# Patient Record
Sex: FEMALE | Race: BLACK OR AFRICAN AMERICAN | NOT HISPANIC OR LATINO | Employment: OTHER | URBAN - METROPOLITAN AREA
[De-identification: names, ages, dates, MRNs, and addresses within clinical notes are randomized per-mention and may not be internally consistent; named-entity substitution may affect disease eponyms.]

---

## 2017-01-18 RX ORDER — GABAPENTIN 300 MG/1
CAPSULE ORAL
Qty: 90 CAPSULE | Refills: 2 | Status: SHIPPED | OUTPATIENT
Start: 2017-01-18 | End: 2017-03-21 | Stop reason: SDUPTHER

## 2017-02-02 ENCOUNTER — TELEPHONE (OUTPATIENT)
Dept: ENDOCRINOLOGY | Facility: CLINIC | Age: 52
End: 2017-02-02

## 2017-02-02 NOTE — TELEPHONE ENCOUNTER
----- Message from Preston Chiang sent at 2/2/2017  8:26 AM CST -----  Contact: self [phone]  Patient requesting to come in at a later time in the afternoon for schedule appointment tomorrow. Thanks!

## 2017-02-27 ENCOUNTER — TELEPHONE (OUTPATIENT)
Dept: FAMILY MEDICINE | Facility: CLINIC | Age: 52
End: 2017-02-27

## 2017-02-27 NOTE — TELEPHONE ENCOUNTER
She is resubmitting papers faxing to [facility] to see if dr painter will fill out for limited labor. Awaiting faxes

## 2017-02-27 NOTE — TELEPHONE ENCOUNTER
----- Message from Milton Lenz sent at 2/27/2017  9:25 AM CST -----  Contact: Patient  Patient called requesting a sooner appointment. Didn't want to see another doctor or nurse practitioner. Please call back at [phone] to confirm. Thanks,

## 2017-03-07 ENCOUNTER — TELEPHONE (OUTPATIENT)
Dept: FAMILY MEDICINE | Facility: CLINIC | Age: 52
End: 2017-03-07

## 2017-03-07 NOTE — TELEPHONE ENCOUNTER
----- Message from Padmini Montes sent at 3/6/2017 10:08 AM CST -----  Contact: self  Patient wants to speak with a nurse regarding medical paperwork. Please call back at  [phone]

## 2017-03-09 ENCOUNTER — TELEPHONE (OUTPATIENT)
Dept: FAMILY MEDICINE | Facility: CLINIC | Age: 52
End: 2017-03-09

## 2017-03-10 ENCOUNTER — TELEPHONE (OUTPATIENT)
Dept: FAMILY MEDICINE | Facility: CLINIC | Age: 52
End: 2017-03-10

## 2017-03-14 ENCOUNTER — TELEPHONE (OUTPATIENT)
Dept: FAMILY MEDICINE | Facility: CLINIC | Age: 52
End: 2017-03-14

## 2017-03-16 ENCOUNTER — OFFICE VISIT (OUTPATIENT)
Dept: FAMILY MEDICINE | Facility: CLINIC | Age: 52
End: 2017-03-16
Payer: COMMERCIAL

## 2017-03-16 VITALS
DIASTOLIC BLOOD PRESSURE: 74 MMHG | HEART RATE: 97 BPM | SYSTOLIC BLOOD PRESSURE: 124 MMHG | WEIGHT: 221.56 LBS | BODY MASS INDEX: 49.84 KG/M2 | OXYGEN SATURATION: 100 % | HEIGHT: 56 IN

## 2017-03-16 DIAGNOSIS — G62.9 PERIPHERAL POLYNEUROPATHY: ICD-10-CM

## 2017-03-16 DIAGNOSIS — E11.9 TYPE 2 DIABETES MELLITUS WITHOUT COMPLICATION, WITHOUT LONG-TERM CURRENT USE OF INSULIN: Primary | ICD-10-CM

## 2017-03-16 DIAGNOSIS — F41.9 ANXIETY: ICD-10-CM

## 2017-03-16 DIAGNOSIS — I10 HTN (HYPERTENSION), BENIGN: ICD-10-CM

## 2017-03-16 PROCEDURE — 3045F PR MOST RECENT HEMOGLOBIN A1C LEVEL 7.0-9.0%: CPT | Mod: S$GLB,,, | Performed by: FAMILY MEDICINE

## 2017-03-16 PROCEDURE — 99999 PR PBB SHADOW E&M-EST. PATIENT-LVL III: CPT | Mod: PBBFAC,,, | Performed by: FAMILY MEDICINE

## 2017-03-16 PROCEDURE — 1160F RVW MEDS BY RX/DR IN RCRD: CPT | Mod: S$GLB,,, | Performed by: FAMILY MEDICINE

## 2017-03-16 PROCEDURE — 3078F DIAST BP <80 MM HG: CPT | Mod: S$GLB,,, | Performed by: FAMILY MEDICINE

## 2017-03-16 PROCEDURE — 3074F SYST BP LT 130 MM HG: CPT | Mod: S$GLB,,, | Performed by: FAMILY MEDICINE

## 2017-03-16 PROCEDURE — 99214 OFFICE O/P EST MOD 30 MIN: CPT | Mod: S$GLB,,, | Performed by: FAMILY MEDICINE

## 2017-03-16 PROCEDURE — 4010F ACE/ARB THERAPY RXD/TAKEN: CPT | Mod: S$GLB,,, | Performed by: FAMILY MEDICINE

## 2017-03-16 RX ORDER — CITALOPRAM 40 MG/1
40 TABLET, FILM COATED ORAL DAILY
Qty: 30 TABLET | Refills: 11
Start: 2017-03-16 | End: 2017-07-28

## 2017-03-16 RX ORDER — IBUPROFEN 800 MG/1
800 TABLET ORAL 3 TIMES DAILY
Refills: 2 | Status: ON HOLD | COMMUNITY
Start: 2016-12-29 | End: 2017-08-31 | Stop reason: CLARIF

## 2017-03-16 RX ORDER — BUSPIRONE HYDROCHLORIDE 5 MG/1
5 TABLET ORAL 2 TIMES DAILY
Qty: 60 TABLET | Refills: 11 | Status: SHIPPED | OUTPATIENT
Start: 2017-03-16 | End: 2017-07-28

## 2017-03-16 RX ORDER — CLOBETASOL PROPIONATE 0.5 MG/G
1 CREAM TOPICAL 2 TIMES DAILY
Refills: 1 | Status: ON HOLD | COMMUNITY
Start: 2016-12-29 | End: 2017-08-31 | Stop reason: CLARIF

## 2017-03-16 NOTE — MR AVS SNAPSHOT
[facility]  [address]  [facility] [place] [address]  Phone: [phone]  Fax: [phone]                  Mine Quiros   3/16/2017 10:20 AM   Office Visit    Description:  Female : 1965   Provider:  Hai Pitt MD   Department:  [facility]           Reason for Visit     Anxiety           Diagnoses this Visit        Comments    Diabetes mellitus with neurological manifestations, uncontrolled    -  Primary     Peripheral polyneuropathy         HTN (hypertension), benign                To Do List           Future Appointments        Provider Department Dept Phone    2017 10:15 AM [facility], [place] [facility] [phone]    2017 10:00 AM XOCHILT Baumann,ANP-C [facility] [phone]    2017 10:00 AM Hai Pitt MD [facility] [phone]      Goals (5 Years of Data)     None      Follow-Up and Disposition     Return in about 3 months (around 2017).       These Medications        Disp Refills Start End    citalopram (CELEXA) 40 MG tablet 30 tablet 11 3/16/2017 4/15/2017    Take 1 tablet (40 mg total) by mouth once daily. - Oral    Pharmacy: [facility] - [place] [address] Ph #: [phone]       busPIRone (BUSPAR) 5 MG Tab 60 tablet 11 3/16/2017 3/16/2018    Take 1 tablet (5 mg total) by mouth 2 (two) times daily. - Oral    Pharmacy: [facility] - [place] [address] Ph #: [phone]         [facility]     [facility] Nurse Care [facility] -  Assistance  Registered nurses in the [facility] provide clinical advisement, health education, appointment booking, and other advisory services.  Call for this free service at [phone].             Medications           Message regarding Medications     Verify the changes and/or additions to your medication regime listed below are the same as discussed with your  "clinician today.  If any of these changes or additions are incorrect, please notify your healthcare provider.        START taking these NEW medications        Refills    citalopram (CELEXA) 40 MG tablet 11    Sig: Take 1 tablet (40 mg total) by mouth once daily.    Class: No Print    Route: Oral    busPIRone (BUSPAR) 5 MG Tab 11    Sig: Take 1 tablet (5 mg total) by mouth 2 (two) times daily.    Class: Normal    Route: Oral      STOP taking these medications     acetaminophen (TYLENOL) 500 MG tablet Take 2 tablets (1,000 mg total) by mouth 3 (three) times daily as needed for Pain.           Verify that the below list of medications is an accurate representation of the medications you are currently taking.  If none reported, the list may be blank. If incorrect, please contact your healthcare provider. Carry this list with you in case of emergency.           Current Medications     amlodipine (NORVASC) 10 MG tablet Take 1 tablet (10 mg total) by mouth once daily.    BD INSULIN PEN NEEDLE UF MINI 31 gauge x 3/16" Ndle USE AS DIRECTED    chlorthalidone (HYGROTEN) 25 MG Tab TAKE 1 TABLET BY MOUTH DAILY    clindamycin-benzoyl peroxide (BENZACLIN) gel aaa facial acne bid prn    clobetasol (TEMOVATE) 0.05 % cream 1 application 2 (two) times daily. Apply to affected area    clobetasol (TEMOVATE) 0.05 % external solution Use on scalp one - two times daily as needed for scaling or itching    diphenhydrAMINE (BENADRYL) 25 mg capsule Take 25 mg by mouth nightly.      gabapentin (NEURONTIN) 300 MG capsule TAKE 1 CAPSULE BY MOUTH THREE TIMES DAILY    ibuprofen (ADVIL,MOTRIN) 800 MG tablet Take 800 mg by mouth 3 (three) times daily.    insulin lispro (HUMALOG) 100 unit/mL injection Pt needs 3 vials per month to use with Vgo device.    ketoconazole (NIZORAL) 2 % shampoo Wash hair with medicated shampoo at least 2x/week - let sit on scalp at least 5 minutes prior to rinsing    losartan (COZAAR) 100 MG tablet TAKE 1 TABLET BY MOUTH " "DAILY    metformin (GLUCOPHAGE-XR) 500 MG 24 hr tablet Take 1 tablet (500 mg total) by mouth 2 (two) times daily with meals.    metoprolol tartrate (LOPRESSOR) 25 MG tablet Take 1 tablet (25 mg total) by mouth 2 (two) times daily.    pioglitazone (ACTOS) 15 MG tablet Take 15 mg by mouth once daily.    salicylic acid 6 % Crea Apply daily    sub-q insulin device, 40 unit (VGO 40) Jasmyne 1 Device by Misc.(Non-Drug; Combo Route) route once daily.    trazodone (DESYREL) 50 MG tablet Take 50 mg by mouth nightly as needed for Insomnia.    busPIRone (BUSPAR) 5 MG Tab Take 1 tablet (5 mg total) by mouth 2 (two) times daily.    citalopram (CELEXA) 40 MG tablet Take 1 tablet (40 mg total) by mouth once daily.           Clinical Reference Information           Your Vitals Were     BP Pulse Height Weight SpO2 BMI    124/74 97 4' 8" (1.422 m) 100.5 kg (221 lb 9 oz) 100% 49.67 kg/m2      Blood Pressure          Most Recent Value    BP  124/74      Allergies as of 3/16/2017     Morphine      Immunizations Administered on Date of Encounter - 3/16/2017     None      Language Assistance Services     ATTENTION: Language assistance services are available, free of charge. Please call [phone].      ATENCIÓN: Si habla ryan, tiene a rodriguez disposición servicios gratuitos de asistencia lingüística. Llame al [phone].     ORLY Ý: N?u b?n nói Ti?ng Vi?t, có các d?ch v? h? tr? ngôn ng? mi?n phí dành cho b?n. G?i s? [phone].         [facility] complies with applicable Federal civil rights laws and does not discriminate on the basis of race, color, national origin, age, disability, or sex.        "

## 2017-03-16 NOTE — PROGRESS NOTES
Subjective:       Patient ID: Mine Quiros is a 51 y.o. female.    Chief Complaint: Anxiety    HPI     dm2 with neuropathy:  a1c 7.6% 12/2016  Sees endocrinology  On insulin pump  Not utd with eye dr      htn stable.      Recall, patient on light duty work due to severe diabetic peripheral neuropathy. Reports that she may be approved for disability on 3/24/17.      Patient reports having panic attacks at work for the past 2 months.  Unable to concentrate at work.  Compliant with celexa 40 mg daily.      htn stable.     Review of Systems      Review of Systems   Constitutional: Negative for fever and chills.   HENT: Negative for hearing loss and neck stiffness.    Eyes: Negative for redness and itching.   Respiratory: Negative for cough and choking.    Cardiovascular: Negative for chest pain and leg swelling.  Abdomen: Negative for abdominal pain and blood in stool.   Genitourinary: Negative for dysuria and flank pain.   Musculoskeletal: Negative for back pain and gait problem.   Neurological: Negative for light-headedness and headaches.   Hematological: Negative for adenopathy.       Objective:      Physical Exam   Constitutional: She appears well-developed.   HENT:   Head: Normocephalic and atraumatic.   Eyes: Conjunctivae are normal. Pupils are equal, round, and reactive to light.   Neck: Normal range of motion.   Cardiovascular: Normal rate and regular rhythm.    No murmur heard.  Pulmonary/Chest: Effort normal and breath sounds normal. She has no wheezes.   Lymphadenopathy:     She has no cervical adenopathy.       Assessment:       1. Diabetes mellitus with neurological manifestations, uncontrolled    2. Peripheral polyneuropathy    3. HTN (hypertension), benign    4. Anxiety        Plan:       Diabetes mellitus with neurological manifestations, uncontrolled    Peripheral polyneuropathy    HTN (hypertension), benign    Anxiety    Other orders  -     citalopram (CELEXA) 40 MG tablet; Take 1 tablet (40 mg  total) by mouth once daily.  Dispense: 30 tablet; Refill: 11  -     busPIRone (BUSPAR) 5 MG Tab; Take 1 tablet (5 mg total) by mouth 2 (two) times daily.  Dispense: 60 tablet; Refill: 11          Plan:  Cont with celexa. Add buspar.  rtw note for restricted duty on 3/27/17.

## 2017-03-21 ENCOUNTER — TELEPHONE (OUTPATIENT)
Dept: FAMILY MEDICINE | Facility: CLINIC | Age: 52
End: 2017-03-21

## 2017-03-21 DIAGNOSIS — G47.33 OSA (OBSTRUCTIVE SLEEP APNEA): Primary | ICD-10-CM

## 2017-03-21 RX ORDER — GABAPENTIN 300 MG/1
CAPSULE ORAL
Qty: 90 CAPSULE | Refills: 3 | Status: SHIPPED | OUTPATIENT
Start: 2017-03-21 | End: 2017-09-08 | Stop reason: SDUPTHER

## 2017-03-21 NOTE — TELEPHONE ENCOUNTER
Called pt, she is requesting a new mask. She doesn't like the way hers fits and is leaking air please advise. She uses [facility].

## 2017-03-21 NOTE — TELEPHONE ENCOUNTER
----- Message from Ayala Clayton sent at 3/21/2017 10:44 AM CDT -----  Contact: patient  Patient calling in regards to the mask for her C-pap machine. It's not working well for her and she would like a prescription to get a new mask. It's hard for her to sleep. Please advise.  Call back .  Thanks!

## 2017-04-16 RX ORDER — LOSARTAN POTASSIUM 100 MG/1
TABLET ORAL
Qty: 90 TABLET | Refills: 3 | Status: SHIPPED | OUTPATIENT
Start: 2017-04-16 | End: 2020-04-20

## 2017-04-17 ENCOUNTER — LAB VISIT (OUTPATIENT)
Dept: LAB | Facility: HOSPITAL | Age: 52
End: 2017-04-17
Attending: NURSE PRACTITIONER
Payer: COMMERCIAL

## 2017-04-17 DIAGNOSIS — E11.9 TYPE 2 DIABETES MELLITUS WITHOUT COMPLICATION, WITHOUT LONG-TERM CURRENT USE OF INSULIN: ICD-10-CM

## 2017-04-17 DIAGNOSIS — E11.42 TYPE 2 DIABETES MELLITUS WITH DIABETIC POLYNEUROPATHY, WITH LONG-TERM CURRENT USE OF INSULIN: ICD-10-CM

## 2017-04-17 DIAGNOSIS — Z79.4 TYPE 2 DIABETES MELLITUS WITH DIABETIC POLYNEUROPATHY, WITH LONG-TERM CURRENT USE OF INSULIN: ICD-10-CM

## 2017-04-17 LAB
CHOLEST/HDLC SERPL: 3.4 {RATIO}
HDL/CHOLESTEROL RATIO: 29.6 %
HDLC SERPL-MCNC: 206 MG/DL
HDLC SERPL-MCNC: 61 MG/DL
LDLC SERPL CALC-MCNC: 126.2 MG/DL
NONHDLC SERPL-MCNC: 145 MG/DL
TRIGL SERPL-MCNC: 94 MG/DL

## 2017-04-17 PROCEDURE — 83036 HEMOGLOBIN GLYCOSYLATED A1C: CPT

## 2017-04-17 PROCEDURE — 36415 COLL VENOUS BLD VENIPUNCTURE: CPT | Mod: PO

## 2017-04-17 PROCEDURE — 80061 LIPID PANEL: CPT

## 2017-04-18 LAB
ESTIMATED AVG GLUCOSE: 160 MG/DL
HBA1C MFR BLD HPLC: 7.2 %

## 2017-04-24 ENCOUNTER — PATIENT MESSAGE (OUTPATIENT)
Dept: FAMILY MEDICINE | Facility: CLINIC | Age: 52
End: 2017-04-24

## 2017-04-26 ENCOUNTER — OFFICE VISIT (OUTPATIENT)
Dept: ENDOCRINOLOGY | Facility: CLINIC | Age: 52
End: 2017-04-26
Payer: COMMERCIAL

## 2017-04-26 VITALS
BODY MASS INDEX: 50.33 KG/M2 | HEIGHT: 56 IN | WEIGHT: 223.75 LBS | HEART RATE: 87 BPM | DIASTOLIC BLOOD PRESSURE: 84 MMHG | SYSTOLIC BLOOD PRESSURE: 146 MMHG

## 2017-04-26 DIAGNOSIS — Z79.4 TYPE 2 DIABETES MELLITUS WITH DIABETIC POLYNEUROPATHY, WITH LONG-TERM CURRENT USE OF INSULIN: Primary | ICD-10-CM

## 2017-04-26 DIAGNOSIS — I10 HTN (HYPERTENSION), BENIGN: ICD-10-CM

## 2017-04-26 DIAGNOSIS — Z96.41 INSULIN PUMP STATUS: Chronic | ICD-10-CM

## 2017-04-26 DIAGNOSIS — G62.9 PERIPHERAL POLYNEUROPATHY: ICD-10-CM

## 2017-04-26 DIAGNOSIS — E11.42 TYPE 2 DIABETES MELLITUS WITH DIABETIC POLYNEUROPATHY, WITH LONG-TERM CURRENT USE OF INSULIN: Primary | ICD-10-CM

## 2017-04-26 DIAGNOSIS — E66.01 MORBID OBESITY WITH BMI OF 45.0-49.9, ADULT: ICD-10-CM

## 2017-04-26 PROCEDURE — 3079F DIAST BP 80-89 MM HG: CPT | Mod: S$GLB,,, | Performed by: NURSE PRACTITIONER

## 2017-04-26 PROCEDURE — 4010F ACE/ARB THERAPY RXD/TAKEN: CPT | Mod: S$GLB,,, | Performed by: NURSE PRACTITIONER

## 2017-04-26 PROCEDURE — 99999 PR PBB SHADOW E&M-EST. PATIENT-LVL IV: CPT | Mod: PBBFAC,,, | Performed by: NURSE PRACTITIONER

## 2017-04-26 PROCEDURE — 1160F RVW MEDS BY RX/DR IN RCRD: CPT | Mod: S$GLB,,, | Performed by: NURSE PRACTITIONER

## 2017-04-26 PROCEDURE — 99214 OFFICE O/P EST MOD 30 MIN: CPT | Mod: S$GLB,,, | Performed by: NURSE PRACTITIONER

## 2017-04-26 PROCEDURE — 3045F PR MOST RECENT HEMOGLOBIN A1C LEVEL 7.0-9.0%: CPT | Mod: S$GLB,,, | Performed by: NURSE PRACTITIONER

## 2017-04-26 PROCEDURE — 3077F SYST BP >= 140 MM HG: CPT | Mod: S$GLB,,, | Performed by: NURSE PRACTITIONER

## 2017-04-26 NOTE — MR AVS SNAPSHOT
[facility]  [address]  [facility] [address]  Phone: [phone]  Fax: [phone]                  Mine Quiros   2017 10:00 AM   Office Visit    Description:  Female : 1965   Provider:  XOCHILT Baumann,WILLIAMC   Department:  [place] - Endocrinology           Reason for Visit     Diabetes Mellitus           Diagnoses this Visit        Comments    Type 2 diabetes mellitus with diabetic polyneuropathy, with long-term current use of insulin    -  Primary            To Do List           Future Appointments        Provider Department Dept Phone    2017 10:00 AM Hai Pitt MD [facility] [phone]    2017 10:00 AM [facility] [place] [facility] [phone]    2017 10:30 AM XOCHILT Baumann,ANP-C [facility] [phone]      Goals (5 Years of Data)     None      [facility] On Call     [facility] Nurse Care Line -  Assistance  Unless otherwise directed by your provider, please contact [facility], our nurse care line that is available for  assistance.     Registered nurses in the [facility] provide: appointment scheduling, clinical advisement, health education, and other advisory services.  Call: [phone] (toll free)               Medications           Message regarding Medications     Verify the changes and/or additions to your medication regime listed below are the same as discussed with your clinician today.  If any of these changes or additions are incorrect, please notify your healthcare provider.             Verify that the below list of medications is an accurate representation of the medications you are currently taking.  If none reported, the list may be blank. If incorrect, please contact your healthcare provider. Carry this list with you in case of emergency.           Current Medications     amlodipine (NORVASC) 10 MG tablet Take 1 tablet (10 mg  "total) by mouth once daily.    BD INSULIN PEN NEEDLE UF MINI 31 gauge x 3/16" Ndle USE AS DIRECTED    busPIRone (BUSPAR) 5 MG Tab Take 1 tablet (5 mg total) by mouth 2 (two) times daily.    chlorthalidone (HYGROTEN) 25 MG Tab TAKE 1 TABLET BY MOUTH DAILY    clindamycin-benzoyl peroxide (BENZACLIN) gel aaa facial acne bid prn    clobetasol (TEMOVATE) 0.05 % cream 1 application 2 (two) times daily. Apply to affected area    clobetasol (TEMOVATE) 0.05 % external solution Use on scalp one - two times daily as needed for scaling or itching    diphenhydrAMINE (BENADRYL) 25 mg capsule Take 25 mg by mouth nightly.      gabapentin (NEURONTIN) 300 MG capsule TAKE 1 CAPSULE BY MOUTH THREE TIMES DAILY    ibuprofen (ADVIL,MOTRIN) 800 MG tablet Take 800 mg by mouth 3 (three) times daily.    insulin lispro (HUMALOG) 100 unit/mL injection Pt needs 3 vials per month to use with Vgo device.    ketoconazole (NIZORAL) 2 % shampoo Wash hair with medicated shampoo at least 2x/week - let sit on scalp at least 5 minutes prior to rinsing    losartan (COZAAR) 100 MG tablet TAKE 1 TABLET BY MOUTH DAILY    metformin (GLUCOPHAGE-XR) 500 MG 24 hr tablet Take 1 tablet (500 mg total) by mouth 2 (two) times daily with meals.    metoprolol tartrate (LOPRESSOR) 25 MG tablet Take 1 tablet (25 mg total) by mouth 2 (two) times daily.    pioglitazone (ACTOS) 15 MG tablet Take 15 mg by mouth once daily.    salicylic acid 6 % Crea Apply daily    sub-q insulin device, 40 unit (VGO 40) Jasmyne 1 Device by Misc.(Non-Drug; Combo Route) route once daily.    trazodone (DESYREL) 50 MG tablet Take 50 mg by mouth nightly as needed for Insomnia.    citalopram (CELEXA) 40 MG tablet Take 1 tablet (40 mg total) by mouth once daily.           Clinical Reference Information           Your Vitals Were     BP Pulse Height Weight BMI    146/84 (BP Location: Right arm, Patient Position: Sitting, BP Method: Manual) 87 4' 8" (1.422 m) 101.5 kg (223 lb 12.3 oz) 50.17 kg/m2    "   Blood Pressure          Most Recent Value    BP  (!)  146/84      Allergies as of 4/26/2017     Morphine      Immunizations Administered on Date of Encounter - 4/26/2017     None      Orders Placed During Today's Visit     Future Labs/Procedures Expected by Expires    Hemoglobin A1c  4/26/2017 4/26/2018      Language Assistance Services     ATTENTION: Language assistance services are available, free of charge. Please call [phone].      ATENCIÓN: Si habla español, tiene a rodriguez disposición servicios gratuitos de asistencia lingüística. Llame al [phone].     CHÚ Ý: N?u b?n nói Ti?ng Vi?t, có các d?ch v? h? tr? ngôn ng? mi?n phí dành cho b?n. G?i s? [phone].         [facility] complies with applicable Federal civil rights laws and does not discriminate on the basis of race, color, national origin, age, disability, or sex.

## 2017-04-26 NOTE — PROGRESS NOTES
Subjective:       Patient ID: Mine Quiros is a 51 y.o. female.    Chief Complaint: insulin pump download and glucose rview    HPI Pt is a 49 y/o female with a long hx of very uncontrolled Type 2 DM-- as well as chronic conditions pending review including HTN, peripheral neuropathy, diastolic dysfunciton, morbid obesity- now on insulin pump.She has had multiple difficulties being able to navigate pump and having freq hypoglycemia.     IInterim Events:   Pt now medically disabledc-likely r/t peripheral neuropathy, SOB.  Continues insulin pump use.  A1C best ever.  No c/o hypoglycemia. Pt inadvertantly changed date and time in pump--which negated ability to download information today.  Pt again counseled on accessing the [email or web address] menu and changing settings.    Metformin 500 mg XR restarted at last visit.  Pt still with freq BM--but more tolerable.       I  P        Medtronic 530 G insulin pump   12 mid ----1.8 u/hr   8 am------2.2u/hr    CHO 1:3  ISF 1:20  Goal 100-140    Diabetes Flow Sheet:  Diabetes Medications:pioglitazone 15 mg     Prior Regimen/Recommended in event of pump failure--50 units of lantus and Novlog 15 with meals.   Diabetes Complications:peripheral neuropathy, cardiovascular.   Aspirin: no  Statin: no  ACE/ARB:losartan 100 mg   Last Urine Microalbumin:   Last Eye exam:  Last Diabetic Education:       Review of Systems   Constitutional: Negative for activity change and fatigue.   HENT: Negative for hearing loss and trouble swallowing.    Eyes: Negative for photophobia and visual disturbance.        Last Eye Exam:   April 2016   Respiratory: Negative for cough and shortness of breath (stanley).    Cardiovascular: Negative for chest pain and palpitations.   Gastrointestinal: Negative for constipation and diarrhea.   Genitourinary: Negative for frequency and urgency.   Musculoskeletal: Negative for arthralgias and myalgias.        Foot pain.    Skin: Negative for rash and wound.   Neurological:  "Positive for numbness. Negative for weakness.   Psychiatric/Behavioral: Negative for sleep disturbance. The patient is not nervous/anxious.        Objective:      Physical Exam   Constitutional: She is oriented to person, place, and time. She appears well-developed and well-nourished.   Vital Signs  Pulse: 106  Resp: 18  BP: 138/82  BP Location: Right leg  Patient Position: Sitting  Pain Score:   6 (both feet)  Pain Loc: Foot  Height and Weight  Height: 4' 8" (142.2 cm)  Weight: 101.8 kg (224 lb 6.9 oz)  BSA (Calculated - sq m): 2.01 sq meters  BMI (Calculated): 50.4  Weight in (lb) to have BMI = 25: 111.3]     HENT:   Head: Normocephalic and atraumatic.   Nose: Nose normal.   Mouth/Throat: Oropharynx is clear and moist.   Eyes: Conjunctivae and EOM are normal. Pupils are equal, round, and reactive to light.   Neck: Normal range of motion. Neck supple. No tracheal deviation present.   Cardiovascular: Normal rate, regular rhythm, normal heart sounds and intact distal pulses.    Pulmonary/Chest: Effort normal and breath sounds normal.   Musculoskeletal: Normal range of motion.   Feet:no open wound or calluses.    Lymphadenopathy:     She has no cervical adenopathy.   Neurological: She is alert and oriented to person, place, and time.   Vibratory sensation to feet:  Intact bilaterally    Skin: Skin is warm and dry.   Psychiatric: She has a normal mood and affect. Her behavior is normal. Judgment and thought content normal.       Hemoglobin A1C   Date Value Ref Range Status   04/17/2017 7.2 (H) 4.5 - 6.2 % Final     Comment:     According to ADA guidelines, hemoglobin A1C <7.0% represents  optimal control in non-pregnant diabetic patients.  Different  metrics may apply to specific populations.   Standards of Medical Care in Diabetes - 2016.  For the purpose of screening for the presence of diabetes:  <5.7%     Consistent with the absence of diabetes  5.7-6.4%  Consistent with increasing risk for diabetes "   (prediabetes)  >or=6.5%  Consistent with diabetes  Currently no consensus exists for use of hemoglobin A1C  for diagnosis of diabetes for children.     12/19/2016 7.6 (H) 4.5 - 6.2 % Final     Comment:     According to ADA guidelines, hemoglobin A1C <7.0% represents  optimal control in non-pregnant diabetic patients.  Different  metrics may apply to specific populations.   Standards of Medical Care in Diabetes - 2016.  For the purpose of screening for the presence of diabetes:  <5.7%     Consistent with the absence of diabetes  5.7-6.4%  Consistent with increasing risk for diabetes   (prediabetes)  >or=6.5%  Consistent with diabetes  Currently no consensus exists for use of hemoglobin A1C  for diagnosis of diabetes for children.     09/12/2016 7.9 (H) 4.5 - 6.2 % Final     Comment:     According to ADA guidelines, hemoglobin A1C <7.0% represents  optimal control in non-pregnant diabetic patients.  Different  metrics may apply to specific populations.   Standards of Medical Care in Diabetes - 2016.  For the purpose of screening for the presence of diabetes:  <5.7%     Consistent with the absence of diabetes  5.7-6.4%  Consistent with increasing risk for diabetes   (prediabetes)  >or=6.5%  Consistent with diabetes  Currently no consensus exists for use of hemoglobin A1C  for diagnosis of diabetes for children.         Chemistry        Component Value Date/Time     12/28/2016 1647    K 4.0 12/28/2016 1647     12/28/2016 1647    CO2 30 12/28/2016 1647    BUN 13 12/28/2016 1647    CREATININE 0.74 12/28/2016 1647     (H) 12/28/2016 1647        Component Value Date/Time    CALCIUM 9.0 12/28/2016 1647    ALKPHOS 93 12/28/2016 1647     (H) 12/28/2016 1647    ALT 85 (H) 12/28/2016 1647    BILITOT 0.9 12/28/2016 1647        Lab Results   Component Value Date    LDLCALC 126.2 04/17/2017     Lab Results   Component Value Date    TSH 0.595 06/15/2016       Assessment:       1. Type 2 diabetes mellitus  with diabetic polyneuropathy, with long-term current use of insulin  Hemoglobin A1c  Chronic-stable-no changes    2. HTN (hypertension), benign  -chronic-stable-cont ccb, arb, bb    3. Peripheral polyneuropathy  -chronic-worsened-monitor foot care    4. Morbid obesity with BMI of 45.0-49.9, adult  -chronic-stable-advise to consider aquatics    5. Insulin pump status           Plan:       Pt must increase SBGM to QID and bolus with meals--suspect overbasaled.    --counseled on wt loss to help foot and leg pain, amongst a gew.     -will add metformin 500 XR--see if we can decrease insulin to minimize wt gain.     ORDERS  4/26/17    4 mo with a1c prior

## 2017-06-02 ENCOUNTER — PATIENT OUTREACH (OUTPATIENT)
Dept: ADMINISTRATIVE | Facility: HOSPITAL | Age: 52
End: 2017-06-02

## 2017-06-06 RX ORDER — INSULIN LISPRO 100 [IU]/ML
INJECTION, SOLUTION INTRAVENOUS; SUBCUTANEOUS
Qty: 50 ML | Refills: 6 | Status: SHIPPED | OUTPATIENT
Start: 2017-06-06 | End: 2018-06-21 | Stop reason: SDUPTHER

## 2017-06-08 ENCOUNTER — TELEPHONE (OUTPATIENT)
Dept: ADMINISTRATIVE | Facility: HOSPITAL | Age: 52
End: 2017-06-08

## 2017-06-08 NOTE — TELEPHONE ENCOUNTER
"Patient is enrolled in [facility] Quality Blue program for assistance with management of chronic disease. He/she will receive pre-visit and post-visit calls from a [employer] nurse to assist with closing any gaps in patient's care. Patient informed the nurse of the following:         "Member plans to attend appointment and discuss Colonoscopy at that time (6/1)"  "

## 2017-06-12 ENCOUNTER — PATIENT OUTREACH (OUTPATIENT)
Dept: ADMINISTRATIVE | Facility: HOSPITAL | Age: 52
End: 2017-06-12

## 2017-06-12 NOTE — LETTER
June 12, 2017    Mine Lopez Ishaan  [address]  [place] [address]             [facility]  [address]  [facility] [address]  Phone: [phone] Dear MsKailash Arechigaes:    We have tried to reach you by mychart unsuccessfully.     [facility] is committed to your overall health.  To help you get the most out of each of your visits, we will review your information to make sure you are up to date on all of your recommended tests and/or procedures.       Dr. Pitt         has found that you may be due for:     Pneumonia immunization   Pap smear with HPV Cotest   Diabetic Foot Exam     If you have had any of the above done at another facility, please bring the records or information with you so that your record at [facility] will be complete.      If you are currently taking medication , please bring it with you to your appointment for review.     We appreciate the opportunity to provide you with excellent medical care.      If you have any questions or concerns, please don't hesitate to call.    Sincerely,    Katerina Yates  Clinical Care Coordinator  [facility]  [address] [facility]  [place] [address]  Phone: [phone]   Fax: [phone]

## 2017-07-21 ENCOUNTER — TELEPHONE (OUTPATIENT)
Dept: ADMINISTRATIVE | Facility: HOSPITAL | Age: 52
End: 2017-07-21

## 2017-07-21 NOTE — TELEPHONE ENCOUNTER
"Patient is enrolled in [facility] Quality Blue program for assistance with management of chronic disease. He/she will receive pre-visit and post-visit calls from a [employer] nurse to assist with closing any gaps in patient's care. Patient informed the nurse of the following:         "Member reports elevated blood sugars. Member denies diet changes and confirms medication compliance. Member also reports appt with Pain Management MD to manage lower back pain. Member also reports 2 falls since the last f/u call. Member states one fall was d/t her falling in a hole. The other fall she was dancing. Mbr reports colonoscopy due (7/10)"    Upcoming OV with Pain management and Endo.    Please discuss colonoscopy at upcoming OV.  Also perform foot exam.  "

## 2017-07-28 ENCOUNTER — OFFICE VISIT (OUTPATIENT)
Dept: FAMILY MEDICINE | Facility: CLINIC | Age: 52
End: 2017-07-28
Payer: COMMERCIAL

## 2017-07-28 VITALS
DIASTOLIC BLOOD PRESSURE: 68 MMHG | WEIGHT: 227.5 LBS | SYSTOLIC BLOOD PRESSURE: 132 MMHG | OXYGEN SATURATION: 99 % | BODY MASS INDEX: 51.18 KG/M2 | RESPIRATION RATE: 18 BRPM | HEIGHT: 56 IN | HEART RATE: 98 BPM

## 2017-07-28 DIAGNOSIS — E66.01 MORBID OBESITY WITH BMI OF 45.0-49.9, ADULT: ICD-10-CM

## 2017-07-28 DIAGNOSIS — L30.9 ECZEMA, UNSPECIFIED TYPE: ICD-10-CM

## 2017-07-28 DIAGNOSIS — I10 HTN (HYPERTENSION), BENIGN: ICD-10-CM

## 2017-07-28 PROCEDURE — 3045F PR MOST RECENT HEMOGLOBIN A1C LEVEL 7.0-9.0%: CPT | Mod: S$GLB,,, | Performed by: FAMILY MEDICINE

## 2017-07-28 PROCEDURE — 99999 PR PBB SHADOW E&M-EST. PATIENT-LVL III: CPT | Mod: PBBFAC,,, | Performed by: FAMILY MEDICINE

## 2017-07-28 PROCEDURE — 4010F ACE/ARB THERAPY RXD/TAKEN: CPT | Mod: S$GLB,,, | Performed by: FAMILY MEDICINE

## 2017-07-28 PROCEDURE — 99214 OFFICE O/P EST MOD 30 MIN: CPT | Mod: 25,S$GLB,, | Performed by: FAMILY MEDICINE

## 2017-07-28 PROCEDURE — 90732 PPSV23 VACC 2 YRS+ SUBQ/IM: CPT | Mod: S$GLB,,, | Performed by: FAMILY MEDICINE

## 2017-07-28 PROCEDURE — 90471 IMMUNIZATION ADMIN: CPT | Mod: S$GLB,,, | Performed by: FAMILY MEDICINE

## 2017-07-28 RX ORDER — BETAMETHASONE DIPROPIONATE 0.5 MG/G
CREAM TOPICAL 2 TIMES DAILY
Qty: 45 G | Refills: 2 | Status: SHIPPED | OUTPATIENT
Start: 2017-07-28 | End: 2018-08-09

## 2017-07-28 RX ORDER — ESTRADIOL 0.1 MG/G
1 CREAM VAGINAL
Refills: 1 | Status: ON HOLD | COMMUNITY
Start: 2017-06-13 | End: 2017-08-31 | Stop reason: CLARIF

## 2017-07-28 NOTE — PROGRESS NOTES
Subjective:       Patient ID: Mine Quiros is a 51 y.o. female.    Chief Complaint: Diabetes (f/u)    HPI     htn stable.     dm2 with neuropathy:  a1c 7.2% on 4/2017  Sees endocrinology  On insulin pump  Not utd with eye       C/o dry itchy spot on right lower ant dist tib/fib area x 1 year.     Sprained her left foot 1 month ago.  Reports less swelling and better rom. Mild discomfort.     Patient received her medical [employer] disability in April 2017.      Planning to see pain management next week for chronic low back pain.     Review of Systems        Review of Systems   Constitutional: Negative for fever and chills.   HENT: Negative for hearing loss and neck stiffness.    Eyes: Negative for redness and itching.   Respiratory: Negative for cough and choking.    Cardiovascular: Negative for chest pain and leg swelling.  Abdomen: Negative for abdominal pain and blood in stool.   Genitourinary: Negative for dysuria and flank pain.   Musculoskeletal: Negative for back pain and gait problem.   Neurological: Negative for light-headedness and headaches.   Hematological: Negative for adenopathy.   Psychiatric/Behavioral: Negative for behavioral problems.     Objective:      Physical Exam   Constitutional: She appears well-developed.   HENT:   Head: Normocephalic and atraumatic.   Eyes: Conjunctivae are normal. Pupils are equal, round, and reactive to light.   Neck: Normal range of motion.   Cardiovascular: Normal rate and regular rhythm.    No murmur heard.  Pulmonary/Chest: Effort normal and breath sounds normal. She has no wheezes.   Lymphadenopathy:     She has no cervical adenopathy.   Skin:   Right ant dist tib/fib area: eczematous patch noted. Non tender.          Protective Sensation (w/ 10 gram monofilament):  Right: Decreased  Left: Decreased    Visual Inspection:  Normal -  Bilateral    Pedal Pulses:   Right: Present  Left: Present    Posterior tibialis:   Right:Present  Left: Present    Lab Results  "  Component Value Date    HGBA1C 7.2 (H) 04/17/2017           Assessment:       1. Diabetes mellitus with neurological manifestations, uncontrolled    2. HTN (hypertension), benign    3. Eczema, unspecified type    4. Morbid obesity with BMI of 45.0-49.9, adult        Plan:       Diabetes mellitus with neurological manifestations, uncontrolled    HTN (hypertension), benign    Eczema, unspecified type    Morbid obesity with BMI of 45.0-49.9, adult    Other orders  -     betamethasone dipropionate (DIPROLENE) 0.05 % cream; Apply topically 2 (two) times daily.  Dispense: 45 g; Refill: 2  -     (In Office Administered) Pneumococcal Polysaccharide Vaccine (23 Valent) (SQ/IM)          Plan:  Start diprolene  Cont all other meds      Medication List with Changes/Refills   New Medications    BETAMETHASONE DIPROPIONATE (DIPROLENE) 0.05 % CREAM    Apply topically 2 (two) times daily.   Current Medications    AMLODIPINE (NORVASC) 10 MG TABLET    Take 1 tablet (10 mg total) by mouth once daily.    BD INSULIN PEN NEEDLE UF MINI 31 GAUGE X 3/16" NDLE    USE AS DIRECTED    CHLORTHALIDONE (HYGROTEN) 25 MG TAB    TAKE 1 TABLET BY MOUTH DAILY    CITALOPRAM (CELEXA) 40 MG TABLET    Take 1 tablet (40 mg total) by mouth once daily.    CLINDAMYCIN-BENZOYL PEROXIDE (BENZACLIN) GEL    aaa facial acne bid prn    CLOBETASOL (TEMOVATE) 0.05 % CREAM    1 application 2 (two) times daily. Apply to affected area    CLOBETASOL (TEMOVATE) 0.05 % EXTERNAL SOLUTION    Use on scalp one - two times daily as needed for scaling or itching    DIPHENHYDRAMINE (BENADRYL) 25 MG CAPSULE    Take 25 mg by mouth nightly.      ESTRACE 0.01 % (0.1 MG/GRAM) VAGINAL CREAM    Place 1 application vaginally twice a week. 1-2 times per week    GABAPENTIN (NEURONTIN) 300 MG CAPSULE    TAKE 1 CAPSULE BY MOUTH THREE TIMES DAILY    HUMALOG 100 UNIT/ML INJECTION    Pt uses approx 150 units daily via insulin pump    IBUPROFEN (ADVIL,MOTRIN) 800 MG TABLET    Take 800 mg by " mouth 3 (three) times daily.    INSULIN LISPRO (HUMALOG) 100 UNIT/ML INJECTION    Pt needs 3 vials per month to use with Vgo device.    KETOCONAZOLE (NIZORAL) 2 % SHAMPOO    Wash hair with medicated shampoo at least 2x/week - let sit on scalp at least 5 minutes prior to rinsing    LOSARTAN (COZAAR) 100 MG TABLET    TAKE 1 TABLET BY MOUTH DAILY    METFORMIN (GLUCOPHAGE-XR) 500 MG 24 HR TABLET    Take 1 tablet (500 mg total) by mouth 2 (two) times daily with meals.    METOPROLOL TARTRATE (LOPRESSOR) 25 MG TABLET    Take 1 tablet (25 mg total) by mouth 2 (two) times daily.    PIOGLITAZONE (ACTOS) 15 MG TABLET    Take 15 mg by mouth once daily.    SALICYLIC ACID 6 % CREA    Apply daily    SUB-Q INSULIN DEVICE, 40 UNIT (VGO 40) KYMBERLY    1 Device by Misc.(Non-Drug; Combo Route) route once daily.   Discontinued Medications    BUSPIRONE (BUSPAR) 5 MG TAB    Take 1 tablet (5 mg total) by mouth 2 (two) times daily.    TRAZODONE (DESYREL) 50 MG TABLET    Take 50 mg by mouth nightly as needed for Insomnia.

## 2017-07-31 ENCOUNTER — OFFICE VISIT (OUTPATIENT)
Dept: PAIN MEDICINE | Facility: CLINIC | Age: 52
End: 2017-07-31
Payer: COMMERCIAL

## 2017-07-31 ENCOUNTER — HOSPITAL ENCOUNTER (OUTPATIENT)
Dept: RADIOLOGY | Facility: HOSPITAL | Age: 52
Discharge: HOME | End: 2017-07-31
Attending: ANESTHESIOLOGY
Payer: COMMERCIAL

## 2017-07-31 VITALS
TEMPERATURE: 99 F | SYSTOLIC BLOOD PRESSURE: 120 MMHG | HEART RATE: 78 BPM | HEIGHT: 56 IN | WEIGHT: 229.06 LBS | BODY MASS INDEX: 51.53 KG/M2 | DIASTOLIC BLOOD PRESSURE: 76 MMHG | RESPIRATION RATE: 20 BRPM

## 2017-07-31 DIAGNOSIS — Z79.4 TYPE 2 DIABETES MELLITUS WITH DIABETIC POLYNEUROPATHY, WITH LONG-TERM CURRENT USE OF INSULIN: ICD-10-CM

## 2017-07-31 DIAGNOSIS — M51.36 DDD (DEGENERATIVE DISC DISEASE), LUMBAR: ICD-10-CM

## 2017-07-31 DIAGNOSIS — E11.42 TYPE 2 DIABETES MELLITUS WITH DIABETIC POLYNEUROPATHY, WITH LONG-TERM CURRENT USE OF INSULIN: ICD-10-CM

## 2017-07-31 DIAGNOSIS — E66.01 MORBID OBESITY WITH BMI OF 45.0-49.9, ADULT: Primary | ICD-10-CM

## 2017-07-31 DIAGNOSIS — M54.16 LEFT LUMBAR RADICULITIS: ICD-10-CM

## 2017-07-31 PROCEDURE — 3045F PR MOST RECENT HEMOGLOBIN A1C LEVEL 7.0-9.0%: CPT | Mod: S$GLB,,, | Performed by: ANESTHESIOLOGY

## 2017-07-31 PROCEDURE — 72110 X-RAY EXAM L-2 SPINE 4/>VWS: CPT | Mod: TC,PO

## 2017-07-31 PROCEDURE — 99999 PR PBB SHADOW E&M-EST. PATIENT-LVL IV: CPT | Mod: PBBFAC,,, | Performed by: ANESTHESIOLOGY

## 2017-07-31 PROCEDURE — 4010F ACE/ARB THERAPY RXD/TAKEN: CPT | Mod: S$GLB,,, | Performed by: ANESTHESIOLOGY

## 2017-07-31 PROCEDURE — 72110 X-RAY EXAM L-2 SPINE 4/>VWS: CPT | Mod: 26,,, | Performed by: RADIOLOGY

## 2017-07-31 PROCEDURE — 99204 OFFICE O/P NEW MOD 45 MIN: CPT | Mod: S$GLB,,, | Performed by: ANESTHESIOLOGY

## 2017-07-31 NOTE — PROGRESS NOTES
This note was completed with dictation software and grammatical errors may exist.    CC: Low back pain    HPI: The patient is a 51-year-old woman with a history of diabetes with neuropathy, hypertension, obesity who presents in self-referral for low back pain. The patient reports having back pain for at least the last 3 years, denies any specific trauma that may have causes.  She describes as constant, radiating along the left posterior thigh and calf into the foot.  She describes her pain is constant but severely worse with standing and walking.  She describes the pain as burning and it radiates along the posterior thigh into the foot but she also has diabetic neuropathy in both feet so has burning and numbness in her feet on a regular basis.  Her pain is especially worse with standing, bending, walking or getting out of a bed or a chair.  She does get some relief with rest and sitting.  She denies any weakness, denies any bowel or bladder incontinence.     Pain intervention history: She had done 3-4 weeks of physical therapy at [facility] with no sustained relief.  She tried gabapentin but it causes drowsiness.      ROS: She reports weight gain, rash, itching, headaches, shortness of breath, diarrhea, diabetes, back pain, difficulty sleeping, anxiety, depression.  Balance of review of systems is negative.    Past Medical History:   Diagnosis Date    Abnormal stress test 10/2016    Acute diastolic heart failure secondary to idiopathic cardiomyopathy 10/2016    Allergy     Carpal tunnel syndrome of right wrist     bilateral    Chest pain     Diabetes mellitus     Type 2 IDDM - Uncontrolled    Diabetic neuropathy     Difficult intubation     needed glidescope for cholecystectomy 2009    Fatty liver     GERD (gastroesophageal reflux disease)     on no meds    Hyperlipidemia     Hypertension     Insomnia     Knee pain     Morbid obesity     Neuropathy due to type 2 diabetes mellitus     anaya feet    MARLON  "(obstructive sleep apnea)     CPAP, says she is compliant with use    Parotid mass 2014    had Biopsy    SOB (shortness of breath) 10/2016    Tachycardia     frequently,chronic for years per chart       Past Surgical History:   Procedure Laterality Date     SECTION      x 1    CHOLECYSTECTOMY  2009    ESOPHAGOGASTRODUODENOSCOPY  2009    Dr [facility]ELA [facility]    HEMORRHOID SURGERY  2012    PPH with External Hemorrhoidectomy - Dr JAMAL Rollins, had spinal anesthesia    ORIF FEMUR FRACTURE Left     IM dmitry.      TRIGGER FINGER RELEASE Right     TUBAL LIGATION         Social History     Social History    Marital status: Single     Spouse name: N/A    Number of children: N/A    Years of education: N/A     Social History Main Topics    Smoking status: Never Smoker    Smokeless tobacco: Never Used    Alcohol use Yes      Comment: 3 daily- ai and vodka    Drug use: No    Sexual activity: Not on file     Other Topics Concern    Not on file     Social History Narrative    No narrative on file         Medications/Allergies: See med card    Vitals:    17 0811   BP: 120/76   Pulse: 78   Resp: 20   Temp: 98.6 °F (37 °C)   TempSrc: Oral   Weight: 103.9 kg (229 lb 0.9 oz)   Height: 4' 8" (1.422 m)   PainSc:   8   PainLoc: Back         Physical exam:  Gen: A and O x3, pleasant, obese  Skin: No rashes or obvious lesions  HEENT: PERRLA, no obvious deformities on ears or in canals. Trachea midline.  CVS: Regular rate and rhythm, normal palpable pulses.  Resp:No increased work of breathing, symmetrical chest rise.  Abdomen: Soft, NT/ND.  Musculoskeletal:  No antalgic gait.     Neuro:  Lower extremities: 5/5 strength bilaterally  Reflexes: Patellar 2+, Achilles 2+ bilaterally.  Sensory:  Intact and symmetrical to light touch and pinprick in L2-S1 dermatomes bilaterally.    Lumbar spine:  Lumbar spine: Range of motion is severely reduced with flexion and extension with increased pain in both " directions especially with left oblique extension causing left buttock pain.  Saroj's test causes no increased pain on either side.    Supine straight leg raise is positive for left posterior thigh pain at 60°.   Internal and external rotation of the hip causes no increased pain on either side.  Myofascial exam: No tenderness to palpation across lumbar paraspinous muscles.    Imaging:  None    Assessment:  The patient is a 51-year-old woman with a history of diabetes with neuropathy, hypertension, obesity who presents in self-referral for low back pain.    1. Morbid obesity with BMI of 45.0-49.9, adult     2. Type 2 diabetes mellitus with diabetic polyneuropathy, with long-term current use of insulin     3. DDD (degenerative disc disease), lumbar  MRI Lumbar Spine Without Contrast    X-Ray Lumbar Spine Complete 5 View   4. Left lumbar radiculitis  MRI Lumbar Spine Without Contrast    X-Ray Lumbar Spine Complete 5 View       Plan:  1.  I'm going to get an x-ray of her lumbar spine and also an MRI since she is having radicular left leg pain.  I will call her with the results and we can discuss possible interventions.

## 2017-08-07 ENCOUNTER — HOSPITAL ENCOUNTER (OUTPATIENT)
Dept: RADIOLOGY | Facility: HOSPITAL | Age: 52
Discharge: HOME | End: 2017-08-07
Attending: ANESTHESIOLOGY
Payer: COMMERCIAL

## 2017-08-07 ENCOUNTER — TELEPHONE (OUTPATIENT)
Dept: PAIN MEDICINE | Facility: CLINIC | Age: 52
End: 2017-08-07

## 2017-08-07 DIAGNOSIS — M54.16 LEFT LUMBAR RADICULITIS: ICD-10-CM

## 2017-08-07 DIAGNOSIS — M51.36 DDD (DEGENERATIVE DISC DISEASE), LUMBAR: ICD-10-CM

## 2017-08-07 PROCEDURE — 72148 MRI LUMBAR SPINE W/O DYE: CPT | Mod: TC,PO

## 2017-08-07 PROCEDURE — 72148 MRI LUMBAR SPINE W/O DYE: CPT | Mod: 26,,, | Performed by: RADIOLOGY

## 2017-08-07 NOTE — TELEPHONE ENCOUNTER
Please note the patient know that I have reviewed her lumbar spine MRI and it does appear that she has some disc bulging out to the left side at L4/5 and L5/S1 that may contact the exiting nerves.  My recommendation would be to set her up for a left L4/5 and L5/S1 transforaminal injection.

## 2017-08-09 ENCOUNTER — TELEPHONE (OUTPATIENT)
Dept: PAIN MEDICINE | Facility: CLINIC | Age: 52
End: 2017-08-09

## 2017-08-09 NOTE — TELEPHONE ENCOUNTER
Spoke with patient regarding MRI results. Verbalized understanding. Procedure scheduled on 8/31 with  4 week follow up. Pre-op instructions reviewed and sent to patient.

## 2017-08-09 NOTE — TELEPHONE ENCOUNTER
----- Message from Preston Chiang sent at 8/9/2017 10:36 AM CDT -----  Contact: self-- [phone]  Patient states she is returning a phone call. Thanks!

## 2017-08-10 DIAGNOSIS — M54.16 LUMBAR RADICULOPATHY: Primary | ICD-10-CM

## 2017-08-10 RX ORDER — ALPRAZOLAM 0.5 MG/1
1 TABLET, ORALLY DISINTEGRATING ORAL ONCE AS NEEDED
Status: CANCELLED | OUTPATIENT
Start: 2017-08-31 | End: 2017-08-31

## 2017-08-18 DIAGNOSIS — Z12.11 COLON CANCER SCREENING: ICD-10-CM

## 2017-08-22 ENCOUNTER — LAB VISIT (OUTPATIENT)
Dept: LAB | Facility: HOSPITAL | Age: 52
End: 2017-08-22
Attending: NURSE PRACTITIONER
Payer: COMMERCIAL

## 2017-08-22 DIAGNOSIS — E11.42 TYPE 2 DIABETES MELLITUS WITH DIABETIC POLYNEUROPATHY, WITH LONG-TERM CURRENT USE OF INSULIN: ICD-10-CM

## 2017-08-22 DIAGNOSIS — Z79.4 TYPE 2 DIABETES MELLITUS WITH DIABETIC POLYNEUROPATHY, WITH LONG-TERM CURRENT USE OF INSULIN: ICD-10-CM

## 2017-08-22 LAB
ESTIMATED AVG GLUCOSE: 192 MG/DL
HBA1C MFR BLD HPLC: 8.3 %

## 2017-08-22 PROCEDURE — 83036 HEMOGLOBIN GLYCOSYLATED A1C: CPT

## 2017-08-22 PROCEDURE — 36415 COLL VENOUS BLD VENIPUNCTURE: CPT | Mod: PO

## 2017-08-24 DIAGNOSIS — M51.36 DDD (DEGENERATIVE DISC DISEASE), LUMBAR: Primary | ICD-10-CM

## 2017-08-29 ENCOUNTER — OFFICE VISIT (OUTPATIENT)
Dept: ENDOCRINOLOGY | Facility: CLINIC | Age: 52
End: 2017-08-29
Payer: COMMERCIAL

## 2017-08-29 VITALS
DIASTOLIC BLOOD PRESSURE: 90 MMHG | SYSTOLIC BLOOD PRESSURE: 162 MMHG | BODY MASS INDEX: 52.09 KG/M2 | HEART RATE: 103 BPM | HEIGHT: 56 IN | WEIGHT: 231.56 LBS

## 2017-08-29 DIAGNOSIS — G62.9 PERIPHERAL POLYNEUROPATHY: ICD-10-CM

## 2017-08-29 DIAGNOSIS — Z51.81 MEDICATION MONITORING ENCOUNTER: ICD-10-CM

## 2017-08-29 DIAGNOSIS — E66.01 MORBID OBESITY WITH BMI OF 45.0-49.9, ADULT: ICD-10-CM

## 2017-08-29 DIAGNOSIS — E11.42 TYPE 2 DIABETES MELLITUS WITH DIABETIC POLYNEUROPATHY, WITH LONG-TERM CURRENT USE OF INSULIN: Primary | ICD-10-CM

## 2017-08-29 DIAGNOSIS — F10.10 ALCOHOL ABUSE: ICD-10-CM

## 2017-08-29 DIAGNOSIS — Z72.4 INAPPROPRIATE DIET OR EATING HABITS: ICD-10-CM

## 2017-08-29 DIAGNOSIS — Z91.148 RECOMMENDED MEDICATION SCHEDULE NOT FOLLOWED: ICD-10-CM

## 2017-08-29 DIAGNOSIS — Z79.4 TYPE 2 DIABETES MELLITUS WITH DIABETIC POLYNEUROPATHY, WITH LONG-TERM CURRENT USE OF INSULIN: Primary | ICD-10-CM

## 2017-08-29 DIAGNOSIS — I10 HTN (HYPERTENSION), BENIGN: ICD-10-CM

## 2017-08-29 DIAGNOSIS — E78.5 HYPERLIPIDEMIA, UNSPECIFIED HYPERLIPIDEMIA TYPE: ICD-10-CM

## 2017-08-29 PROCEDURE — 99999 PR PBB SHADOW E&M-EST. PATIENT-LVL IV: CPT | Mod: PBBFAC,,, | Performed by: NURSE PRACTITIONER

## 2017-08-29 PROCEDURE — 3080F DIAST BP >= 90 MM HG: CPT | Mod: S$GLB,,, | Performed by: NURSE PRACTITIONER

## 2017-08-29 PROCEDURE — 4010F ACE/ARB THERAPY RXD/TAKEN: CPT | Mod: S$GLB,,, | Performed by: NURSE PRACTITIONER

## 2017-08-29 PROCEDURE — 3077F SYST BP >= 140 MM HG: CPT | Mod: S$GLB,,, | Performed by: NURSE PRACTITIONER

## 2017-08-29 PROCEDURE — 99215 OFFICE O/P EST HI 40 MIN: CPT | Mod: S$GLB,,, | Performed by: NURSE PRACTITIONER

## 2017-08-29 PROCEDURE — 3008F BODY MASS INDEX DOCD: CPT | Mod: S$GLB,,, | Performed by: NURSE PRACTITIONER

## 2017-08-29 PROCEDURE — 3045F PR MOST RECENT HEMOGLOBIN A1C LEVEL 7.0-9.0%: CPT | Mod: S$GLB,,, | Performed by: NURSE PRACTITIONER

## 2017-08-29 RX ORDER — CITALOPRAM 40 MG/1
40 TABLET, FILM COATED ORAL DAILY
Refills: 11 | COMMUNITY
Start: 2017-08-03 | End: 2019-01-04 | Stop reason: SDUPTHER

## 2017-08-29 NOTE — PROGRESS NOTES
Subjective:       Patient ID: Mine Quiros is a 51 y.o. female.    Chief Complaint: insulin pump download and glucose rview    HPI Pt is a 49 y/o female with a long hx of very uncontrolled Type 2 DM-- as well as chronic conditions pending review including HTN, peripheral neuropathy, diastolic dysfunciton, morbid obesity- now on insulin pump.She has had multiple difficulties being able to navigate pump and having freq hypoglycemia.     IInterim Events:   Pt now medically disabledc-likely r/t peripheral neuropathy, SOB.  Continues insulin pump use.  A1c drifting up.  Drinking a lot.  ALso rarely bolusing or checking glucoses.  ALso eating a lot of salt.  So much salt on popcorn her grown son couldn't eat it.   No hypoglycemia.    Medtronic 530 G insulin pump   12 mid ----1.8 u/hr   8 am------2.2u/hr    CHO 1:3  ISF 1:20  Goal 100-140    Diabetes Flow Sheet:  Diabetes Medications:pioglitazone 15 mg     Prior Regimen/Recommended in event of pump failure--50 units of lantus and Novlog 15 with meals.   Diabetes Complications:peripheral neuropathy, cardiovascular.   Aspirin: no  Statin: no  ACE/ARB:losartan 100 mg   Last Urine Microalbumin:   Last Eye exam:  Last Diabetic Education:       Review of Systems   Constitutional: Negative for activity change and fatigue.   HENT: Negative for hearing loss and trouble swallowing.    Eyes: Negative for photophobia and visual disturbance.        Last Eye Exam:   April 2016   Respiratory: Negative for cough and shortness of breath (stanley).    Cardiovascular: Negative for chest pain and palpitations.   Gastrointestinal: Negative for constipation and diarrhea.   Genitourinary: Negative for frequency and urgency.   Musculoskeletal: Negative for arthralgias and myalgias.        Foot pain.    Skin: Negative for rash and wound.   Neurological: Positive for numbness. Negative for weakness.   Psychiatric/Behavioral: Negative for sleep disturbance. The patient is not nervous/anxious.       "  Objective:      Physical Exam   Constitutional: She is oriented to person, place, and time. She appears well-developed and well-nourished.   Vital Signs  Pulse: 106  Resp: 18  BP: 138/82  BP Location: Right leg  Patient Position: Sitting  Pain Score:   6 (both feet)  Pain Loc: Foot  Height and Weight  Height: 4' 8" (142.2 cm)  Weight: 101.8 kg (224 lb 6.9 oz)  BSA (Calculated - sq m): 2.01 sq meters  BMI (Calculated): 50.4  Weight in (lb) to have BMI = 25: 111.3]     HENT:   Head: Normocephalic and atraumatic.   Nose: Nose normal.   Mouth/Throat: Oropharynx is clear and moist.   Eyes: Conjunctivae and EOM are normal. Pupils are equal, round, and reactive to light.   Neck: Normal range of motion. Neck supple. No tracheal deviation present.   Cardiovascular: Normal rate, regular rhythm, normal heart sounds and intact distal pulses.    Pulmonary/Chest: Effort normal and breath sounds normal.   Musculoskeletal: Normal range of motion.   Feet:no open wound or calluses.    Lymphadenopathy:     She has no cervical adenopathy.   Neurological: She is alert and oriented to person, place, and time.   Vibratory sensation to feet:  Intact bilaterally    Skin: Skin is warm and dry.   Psychiatric: She has a normal mood and affect. Her behavior is normal. Judgment and thought content normal.       Hemoglobin A1C   Date Value Ref Range Status   08/22/2017 8.3 (H) 4.0 - 5.6 % Final     Comment:     According to ADA guidelines, hemoglobin A1c <7.0% represents  optimal control in non-pregnant diabetic patients. Different  metrics may apply to specific patient populations.   Standards of Medical Care in Diabetes-2016.  For the purpose of screening for the presence of diabetes:  <5.7%     Consistent with the absence of diabetes  5.7-6.4%  Consistent with increasing risk for diabetes   (prediabetes)  >or=6.5%  Consistent with diabetes  Currently, no consensus exists for use of hemoglobin A1c  for diagnosis of diabetes for " children.  This Hemoglobin A1c assay has significant interference with fetal   hemoglobin   (HbF). The results are invalid for patients with abnormal amounts of   HbF,   including those with known Hereditary Persistence   of Fetal Hemoglobin. Heterozygous hemoglobin variants (HbAS, HbAC,   HbAD, HbAE, HbA2) do not significantly interfere with this assay;   however, presence of multiple variants in a sample may impact the %   interference.     04/17/2017 7.2 (H) 4.5 - 6.2 % Final     Comment:     According to ADA guidelines, hemoglobin A1C <7.0% represents  optimal control in non-pregnant diabetic patients.  Different  metrics may apply to specific populations.   Standards of Medical Care in Diabetes - 2016.  For the purpose of screening for the presence of diabetes:  <5.7%     Consistent with the absence of diabetes  5.7-6.4%  Consistent with increasing risk for diabetes   (prediabetes)  >or=6.5%  Consistent with diabetes  Currently no consensus exists for use of hemoglobin A1C  for diagnosis of diabetes for children.     12/19/2016 7.6 (H) 4.5 - 6.2 % Final     Comment:     According to ADA guidelines, hemoglobin A1C <7.0% represents  optimal control in non-pregnant diabetic patients.  Different  metrics may apply to specific populations.   Standards of Medical Care in Diabetes - 2016.  For the purpose of screening for the presence of diabetes:  <5.7%     Consistent with the absence of diabetes  5.7-6.4%  Consistent with increasing risk for diabetes   (prediabetes)  >or=6.5%  Consistent with diabetes  Currently no consensus exists for use of hemoglobin A1C  for diagnosis of diabetes for children.         Chemistry        Component Value Date/Time     12/28/2016 1647    K 4.0 12/28/2016 1647     12/28/2016 1647    CO2 30 12/28/2016 1647    BUN 13 12/28/2016 1647    CREATININE 0.74 12/28/2016 1647     (H) 12/28/2016 1647        Component Value Date/Time    CALCIUM 9.0 12/28/2016 1647    ALKPHOS 93  12/28/2016 1647     (H) 12/28/2016 1647    ALT 85 (H) 12/28/2016 1647    BILITOT 0.9 12/28/2016 1647        Lab Results   Component Value Date    LDLCALC 126.2 04/17/2017     Lab Results   Component Value Date    TSH 0.595 06/15/2016       Assessment:       1. Type 2 diabetes mellitus with diabetic polyneuropathy, with long-term current use of insulin  Hemoglobin A1c  Chronic-stable-no changes    2. HTN (hypertension), benign  -chronic-exacerbated-cont ccb, arb, bb --counseled on salt--seriously    3. Peripheral polyneuropathy  -chronic-worsened-monitor foot care    4. Morbid obesity with BMI of 45.0-49.9, adult  -chronic-stable-advise to consider aquatics    5. Insulin pump status     6.      Alcohol abuse----counseled on neg effect on glucoses, increased risk of hypoglycemia,  And unneeded calories.        Plan:     Pt advised if she is not going to use pump appropriately, I will stop filling pump supplies.       ORDERS  8/29/1 7    3 mo with a1c prior

## 2017-08-31 ENCOUNTER — HOSPITAL ENCOUNTER (OUTPATIENT)
Dept: RADIOLOGY | Facility: HOSPITAL | Age: 52
Discharge: HOME | End: 2017-08-31
Attending: ANESTHESIOLOGY | Admitting: ANESTHESIOLOGY
Payer: COMMERCIAL

## 2017-08-31 ENCOUNTER — HOSPITAL ENCOUNTER (OUTPATIENT)
Facility: HOSPITAL | Age: 52
Discharge: HOME | End: 2017-08-31
Attending: ANESTHESIOLOGY | Admitting: ANESTHESIOLOGY
Payer: COMMERCIAL

## 2017-08-31 ENCOUNTER — SURGERY (OUTPATIENT)
Age: 52
End: 2017-08-31

## 2017-08-31 VITALS
OXYGEN SATURATION: 96 % | SYSTOLIC BLOOD PRESSURE: 132 MMHG | DIASTOLIC BLOOD PRESSURE: 70 MMHG | HEART RATE: 90 BPM | TEMPERATURE: 97 F | RESPIRATION RATE: 20 BRPM

## 2017-08-31 DIAGNOSIS — M51.36 DDD (DEGENERATIVE DISC DISEASE), LUMBAR: ICD-10-CM

## 2017-08-31 DIAGNOSIS — M54.16 LUMBAR RADICULOPATHY: Primary | ICD-10-CM

## 2017-08-31 LAB — GLUCOSE SERPL-MCNC: 165 MG/DL (ref 70–110)

## 2017-08-31 PROCEDURE — 64484 NJX AA&/STRD TFRM EPI L/S EA: CPT | Mod: LT,,, | Performed by: ANESTHESIOLOGY

## 2017-08-31 PROCEDURE — 76000 FLUOROSCOPY <1 HR PHYS/QHP: CPT | Mod: TC,PO

## 2017-08-31 PROCEDURE — 64483 NJX AA&/STRD TFRM EPI L/S 1: CPT | Mod: PO | Performed by: ANESTHESIOLOGY

## 2017-08-31 PROCEDURE — 25000003 PHARM REV CODE 250: Mod: PO | Performed by: ANESTHESIOLOGY

## 2017-08-31 PROCEDURE — 82962 GLUCOSE BLOOD TEST: CPT | Mod: PO | Performed by: ANESTHESIOLOGY

## 2017-08-31 PROCEDURE — 25500020 PHARM REV CODE 255: Mod: PO | Performed by: ANESTHESIOLOGY

## 2017-08-31 PROCEDURE — 64484 NJX AA&/STRD TFRM EPI L/S EA: CPT | Mod: PO | Performed by: ANESTHESIOLOGY

## 2017-08-31 PROCEDURE — 63600175 PHARM REV CODE 636 W HCPCS: Mod: PO | Performed by: ANESTHESIOLOGY

## 2017-08-31 PROCEDURE — 64483 NJX AA&/STRD TFRM EPI L/S 1: CPT | Mod: LT,,, | Performed by: ANESTHESIOLOGY

## 2017-08-31 RX ORDER — BUPIVACAINE HYDROCHLORIDE 2.5 MG/ML
INJECTION, SOLUTION EPIDURAL; INFILTRATION; INTRACAUDAL
Status: DISCONTINUED | OUTPATIENT
Start: 2017-08-31 | End: 2017-08-31 | Stop reason: HOSPADM

## 2017-08-31 RX ORDER — ALPRAZOLAM 0.5 MG/1
1 TABLET, ORALLY DISINTEGRATING ORAL ONCE AS NEEDED
Status: COMPLETED | OUTPATIENT
Start: 2017-08-31 | End: 2017-08-31

## 2017-08-31 RX ORDER — LIDOCAINE HYDROCHLORIDE 10 MG/ML
INJECTION, SOLUTION EPIDURAL; INFILTRATION; INTRACAUDAL; PERINEURAL
Status: DISCONTINUED | OUTPATIENT
Start: 2017-08-31 | End: 2017-08-31 | Stop reason: HOSPADM

## 2017-08-31 RX ORDER — METHYLPREDNISOLONE ACETATE 80 MG/ML
INJECTION, SUSPENSION INTRA-ARTICULAR; INTRALESIONAL; INTRAMUSCULAR; SOFT TISSUE
Status: DISCONTINUED | OUTPATIENT
Start: 2017-08-31 | End: 2017-08-31 | Stop reason: HOSPADM

## 2017-08-31 RX ADMIN — METHYLPREDNISOLONE ACETATE 80 MG: 80 INJECTION, SUSPENSION INTRA-ARTICULAR; INTRALESIONAL; INTRAMUSCULAR; SOFT TISSUE at 09:08

## 2017-08-31 RX ADMIN — IOHEXOL 3 ML: 300 INJECTION, SOLUTION INTRAVENOUS at 09:08

## 2017-08-31 RX ADMIN — BUPIVACAINE HYDROCHLORIDE 3 ML: 2.5 INJECTION, SOLUTION EPIDURAL; INFILTRATION; INTRACAUDAL; PERINEURAL at 09:08

## 2017-08-31 RX ADMIN — ALPRAZOLAM 1 MG: 0.5 TABLET, ORALLY DISINTEGRATING ORAL at 09:08

## 2017-08-31 RX ADMIN — LIDOCAINE HYDROCHLORIDE 10 ML: 10 INJECTION, SOLUTION EPIDURAL; INFILTRATION; INTRACAUDAL; PERINEURAL at 09:08

## 2017-08-31 NOTE — H&P
CC: Back pain    HPI: The patient is a 52yo woman with a history of lumbar radiculopathy here for left L4/5 and L5/S1 TFESI. There are no major changes in history and physical from 17.    Past Medical History:   Diagnosis Date    Abnormal stress test 10/2016    Acute diastolic heart failure secondary to idiopathic cardiomyopathy 10/2016    Allergy     Arthritis     Carpal tunnel syndrome of right wrist     bilateral    Chest pain     Diabetes mellitus     Type 2 IDDM - Uncontrolled    Diabetic neuropathy     Difficult intubation     needed glidescope for cholecystectomy     Fatty liver     GERD (gastroesophageal reflux disease)     on no meds    Hyperlipidemia     Hypertension     Insomnia     Knee pain     Morbid obesity     Neuropathy due to type 2 diabetes mellitus     anaya feet    MARLON (obstructive sleep apnea)     CPAP, says she is compliant with use    Parotid mass     had Biopsy    SOB (shortness of breath) 10/2016    Tachycardia     frequently,chronic for years per chart    Thyroid disease        Past Surgical History:   Procedure Laterality Date     SECTION      x 1    CHOLECYSTECTOMY  2009    ESOPHAGOGASTRODUODENOSCOPY  2009    Dr GRAC[facility]ELA [facility] - [facility]    HEMORRHOID SURGERY  2012    PPH with External Hemorrhoidectomy - Dr JAMAL Rollins, had spinal anesthesia    ORIF FEMUR FRACTURE Left     IM dmitry.      TRIGGER FINGER RELEASE Right     TUBAL LIGATION         Family History   Problem Relation Age of Onset    Diabetes Mother     Kidney disease Mother     Hyperlipidemia Father     Heart disease Father        Social History     Social History    Marital status: Single     Spouse name: N/A    Number of children: N/A    Years of education: N/A     Social History Main Topics    Smoking status: Never Smoker    Smokeless tobacco: Never Used    Alcohol use Yes      Comment: 3 daily- ai and vodka    Drug use: No    Sexual activity: Not Asked     Other  Topics Concern    None     Social History Narrative    None       Current Facility-Administered Medications   Medication Dose Route Frequency Provider Last Rate Last Dose    alprazolam ODT dissolvable tablet 1 mg  1 mg Oral Once PRN Gregorio Medina MD           Review of patient's allergies indicates:   Allergen Reactions    Morphine Hives and Itching       Vitals:    08/31/17 0857   BP: (!) 164/77   Pulse: 91   Resp: 18   Temp: 97.7 °F (36.5 °C)   TempSrc: Skin   SpO2: 98%       REVIEW OF SYSTEMS:     GENERAL: No weight loss, malaise or fevers.  HEENT:  No recent changes in vision or hearing  NECK: Negative for lumps, no difficulty with swallowing.  RESPIRATORY: Negative for cough, wheezing or shortness of breath, patient denies any recent URI.  CARDIOVASCULAR: Negative for chest pain, leg swelling or palpitations.  GI: Negative for abdominal discomfort, blood in stools or black stools or change in bowel habits.  MUSCULOSKELETAL: See HPI.  SKIN: Negative for lesions, rash, and itching.  PSYCH: No suicidal or homicidal ideations, no current mood disturbances.  HEMATOLOGY/LYMPHOLOGY: Negative for prolonged bleeding, bruising easily or swollen nodes. Patient is not currently taking any anti-coagulants  ENDO: No history of diabetes or thyroid dysfunction  NEURO: No history of syncope, paralysis, seizures or tremors.All other reviewed and negative other than HPI.    Physical exam:  Gen: A and O x3, pleasant, well-groomed  Skin: No rashes or obvious lesions  HEENT: PERRLA, no obvious deformities on ears or in canals. No thyroid masses, trachea midline, no palpable lymph nodes in neck, axilla.  CVS: Regular rate and rhythm, normal S1 and S2, no murmurs.  Resp: Clear to auscultation bilaterally.  Abdomen: Soft, NT/ND, normal bowel sounds present.  Musculoskeletal/Neuro: Moving all extremities    Assessment:  Lumbar radiculopathy  -     Case Request Operating Room: YUN-TRANSFORAMINAL L4/5 and L5/S1  -     Activity  as tolerated; Standing  -     Place in Outpatient; Standing  -     Diet NPO; Standing  -     alprazolam ODT dissolvable tablet 1 mg; Take 2 tablets (1 mg total) by mouth once as needed for Anxiety.  -     Notify physician ; Standing  -     Notify physician ; Standing  -     Notify physician (specify); Standing  -     Verify informed consent; Standing  -     Vital signs; Standing

## 2017-08-31 NOTE — DISCHARGE SUMMARY
[facility]  Discharge Note  Short Stay    Admit Date: 8/31/2017    Discharge Date: 8/31/2017    Attending Physician: Gregorio Medina MD     Discharge Provider: Gregorio Medina    Diagnoses:  Active Hospital Problems    Diagnosis  POA    *Lumbar radiculopathy [M54.16]  Yes      Resolved Hospital Problems    Diagnosis Date Resolved POA   No resolved problems to display.       Discharged Condition: good    Final Diagnoses: Lumbar radiculopathy [M54.16]    Disposition: Home or Self Care    Hospital Course: no complications, uneventful    Outcome of Hospitalization, Treatment, Procedure, or Surgery:  Patient was admitted for outpatient procedure. The patient underwent procedure without complications and are discharged home    Follow up/Patient Instructions:  Follow up as scheduled/Patient has received instructions and follow up date    Medications:  Continue previous medications      Discharge Procedure Orders  Diet general     Activity as tolerated     Call MD for:  temperature >100.4     Call MD for:  severe uncontrolled pain     Call MD for:  redness, tenderness, or signs of infection (pain, swelling, redness, odor or green/yellow discharge around incision site)     Call MD for:  severe persistent headache     No dressing needed           Discharge Procedure Orders (must include Diet, Follow-up, Activity):    Discharge Procedure Orders (must include Diet, Follow-up, Activity)  Diet general     Activity as tolerated     Call MD for:  temperature >100.4     Call MD for:  severe uncontrolled pain     Call MD for:  redness, tenderness, or signs of infection (pain, swelling, redness, odor or green/yellow discharge around incision site)     Call MD for:  severe persistent headache     No dressing needed

## 2017-08-31 NOTE — OP NOTE
PROCEDURE DATE: 8/31/2017    PROCEDURE: Left L4/5 and L5/S1 transforaminal epidural steroid injection under fluoroscopy    DIAGNOSIS: Lumbar  Radiculopathy    Post op diagnosis: Same    PHYSICIAN: Gregorio Medina MD    MEDICATIONS INJECTED:  Methylprednisolone 40mg (0.5ml) and 1.5ml 0.25% bupivicaine at each nerve root.     LOCAL ANESTHETIC INJECTED:  Lidocaine 1%. 4 ml per site.    SEDATION MEDICATIONS: none    ESTIMATED BLOOD LOSS:  none    COMPLICATIONS:  none    TECHNIQUE:   A time-out was taken to identify patient and procedure side prior to starting the procedure. The patient was placed in a prone position, prepped and draped in the usual sterile fashion using ChloraPrep and sterile towels.  The area to be injected was determined under fluoroscopic guidance in AP and oblique view.  Local anesthetic was given by raising a wheal and going down to the hub of a 25-gauge 1.5 inch needle.  In oblique view, a 5 inch 22-gauge bent-tip spinal needle was introduced towards 6 oclock position of the pedicle of each above named nerve root level.  The needle was walked medially then hinged into the neural foramen and position was confirmed in AP and lateral views.  Omnipaque contrast dye was injected to confirm appropriate placement and that there was no vascular uptake.  After negative aspiration for blood or CSF, the medication was then injected. This was performed at the left L4/5 and L5/S1 level(s). The patient tolerated the procedure well.    The patient was monitored after the procedure.  Patient was given post procedure and discharge instructions to follow at home. The patient was discharged in a stable condition.

## 2017-09-08 RX ORDER — GABAPENTIN 300 MG/1
CAPSULE ORAL
Qty: 90 CAPSULE | Refills: 3 | Status: SHIPPED | OUTPATIENT
Start: 2017-09-08 | End: 2018-02-24 | Stop reason: SDUPTHER

## 2017-09-12 ENCOUNTER — TELEPHONE (OUTPATIENT)
Dept: PAIN MEDICINE | Facility: CLINIC | Age: 52
End: 2017-09-12

## 2017-09-12 NOTE — TELEPHONE ENCOUNTER
Spoke with patient. Patient stated the steroid injection did not work. Patient follow up moved to 9/14 at 3:30 with Patrice.

## 2017-09-12 NOTE — TELEPHONE ENCOUNTER
----- Message from Lala Medina sent at 9/11/2017  3:53 PM CDT -----  Contact: Self  Patient that the steroid injections did not work.  Please call [phone].

## 2017-09-14 ENCOUNTER — OFFICE VISIT (OUTPATIENT)
Dept: PAIN MEDICINE | Facility: CLINIC | Age: 52
End: 2017-09-14
Payer: COMMERCIAL

## 2017-09-14 VITALS
RESPIRATION RATE: 20 BRPM | BODY MASS INDEX: 50.96 KG/M2 | DIASTOLIC BLOOD PRESSURE: 70 MMHG | HEART RATE: 78 BPM | SYSTOLIC BLOOD PRESSURE: 130 MMHG | WEIGHT: 227.31 LBS | TEMPERATURE: 99 F

## 2017-09-14 DIAGNOSIS — M51.36 DDD (DEGENERATIVE DISC DISEASE), LUMBAR: ICD-10-CM

## 2017-09-14 DIAGNOSIS — M47.816 FACET HYPERTROPHY OF LUMBAR REGION: Primary | ICD-10-CM

## 2017-09-14 PROCEDURE — 3008F BODY MASS INDEX DOCD: CPT | Mod: S$GLB,,, | Performed by: PHYSICIAN ASSISTANT

## 2017-09-14 PROCEDURE — 3078F DIAST BP <80 MM HG: CPT | Mod: S$GLB,,, | Performed by: PHYSICIAN ASSISTANT

## 2017-09-14 PROCEDURE — 99999 PR PBB SHADOW E&M-EST. PATIENT-LVL IV: CPT | Mod: PBBFAC,,, | Performed by: PHYSICIAN ASSISTANT

## 2017-09-14 PROCEDURE — 3075F SYST BP GE 130 - 139MM HG: CPT | Mod: S$GLB,,, | Performed by: PHYSICIAN ASSISTANT

## 2017-09-14 PROCEDURE — 99213 OFFICE O/P EST LOW 20 MIN: CPT | Mod: S$GLB,,, | Performed by: PHYSICIAN ASSISTANT

## 2017-09-14 RX ORDER — MIDAZOLAM HYDROCHLORIDE 5 MG/ML
4 INJECTION INTRAMUSCULAR; INTRAVENOUS ONCE
Status: CANCELLED | OUTPATIENT
Start: 2017-10-02

## 2017-09-14 RX ORDER — SODIUM CHLORIDE, SODIUM LACTATE, POTASSIUM CHLORIDE, CALCIUM CHLORIDE 600; 310; 30; 20 MG/100ML; MG/100ML; MG/100ML; MG/100ML
INJECTION, SOLUTION INTRAVENOUS CONTINUOUS
Status: CANCELLED | OUTPATIENT
Start: 2017-10-02

## 2017-09-14 NOTE — PROGRESS NOTES
This note was completed with dictation software and grammatical errors may exist.    CC: Low back pain    HPI: The patient is a 51-year-old woman with a history of diabetes with neuropathy, hypertension, obesity who presents in self-referral for low back pain. She is status post left L4 and L5 transforaminal epidural steroid injection on 8/31/17 with 0% relief.  The patient is new to me.  She complains of throbbing and aching bilateral low back pain radiating to the buttocks and anterior thighs.  She states that this is worse with a slight bend, walking and standing.  She has relief with sitting.  She denies weakness, numbness, bladder or bowel incontinence.    Pain intervention history: She had done 3-4 weeks of physical therapy at [facility] with no sustained relief.  She tried gabapentin but it causes drowsiness.  She is status post left L4 and L5 transforaminal epidural steroid injection on 8/31/17 with 0% relief.     ROS: She reports weight gain, rash, itching, headaches, shortness of breath, diarrhea, diabetes, back pain, difficulty sleeping, anxiety, depression.  Balance of review of systems is negative.    Past Medical History:   Diagnosis Date    Abnormal stress test 10/2016    Acne     Acute diastolic heart failure secondary to idiopathic cardiomyopathy 10/2016    Allergy     Anxiety     Arthritis     Carpal tunnel syndrome of right wrist     bilateral    Chest pain     Diabetes mellitus     Type 2 IDDM - Uncontrolled    Diabetic neuropathy     Difficult intubation     needed glidescope for cholecystectomy 2009    Dry scalp     Fatty liver     GERD (gastroesophageal reflux disease)     on no meds    Hormone replacement therapy (HRT)     Hyperlipidemia     Hypertension     Insomnia     Knee pain     Morbid obesity     Neuropathy due to type 2 diabetes mellitus     anaya feet    MARLON (obstructive sleep apnea)     CPAP, says she is compliant with use    Parotid mass 2014    had Biopsy    SOB  (shortness of breath) 10/2016    Tachycardia     frequently,chronic for years per chart       Past Surgical History:   Procedure Laterality Date    CARDIAC CATHETERIZATION  2016    WDL per patient     SECTION      x 1    CHOLECYSTECTOMY  2009    ESOPHAGOGASTRODUODENOSCOPY  2009    Dr GRAC[facility]ELA [facility]    HEMORRHOID SURGERY  2012    PPH with External Hemorrhoidectomy - Dr JAMAL Rollins, had spinal anesthesia    ORIF FEMUR FRACTURE Left     IM dmitry.      TRIGGER FINGER RELEASE Right     TUBAL LIGATION         Social History     Social History    Marital status: Single     Spouse name: N/A    Number of children: N/A    Years of education: N/A     Social History Main Topics    Smoking status: Never Smoker    Smokeless tobacco: Never Used    Alcohol use 12.6 oz/week     21 Shots of liquor per week    Drug use: No    Sexual activity: Not Asked     Other Topics Concern    None     Social History Narrative    None         Medications/Allergies: See med card    Vitals:    17 1539   BP: 130/70   Pulse: 78   Resp: 20   Temp: 98.5 °F (36.9 °C)   TempSrc: Oral   Weight: 103.1 kg (227 lb 4.7 oz)   PainSc:   8   PainLoc: Back         Physical exam:  Gen: A and O x3, pleasant, obese  Skin: No rashes or obvious lesions  HEENT: PERRLA, no obvious deformities on ears or in canals. Trachea midline.  CVS: Regular rate and rhythm, normal palpable pulses.  Resp:No increased work of breathing, symmetrical chest rise.  Abdomen: Soft, NT/ND.  Musculoskeletal:  No antalgic gait.     Neuro:  Lower extremities: 5/5 strength bilaterally  Reflexes: Patellar 2+, Achilles 2+ bilaterally.  Sensory:  Intact and symmetrical to light touch and pinprick in L2-S1 dermatomes bilaterally.    Lumbar spine:  Lumbar spine: Range of motion is severely reduced with flexion and extension with increased bilateral low back pain during each maneuver but worse with flexion.  Saroj's test causes no increased pain on either side.     Supine straight leg raise is negative bilaterally.  Internal and external rotation of the hip causes no increased pain on either side.  Myofascial exam: No tenderness to palpation across lumbar paraspinous muscles.    Imaging:  MRI lumbar spine 8/7/17  At the T12-L1 level, broad-based bulge appears to be present and of 3 mm with possible anterior cord contact. Facet arthropathy and ligamentous hypertrophy appear to be present. This may relate to a slightly irregular protrusion centrally of 3 mm. Mild thecal sac narrowing is noted the 9 mm. Mild bilateral neural foraminal stenosis appears to be present.  At the L1-L2 level, mild broad-based is noted towards each neural foramen, mild bilateral neural foraminal narrowing appears to be present. Minimal central canal narrowing is noted with the thecal sac narrowed to 10 mm.  At the L2-L3 level, facet arthropathy and ligamentous hypertrophy appears to be present. Mild broad-based bulge appears to be present of 2-3 mm, mild bilateral neural foraminal narrowing is noted. A congenitally small central canal is noted to 9 mm with mild central canal narrowing  At the L3-L4 level, broad-based bulging is noted towards each neural foramen of 3-4 mm, a congenitally small thecal sac is noted to 9 mm with mild central canal narrowing. Mild bilateral neural foraminal narrowing is noted.  At the L4-L5 level, mild facet arthropathy and ligamentous hypertrophy appears to be present, mild bilateral neural foraminal narrowing is noted. Mild thecal sac narrowing is noted to 8 mm.  At L5-S1 level, moderate right neural foraminal narrowing is noted with mild left neural foraminal narrowing. Mild thecal sac narrowing is noted to 9 mm.      Assessment:  The patient is a 51-year-old woman with a history of diabetes with neuropathy, hypertension, obesity who presents in self-referral for low back pain.    1. Facet hypertrophy of lumbar region     2. DDD (degenerative disc disease), lumbar          Plan:  1.  Since the patient did not have relief following the left L4 and L5 TF YUN, we discussed that her pain may be more facet mediated.  I will schedule her for bilateral L3, 4 and 5 medial branch blocks and RFA if indicated.  2.  Follow-up in 4 weeks post procedure sooner as needed.

## 2017-09-15 ENCOUNTER — TELEPHONE (OUTPATIENT)
Dept: PAIN MEDICINE | Facility: CLINIC | Age: 52
End: 2017-09-15

## 2017-09-26 ENCOUNTER — TELEPHONE (OUTPATIENT)
Dept: ENDOCRINOLOGY | Facility: CLINIC | Age: 52
End: 2017-09-26

## 2017-09-27 ENCOUNTER — TELEPHONE (OUTPATIENT)
Dept: FAMILY MEDICINE | Facility: CLINIC | Age: 52
End: 2017-09-27

## 2017-09-27 NOTE — TELEPHONE ENCOUNTER
----- Message from Peyton Rodriguez sent at 9/27/2017 10:51 AM CDT -----  Contact: self  Patient called regarding her recent visit to a [facility]. The medication they wanted to give, stating it interacts with what Dr. Pitt prescribed. Need a letter stating it would be ok for clinic to prescribe. Please contact [phone]

## 2017-10-02 ENCOUNTER — HOSPITAL ENCOUNTER (OUTPATIENT)
Facility: HOSPITAL | Age: 52
Discharge: HOME | End: 2017-10-02
Attending: ANESTHESIOLOGY | Admitting: ANESTHESIOLOGY
Payer: COMMERCIAL

## 2017-10-02 ENCOUNTER — HOSPITAL ENCOUNTER (OUTPATIENT)
Dept: RADIOLOGY | Facility: HOSPITAL | Age: 52
Discharge: HOME | End: 2017-10-02
Attending: ANESTHESIOLOGY | Admitting: ANESTHESIOLOGY
Payer: COMMERCIAL

## 2017-10-02 ENCOUNTER — SURGERY (OUTPATIENT)
Age: 52
End: 2017-10-02

## 2017-10-02 VITALS
HEIGHT: 56 IN | RESPIRATION RATE: 16 BRPM | TEMPERATURE: 98 F | SYSTOLIC BLOOD PRESSURE: 131 MMHG | BODY MASS INDEX: 50.84 KG/M2 | OXYGEN SATURATION: 99 % | HEART RATE: 93 BPM | DIASTOLIC BLOOD PRESSURE: 83 MMHG | WEIGHT: 226 LBS

## 2017-10-02 DIAGNOSIS — M47.816 FACET HYPERTROPHY OF LUMBAR REGION: Primary | ICD-10-CM

## 2017-10-02 DIAGNOSIS — M51.36 DDD (DEGENERATIVE DISC DISEASE), LUMBAR: ICD-10-CM

## 2017-10-02 DIAGNOSIS — M51.36 DDD (DEGENERATIVE DISC DISEASE), LUMBAR: Primary | ICD-10-CM

## 2017-10-02 LAB — GLUCOSE SERPL-MCNC: 108 MG/DL (ref 70–110)

## 2017-10-02 PROCEDURE — 64495 INJ PARAVERT F JNT L/S 3 LEV: CPT | Mod: 50,PO | Performed by: ANESTHESIOLOGY

## 2017-10-02 PROCEDURE — 64494 INJ PARAVERT F JNT L/S 2 LEV: CPT | Mod: 50,,, | Performed by: ANESTHESIOLOGY

## 2017-10-02 PROCEDURE — 82962 GLUCOSE BLOOD TEST: CPT | Mod: PO | Performed by: ANESTHESIOLOGY

## 2017-10-02 PROCEDURE — 64494 INJ PARAVERT F JNT L/S 2 LEV: CPT | Mod: 50,PO | Performed by: ANESTHESIOLOGY

## 2017-10-02 PROCEDURE — 99152 MOD SED SAME PHYS/QHP 5/>YRS: CPT | Mod: ,,, | Performed by: ANESTHESIOLOGY

## 2017-10-02 PROCEDURE — 76000 FLUOROSCOPY <1 HR PHYS/QHP: CPT | Mod: TC,PO

## 2017-10-02 PROCEDURE — 64493 INJ PARAVERT F JNT L/S 1 LEV: CPT | Mod: 50,,, | Performed by: ANESTHESIOLOGY

## 2017-10-02 PROCEDURE — 64493 INJ PARAVERT F JNT L/S 1 LEV: CPT | Mod: 50,PO | Performed by: ANESTHESIOLOGY

## 2017-10-02 PROCEDURE — 64495 INJ PARAVERT F JNT L/S 3 LEV: CPT | Mod: 50,,, | Performed by: ANESTHESIOLOGY

## 2017-10-02 PROCEDURE — 25000003 PHARM REV CODE 250: Mod: PO | Performed by: ANESTHESIOLOGY

## 2017-10-02 PROCEDURE — 63600175 PHARM REV CODE 636 W HCPCS: Mod: PO | Performed by: ANESTHESIOLOGY

## 2017-10-02 PROCEDURE — 25500020 PHARM REV CODE 255: Mod: PO | Performed by: ANESTHESIOLOGY

## 2017-10-02 RX ORDER — LIDOCAINE HYDROCHLORIDE 10 MG/ML
1 INJECTION, SOLUTION EPIDURAL; INFILTRATION; INTRACAUDAL; PERINEURAL ONCE
Status: COMPLETED | OUTPATIENT
Start: 2017-10-02 | End: 2017-10-02

## 2017-10-02 RX ORDER — MIDAZOLAM HYDROCHLORIDE 5 MG/ML
4 INJECTION INTRAMUSCULAR; INTRAVENOUS ONCE
Status: COMPLETED | OUTPATIENT
Start: 2017-10-02 | End: 2017-10-02

## 2017-10-02 RX ORDER — LIDOCAINE HYDROCHLORIDE 10 MG/ML
INJECTION, SOLUTION EPIDURAL; INFILTRATION; INTRACAUDAL; PERINEURAL
Status: DISCONTINUED | OUTPATIENT
Start: 2017-10-02 | End: 2017-10-02 | Stop reason: HOSPADM

## 2017-10-02 RX ORDER — SODIUM CHLORIDE, SODIUM LACTATE, POTASSIUM CHLORIDE, CALCIUM CHLORIDE 600; 310; 30; 20 MG/100ML; MG/100ML; MG/100ML; MG/100ML
INJECTION, SOLUTION INTRAVENOUS CONTINUOUS
Status: DISCONTINUED | OUTPATIENT
Start: 2017-10-02 | End: 2017-10-02 | Stop reason: HOSPADM

## 2017-10-02 RX ORDER — MIDAZOLAM HYDROCHLORIDE 2 MG/2ML
INJECTION, SOLUTION INTRAMUSCULAR; INTRAVENOUS
Status: DISCONTINUED | OUTPATIENT
Start: 2017-10-02 | End: 2017-10-02 | Stop reason: HOSPADM

## 2017-10-02 RX ORDER — BUPIVACAINE HYDROCHLORIDE 2.5 MG/ML
INJECTION, SOLUTION EPIDURAL; INFILTRATION; INTRACAUDAL
Status: DISCONTINUED | OUTPATIENT
Start: 2017-10-02 | End: 2017-10-02 | Stop reason: HOSPADM

## 2017-10-02 RX ADMIN — LIDOCAINE HYDROCHLORIDE: 10 INJECTION, SOLUTION EPIDURAL; INFILTRATION; INTRACAUDAL; PERINEURAL at 02:10

## 2017-10-02 RX ADMIN — SODIUM CHLORIDE, SODIUM LACTATE, POTASSIUM CHLORIDE, AND CALCIUM CHLORIDE: .6; .31; .03; .02 INJECTION, SOLUTION INTRAVENOUS at 02:10

## 2017-10-02 RX ADMIN — IOHEXOL 3 ML: 300 INJECTION, SOLUTION INTRAVENOUS at 03:10

## 2017-10-02 RX ADMIN — LIDOCAINE HYDROCHLORIDE 10 ML: 10 INJECTION, SOLUTION EPIDURAL; INFILTRATION; INTRACAUDAL; PERINEURAL at 03:10

## 2017-10-02 RX ADMIN — MIDAZOLAM HYDROCHLORIDE 2 MG: 1 INJECTION, SOLUTION INTRAMUSCULAR; INTRAVENOUS at 03:10

## 2017-10-02 RX ADMIN — MIDAZOLAM HYDROCHLORIDE 2 MG: 5 INJECTION, SOLUTION INTRAMUSCULAR; INTRAVENOUS at 02:10

## 2017-10-02 RX ADMIN — BUPIVACAINE HYDROCHLORIDE 4 ML: 2.5 INJECTION, SOLUTION EPIDURAL; INFILTRATION; INTRACAUDAL; PERINEURAL at 03:10

## 2017-10-02 NOTE — OP NOTE
PROCEDURE DATE: 10/2/2017    PROCEDURE:  Bilateral L3,4,5 medial branch nerve block on the bilateral-side    DIAGNOSIS:  Lumbar facet hypertrophy     Post Op diagnosis: Same    PHYSICIAN: Gregorio Medina MD    MEDICATIONS INJECTED: 0.25% bupivicaine, 1ml at each level    LOCAL ANESTHETIC USED: Lidocaine 1%, 2ml at each level    SEDATION MEDICATIONS:4mg versed    ESTIMATED BLOOD LOSS:  none    COMPLICATIONS:  none    TECHNIQUE: A time out was taken to identify the patient, procedure and side of the procedure. The patient was placed in a prone position, then prepped and draped in the usual sterile fashion using ChloraPrep and sterile towels.  The levels were determined under fluoroscopic guidance and then marked.  Local anesthetic was given by raising a wheal at the skin over each site and then infiltrated approximately 2cm deeper.  A 22-gauge 5 inch needle was introduced to the anatomic location of the right and then left L3,4,5 medial branch nerves on the bilateral side.  Appropriate location and medication spread confirmed by injecting 0.5ml of Omnipaque. The above medication was then injected. The patient tolerated the procedure well.     The patient was monitored after the procedure. The patient will be contacted in the next few days to determine extent of relief.  Patient was given post procedure and discharge instructions to follow at home.  The patient was discharged in a stable condition.

## 2017-10-02 NOTE — DISCHARGE SUMMARY
[facility]  Discharge Note  Short Stay    Admit Date: 10/2/2017    Discharge Date: 10/2/2017    Attending Physician: Gregorio Medina MD     Discharge Provider: Gregorio Medina    Diagnoses:  Active Hospital Problems    Diagnosis  POA    *Facet hypertrophy of lumbar region [M47.896]  Yes      Resolved Hospital Problems    Diagnosis Date Resolved POA   No resolved problems to display.       Discharged Condition: good    Final Diagnoses: Facet hypertrophy of lumbar region [M47.896]    Disposition: Home or Self Care    Hospital Course: no complications, uneventful    Outcome of Hospitalization, Treatment, Procedure, or Surgery:  Patient was admitted for outpatient procedure. The patient underwent procedure without complications and are discharged home    Follow up/Patient Instructions:  Follow up as scheduled/Patient has received instructions and follow up date    Medications:  Continue previous medications      Discharge Procedure Orders  Diet general     Activity as tolerated     Call MD for:  temperature >100.4     Call MD for:  severe uncontrolled pain     Call MD for:  redness, tenderness, or signs of infection (pain, swelling, redness, odor or green/yellow discharge around incision site)     Call MD for:  severe persistent headache     No dressing needed           Discharge Procedure Orders (must include Diet, Follow-up, Activity):    Discharge Procedure Orders (must include Diet, Follow-up, Activity)  Diet general     Activity as tolerated     Call MD for:  temperature >100.4     Call MD for:  severe uncontrolled pain     Call MD for:  redness, tenderness, or signs of infection (pain, swelling, redness, odor or green/yellow discharge around incision site)     Call MD for:  severe persistent headache     No dressing needed

## 2017-10-02 NOTE — H&P (VIEW-ONLY)
This note was completed with dictation software and grammatical errors may exist.    CC: Low back pain    HPI: The patient is a 51-year-old woman with a history of diabetes with neuropathy, hypertension, obesity who presents in self-referral for low back pain. She is status post left L4 and L5 transforaminal epidural steroid injection on 8/31/17 with 0% relief.  The patient is new to me.  She complains of throbbing and aching bilateral low back pain radiating to the buttocks and anterior thighs.  She states that this is worse with a slight bend, walking and standing.  She has relief with sitting.  She denies weakness, numbness, bladder or bowel incontinence.    Pain intervention history: She had done 3-4 weeks of physical therapy at [facility] with no sustained relief.  She tried gabapentin but it causes drowsiness.  She is status post left L4 and L5 transforaminal epidural steroid injection on 8/31/17 with 0% relief.     ROS: She reports weight gain, rash, itching, headaches, shortness of breath, diarrhea, diabetes, back pain, difficulty sleeping, anxiety, depression.  Balance of review of systems is negative.    Past Medical History:   Diagnosis Date    Abnormal stress test 10/2016    Acne     Acute diastolic heart failure secondary to idiopathic cardiomyopathy 10/2016    Allergy     Anxiety     Arthritis     Carpal tunnel syndrome of right wrist     bilateral    Chest pain     Diabetes mellitus     Type 2 IDDM - Uncontrolled    Diabetic neuropathy     Difficult intubation     needed glidescope for cholecystectomy 2009    Dry scalp     Fatty liver     GERD (gastroesophageal reflux disease)     on no meds    Hormone replacement therapy (HRT)     Hyperlipidemia     Hypertension     Insomnia     Knee pain     Morbid obesity     Neuropathy due to type 2 diabetes mellitus     anyaa feet    MARLON (obstructive sleep apnea)     CPAP, says she is compliant with use    Parotid mass 2014    had Biopsy    SOB  (shortness of breath) 10/2016    Tachycardia     frequently,chronic for years per chart       Past Surgical History:   Procedure Laterality Date    CARDIAC CATHETERIZATION  2016    WDL per patient     SECTION      x 1    CHOLECYSTECTOMY  2009    ESOPHAGOGASTRODUODENOSCOPY  2009    Dr [facility]ELA [facility] - [facility]    HEMORRHOID SURGERY  2012    PPH with External Hemorrhoidectomy - Dr JAMAL Rollins, had spinal anesthesia    ORIF FEMUR FRACTURE Left     IM dmitry.      TRIGGER FINGER RELEASE Right     TUBAL LIGATION         Social History     Social History    Marital status: Single     Spouse name: N/A    Number of children: N/A    Years of education: N/A     Social History Main Topics    Smoking status: Never Smoker    Smokeless tobacco: Never Used    Alcohol use 12.6 oz/week     21 Shots of liquor per week    Drug use: No    Sexual activity: Not Asked     Other Topics Concern    None     Social History Narrative    None         Medications/Allergies: See med card    Vitals:    17 1539   BP: 130/70   Pulse: 78   Resp: 20   Temp: 98.5 °F (36.9 °C)   TempSrc: Oral   Weight: 103.1 kg (227 lb 4.7 oz)   PainSc:   8   PainLoc: Back         Physical exam:  Gen: A and O x3, pleasant, obese  Skin: No rashes or obvious lesions  HEENT: PERRLA, no obvious deformities on ears or in canals. Trachea midline.  CVS: Regular rate and rhythm, normal palpable pulses.  Resp:No increased work of breathing, symmetrical chest rise.  Abdomen: Soft, NT/ND.  Musculoskeletal:  No antalgic gait.     Neuro:  Lower extremities: 5/5 strength bilaterally  Reflexes: Patellar 2+, Achilles 2+ bilaterally.  Sensory:  Intact and symmetrical to light touch and pinprick in L2-S1 dermatomes bilaterally.    Lumbar spine:  Lumbar spine: Range of motion is severely reduced with flexion and extension with increased bilateral low back pain during each maneuver but worse with flexion.  Saroj's test causes no increased pain on either side.     Supine straight leg raise is negative bilaterally.  Internal and external rotation of the hip causes no increased pain on either side.  Myofascial exam: No tenderness to palpation across lumbar paraspinous muscles.    Imaging:  MRI lumbar spine 8/7/17  At the T12-L1 level, broad-based bulge appears to be present and of 3 mm with possible anterior cord contact. Facet arthropathy and ligamentous hypertrophy appear to be present. This may relate to a slightly irregular protrusion centrally of 3 mm. Mild thecal sac narrowing is noted the 9 mm. Mild bilateral neural foraminal stenosis appears to be present.  At the L1-L2 level, mild broad-based is noted towards each neural foramen, mild bilateral neural foraminal narrowing appears to be present. Minimal central canal narrowing is noted with the thecal sac narrowed to 10 mm.  At the L2-L3 level, facet arthropathy and ligamentous hypertrophy appears to be present. Mild broad-based bulge appears to be present of 2-3 mm, mild bilateral neural foraminal narrowing is noted. A congenitally small central canal is noted to 9 mm with mild central canal narrowing  At the L3-L4 level, broad-based bulging is noted towards each neural foramen of 3-4 mm, a congenitally small thecal sac is noted to 9 mm with mild central canal narrowing. Mild bilateral neural foraminal narrowing is noted.  At the L4-L5 level, mild facet arthropathy and ligamentous hypertrophy appears to be present, mild bilateral neural foraminal narrowing is noted. Mild thecal sac narrowing is noted to 8 mm.  At L5-S1 level, moderate right neural foraminal narrowing is noted with mild left neural foraminal narrowing. Mild thecal sac narrowing is noted to 9 mm.      Assessment:  The patient is a 51-year-old woman with a history of diabetes with neuropathy, hypertension, obesity who presents in self-referral for low back pain.    1. Facet hypertrophy of lumbar region     2. DDD (degenerative disc disease), lumbar          Plan:  1.  Since the patient did not have relief following the left L4 and L5 TF YUN, we discussed that her pain may be more facet mediated.  I will schedule her for bilateral L3, 4 and 5 medial branch blocks and RFA if indicated.  2.  Follow-up in 4 weeks post procedure sooner as needed.

## 2017-10-04 ENCOUNTER — TELEPHONE (OUTPATIENT)
Dept: PAIN MEDICINE | Facility: CLINIC | Age: 52
End: 2017-10-04

## 2017-10-04 RX ORDER — CHLORTHALIDONE 25 MG/1
TABLET ORAL
Qty: 30 TABLET | Refills: 11 | Status: SHIPPED | OUTPATIENT
Start: 2017-10-04 | End: 2019-01-30 | Stop reason: SDUPTHER

## 2017-10-06 ENCOUNTER — TELEPHONE (OUTPATIENT)
Dept: PAIN MEDICINE | Facility: CLINIC | Age: 52
End: 2017-10-06

## 2017-10-06 NOTE — TELEPHONE ENCOUNTER
"----- Message from Mikki Montez sent at 10/6/2017 10:47 AM CDT -----  Contact: self  Patient [phone] (cell) is calling/patient had a procedure this past Monday 10 02 17 by Dr Medina on her back and she is saying that "it did not work"/please call  "

## 2017-10-06 NOTE — TELEPHONE ENCOUNTER
Spoke with the patient regarding the block that was done. Stated that she received a little bit of relief but not enough. Follow up appointment scheduled to discuss.

## 2017-10-12 ENCOUNTER — OFFICE VISIT (OUTPATIENT)
Dept: PAIN MEDICINE | Facility: CLINIC | Age: 52
End: 2017-10-12
Payer: COMMERCIAL

## 2017-10-12 VITALS
BODY MASS INDEX: 50.37 KG/M2 | SYSTOLIC BLOOD PRESSURE: 140 MMHG | RESPIRATION RATE: 20 BRPM | DIASTOLIC BLOOD PRESSURE: 70 MMHG | HEART RATE: 78 BPM | TEMPERATURE: 98 F | WEIGHT: 224.63 LBS

## 2017-10-12 DIAGNOSIS — M47.816 FACET HYPERTROPHY OF LUMBAR REGION: Primary | ICD-10-CM

## 2017-10-12 DIAGNOSIS — E66.01 MORBID OBESITY WITH BODY MASS INDEX OF 50 OR HIGHER: ICD-10-CM

## 2017-10-12 PROCEDURE — 99213 OFFICE O/P EST LOW 20 MIN: CPT | Mod: S$GLB,,, | Performed by: PHYSICIAN ASSISTANT

## 2017-10-12 PROCEDURE — 99999 PR PBB SHADOW E&M-EST. PATIENT-LVL V: CPT | Mod: PBBFAC,,, | Performed by: PHYSICIAN ASSISTANT

## 2017-10-12 RX ORDER — SODIUM CHLORIDE, SODIUM LACTATE, POTASSIUM CHLORIDE, CALCIUM CHLORIDE 600; 310; 30; 20 MG/100ML; MG/100ML; MG/100ML; MG/100ML
INJECTION, SOLUTION INTRAVENOUS CONTINUOUS
Status: CANCELLED | OUTPATIENT
Start: 2017-11-06

## 2017-10-16 NOTE — PROGRESS NOTES
This note was completed with dictation software and grammatical errors may exist.    CC: Low back pain    HPI: The patient is a 52-year-old woman with a history of diabetes with neuropathy, hypertension, obesity who presents in self-referral for low back pain. She is status post bilateral L3, 4 and 5 medial branch blocks on 10/2/17 with unknown relief.  She states that she sat down the rest of the day so she doesn't know if it helped or not.  She continues to have aching pain across the low back into the buttocks anterior thighs if she is on her feet.  She denies any major pain with sitting.  She denies weakness, numbness, bladder or bowel incontinence.    Pain intervention history: She had done 3-4 weeks of physical therapy at [facility] with no sustained relief.  She tried gabapentin but it causes drowsiness.  She is status post left L4 and L5 transforaminal epidural steroid injection on 8/31/17 with 0% relief.     ROS: She reports weight gain, rash, itching, headaches, shortness of breath, diarrhea, diabetes, back pain, difficulty sleeping, anxiety, depression.  Balance of review of systems is negative.    Past Medical History:   Diagnosis Date    Abnormal stress test 10/2016    Acne     Acute diastolic heart failure secondary to idiopathic cardiomyopathy 10/2016    Allergy     Anxiety     Arthritis     Carpal tunnel syndrome of right wrist     bilateral    Chest pain     Diabetes mellitus     Type 2 IDDM - Uncontrolled    Diabetic neuropathy     Difficult intubation     needed glidescope for cholecystectomy 2009    Dry scalp     Fatty liver     GERD (gastroesophageal reflux disease)     on no meds    Hyperlipidemia     Hypertension     Insomnia     Knee pain     Morbid obesity     Neuropathy due to type 2 diabetes mellitus     anaya feet    MARLON (obstructive sleep apnea)     CPAP, says she is compliant with use    Parotid mass 2014    had Biopsy    SOB (shortness of breath) 10/2016     Tachycardia     frequently,chronic for years per chart       Past Surgical History:   Procedure Laterality Date    CARDIAC CATHETERIZATION  2016    WDL per patient     SECTION      x 1    CHOLECYSTECTOMY  2009    ESOPHAGOGASTRODUODENOSCOPY  2009    Dr GRAC[facility]ELA [facility]    HEMORRHOID SURGERY  2012    PPH with External Hemorrhoidectomy - Dr JAMAL Rollins, had spinal anesthesia    ORIF FEMUR FRACTURE Left     IM dmitry.      TRIGGER FINGER RELEASE Right     TUBAL LIGATION         Social History     Social History    Marital status: Single     Spouse name: N/A    Number of children: N/A    Years of education: N/A     Social History Main Topics    Smoking status: Never Smoker    Smokeless tobacco: Never Used    Alcohol use 12.6 oz/week     21 Shots of liquor per week    Drug use: No    Sexual activity: Not Asked     Other Topics Concern    None     Social History Narrative    None         Medications/Allergies: See med card    Vitals:    10/12/17 1514   BP: (!) 140/70   Pulse: 78   Resp: 20   Temp: 98.3 °F (36.8 °C)   TempSrc: Oral   Weight: 101.9 kg (224 lb 10.4 oz)   PainSc:   8   PainLoc: Back         Physical exam:  Gen: A and O x3, pleasant, obese  Skin: No rashes or obvious lesions  HEENT: PERRLA, no obvious deformities on ears or in canals. Trachea midline.  CVS: Regular rate and rhythm, normal palpable pulses.  Resp:No increased work of breathing, symmetrical chest rise.  Abdomen: Soft, NT/ND.  Musculoskeletal:  No antalgic gait.     Neuro:  Lower extremities: 5/5 strength bilaterally  Reflexes: Patellar 2+, Achilles 2+ bilaterally.  Sensory:  Intact and symmetrical to light touch and pinprick in L2-S1 dermatomes bilaterally.    Lumbar spine:  Lumbar spine: Range of motion is severely reduced with flexion and extension with increased bilateral low back pain during each maneuver but worse with flexion.  Saroj's test causes no increased pain on either side.    Supine straight leg raise  is negative bilaterally.  Internal and external rotation of the hip causes no increased pain on either side.  Myofascial exam: No tenderness to palpation across lumbar paraspinous muscles.    Imaging:  MRI lumbar spine 8/7/17  At the T12-L1 level, broad-based bulge appears to be present and of 3 mm with possible anterior cord contact. Facet arthropathy and ligamentous hypertrophy appear to be present. This may relate to a slightly irregular protrusion centrally of 3 mm. Mild thecal sac narrowing is noted the 9 mm. Mild bilateral neural foraminal stenosis appears to be present.  At the L1-L2 level, mild broad-based is noted towards each neural foramen, mild bilateral neural foraminal narrowing appears to be present. Minimal central canal narrowing is noted with the thecal sac narrowed to 10 mm.  At the L2-L3 level, facet arthropathy and ligamentous hypertrophy appears to be present. Mild broad-based bulge appears to be present of 2-3 mm, mild bilateral neural foraminal narrowing is noted. A congenitally small central canal is noted to 9 mm with mild central canal narrowing  At the L3-L4 level, broad-based bulging is noted towards each neural foramen of 3-4 mm, a congenitally small thecal sac is noted to 9 mm with mild central canal narrowing. Mild bilateral neural foraminal narrowing is noted.  At the L4-L5 level, mild facet arthropathy and ligamentous hypertrophy appears to be present, mild bilateral neural foraminal narrowing is noted. Mild thecal sac narrowing is noted to 8 mm.  At L5-S1 level, moderate right neural foraminal narrowing is noted with mild left neural foraminal narrowing. Mild thecal sac narrowing is noted to 9 mm.      Assessment:  The patient is a 52-year-old woman with a history of diabetes with neuropathy, hypertension, obesity who presents in self-referral for low back pain.    1. Facet hypertrophy of lumbar region  Vital signs    Activity as tolerated    Place 18-22 [facility] IV      Verify informed consent    Notify physician     Notify physician     Notify physician (specify)    Diet NPO    Case Request Operating Room: BLOCK-NERVE-MEDIAL BRANCH-LUMBAR L3, L4, L5    Place in Outpatient    lactated ringers infusion   2. Morbid obesity with body mass index of 50 or higher         Plan:  1.  I explained to the patient that we do not know if the blocks helped because she sat down the rest of the day after the procedure and does not have major pain with sitting.  We will schedule repeat bilateral L3, 4 and 5 medial branch blocks and she verbalizes her understanding that she needs to stand and walk when she gets home from the procedure to see if it helped or not.  If successful, we will proceed with RFA.  2.  I strongly encouraged physical therapy and exercise, however she is resistant to this.  3.  Follow-up in 4 weeks post procedure or sooner as needed.

## 2017-11-06 ENCOUNTER — HOSPITAL ENCOUNTER (OUTPATIENT)
Dept: RADIOLOGY | Facility: HOSPITAL | Age: 52
Discharge: HOME | End: 2017-11-06
Attending: ANESTHESIOLOGY
Payer: COMMERCIAL

## 2017-11-06 ENCOUNTER — HOSPITAL ENCOUNTER (OUTPATIENT)
Facility: HOSPITAL | Age: 52
Discharge: HOME | End: 2017-11-06
Attending: ANESTHESIOLOGY | Admitting: ANESTHESIOLOGY
Payer: COMMERCIAL

## 2017-11-06 ENCOUNTER — SURGERY (OUTPATIENT)
Age: 52
End: 2017-11-06

## 2017-11-06 VITALS
WEIGHT: 225 LBS | RESPIRATION RATE: 16 BRPM | DIASTOLIC BLOOD PRESSURE: 66 MMHG | SYSTOLIC BLOOD PRESSURE: 112 MMHG | TEMPERATURE: 98 F | BODY MASS INDEX: 50.61 KG/M2 | HEART RATE: 91 BPM | OXYGEN SATURATION: 100 % | HEIGHT: 56 IN

## 2017-11-06 DIAGNOSIS — M51.36 DDD (DEGENERATIVE DISC DISEASE), LUMBAR: ICD-10-CM

## 2017-11-06 DIAGNOSIS — M47.816 FACET HYPERTROPHY OF LUMBAR REGION: Primary | ICD-10-CM

## 2017-11-06 LAB — GLUCOSE SERPL-MCNC: 88 MG/DL (ref 70–110)

## 2017-11-06 PROCEDURE — 64494 INJ PARAVERT F JNT L/S 2 LEV: CPT | Mod: 50,,, | Performed by: ANESTHESIOLOGY

## 2017-11-06 PROCEDURE — 76000 FLUOROSCOPY <1 HR PHYS/QHP: CPT | Mod: TC,PO

## 2017-11-06 PROCEDURE — 64494 INJ PARAVERT F JNT L/S 2 LEV: CPT | Mod: 50,PO | Performed by: ANESTHESIOLOGY

## 2017-11-06 PROCEDURE — 64495 INJ PARAVERT F JNT L/S 3 LEV: CPT | Mod: 50,PO | Performed by: ANESTHESIOLOGY

## 2017-11-06 PROCEDURE — 64493 INJ PARAVERT F JNT L/S 1 LEV: CPT | Mod: 50,PO | Performed by: ANESTHESIOLOGY

## 2017-11-06 PROCEDURE — 64493 INJ PARAVERT F JNT L/S 1 LEV: CPT | Mod: 50,,, | Performed by: ANESTHESIOLOGY

## 2017-11-06 PROCEDURE — 99152 MOD SED SAME PHYS/QHP 5/>YRS: CPT | Mod: ,,, | Performed by: ANESTHESIOLOGY

## 2017-11-06 PROCEDURE — 25000003 PHARM REV CODE 250: Mod: PO | Performed by: ANESTHESIOLOGY

## 2017-11-06 PROCEDURE — 25500020 PHARM REV CODE 255: Mod: PO | Performed by: ANESTHESIOLOGY

## 2017-11-06 PROCEDURE — 63600175 PHARM REV CODE 636 W HCPCS: Mod: PO | Performed by: ANESTHESIOLOGY

## 2017-11-06 RX ORDER — CITALOPRAM 40 MG/1
TABLET, FILM COATED ORAL
Qty: 30 TABLET | Refills: 11 | Status: SHIPPED | OUTPATIENT
Start: 2017-11-06 | End: 2017-11-20 | Stop reason: SDUPTHER

## 2017-11-06 RX ORDER — LIDOCAINE HYDROCHLORIDE 10 MG/ML
1 INJECTION, SOLUTION EPIDURAL; INFILTRATION; INTRACAUDAL; PERINEURAL ONCE
Status: COMPLETED | OUTPATIENT
Start: 2017-11-06 | End: 2017-11-06

## 2017-11-06 RX ORDER — MIDAZOLAM HYDROCHLORIDE 2 MG/2ML
INJECTION, SOLUTION INTRAMUSCULAR; INTRAVENOUS
Status: DISCONTINUED | OUTPATIENT
Start: 2017-11-06 | End: 2017-11-06 | Stop reason: HOSPADM

## 2017-11-06 RX ORDER — SODIUM CHLORIDE, SODIUM LACTATE, POTASSIUM CHLORIDE, CALCIUM CHLORIDE 600; 310; 30; 20 MG/100ML; MG/100ML; MG/100ML; MG/100ML
INJECTION, SOLUTION INTRAVENOUS CONTINUOUS
Status: DISCONTINUED | OUTPATIENT
Start: 2017-11-06 | End: 2017-11-06

## 2017-11-06 RX ORDER — BUPIVACAINE HYDROCHLORIDE 2.5 MG/ML
INJECTION, SOLUTION EPIDURAL; INFILTRATION; INTRACAUDAL
Status: DISCONTINUED | OUTPATIENT
Start: 2017-11-06 | End: 2017-11-06 | Stop reason: HOSPADM

## 2017-11-06 RX ORDER — LIDOCAINE HYDROCHLORIDE 10 MG/ML
INJECTION, SOLUTION EPIDURAL; INFILTRATION; INTRACAUDAL; PERINEURAL
Status: DISCONTINUED | OUTPATIENT
Start: 2017-11-06 | End: 2017-11-06 | Stop reason: HOSPADM

## 2017-11-06 RX ADMIN — BUPIVACAINE HYDROCHLORIDE 6 ML: 2.5 INJECTION, SOLUTION EPIDURAL; INFILTRATION; INTRACAUDAL; PERINEURAL at 03:11

## 2017-11-06 RX ADMIN — MIDAZOLAM HYDROCHLORIDE 1 MG: 1 INJECTION, SOLUTION INTRAMUSCULAR; INTRAVENOUS at 03:11

## 2017-11-06 RX ADMIN — IOHEXOL 3 ML: 300 INJECTION, SOLUTION INTRAVENOUS at 03:11

## 2017-11-06 RX ADMIN — LIDOCAINE HYDROCHLORIDE 6 ML: 10 INJECTION, SOLUTION EPIDURAL; INFILTRATION; INTRACAUDAL; PERINEURAL at 03:11

## 2017-11-06 RX ADMIN — LIDOCAINE HYDROCHLORIDE: 10 INJECTION, SOLUTION EPIDURAL; INFILTRATION; INTRACAUDAL; PERINEURAL at 02:11

## 2017-11-06 NOTE — H&P (VIEW-ONLY)
This note was completed with dictation software and grammatical errors may exist.    CC: Low back pain    HPI: The patient is a 52-year-old woman with a history of diabetes with neuropathy, hypertension, obesity who presents in self-referral for low back pain. She is status post bilateral L3, 4 and 5 medial branch blocks on 10/2/17 with unknown relief.  She states that she sat down the rest of the day so she doesn't know if it helped or not.  She continues to have aching pain across the low back into the buttocks anterior thighs if she is on her feet.  She denies any major pain with sitting.  She denies weakness, numbness, bladder or bowel incontinence.    Pain intervention history: She had done 3-4 weeks of physical therapy at [facility] with no sustained relief.  She tried gabapentin but it causes drowsiness.  She is status post left L4 and L5 transforaminal epidural steroid injection on 8/31/17 with 0% relief.     ROS: She reports weight gain, rash, itching, headaches, shortness of breath, diarrhea, diabetes, back pain, difficulty sleeping, anxiety, depression.  Balance of review of systems is negative.    Past Medical History:   Diagnosis Date    Abnormal stress test 10/2016    Acne     Acute diastolic heart failure secondary to idiopathic cardiomyopathy 10/2016    Allergy     Anxiety     Arthritis     Carpal tunnel syndrome of right wrist     bilateral    Chest pain     Diabetes mellitus     Type 2 IDDM - Uncontrolled    Diabetic neuropathy     Difficult intubation     needed glidescope for cholecystectomy 2009    Dry scalp     Fatty liver     GERD (gastroesophageal reflux disease)     on no meds    Hyperlipidemia     Hypertension     Insomnia     Knee pain     Morbid obesity     Neuropathy due to type 2 diabetes mellitus     anaya feet    MARLON (obstructive sleep apnea)     CPAP, says she is compliant with use    Parotid mass 2014    had Biopsy    SOB (shortness of breath) 10/2016     Tachycardia     frequently,chronic for years per chart       Past Surgical History:   Procedure Laterality Date    CARDIAC CATHETERIZATION  2016    WDL per patient     SECTION      x 1    CHOLECYSTECTOMY  2009    ESOPHAGOGASTRODUODENOSCOPY  2009    Dr [facility]EL[facility]    HEMORRHOID SURGERY  2012    PPH with External Hemorrhoidectomy - Dr JAMAL Rollins, had spinal anesthesia    ORIF FEMUR FRACTURE Left     IM dmitry.      TRIGGER FINGER RELEASE Right     TUBAL LIGATION         Social History     Social History    Marital status: Single     Spouse name: N/A    Number of children: N/A    Years of education: N/A     Social History Main Topics    Smoking status: Never Smoker    Smokeless tobacco: Never Used    Alcohol use 12.6 oz/week     21 Shots of liquor per week    Drug use: No    Sexual activity: Not Asked     Other Topics Concern    None     Social History Narrative    None         Medications/Allergies: See med card    Vitals:    10/12/17 1514   BP: (!) 140/70   Pulse: 78   Resp: 20   Temp: 98.3 °F (36.8 °C)   TempSrc: Oral   Weight: 101.9 kg (224 lb 10.4 oz)   PainSc:   8   PainLoc: Back         Physical exam:  Gen: A and O x3, pleasant, obese  Skin: No rashes or obvious lesions  HEENT: PERRLA, no obvious deformities on ears or in canals. Trachea midline.  CVS: Regular rate and rhythm, normal palpable pulses.  Resp:No increased work of breathing, symmetrical chest rise.  Abdomen: Soft, NT/ND.  Musculoskeletal:  No antalgic gait.     Neuro:  Lower extremities: 5/5 strength bilaterally  Reflexes: Patellar 2+, Achilles 2+ bilaterally.  Sensory:  Intact and symmetrical to light touch and pinprick in L2-S1 dermatomes bilaterally.    Lumbar spine:  Lumbar spine: Range of motion is severely reduced with flexion and extension with increased bilateral low back pain during each maneuver but worse with flexion.  Saroj's test causes no increased pain on either side.    Supine straight leg raise  is negative bilaterally.  Internal and external rotation of the hip causes no increased pain on either side.  Myofascial exam: No tenderness to palpation across lumbar paraspinous muscles.    Imaging:  MRI lumbar spine 8/7/17  At the T12-L1 level, broad-based bulge appears to be present and of 3 mm with possible anterior cord contact. Facet arthropathy and ligamentous hypertrophy appear to be present. This may relate to a slightly irregular protrusion centrally of 3 mm. Mild thecal sac narrowing is noted the 9 mm. Mild bilateral neural foraminal stenosis appears to be present.  At the L1-L2 level, mild broad-based is noted towards each neural foramen, mild bilateral neural foraminal narrowing appears to be present. Minimal central canal narrowing is noted with the thecal sac narrowed to 10 mm.  At the L2-L3 level, facet arthropathy and ligamentous hypertrophy appears to be present. Mild broad-based bulge appears to be present of 2-3 mm, mild bilateral neural foraminal narrowing is noted. A congenitally small central canal is noted to 9 mm with mild central canal narrowing  At the L3-L4 level, broad-based bulging is noted towards each neural foramen of 3-4 mm, a congenitally small thecal sac is noted to 9 mm with mild central canal narrowing. Mild bilateral neural foraminal narrowing is noted.  At the L4-L5 level, mild facet arthropathy and ligamentous hypertrophy appears to be present, mild bilateral neural foraminal narrowing is noted. Mild thecal sac narrowing is noted to 8 mm.  At L5-S1 level, moderate right neural foraminal narrowing is noted with mild left neural foraminal narrowing. Mild thecal sac narrowing is noted to 9 mm.      Assessment:  The patient is a 52-year-old woman with a history of diabetes with neuropathy, hypertension, obesity who presents in self-referral for low back pain.    1. Facet hypertrophy of lumbar region  Vital signs    Activity as tolerated    Place 18-22 [facility] IV      Verify informed consent    Notify physician     Notify physician     Notify physician (specify)    Diet NPO    Case Request Operating Room: BLOCK-NERVE-MEDIAL BRANCH-LUMBAR L3, L4, L5    Place in Outpatient    lactated ringers infusion   2. Morbid obesity with body mass index of 50 or higher         Plan:  1.  I explained to the patient that we do not know if the blocks helped because she sat down the rest of the day after the procedure and does not have major pain with sitting.  We will schedule repeat bilateral L3, 4 and 5 medial branch blocks and she verbalizes her understanding that she needs to stand and walk when she gets home from the procedure to see if it helped or not.  If successful, we will proceed with RFA.  2.  I strongly encouraged physical therapy and exercise, however she is resistant to this.  3.  Follow-up in 4 weeks post procedure or sooner as needed.

## 2017-11-06 NOTE — DISCHARGE SUMMARY
[facility]  Discharge Note  Short Stay    Admit Date: 11/6/2017    Discharge Date: 11/6/2017    Attending Physician: Gregorio Medina MD     Discharge Provider: Gregorio Medina    Diagnoses:  Active Hospital Problems    Diagnosis  POA    *Facet hypertrophy of lumbar region [M47.896]  Yes      Resolved Hospital Problems    Diagnosis Date Resolved POA   No resolved problems to display.       Discharged Condition: good    Final Diagnoses: Facet hypertrophy of lumbar region [M47.896]    Disposition: Home or Self Care    Hospital Course: no complications, uneventful    Outcome of Hospitalization, Treatment, Procedure, or Surgery:  Patient was admitted for outpatient procedure. The patient underwent procedure without complications and are discharged home    Follow up/Patient Instructions:  Follow up as scheduled/Patient has received instructions and follow up date    Medications:  Continue previous medications      Discharge Procedure Orders  Diet general     Activity as tolerated     Call MD for:  temperature >100.4     Call MD for:  severe uncontrolled pain     Call MD for:  redness, tenderness, or signs of infection (pain, swelling, redness, odor or green/yellow discharge around incision site)     Call MD for:  severe persistent headache     No dressing needed           Discharge Procedure Orders (must include Diet, Follow-up, Activity):    Discharge Procedure Orders (must include Diet, Follow-up, Activity)  Diet general     Activity as tolerated     Call MD for:  temperature >100.4     Call MD for:  severe uncontrolled pain     Call MD for:  redness, tenderness, or signs of infection (pain, swelling, redness, odor or green/yellow discharge around incision site)     Call MD for:  severe persistent headache     No dressing needed

## 2017-11-06 NOTE — DISCHARGE INSTRUCTIONS
Home care instructions  Apply ice pack to the injection site for 20 minutes periods for the first 24 hrs for soreness/discomfort at injection site DO NOT USE HEAT FOR 24 HOURS  Keep site clean and dry for 24 hours, remove bandaid when desired  Do not drive until tomorrow  Take care when walking after a lumbar injection  Resume home medication as prescribed today  Resume Aspirin, Plavix, or Coumadin the day after the procedure unless otherwise instructed.    SEE IMMEDIATE MEDICAL HELP FOR:  Severe increase in your usual pain or appearance of new pain  Prolonged or increasing weakness or numbness in the legs or arms  Drainage, redness, active bleeding, or increased swelling at the injection site  Temperature over 100.0 degrees F.  Headache that increases when your head is upright and decreases when you lie flat    CALL 911 OR GO DIRECTLY TO EMERGENCY DEPARTMENT FOR:  Shortness of breath, chest pain, or problems breathing

## 2017-11-06 NOTE — INTERVAL H&P NOTE
The patient has been examined and the H&P has been reviewed:    I concur with the findings and no changes have occurred since H&P was written.      Anesthesia/Surgery risks, benefits and alternative options discussed and understood by patient/family.          There are no hospital problems to display for this patient.

## 2017-11-06 NOTE — OP NOTE
PROCEDURE DATE: 11/6/2017    PROCEDURE:  Bilateral L3,4,5 medial branch nerve block on the bilateral-side    DIAGNOSIS:  Lumbar facet hypertrophy     Post Op diagnosis: Same    PHYSICIAN: Gregorio Medina MD    MEDICATIONS INJECTED: 0.25% bupivicaine, 1ml at each level    LOCAL ANESTHETIC USED: Lidocaine 1%, 2ml at each level    SEDATION MEDICATIONS:2mg versed    ESTIMATED BLOOD LOSS:  none    COMPLICATIONS:  none    TECHNIQUE: A time out was taken to identify the patient, procedure and side of the procedure. The patient was placed in a prone position, then prepped and draped in the usual sterile fashion using ChloraPrep and sterile towels.  The levels were determined under fluoroscopic guidance and then marked.  Local anesthetic was given by raising a wheal at the skin over each site and then infiltrated approximately 2cm deeper.  A 22-gauge 5 inch needle was introduced to the anatomic location of the right and then left L3,4,5 medial branch nerves on the bilateral side.  Appropriate location and medication spread confirmed by injecting 0.5ml of Omnipaque. The above medication was then injected. The patient tolerated the procedure well.     The patient was monitored after the procedure. The patient will be contacted in the next few days to determine extent of relief.  Patient was given post procedure and discharge instructions to follow at home.  The patient was discharged in a stable condition.

## 2017-11-08 ENCOUNTER — PATIENT OUTREACH (OUTPATIENT)
Dept: ADMINISTRATIVE | Facility: HOSPITAL | Age: 52
End: 2017-11-08

## 2017-11-08 NOTE — PROGRESS NOTES
Health Maintenance Due   Topic Date Due    Colonoscopy  09/18/2015    Influenza Vaccine  08/01/2017    Eye Exam  09/12/2017

## 2017-11-09 ENCOUNTER — TELEPHONE (OUTPATIENT)
Dept: PAIN MEDICINE | Facility: CLINIC | Age: 52
End: 2017-11-09

## 2017-11-09 DIAGNOSIS — M47.816 FACET HYPERTROPHY OF LUMBAR REGION: ICD-10-CM

## 2017-11-09 DIAGNOSIS — M47.816 LUMBAR FACET ARTHROPATHY: Primary | ICD-10-CM

## 2017-11-09 RX ORDER — SODIUM CHLORIDE, SODIUM LACTATE, POTASSIUM CHLORIDE, CALCIUM CHLORIDE 600; 310; 30; 20 MG/100ML; MG/100ML; MG/100ML; MG/100ML
INJECTION, SOLUTION INTRAVENOUS CONTINUOUS
Status: CANCELLED | OUTPATIENT
Start: 2017-12-05

## 2017-11-09 NOTE — TELEPHONE ENCOUNTER
----- Message from Ignacia Jones RT sent at 11/9/2017 11:23 AM CST -----  Contact: pt    Called pod,pt  /  , returned missed call, thanks.

## 2017-11-09 NOTE — TELEPHONE ENCOUNTER
Spoke with the patient regarding the 2nd block and stated that she received greater than 75% relief. Before the block she could only walk 25 steps and stand for 1 -1 1/2 minutes. After the block the patient went shopping and was able to walk for a long time and standing was not a problem.When the patient sleeps at night, she usually wakes up through out the night. After the block she slept all night and woke up at 11 the next morning. Stated that she had the best sleep ever and was well rested. RFA scheduled for 12/5 with a follow up appointment mailed to home address.

## 2017-11-16 ENCOUNTER — TELEPHONE (OUTPATIENT)
Dept: FAMILY MEDICINE | Facility: CLINIC | Age: 52
End: 2017-11-16

## 2017-11-16 NOTE — TELEPHONE ENCOUNTER
----- Message from Peyton Rodriguez sent at 11/16/2017  2:52 PM CST -----  Contact: self  Patient called regarding a medication to prescribed for possible sinus infection. Symptoms are sore throat, runny nose, headache and nasal drainage. Please contact [phone] (home) [phone]    [facility] - [address] [place] [address]  [place] [address]  Phone: [phone] Fax: [phone]

## 2017-11-17 DIAGNOSIS — Z13.5 DIABETIC RETINOPATHY SCREENING: ICD-10-CM

## 2017-11-20 ENCOUNTER — LAB VISIT (OUTPATIENT)
Dept: LAB | Facility: HOSPITAL | Age: 52
End: 2017-11-20
Attending: NURSE PRACTITIONER
Payer: COMMERCIAL

## 2017-11-20 ENCOUNTER — OFFICE VISIT (OUTPATIENT)
Dept: FAMILY MEDICINE | Facility: CLINIC | Age: 52
End: 2017-11-20
Payer: COMMERCIAL

## 2017-11-20 VITALS
RESPIRATION RATE: 16 BRPM | HEART RATE: 102 BPM | BODY MASS INDEX: 51.78 KG/M2 | SYSTOLIC BLOOD PRESSURE: 134 MMHG | HEIGHT: 56 IN | DIASTOLIC BLOOD PRESSURE: 76 MMHG | TEMPERATURE: 98 F | OXYGEN SATURATION: 99 % | WEIGHT: 230.19 LBS

## 2017-11-20 DIAGNOSIS — E11.42 TYPE 2 DIABETES MELLITUS WITH DIABETIC POLYNEUROPATHY, WITH LONG-TERM CURRENT USE OF INSULIN: ICD-10-CM

## 2017-11-20 DIAGNOSIS — J01.00 ACUTE NON-RECURRENT MAXILLARY SINUSITIS: Primary | ICD-10-CM

## 2017-11-20 DIAGNOSIS — L30.9 ECZEMA, UNSPECIFIED TYPE: ICD-10-CM

## 2017-11-20 DIAGNOSIS — Z79.4 TYPE 2 DIABETES MELLITUS WITH DIABETIC POLYNEUROPATHY, WITH LONG-TERM CURRENT USE OF INSULIN: ICD-10-CM

## 2017-11-20 LAB
ESTIMATED AVG GLUCOSE: 180 MG/DL
HBA1C MFR BLD HPLC: 7.9 %

## 2017-11-20 PROCEDURE — 99214 OFFICE O/P EST MOD 30 MIN: CPT | Mod: S$GLB,,, | Performed by: NURSE PRACTITIONER

## 2017-11-20 PROCEDURE — 36415 COLL VENOUS BLD VENIPUNCTURE: CPT | Mod: PO

## 2017-11-20 PROCEDURE — 99999 PR PBB SHADOW E&M-EST. PATIENT-LVL IV: CPT | Mod: PBBFAC,,, | Performed by: NURSE PRACTITIONER

## 2017-11-20 PROCEDURE — 83036 HEMOGLOBIN GLYCOSYLATED A1C: CPT

## 2017-11-20 RX ORDER — AMOXICILLIN AND CLAVULANATE POTASSIUM 875; 125 MG/1; MG/1
1 TABLET, FILM COATED ORAL 2 TIMES DAILY
Qty: 14 TABLET | Refills: 0 | Status: SHIPPED | OUTPATIENT
Start: 2017-11-20 | End: 2017-11-27

## 2017-11-20 RX ORDER — FLUTICASONE PROPIONATE 50 MCG
1 SPRAY, SUSPENSION (ML) NASAL DAILY
Qty: 1 BOTTLE | Refills: 0 | Status: SHIPPED | OUTPATIENT
Start: 2017-11-20 | End: 2018-03-06

## 2017-11-20 NOTE — PROGRESS NOTES
Subjective:       Patient ID: Mine Quiros is a 52 y.o. female.    Chief Complaint: Nasal Congestion (yellow); Ear Fullness; Sore Throat; Hoarse; Cough (yellow sputum); and Sinus Problem    Sinus Problem   This is a new problem. The current episode started in the past 7 days. The problem has been waxing and waning since onset. There has been no fever. Associated symptoms include congestion, coughing, ear pain, headaches, sinus pressure and a sore throat. Pertinent negatives include no diaphoresis, neck pain or shortness of breath. Treatments tried: sudafed. The treatment provided no relief.     Vitals:    11/20/17 0951   BP: 134/76   Pulse: 102   Resp: 16   Temp: 98.3 °F (36.8 °C)     Review of Systems   Constitutional: Negative.  Negative for diaphoresis and fever.   HENT: Positive for congestion, ear pain, postnasal drip, sinus pain, sinus pressure and sore throat. Negative for facial swelling and trouble swallowing.    Eyes: Negative.  Negative for discharge and redness.   Respiratory: Positive for cough. Negative for shortness of breath.    Cardiovascular: Negative.  Negative for chest pain and palpitations.   Gastrointestinal: Negative.  Negative for abdominal pain and vomiting.   Genitourinary: Negative.  Negative for difficulty urinating and flank pain.   Musculoskeletal: Negative.  Negative for back pain and neck pain.   Skin: Negative.  Negative for rash and wound.   Neurological: Positive for headaches. Negative for dizziness and light-headedness.   Hematological: Negative.    Psychiatric/Behavioral: Negative.  Negative for confusion. The patient is not nervous/anxious.        Past Medical History:   Diagnosis Date    Abnormal stress test 10/2016    Acne     Acute diastolic heart failure secondary to idiopathic cardiomyopathy 10/2016    Allergy     Anxiety     Arthritis     Carpal tunnel syndrome of right wrist     bilateral    Chest pain     Diabetes mellitus     Type 2 IDDM - Uncontrolled     Diabetic neuropathy     Difficult intubation     needed glidescope for cholecystectomy 2009    Dry scalp     Fatty liver     GERD (gastroesophageal reflux disease)     on no meds    Hyperlipidemia     Hypertension     Insomnia     Knee pain     Morbid obesity     Neuropathy due to type 2 diabetes mellitus     anaya feet    MARLON (obstructive sleep apnea)     CPAP, says she is compliant with use    Parotid mass 2014    had Biopsy    SOB (shortness of breath) 10/2016    Tachycardia     frequently,chronic for years per chart     Objective:      Physical Exam   Constitutional: She is oriented to person, place, and time. She does not have a sickly appearance. No distress.   HENT:   Head: Normocephalic.   Right Ear: Hearing, external ear and ear canal normal. Tympanic membrane is bulging.   Left Ear: Hearing, external ear and ear canal normal. Tympanic membrane is bulging.   Nose: Right sinus exhibits maxillary sinus tenderness. Left sinus exhibits maxillary sinus tenderness.   Mouth/Throat:       Eyes: Conjunctivae and lids are normal.   Neck: Trachea normal and normal range of motion. Neck supple. No JVD present. No tracheal deviation present.   Cardiovascular: Normal rate, regular rhythm, S1 normal, S2 normal and normal heart sounds.    Pulmonary/Chest: Effort normal and breath sounds normal. She exhibits no tenderness.   Abdominal: Normal appearance. She exhibits no distension.   Musculoskeletal: Normal range of motion. She exhibits no edema or deformity.   Neurological: She is alert and oriented to person, place, and time.   Skin: Skin is warm and dry. She is not diaphoretic. No pallor.   Psychiatric: She has a normal mood and affect. Her speech is normal and behavior is normal. Judgment and thought content normal. Cognition and memory are normal.   Nursing note and vitals reviewed.      Assessment:       1. Acute non-recurrent maxillary sinusitis    2. Eczema, unspecified type        Plan:       Acute  non-recurrent maxillary sinusitis  -     amoxicillin-clavulanate 875-125mg (AUGMENTIN) 875-125 mg per tablet; Take 1 tablet by mouth 2 (two) times daily.  Dispense: 14 tablet; Refill: 0  -     fluticasone (FLONASE) 50 mcg/actuation nasal spray; 1 spray by Each Nare route once daily.  Dispense: 1 Bottle; Refill: 0  - Hot showers with steam inhalation    Eczema, unspecified type  -     Ambulatory referral to Dermatology        Return if symptoms worsen or fail to improve.

## 2017-11-20 NOTE — PATIENT INSTRUCTIONS
ANTIHISTAMINE   FLONASE    Acute Bacterial Rhinosinusitis (ABRS)    Acute bacterial rhinosinusitis (ABRS) is an infection of your nasal cavity and sinuses. Its caused by bacteria. Acute means that youve had symptoms for less than 12 weeks.  Understanding your sinuses  The nasal cavity is the large air-filled space behind your nose. The sinuses are a group of spaces formed by the bones of your face. They connect with your nasal cavity. ABRS causes the tissue lining these spaces to become inflamed. Mucus may not drain normally. This leads to facial pain and other symptoms.  What causes ABRS?  ABRS most often follows an upper respiratory infection caused by a virus. Bacteria then infect the lining of your nasal cavity and sinuses. But you can also get ABRS if you have:  · Nasal allergies  · Long-term nasal swelling and congestion not caused by allergies  · Blockage in the nose  Symptoms of ABRS  The symptoms of ABRS may be different for each person, and can include:  · Nasal congestion  · Runny nose  · Fluid draining from the nose down the throat (postnasal drip)  · Headache  · Cough  · Pain in the sinuses  · Thick, colored fluid from the nose (mucus)  · Fever  Diagnosing ABRS  ABRS may be diagnosed if youve had an upper respiratory infection like a cold and cough for longer than 10 to 14 days. Your health care provider will ask about your symptoms and your medical history. The provider will check your vital signs, including your temperature. Youll have a physical exam. The health care provider will check your ears, nose, and throat. You likely wont need any tests. If ABRS comes back, you may have a culture or other tests.  Treatment for ABRS  Treatment may include:  · Antibiotic medicine. This is for symptoms that last for at least 10 to 14 days.  · Nasal corticosteroid medicine. Drops or spray used in the nose can lessen swelling and congestion.  · Over-the-counter pain medicine. This is to lessen  sinus pain and pressure.  · Nasal decongestant medicine. Spray or drops may help to lessen congestion. Do not use them for more than a few days.  · Salt wash (saline irrigation). This can help to loosen mucus.  Possible complications of ABRS  ABRS may come back or become long-term (chronic).  In rare cases, ABRS may cause complications such as:   · Inflamed tissue around the brain and spinal cord (meningitis)  · Inflamed tissue around the eyes (orbital cellulitis)  · Inflamed bones around the sinuses (osteitis)  These problems may need to be treated in a hospital with intravenous (IV) antibiotic medicine or surgery.  When to call the health care provider  Call your health care provider if you have any of the following:  · Symptoms that dont get better, or get worse  · Symptoms that dont get better after 3 to 5 days on antibiotics  · Trouble seeing  · Swelling around your eyes  · Confusion or trouble staying awake   Date Last Reviewed: 3/3/2015  © 7820-8846 [employer]. [address] [place] [address]. All rights reserved. This information is not intended as a substitute for professional medical care. Always follow your healthcare professional's instructions.        When to Use Antibiotics   Antibiotics are medicines used to treat infections caused by bacteria. They dont work for illnesses caused by viruses or an allergic reaction. In fact, taking antibiotics for reasons other than a bacterial infection can cause problems. For example, you may have side effects from the medicine. And if you really need an antibiotic, it may not work well.                                                                                                                                              When antibiotics wont help  Your healthcare provider wont usually prescribe antibiotics for the following conditions. You can help by not asking for them if you have:   · A cold. This type of illness is caused by a  virus. It can cause a runny nose, stuffed-up nose, sneezing, coughing, headache, mild body aches, and low fever. A cold gets better on its own in a few days to a week.  · The flu (influenza). This is a respiratory illness caused by a virus. The flu usually goes away on its own in a week or so. It can cause fever, body aches, sore throat, and fatigue.  · Bronchitis. This is an infection in the lungs most often caused by a virus. You may have coughing, phlegm, body aches, and a low fever. A common type of bronchitis is known as a chest cold (acute bronchitis). This often happens after you have a respiratory infection like a common cold. Bronchitis can take weeks to go away, but antibiotics usually dont help.  · Most sore throats. Sore throats are most often caused by viruses. Your throat may feel scratchy or achy, and it may hurt to swallow. You may also have a low fever and body aches. A sore throat usually gets better in a few days.  · Most ear infections. An ear infection may be caused by a virus or bacteria. It causes pain in the ear. Antibiotics usually dont help, and the infection goes away on its own.  · Most sinus infections (sinusitis). This kind of infection causes sinus pain and swelling, and a runny nose. In most cases, sinusitis goes away on its own, and antibiotics dont make recovery quicker.  · Allergic rhinitis. This is a set of symptoms caused by an allergic reaction. You may have sneezing, a runny nose, itchy or watery eyes, or a sore throat. Allergies are not treated with antibiotics.  · Low fever. A mild fever thats less than 100.4°F (38°C) most likely doesnt need treatment with antibiotics.   When antibiotics can help   Antibiotics can be used to treat:                                                     · Strep throat. This is a throat infectioncaused by a certain type of bacteria. Symptoms of strep throat include a sore throat, white patches on the tonsils, red spots on the roof of the  mouth, fever, body aches, and nausea and vomiting.  · Urinary tract infection (UTI). This is a bacterial infection of the bladder and the tube that takes urine out of the body. It can cause burning pain and urine thats cloudy or tinted with blood. UTIs are very common. Antibiotics usually help treat these infections.  · Some ear infections. In some cases, a healthcare provider may prescribe antibiotics for an ear infection. You may need a test to show whats causing the ear infection.  · Some sinus infections. In some cases, yourhealthcare provider may give you antibiotics. He or she may first need to make sure your symptoms arent caused by a virus, fungus, allergies, or air pollutants such as smoke.   Your doctor may also recommend antibiotics if you have a condition that can affect your immune system, such as diabetes or cancer.   Self-care at home   If your infection cant be treated with antibiotics, you can take other steps to feel better. Try the remedies below. In general:   · Rest and sleep as much as needed.  · Drink water and other clear fluids.  · Dont smoke, and avoid smoke from other people.  · Use over-the-counter medicine such as acetaminophen to ease pain or fever, as directed by your healthcare provider.   To treat sinus pain or nasal congestion:   · Put a warm, moist washcloth on your face where you feel sinus pain or pressure.  · Use a nasal spray with medicine or saline, as directed by your healthcare provider.  · Breathe in steam from a hot shower.  · Use a humidifier or cool mist vaporizer.   To quiet a cough:   · Use a humidifier or cool mist vaporizer.  · Breathe in steam from a hot shower.  · Use cough lozenges.   To sooth a sore throat:   · Suck on ice chips, popsicles, or lozenges.  · Use a sore throat spray.  · Use a humidifier or cool mist vaporizer.  · Gargle with saltwater.  · Drink warm liquids.   To ease ear pain:   · Hold a warm, moist washcloth on the ear for 10 minutes at a  time.  Date Last Reviewed: 9/1/2016  © 8127-6911 [employer] [address] [place] [address], [place] [address]. All rights reserved. This information is not intended as a substitute for professional medical care. Always follow your healthcare professional's instructions.

## 2017-11-27 ENCOUNTER — PATIENT OUTREACH (OUTPATIENT)
Dept: ADMINISTRATIVE | Facility: HOSPITAL | Age: 52
End: 2017-11-27

## 2017-11-27 NOTE — PROGRESS NOTES
Portal outreach un-read by patient.  Outreach mailed today    Health Maintenance Due   Topic Date Due    Colonoscopy  09/18/2015    Eye Exam  09/12/2017

## 2017-11-27 NOTE — LETTER
November 27, 2017    Mnie Quiros  [address]  [place] [address]             [facility]  [address]  [facility] [address]  Phone: [phone] Dear Ms. Quiros:    We have tried to reach you by [facility] email unsuccessfully.      [facility] is committed to your overall health and would like to ensure that you are up to date on your recommended health testing.   Dr. Pitt has found that you may be due for the following:     Colonoscopy (Colorectal screening)   Influenza vaccine     Diabetic Retinopathy EYE EXAM screening (we now are able to perform this test the same day as your office visit with a health care team member without paying a second copay)     If you have had any of the above done at another facility, please let us know by calling or faxing to the numbers below so that your medical record can be updated. If you have a copy of these records, please fax them to the fax number below.  If not, please call [phone] so that we can get the necessary information to obtain copies from that facility.     Otherwise, please schedule these appointments at your earliest convenience by calling [phone] or going to [email or web address].      Sincerely,    Katerina Yates  Clinical Care Coordinator  [facility] [address] [facility] [address]  [place] [address]  Phone: [phone]   Fax: [phone]

## 2017-11-30 DIAGNOSIS — M51.36 DDD (DEGENERATIVE DISC DISEASE), LUMBAR: Primary | ICD-10-CM

## 2017-12-05 ENCOUNTER — HOSPITAL ENCOUNTER (OUTPATIENT)
Facility: HOSPITAL | Age: 52
Discharge: HOME | End: 2017-12-05
Attending: ANESTHESIOLOGY | Admitting: ANESTHESIOLOGY
Payer: COMMERCIAL

## 2017-12-05 ENCOUNTER — HOSPITAL ENCOUNTER (OUTPATIENT)
Dept: RADIOLOGY | Facility: HOSPITAL | Age: 52
Discharge: HOME | End: 2017-12-05
Attending: ANESTHESIOLOGY
Payer: COMMERCIAL

## 2017-12-05 ENCOUNTER — SURGERY (OUTPATIENT)
Age: 52
End: 2017-12-05

## 2017-12-05 VITALS
HEIGHT: 56 IN | WEIGHT: 230 LBS | HEART RATE: 93 BPM | SYSTOLIC BLOOD PRESSURE: 130 MMHG | RESPIRATION RATE: 18 BRPM | OXYGEN SATURATION: 99 % | DIASTOLIC BLOOD PRESSURE: 68 MMHG | TEMPERATURE: 98 F | BODY MASS INDEX: 51.74 KG/M2

## 2017-12-05 DIAGNOSIS — M51.36 DDD (DEGENERATIVE DISC DISEASE), LUMBAR: ICD-10-CM

## 2017-12-05 DIAGNOSIS — M47.816 LUMBAR FACET ARTHROPATHY: ICD-10-CM

## 2017-12-05 DIAGNOSIS — M47.816 FACET HYPERTROPHY OF LUMBAR REGION: Primary | ICD-10-CM

## 2017-12-05 LAB — GLUCOSE SERPL-MCNC: 129 MG/DL (ref 70–110)

## 2017-12-05 PROCEDURE — 76000 FLUOROSCOPY <1 HR PHYS/QHP: CPT | Mod: TC,PO

## 2017-12-05 PROCEDURE — 25000003 PHARM REV CODE 250: Mod: PO | Performed by: ANESTHESIOLOGY

## 2017-12-05 PROCEDURE — 64636 DESTROY L/S FACET JNT ADDL: CPT | Mod: 50,PO | Performed by: ANESTHESIOLOGY

## 2017-12-05 PROCEDURE — 64635 DESTROY LUMB/SAC FACET JNT: CPT | Mod: 50,,, | Performed by: ANESTHESIOLOGY

## 2017-12-05 PROCEDURE — 99152 MOD SED SAME PHYS/QHP 5/>YRS: CPT | Mod: ,,, | Performed by: ANESTHESIOLOGY

## 2017-12-05 PROCEDURE — 64635 DESTROY LUMB/SAC FACET JNT: CPT | Mod: 50,PO | Performed by: ANESTHESIOLOGY

## 2017-12-05 PROCEDURE — 82962 GLUCOSE BLOOD TEST: CPT | Mod: PO | Performed by: ANESTHESIOLOGY

## 2017-12-05 PROCEDURE — 63600175 PHARM REV CODE 636 W HCPCS: Mod: PO | Performed by: ANESTHESIOLOGY

## 2017-12-05 PROCEDURE — A4216 STERILE WATER/SALINE, 10 ML: HCPCS | Mod: PO | Performed by: ANESTHESIOLOGY

## 2017-12-05 PROCEDURE — 64636 DESTROY L/S FACET JNT ADDL: CPT | Mod: 50,,, | Performed by: ANESTHESIOLOGY

## 2017-12-05 RX ORDER — LIDOCAINE HYDROCHLORIDE 10 MG/ML
INJECTION, SOLUTION EPIDURAL; INFILTRATION; INTRACAUDAL; PERINEURAL
Status: DISCONTINUED | OUTPATIENT
Start: 2017-12-05 | End: 2017-12-05 | Stop reason: HOSPADM

## 2017-12-05 RX ORDER — MIDAZOLAM HYDROCHLORIDE 2 MG/2ML
INJECTION, SOLUTION INTRAMUSCULAR; INTRAVENOUS
Status: DISCONTINUED | OUTPATIENT
Start: 2017-12-05 | End: 2017-12-05 | Stop reason: HOSPADM

## 2017-12-05 RX ORDER — SODIUM CHLORIDE 9 MG/ML
INJECTION, SOLUTION INTRAMUSCULAR; INTRAVENOUS; SUBCUTANEOUS
Status: DISCONTINUED | OUTPATIENT
Start: 2017-12-05 | End: 2017-12-05 | Stop reason: HOSPADM

## 2017-12-05 RX ORDER — METHYLPREDNISOLONE ACETATE 40 MG/ML
INJECTION, SUSPENSION INTRA-ARTICULAR; INTRALESIONAL; INTRAMUSCULAR; SOFT TISSUE
Status: DISCONTINUED | OUTPATIENT
Start: 2017-12-05 | End: 2017-12-05 | Stop reason: HOSPADM

## 2017-12-05 RX ORDER — SODIUM CHLORIDE, SODIUM LACTATE, POTASSIUM CHLORIDE, CALCIUM CHLORIDE 600; 310; 30; 20 MG/100ML; MG/100ML; MG/100ML; MG/100ML
INJECTION, SOLUTION INTRAVENOUS CONTINUOUS
Status: DISCONTINUED | OUTPATIENT
Start: 2017-12-05 | End: 2017-12-05

## 2017-12-05 RX ORDER — LIDOCAINE HYDROCHLORIDE 10 MG/ML
1 INJECTION INFILTRATION; PERINEURAL ONCE
Status: COMPLETED | OUTPATIENT
Start: 2017-12-05 | End: 2017-12-05

## 2017-12-05 RX ORDER — FENTANYL CITRATE 50 UG/ML
INJECTION, SOLUTION INTRAMUSCULAR; INTRAVENOUS
Status: DISCONTINUED | OUTPATIENT
Start: 2017-12-05 | End: 2017-12-05 | Stop reason: HOSPADM

## 2017-12-05 RX ADMIN — MIDAZOLAM HYDROCHLORIDE 1 MG: 1 INJECTION, SOLUTION INTRAMUSCULAR; INTRAVENOUS at 02:12

## 2017-12-05 RX ADMIN — LIDOCAINE HYDROCHLORIDE 1 ML: 10 INJECTION, SOLUTION EPIDURAL; INFILTRATION; INTRACAUDAL; PERINEURAL at 02:12

## 2017-12-05 RX ADMIN — FENTANYL CITRATE 25 MCG: 50 INJECTION, SOLUTION INTRAMUSCULAR; INTRAVENOUS at 02:12

## 2017-12-05 RX ADMIN — SODIUM CHLORIDE 3 ML: 9 INJECTION INTRAMUSCULAR; INTRAVENOUS; SUBCUTANEOUS at 03:12

## 2017-12-05 RX ADMIN — LIDOCAINE HYDROCHLORIDE 10 ML: 10 INJECTION, SOLUTION EPIDURAL; INFILTRATION; INTRACAUDAL; PERINEURAL at 03:12

## 2017-12-05 RX ADMIN — METHYLPREDNISOLONE ACETATE 40 MG: 40 INJECTION, SUSPENSION INTRA-ARTICULAR; INTRALESIONAL; INTRAMUSCULAR; SOFT TISSUE at 03:12

## 2017-12-05 NOTE — H&P
CC: Back pain    HPI: The patient is a 53yo woman with a history of lumbar spondylosis here for bilateral L3,4,5. There are no major changes in history and physical from 10/12/17.    Past Medical History:   Diagnosis Date    Abnormal stress test 10/2016    Acne     Acute diastolic heart failure secondary to idiopathic cardiomyopathy 10/2016    Allergy     Anxiety     Arthritis     Carpal tunnel syndrome of right wrist     bilateral    Chest pain     Diabetes mellitus     Type 2 IDDM - Uncontrolled    Diabetic neuropathy     Difficult intubation     needed glidescope for cholecystectomy     Dry scalp     Fatty liver     GERD (gastroesophageal reflux disease)     on no meds    Hyperlipidemia     Hypertension     Insomnia     Knee pain     Morbid obesity     Neuropathy due to type 2 diabetes mellitus     anaya feet    MARLON (obstructive sleep apnea)     CPAP, says she is compliant with use    Parotid mass     had Biopsy    SOB (shortness of breath) 10/2016    Tachycardia     frequently,chronic for years per chart       Past Surgical History:   Procedure Laterality Date    CARDIAC CATHETERIZATION  2016    WDL per patient     SECTION      x 1    CHOLECYSTECTOMY  2009    ESOPHAGOGASTRODUODENOSCOPY  2009    Dr GRACIELA Jones - [facility]    HEMORRHOID SURGERY  2012    PPH with External Hemorrhoidectomy - Dr JAMAL Rollins, had spinal anesthesia    ORIF FEMUR FRACTURE Left     IM dmitry.      TRIGGER FINGER RELEASE Right     TUBAL LIGATION         Family History   Problem Relation Age of Onset    Diabetes Mother     Kidney disease Mother     Hyperlipidemia Father     Heart disease Father        Social History     Social History    Marital status: Single     Spouse name: N/A    Number of children: N/A    Years of education: N/A     Social History Main Topics    Smoking status: Never Smoker    Smokeless tobacco: Never Used    Alcohol use 12.6 oz/week     21 Shots of liquor per week  "   Drug use: No    Sexual activity: Not Asked     Other Topics Concern    None     Social History Narrative    None       No current facility-administered medications for this encounter.        Review of patient's allergies indicates:   Allergen Reactions    Morphine Hives and Itching       Vitals:    12/05/17 1357   BP: (!) 146/80   Pulse: 91   Resp: 16   Temp: 97 °F (36.1 °C)   TempSrc: Skin   SpO2: 97%   Weight: 104.3 kg (230 lb)   Height: 4' 8" (1.422 m)       REVIEW OF SYSTEMS:     GENERAL: No weight loss, malaise or fevers.  HEENT:  No recent changes in vision or hearing  NECK: Negative for lumps, no difficulty with swallowing.  RESPIRATORY: Negative for cough, wheezing or shortness of breath, patient denies any recent URI.  CARDIOVASCULAR: Negative for chest pain, leg swelling or palpitations.  GI: Negative for abdominal discomfort, blood in stools or black stools or change in bowel habits.  MUSCULOSKELETAL: See HPI.  SKIN: Negative for lesions, rash, and itching.  PSYCH: No suicidal or homicidal ideations, no current mood disturbances.  HEMATOLOGY/LYMPHOLOGY: Negative for prolonged bleeding, bruising easily or swollen nodes. Patient is not currently taking any anti-coagulants  ENDO: No history of diabetes or thyroid dysfunction  NEURO: No history of syncope, paralysis, seizures or tremors.All other reviewed and negative other than HPI.    Physical exam:  Gen: A and O x3, pleasant, well-groomed  Skin: No rashes or obvious lesions  HEENT: PERRLA, no obvious deformities on ears or in canals. No thyroid masses, trachea midline, no palpable lymph nodes in neck, axilla.  CVS: Regular rate and rhythm, normal S1 and S2, no murmurs.  Resp: Clear to auscultation bilaterally.  Abdomen: Soft, NT/ND, normal bowel sounds present.  Musculoskeletal/Neuro: Moving all extremities    Assessment:  Lumbar facet arthropathy  -     Case Request Operating Room: RADIOFREQUENCY THERMOCOAGULATION (RFTC)-NERVE-MEDIAN BRANCH-LUMBAR " L3,4,5  -     Activity as tolerated; Standing  -     Place in Outpatient; Standing  -     Diet NPO; Standing  -     Discontinue: lactated ringers infusion; Inject into the vein continuous.  -     Notify physician ; Standing  -     Notify physician ; Standing  -     Notify physician (specify); Standing  -     Place 18-22 gauage peripheral IV ; Standing  -     Verify informed consent; Standing  -     Vital signs; Standing    Facet hypertrophy of lumbar region    Other orders  -     Saline lock IV; Standing

## 2017-12-05 NOTE — DISCHARGE SUMMARY
[facility]  Discharge Note  Short Stay    Admit Date: 12/5/2017    Discharge Date: 12/5/2017    Attending Physician: Gregorio Medina MD     Discharge Provider: Gregorio Medina    Diagnoses:  Active Hospital Problems    Diagnosis  POA    *Facet hypertrophy of lumbar region [M47.896]  Yes      Resolved Hospital Problems    Diagnosis Date Resolved POA   No resolved problems to display.       Discharged Condition: good    Final Diagnoses: Lumbar facet arthropathy [M12.88]    Disposition: Home or Self Care    Hospital Course: no complications, uneventful    Outcome of Hospitalization, Treatment, Procedure, or Surgery:  Patient was admitted for outpatient procedure. The patient underwent procedure without complications and are discharged home    Follow up/Patient Instructions:  Follow up as scheduled/Patient has received instructions and follow up date    Medications:  Continue previous medications      Discharge Procedure Orders  Diet general     Activity as tolerated     Call MD for:  temperature >100.4     Call MD for:  severe uncontrolled pain     Call MD for:  redness, tenderness, or signs of infection (pain, swelling, redness, odor or green/yellow discharge around incision site)     Call MD for:  severe persistent headache     No dressing needed           Discharge Procedure Orders (must include Diet, Follow-up, Activity):    Discharge Procedure Orders (must include Diet, Follow-up, Activity)  Diet general     Activity as tolerated     Call MD for:  temperature >100.4     Call MD for:  severe uncontrolled pain     Call MD for:  redness, tenderness, or signs of infection (pain, swelling, redness, odor or green/yellow discharge around incision site)     Call MD for:  severe persistent headache     No dressing needed

## 2017-12-05 NOTE — OP NOTE
PROCEDURE DATE: 12/5/2017    PROCEDURE:  Radiofrequency ablation of the bilateral L3,4,5 medial branch nerves on the bilateral-side utilizing fluoroscopy    DIAGNOSIS:  Lumbar spondylosis    Post op Diagnosis: Same    PHYSICIAN: Gregorio Medina MD    MEDICATIONS INJECTED:  From a mixture of 4ml of 2% lidocaine and 40mg of methylprednisone, 1ml of this solution was injected at each level.    LOCAL ANESTHETIC USED: Lidocaine 1%, 2 ml given at each site.    SEDATION MEDICATIONS: 2mg versed, 50mcg fentanyl    ESTIMATED BLOOD LOSS:  none    COMPLICATIONS:  none    TECHNIQUE:  A time out was taken to identify patient and procedure side prior to starting the procedure. Laying in a prone position, the patient was prepped and draped in the usual sterile fashion using ChloraPrep and sterile towels.  The levels were determined under fluoroscopic guidance and then marked.  Local anesthetic was given by raising a wheal at the skin over each site and then infiltrated approximately 2cm deeper.  A 20-gauge  150 mm [employer] RF needle was introduced to the anatomic location of the right and then left L3,4,5 medial branch nerves.  Motor stimulation up to 2 Volts at each level confirmed no motor nerve involvement.  Impedance was less than 800 ohms at each level. The above noted medication was then injected slowly.  Ablation was performed per level utilizing Margaret radiofrequency generator 80°C for 90 seconds. The patient tolerated the procedure well.     The patient was monitored after the procedure.  Patient was given post procedure and discharge instructions to follow at home.  The patient was discharged in a stable condition

## 2017-12-05 NOTE — PLAN OF CARE
Vss, shana po fluids, denies pain, ambulates easily. IV removed, catheter intact. Discharge instructions provided and states understanding. States ready to go home.  Discharged from facility with family.

## 2017-12-26 ENCOUNTER — PATIENT OUTREACH (OUTPATIENT)
Dept: ADMINISTRATIVE | Facility: HOSPITAL | Age: 52
End: 2017-12-26

## 2017-12-27 ENCOUNTER — OFFICE VISIT (OUTPATIENT)
Dept: ENDOCRINOLOGY | Facility: CLINIC | Age: 52
End: 2017-12-27
Payer: COMMERCIAL

## 2017-12-27 VITALS
DIASTOLIC BLOOD PRESSURE: 92 MMHG | BODY MASS INDEX: 51.57 KG/M2 | HEIGHT: 56 IN | WEIGHT: 229.25 LBS | SYSTOLIC BLOOD PRESSURE: 162 MMHG | HEART RATE: 100 BPM

## 2017-12-27 DIAGNOSIS — Z79.4 TYPE 2 DIABETES MELLITUS WITH DIABETIC POLYNEUROPATHY, WITH LONG-TERM CURRENT USE OF INSULIN: Primary | ICD-10-CM

## 2017-12-27 DIAGNOSIS — E66.01 MORBID OBESITY WITH BMI OF 45.0-49.9, ADULT: ICD-10-CM

## 2017-12-27 DIAGNOSIS — Z96.41 INSULIN PUMP STATUS: Chronic | ICD-10-CM

## 2017-12-27 DIAGNOSIS — E11.42 TYPE 2 DIABETES MELLITUS WITH DIABETIC POLYNEUROPATHY, WITH LONG-TERM CURRENT USE OF INSULIN: Primary | ICD-10-CM

## 2017-12-27 DIAGNOSIS — I10 HTN (HYPERTENSION), BENIGN: ICD-10-CM

## 2017-12-27 PROCEDURE — 99214 OFFICE O/P EST MOD 30 MIN: CPT | Mod: S$GLB,,, | Performed by: NURSE PRACTITIONER

## 2017-12-27 PROCEDURE — 99999 PR PBB SHADOW E&M-EST. PATIENT-LVL IV: CPT | Mod: PBBFAC,,, | Performed by: NURSE PRACTITIONER

## 2017-12-27 RX ORDER — LANCING DEVICE/LANCETS
KIT MISCELLANEOUS
Qty: 1 EACH | Refills: 2 | Status: SHIPPED | OUTPATIENT
Start: 2017-12-27

## 2017-12-27 NOTE — PROGRESS NOTES
CC: Ms. Mine Quiros arrives today for management of Type 2 DM and review of chronic medical conditions, as listed in the visit diagnosis section of this encounter.     HPI: Ms. Mine Quiros was diagnosed with Type 2 DM in her 40s. Initial treatment consisted of orals and has been on insulin over the past several years. Began CSII therapy in 5/2016. + FH of DM in mother, son. Denies hospitalizations due to DM.     Last seen in endocrine by AYO Solorio NP in August 2017.     She is new to me today.    Not checking BG readings lately bc lancing device became jammed.  She is requesting a new one.     Hypoglycemia: No    Exercise: No but is walking 2500 steps/day. She would like to increase her daily goal.     Dietary Habits: Eats 2 meals/day because she sleeps in until 11:00. Snacks after dinner 2 pieces on fruit. Avoids sugary beverages. .    Last DM education appointment: 5/2016    Has trouble carb counting but has been advised to enter 40g per meal.       CURRENT DIABETIC MEDS: Actos 15 mg daily, Humalog in Medtronic pump  Glucometer type: Contour Next Link    Name of Device or Devices used by the patient: Medtronic [email or web address] 530G  When did you start the current therapy you are on: 5/2016  Frequency of changing site/infusion set/tubing/cartridges: 3 days  Frequency of changing CGM: n/a  Using bolus wizard: Not often  Taking bolus with each meal: No      PUMP SETTINGS:    Basal:   0000 - 1.8 u/hr  0800 - 2.2    I:C ratio:  1:3    ISF:  20    Target: 100-140    Active insulin time: 4 hours    Max Bolus: 25 units      Previous DM treatments:  Metformin XR - diarrhea    Last Eye Exam: 9/2016. Needs to reschedule  Last Podiatry Exam: 2015    REVIEW OF SYSTEMS  Constitutional: no c/o fatigue, weakness, or weight loss.   Eyes: + blurry vision that comes and goes.   Cardiac: no palpitations or chest pain.  Respiratory: no dyspnea. + productive cough with white sputum  GI: no c/o abdominal pain or nausea.   Skin:  "no rashes. + rash to R ankle that has persisted for a few years.   Neuro: + numbness that comes and goes. Relieved by gabapentin.   Endocrine: denies polyphagia, polydipsia, polyuria      Personally reviewed Past Medical, Surgical, Social History.    Vital Signs  BP (!) 162/92   Pulse 100   Ht 4' 8" (1.422 m)   Wt 104 kg (229 lb 4.5 oz)   LMP 08/31/2007   BMI 51.40 kg/m²     Personally reviewed the below labs:    Hemoglobin A1C   Date Value Ref Range Status   11/20/2017 7.9 (H) 4.0 - 5.6 % Final     Comment:     According to ADA guidelines, hemoglobin A1c <7.0% represents  optimal control in non-pregnant diabetic patients. Different  metrics may apply to specific patient populations.   Standards of Medical Care in Diabetes-2016.  For the purpose of screening for the presence of diabetes:  <5.7%     Consistent with the absence of diabetes  5.7-6.4%  Consistent with increasing risk for diabetes   (prediabetes)  >or=6.5%  Consistent with diabetes  Currently, no consensus exists for use of hemoglobin A1c  for diagnosis of diabetes for children.  This Hemoglobin A1c assay has significant interference with fetal   hemoglobin   (HbF). The results are invalid for patients with abnormal amounts of   HbF,   including those with known Hereditary Persistence   of Fetal Hemoglobin. Heterozygous hemoglobin variants (HbAS, HbAC,   HbAD, HbAE, HbA2) do not significantly interfere with this assay;   however, presence of multiple variants in a sample may impact the %   interference.     08/22/2017 8.3 (H) 4.0 - 5.6 % Final     Comment:     According to ADA guidelines, hemoglobin A1c <7.0% represents  optimal control in non-pregnant diabetic patients. Different  metrics may apply to specific patient populations.   Standards of Medical Care in Diabetes-2016.  For the purpose of screening for the presence of diabetes:  <5.7%     Consistent with the absence of diabetes  5.7-6.4%  Consistent with increasing risk for diabetes "   (prediabetes)  >or=6.5%  Consistent with diabetes  Currently, no consensus exists for use of hemoglobin A1c  for diagnosis of diabetes for children.  This Hemoglobin A1c assay has significant interference with fetal   hemoglobin   (HbF). The results are invalid for patients with abnormal amounts of   HbF,   including those with known Hereditary Persistence   of Fetal Hemoglobin. Heterozygous hemoglobin variants (HbAS, HbAC,   HbAD, HbAE, HbA2) do not significantly interfere with this assay;   however, presence of multiple variants in a sample may impact the %   interference.     04/17/2017 7.2 (H) 4.5 - 6.2 % Final     Comment:     According to ADA guidelines, hemoglobin A1C <7.0% represents  optimal control in non-pregnant diabetic patients.  Different  metrics may apply to specific populations.   Standards of Medical Care in Diabetes - 2016.  For the purpose of screening for the presence of diabetes:  <5.7%     Consistent with the absence of diabetes  5.7-6.4%  Consistent with increasing risk for diabetes   (prediabetes)  >or=6.5%  Consistent with diabetes  Currently no consensus exists for use of hemoglobin A1C  for diagnosis of diabetes for children.         Chemistry        Component Value Date/Time     12/28/2016 1647    K 4.0 12/28/2016 1647     12/28/2016 1647    CO2 30 12/28/2016 1647    BUN 13 12/28/2016 1647    CREATININE 0.74 12/28/2016 1647     (H) 12/28/2016 1647        Component Value Date/Time    CALCIUM 9.0 12/28/2016 1647    ALKPHOS 93 12/28/2016 1647     (H) 12/28/2016 1647    ALT 85 (H) 12/28/2016 1647    BILITOT 0.9 12/28/2016 1647    ESTGFRAFRICA >60 12/28/2016 1647    EGFRNONAA >60 12/28/2016 1647          Lab Results   Component Value Date    CHOL 206 (H) 04/17/2017    CHOL 211 (H) 03/09/2016    CHOL 218 (H) 11/09/2015     Lab Results   Component Value Date    HDL 61 04/17/2017    HDL 50 03/09/2016    HDL 50 11/09/2015     Lab Results   Component Value Date     LDLCALC 126.2 04/17/2017    LDLCALC 138.6 03/09/2016    LDLCALC 145.6 11/09/2015     Lab Results   Component Value Date    TRIG 94 04/17/2017    TRIG 112 03/09/2016    TRIG 112 11/09/2015     Lab Results   Component Value Date    CHOLHDL 29.6 04/17/2017    CHOLHDL 23.7 03/09/2016    CHOLHDL 22.9 11/09/2015       Lab Results   Component Value Date    MICALBCREAT 20.8 03/09/2016     Lab Results   Component Value Date    TSH 0.595 06/15/2016       CrCl cannot be calculated (Patient's most recent lab result is older than the maximum 7 days allowed.).    No results found for: [id number]      PHYSICAL EXAMINATION  Constitutional: Appears well, no distress  Neck: Supple, trachea midline; no thyromegaly or nodules.   Respiratory: CTA, even and unlabored.  Cardiovascular: RRR, no murmurs, no carotid bruits. DP pulses  2+ bilaterally; no edema.    Lymph: no cervical or supraclavicular lymphadenopathy  Skin: warm and dry; no lipohypertrophy, + acanthosis nigracans observed.  Neuro: DTR 1+ BUE/1+BLE.  Feet: appropriate footwear.    PUMP DOWNLOAD: See media file for details. Over past 2 weeks, has only entered carbs 5 times. Not checking glucose often. Glucoses range  mg/dL.   Average TDD: 53.8 u  Basal: 90% (48.5 u)  Bolus: 10% (5.2 u)    Assessment/Plan  1. Type 2 diabetes mellitus with diabetic polyneuropathy, with long-term current use of insulin  -- A1c has decreased but remains elevated. Not bolusing regularly through pump.   -- no changes to pump settings until she uses pump as prescribed. Lengthy discussion regarding this. May need to consider pens in the future.   MICROLET 2 LANCING DEVICE Kit sent to pharmacy  -- check BG 4x/day (AC/HS) and enter glucose into bolus wizard with every meal.  -- keep carb content consistent. May trial using 40g CHO if carbs take up ~ 1/4 of plate.   -- keep snacks < 15 g CHO    -- Discussed diagnosis of DM, A1c goals, progression of disease, long term complications and tx options.     -- Reviewed hypoglycemia management: treat with 1/2 glass of juice, 1/2 can regular coke, or 4 glucose tablets. Monitor and repeat treatment every 15 minutes until BG is >70 Then have a snack, which includes a complex carbohydrate and protein.   Advised patient to check BG before activities, such as driving or exercise.   2. HTN (hypertension), benign  -- elevated today. Recently controlled in clinic (130/68) this month  -- continue current meds and monitor.    3. Morbid obesity with BMI of 50.0-59.9, adult  -- increasing insulin resistance  -- advised her to increase her daily steps by 1,000 every few weeks  Body mass index is 51.4 kg/m².   4. Insulin pump status  -- settings confirmed and download reviewed.   -- no changes today         FOLLOW UP  Return in about 3 months (around 3/27/2018).   Patient instructed to bring BG logs to each follow up   Patient encouraged to call for any BG/medication issues, concerns, or questions.    Orders Placed This Encounter   Procedures    Hemoglobin A1c    Comprehensive metabolic panel    Microalbumin/creatinine urine ratio    TSH

## 2018-01-03 ENCOUNTER — PATIENT OUTREACH (OUTPATIENT)
Dept: ADMINISTRATIVE | Facility: HOSPITAL | Age: 53
End: 2018-01-03

## 2018-01-03 NOTE — LETTER
January 3, 2018    Mine Quiros  [address]  [place] [address]             [facility]  [address]  [facility] [address]  Phone: [phone] Dear Ms. Quiros:    We have tried to reach you by [facility] email unsuccessfully.      [facility] is committed to your overall health.  To help you get the most out of each of your visits, we will review your information to make sure you are up to date on all of your recommended tests and/or procedures.       Dr. Pitt        has found that you may be due for:     Colonoscopy (Colorectal screening)     Diabetic Retinopathy EYE EXAM screening (we now are able to perform this test the same day as your office visit).  You can have this test right after your appointment with Dr. Pitt on 1/9/18  without having to pay another co-pay.  If you recently had this Eye Exam outside of [facility], please bring a copy of this report so that we may scan the report into your chart.     If you have had any of the above done at another facility, please bring the records or information with you so that your record at [facility] will be complete.      If you are currently taking medication , please bring it with you to your appointment for review.     Sincerely,    Katerina Yates  Clinical Care Coordinator  [facility] [address] [facility] [address]  [place] [address]  Phone: [phone]   Fax: [phone]

## 2018-01-04 ENCOUNTER — OFFICE VISIT (OUTPATIENT)
Dept: PAIN MEDICINE | Facility: CLINIC | Age: 53
End: 2018-01-04
Payer: COMMERCIAL

## 2018-01-04 VITALS
RESPIRATION RATE: 20 BRPM | DIASTOLIC BLOOD PRESSURE: 70 MMHG | HEART RATE: 72 BPM | SYSTOLIC BLOOD PRESSURE: 130 MMHG | BODY MASS INDEX: 51.28 KG/M2 | TEMPERATURE: 98 F | WEIGHT: 228.75 LBS

## 2018-01-04 DIAGNOSIS — E66.01 MORBID OBESITY WITH BODY MASS INDEX OF 50 OR HIGHER: ICD-10-CM

## 2018-01-04 DIAGNOSIS — M51.36 DDD (DEGENERATIVE DISC DISEASE), LUMBAR: Primary | ICD-10-CM

## 2018-01-04 DIAGNOSIS — M79.18 MYOFASCIAL PAIN: ICD-10-CM

## 2018-01-04 DIAGNOSIS — R29.898 MUSCULAR DECONDITIONING: ICD-10-CM

## 2018-01-04 DIAGNOSIS — M47.816 LUMBAR SPONDYLOSIS: ICD-10-CM

## 2018-01-04 DIAGNOSIS — M79.10 MUSCULAR PAIN: ICD-10-CM

## 2018-01-04 PROCEDURE — 99999 PR PBB SHADOW E&M-EST. PATIENT-LVL IV: CPT | Mod: PBBFAC,,, | Performed by: PHYSICIAN ASSISTANT

## 2018-01-04 PROCEDURE — 99213 OFFICE O/P EST LOW 20 MIN: CPT | Mod: S$GLB,,, | Performed by: PHYSICIAN ASSISTANT

## 2018-01-04 NOTE — PROGRESS NOTES
This note was completed with dictation software and grammatical errors may exist.    CC: Low back pain    HPI: The patient is a 52-year-old woman with a history of diabetes with neuropathy, hypertension, obesity who presents in self-referral for low back pain. She is status post bilateral L3, 4 and 5 medial branch radiofrequency ablation on 12/5/17 with 0% relief.  She continues complain of pain across the low back and into the buttocks.  She denies having any pain in her legs at this time.  Her pain is worse with walking and improved with sitting.  She denies weakness, numbness, bladder or bowel incontinence.    Pain intervention history: She had done 3-4 weeks of physical therapy at [facility] with no sustained relief.  She tried gabapentin but it causes drowsiness.  She is status post left L4 and L5 transforaminal epidural steroid injection on 8/31/17 with 0% relief. She is status post bilateral L3, 4 and 5 medial branch radiofrequency ablation on 12/5/17 with 0% relief.    ROS: She reports weight gain, rash, itching, headaches, shortness of breath, diarrhea, diabetes, back pain, difficulty sleeping, anxiety, depression.  Balance of review of systems is negative.    Past Medical History:   Diagnosis Date    Abnormal stress test 10/2016    Acne     Acute diastolic heart failure secondary to idiopathic cardiomyopathy 10/2016    Allergy     Anxiety     Arthritis     Carpal tunnel syndrome of right wrist     bilateral    Chest pain     Diabetes mellitus     Type 2 IDDM - Uncontrolled    Diabetic neuropathy     Difficult intubation     needed glidescope for cholecystectomy 2009    Dry scalp     Fatty liver     GERD (gastroesophageal reflux disease)     on no meds    Hyperlipidemia     Hypertension     Insomnia     Knee pain     Morbid obesity     Neuropathy due to type 2 diabetes mellitus     anaya feet    MARLON (obstructive sleep apnea)     CPAP, says she is compliant with use    Parotid mass 2014     had Biopsy    SOB (shortness of breath) 10/2016    Tachycardia     frequently,chronic for years per chart       Past Surgical History:   Procedure Laterality Date    CARDIAC CATHETERIZATION  2016    WDL per patient     SECTION      x 1    CHOLECYSTECTOMY  2009    ESOPHAGOGASTRODUODENOSCOPY  2009    Dr GRACIELA Jones - [facility]    HEMORRHOID SURGERY  2012    PPH with External Hemorrhoidectomy - Dr JAMAL Rollins, had spinal anesthesia    ORIF FEMUR FRACTURE Left     IM dmitry.      TRIGGER FINGER RELEASE Right     TUBAL LIGATION         Social History     Social History    Marital status: Single     Spouse name: N/A    Number of children: N/A    Years of education: N/A     Social History Main Topics    Smoking status: Never Smoker    Smokeless tobacco: Never Used    Alcohol use 12.6 oz/week     21 Shots of liquor per week    Drug use: No    Sexual activity: Not Asked     Other Topics Concern    None     Social History Narrative    None         Medications/Allergies: See med card    Vitals:    18 1418   BP: 130/70   Pulse: 72   Resp: 20   Temp: 98.3 °F (36.8 °C)   TempSrc: Oral   Weight: 103.8 kg (228 lb 11.6 oz)   PainSc:   4   PainLoc: Back         Physical exam:  Gen: A and O x3, pleasant, obese  Skin: No rashes or obvious lesions  HEENT: PERRLA, no obvious deformities on ears or in canals. Trachea midline.  CVS: Regular rate and rhythm, normal palpable pulses.  Resp:No increased work of breathing, symmetrical chest rise.  Abdomen: Soft, NT/ND.  Musculoskeletal:  No antalgic gait.     Neuro:  Lower extremities: 5/5 strength bilaterally  Reflexes: Patellar 2+, Achilles 2+ bilaterally.  Sensory:  Intact and symmetrical to light touch and pinprick in L2-S1 dermatomes bilaterally.    Lumbar spine:  Lumbar spine: Range of motion is severely reduced with flexion and extension with increased bilateral low back pain during each maneuver but worse with flexion.  Saroj's test causes no increased  pain on either side.    Supine straight leg raise is negative bilaterally.  Internal and external rotation of the hip causes no increased pain on either side.  Myofascial exam: No tenderness to palpation across lumbar paraspinous muscles.    Imaging:  MRI lumbar spine 8/7/17  At the T12-L1 level, broad-based bulge appears to be present and of 3 mm with possible anterior cord contact. Facet arthropathy and ligamentous hypertrophy appear to be present. This may relate to a slightly irregular protrusion centrally of 3 mm. Mild thecal sac narrowing is noted the 9 mm. Mild bilateral neural foraminal stenosis appears to be present.  At the L1-L2 level, mild broad-based is noted towards each neural foramen, mild bilateral neural foraminal narrowing appears to be present. Minimal central canal narrowing is noted with the thecal sac narrowed to 10 mm.  At the L2-L3 level, facet arthropathy and ligamentous hypertrophy appears to be present. Mild broad-based bulge appears to be present of 2-3 mm, mild bilateral neural foraminal narrowing is noted. A congenitally small central canal is noted to 9 mm with mild central canal narrowing  At the L3-L4 level, broad-based bulging is noted towards each neural foramen of 3-4 mm, a congenitally small thecal sac is noted to 9 mm with mild central canal narrowing. Mild bilateral neural foraminal narrowing is noted.  At the L4-L5 level, mild facet arthropathy and ligamentous hypertrophy appears to be present, mild bilateral neural foraminal narrowing is noted. Mild thecal sac narrowing is noted to 8 mm.  At L5-S1 level, moderate right neural foraminal narrowing is noted with mild left neural foraminal narrowing. Mild thecal sac narrowing is noted to 9 mm.      Assessment:  The patient is a 52-year-old woman with a history of diabetes with neuropathy, hypertension, obesity who presents in self-referral for low back pain.    1. DDD (degenerative disc disease), lumbar  Ambulatory Referral to  Physical/Occupational Therapy   2. Lumbar spondylosis  Ambulatory Referral to Physical/Occupational Therapy   3. Myofascial pain  Ambulatory Referral to Physical/Occupational Therapy   4. Muscular pain  Ambulatory Referral to Physical/Occupational Therapy   5. Muscular deconditioning  Ambulatory Referral to Physical/Occupational Therapy   6. Morbid obesity with body mass index of 50 or higher  Ambulatory Referral to Physical/Occupational Therapy       Plan:  1.  She has not had relief with epidural steroid injections or lumbar medial branch radiofrequency ablation.  I believe she has a muscular component to her pain and she should work on core strengthening and weight loss.  I completed orders for physical therapy to include dry needling at [facility] in [place]  2.  Follow-up in 2 months or sooner as needed.

## 2018-02-05 ENCOUNTER — OFFICE VISIT (OUTPATIENT)
Dept: FAMILY MEDICINE | Facility: CLINIC | Age: 53
End: 2018-02-05
Payer: COMMERCIAL

## 2018-02-05 ENCOUNTER — CLINICAL SUPPORT (OUTPATIENT)
Dept: FAMILY MEDICINE | Facility: CLINIC | Age: 53
End: 2018-02-05
Attending: FAMILY MEDICINE
Payer: COMMERCIAL

## 2018-02-05 VITALS
HEIGHT: 56 IN | WEIGHT: 226.88 LBS | HEART RATE: 96 BPM | OXYGEN SATURATION: 97 % | SYSTOLIC BLOOD PRESSURE: 142 MMHG | DIASTOLIC BLOOD PRESSURE: 82 MMHG | BODY MASS INDEX: 51.04 KG/M2

## 2018-02-05 DIAGNOSIS — E66.01 MORBID OBESITY WITH BMI OF 45.0-49.9, ADULT: ICD-10-CM

## 2018-02-05 DIAGNOSIS — J32.9 SINUSITIS, UNSPECIFIED CHRONICITY, UNSPECIFIED LOCATION: ICD-10-CM

## 2018-02-05 DIAGNOSIS — Z13.5 DIABETIC RETINOPATHY SCREENING: ICD-10-CM

## 2018-02-05 DIAGNOSIS — H69.91 DYSFUNCTION OF RIGHT EUSTACHIAN TUBE: ICD-10-CM

## 2018-02-05 DIAGNOSIS — I10 HYPERTENSION, UNSPECIFIED TYPE: ICD-10-CM

## 2018-02-05 PROCEDURE — 92250 FUNDUS PHOTOGRAPHY W/I&R: CPT | Mod: S$GLB,,, | Performed by: FAMILY MEDICINE

## 2018-02-05 PROCEDURE — 99999 PR PBB SHADOW E&M-EST. PATIENT-LVL III: CPT | Mod: PBBFAC,,, | Performed by: FAMILY MEDICINE

## 2018-02-05 PROCEDURE — 3008F BODY MASS INDEX DOCD: CPT | Mod: S$GLB,,, | Performed by: FAMILY MEDICINE

## 2018-02-05 PROCEDURE — 99999 PR PBB SHADOW E&M-EST. PATIENT-LVL II: CPT | Mod: PBBFAC,,,

## 2018-02-05 PROCEDURE — 99214 OFFICE O/P EST MOD 30 MIN: CPT | Mod: S$GLB,,, | Performed by: FAMILY MEDICINE

## 2018-02-05 RX ORDER — PREDNISONE 10 MG/1
TABLET ORAL
Qty: 12 TABLET | Refills: 0 | Status: SHIPPED | OUTPATIENT
Start: 2018-02-05 | End: 2018-03-06 | Stop reason: ALTCHOICE

## 2018-02-05 RX ORDER — DOXYCYCLINE 100 MG/1
100 CAPSULE ORAL 2 TIMES DAILY
Qty: 20 CAPSULE | Refills: 0 | Status: SHIPPED | OUTPATIENT
Start: 2018-02-05 | End: 2018-03-06 | Stop reason: ALTCHOICE

## 2018-02-05 NOTE — PROGRESS NOTES
Mine Quiros is a 52 y.o. female here for a diabetic eye screening with non-dilated fundus photos per Dr Pitt.    Patient cooperative?: Yes  Small pupils?: Yes  Last eye exam: 9/12/16    For exam results, see Encounter Report.

## 2018-02-05 NOTE — Clinical Note
Images blurred  - almost looks like from cataracts, but pt is only 52. Needs dilated eye exam - patient of Dr. Rodrigues.

## 2018-02-05 NOTE — PROGRESS NOTES
Subjective:       Patient ID: Mine Quiros is a 52 y.o. female.    Chief Complaint: Diabetes    HPI     Here for a f/u    htn stable.      dm2 with neuropathy:  a1c 7.9% on 11/20/2017  Sees endocrinology  On insulin pump  Not utd with eye dr    Patient received her medical half-way disability in April 2017.  needs update form completed.      Seeing pain management re: chronic lumbago.    C/o  postnasal drip and nasal congestion x 2 weeks.  Now, c/o frontal sinus pressure x 1 week. Reports that her right ear popped approx 2 days ago after blowing her nose. Since then, intermittent discomfort from blowing her nose.  Also, has mild blood tinged nasal mucus from left nostril x 2-3 days.             Review of Systems      Review of Systems   Constitutional: Negative for fever and chills.   HENT: Negative for hearing loss and neck stiffness.    Eyes: Negative for redness and itching.   Respiratory: Negative for cough and choking.    Cardiovascular: Negative for chest pain and leg swelling.  Abdomen: Negative for abdominal pain and blood in stool.   Genitourinary: Negative for dysuria and flank pain.   Neurological: Negative for light-headedness and headaches.   Hematological: Negative for adenopathy.   Psychiatric/Behavioral: Negative for behavioral problems.     Objective:      Physical Exam   Constitutional: She appears well-developed.   HENT:   Head: Normocephalic and atraumatic.   Right tm: retracted   Eyes: Conjunctivae are normal. Pupils are equal, round, and reactive to light.   Neck: Normal range of motion.   Cardiovascular: Normal rate and regular rhythm.    No murmur heard.  Pulmonary/Chest: Effort normal and breath sounds normal. She has no wheezes.   Lymphadenopathy:     She has no cervical adenopathy.       Assessment:       1. Diabetes mellitus with neurological manifestations, uncontrolled    2. Sinusitis, unspecified chronicity, unspecified location    3. Dysfunction of right eustachian tube    4.  "Hypertension, unspecified type    5. Morbid obesity with BMI of 45.0-49.9, adult        Plan:       Diabetes mellitus with neurological manifestations, uncontrolled    Sinusitis, unspecified chronicity, unspecified location    Dysfunction of right eustachian tube    Hypertension, unspecified type    Morbid obesity with BMI of 45.0-49.9, adult    Other orders  -     predniSONE (DELTASONE) 10 MG tablet; Take 3 tabs daily for 2 days thenTake 2 tabs daily for 2 days then Take 1 tab daily for 2 days then stop  Dispense: 12 tablet; Refill: 0  -     doxycycline (MONODOX) 100 MG capsule; Take 1 capsule (100 mg total) by mouth 2 (two) times daily.  Dispense: 20 capsule; Refill: 0          Plan:  Start doxy and prednisone taper for sinusitis and right etd  Nasal swab with ointment for left nasal mucosa  Buy bp machine for home use. Serial bp checks  Cont all other meds  Will fill out updated disability form      Medication List with Changes/Refills   New Medications    DOXYCYCLINE (MONODOX) 100 MG CAPSULE    Take 1 capsule (100 mg total) by mouth 2 (two) times daily.    PREDNISONE (DELTASONE) 10 MG TABLET    Take 3 tabs daily for 2 days thenTake 2 tabs daily for 2 days then Take 1 tab daily for 2 days then stop   Current Medications    AMLODIPINE (NORVASC) 10 MG TABLET    Take 1 tablet (10 mg total) by mouth once daily.    BD INSULIN PEN NEEDLE UF MINI 31 GAUGE X 3/16" NDLE    USE AS DIRECTED    BETAMETHASONE DIPROPIONATE (DIPROLENE) 0.05 % CREAM    Apply topically 2 (two) times daily.    CHLORTHALIDONE (HYGROTEN) 25 MG TAB    TAKE 1 TABLET BY MOUTH DAILY    CITALOPRAM (CELEXA) 40 MG TABLET    Take 40 mg by mouth once daily.    CLINDAMYCIN-BENZOYL PEROXIDE (BENZACLIN) GEL    aaa facial acne bid prn    CLOBETASOL (TEMOVATE) 0.05 % EXTERNAL SOLUTION    Use on scalp one - two times daily as needed for scaling or itching    DICYCLOMINE (BENTYL) 20 MG TABLET    Take 1 tablet (20 mg total) by mouth 2 (two) times daily.    " DIPHENHYDRAMINE (BENADRYL) 25 MG CAPSULE    Take 25 mg by mouth nightly.      FLUTICASONE (FLONASE) 50 MCG/ACTUATION NASAL SPRAY    1 spray by Each Nare route once daily.    GABAPENTIN (NEURONTIN) 300 MG CAPSULE    TAKE 1 CAPSULE BY MOUTH THREE TIMES DAILY    HUMALOG 100 UNIT/ML INJECTION    Pt uses approx 150 units daily via insulin pump    KETOCONAZOLE (NIZORAL) 2 % SHAMPOO    Wash hair with medicated shampoo at least 2x/week - let sit on scalp at least 5 minutes prior to rinsing    LOSARTAN (COZAAR) 100 MG TABLET    TAKE 1 TABLET BY MOUTH DAILY    METOPROLOL TARTRATE (LOPRESSOR) 25 MG TABLET    Take 1 tablet (25 mg total) by mouth 2 (two) times daily.    MICROLET 2 LANCING DEVICE KIT    Use 4x/daily to check glucose.    ONDANSETRON (ZOFRAN-ODT) 8 MG TBDL    Take 1 tablet (8 mg total) by mouth every 6 (six) hours as needed.    PIOGLITAZONE (ACTOS) 15 MG TABLET    Take 15 mg by mouth once daily.    SALICYLIC ACID 6 % CREA    Apply daily

## 2018-02-11 DIAGNOSIS — E11.42 DIABETIC POLYNEUROPATHY ASSOCIATED WITH TYPE 2 DIABETES MELLITUS: Primary | ICD-10-CM

## 2018-02-11 NOTE — PROGRESS NOTES
inform pt via phone that the diabetic eye screening test was unable to grade the retina.  Needs to see an optometrist.  Please schedule referral to optometrist.

## 2018-02-14 ENCOUNTER — TELEPHONE (OUTPATIENT)
Dept: FAMILY MEDICINE | Facility: CLINIC | Age: 53
End: 2018-02-14

## 2018-02-14 NOTE — TELEPHONE ENCOUNTER
----- Message from Lala Medina sent at 2/12/2018  3:52 PM CST -----  Contact: Self  Patient is returning your call, please call back at [phone] (home).  Thank you!

## 2018-02-27 RX ORDER — GABAPENTIN 300 MG/1
CAPSULE ORAL
Qty: 90 CAPSULE | Refills: 3 | Status: SHIPPED | OUTPATIENT
Start: 2018-02-27 | End: 2018-04-13 | Stop reason: SDUPTHER

## 2018-03-06 ENCOUNTER — OFFICE VISIT (OUTPATIENT)
Dept: PAIN MEDICINE | Facility: CLINIC | Age: 53
End: 2018-03-06
Payer: COMMERCIAL

## 2018-03-06 ENCOUNTER — OFFICE VISIT (OUTPATIENT)
Dept: FAMILY MEDICINE | Facility: CLINIC | Age: 53
End: 2018-03-06
Payer: COMMERCIAL

## 2018-03-06 VITALS
WEIGHT: 224.88 LBS | BODY MASS INDEX: 50.59 KG/M2 | HEART RATE: 94 BPM | OXYGEN SATURATION: 98 % | HEIGHT: 56 IN | SYSTOLIC BLOOD PRESSURE: 162 MMHG | DIASTOLIC BLOOD PRESSURE: 82 MMHG

## 2018-03-06 VITALS
HEIGHT: 56 IN | HEART RATE: 87 BPM | BODY MASS INDEX: 50.11 KG/M2 | WEIGHT: 222.75 LBS | SYSTOLIC BLOOD PRESSURE: 134 MMHG | DIASTOLIC BLOOD PRESSURE: 62 MMHG

## 2018-03-06 DIAGNOSIS — I10 HYPERTENSION, UNSPECIFIED TYPE: ICD-10-CM

## 2018-03-06 DIAGNOSIS — E66.01 MORBID OBESITY WITH BMI OF 45.0-49.9, ADULT: ICD-10-CM

## 2018-03-06 DIAGNOSIS — M79.18 MYOFASCIAL PAIN: ICD-10-CM

## 2018-03-06 DIAGNOSIS — M79.10 MUSCULAR PAIN: ICD-10-CM

## 2018-03-06 DIAGNOSIS — R29.898 MUSCULAR DECONDITIONING: ICD-10-CM

## 2018-03-06 DIAGNOSIS — M51.36 DDD (DEGENERATIVE DISC DISEASE), LUMBAR: Primary | ICD-10-CM

## 2018-03-06 DIAGNOSIS — G62.9 PERIPHERAL POLYNEUROPATHY: ICD-10-CM

## 2018-03-06 PROCEDURE — 3075F SYST BP GE 130 - 139MM HG: CPT | Mod: S$GLB,,, | Performed by: PHYSICIAN ASSISTANT

## 2018-03-06 PROCEDURE — 99213 OFFICE O/P EST LOW 20 MIN: CPT | Mod: S$GLB,,, | Performed by: PHYSICIAN ASSISTANT

## 2018-03-06 PROCEDURE — 99999 PR PBB SHADOW E&M-EST. PATIENT-LVL IV: CPT | Mod: PBBFAC,,, | Performed by: PHYSICIAN ASSISTANT

## 2018-03-06 PROCEDURE — 3074F SYST BP LT 130 MM HG: CPT | Mod: S$GLB,,, | Performed by: FAMILY MEDICINE

## 2018-03-06 PROCEDURE — 3079F DIAST BP 80-89 MM HG: CPT | Mod: S$GLB,,, | Performed by: FAMILY MEDICINE

## 2018-03-06 PROCEDURE — 3078F DIAST BP <80 MM HG: CPT | Mod: S$GLB,,, | Performed by: PHYSICIAN ASSISTANT

## 2018-03-06 PROCEDURE — 99214 OFFICE O/P EST MOD 30 MIN: CPT | Mod: S$GLB,,, | Performed by: FAMILY MEDICINE

## 2018-03-06 PROCEDURE — 99999 PR PBB SHADOW E&M-EST. PATIENT-LVL III: CPT | Mod: PBBFAC,,, | Performed by: FAMILY MEDICINE

## 2018-03-06 RX ORDER — AMLODIPINE BESYLATE 10 MG/1
10 TABLET ORAL DAILY
Qty: 90 TABLET | Refills: 3 | Status: SHIPPED | OUTPATIENT
Start: 2018-03-06 | End: 2021-03-04 | Stop reason: SDUPTHER

## 2018-03-06 RX ORDER — LANCETS
EACH MISCELLANEOUS
Refills: 2 | COMMUNITY
Start: 2017-12-27 | End: 2022-05-13 | Stop reason: SDUPTHER

## 2018-03-06 NOTE — PROGRESS NOTES
Subjective:       Patient ID: Mine Quiros is a 52 y.o. female.    Chief Complaint: Diabetes    HPI       Here for a f/u    Here with sister.      bp elevated today.      dm2 with neuropathy:  a1c 7.9% on 11/20/2017  Sees endocrinology  On insulin pump  Not utd with eye dr     Patient received her medical group home disability in April 2017.  needs update form completed.      Seeing pain management re: chronic lumbago.         Review of Systems      Review of Systems   Constitutional: Negative for fever and chills.   HENT: Negative for hearing loss and neck stiffness.    Eyes: Negative for redness and itching.   Respiratory: Negative for cough and choking.    Cardiovascular: Negative for chest pain and leg swelling.  Abdomen: Negative for abdominal pain and blood in stool.   Genitourinary: Negative for dysuria and flank pain.   Musculoskeletal: Negative for back pain and gait problem.   Neurological: Negative for light-headedness and headaches.   Hematological: Negative for adenopathy.   Psychiatric/Behavioral: Negative for behavioral problems.       Objective:      Physical Exam   Constitutional: She appears well-developed.   HENT:   Head: Normocephalic and atraumatic.   Eyes: Conjunctivae are normal. Pupils are equal, round, and reactive to light.   Neck: Normal range of motion.   Cardiovascular: Normal rate and regular rhythm.    No murmur heard.  Pulmonary/Chest: Effort normal and breath sounds normal. She has no wheezes.   Lymphadenopathy:     She has no cervical adenopathy.       Assessment:       1. Diabetes mellitus with neurological manifestations, uncontrolled    2. Hypertension, unspecified type    3. Morbid obesity with BMI of 45.0-49.9, adult    4. Peripheral polyneuropathy        Plan:       Diabetes mellitus with neurological manifestations, uncontrolled    Hypertension, unspecified type    Morbid obesity with BMI of 45.0-49.9, adult    Peripheral polyneuropathy    Other orders  -     amLODIPine  "(NORVASC) 10 MG tablet; Take 1 tablet (10 mg total) by mouth once daily.  Dispense: 90 tablet; Refill: 3            Plan:  Start norvasc. Serial bp checks. Nurse visit in 2-3 weeks  Cont all other meds      Medication List with Changes/Refills   Current Medications    BD INSULIN PEN NEEDLE UF MINI 31 GAUGE X 3/16" NDLE    USE AS DIRECTED    BETAMETHASONE DIPROPIONATE (DIPROLENE) 0.05 % CREAM    Apply topically 2 (two) times daily.    CHLORTHALIDONE (HYGROTEN) 25 MG TAB    TAKE 1 TABLET BY MOUTH DAILY    CITALOPRAM (CELEXA) 40 MG TABLET    Take 40 mg by mouth once daily.    CLINDAMYCIN-BENZOYL PEROXIDE (BENZACLIN) GEL    aaa facial acne bid prn    CLOBETASOL (TEMOVATE) 0.05 % EXTERNAL SOLUTION    Use on scalp one - two times daily as needed for scaling or itching    DIPHENHYDRAMINE (BENADRYL) 25 MG CAPSULE    Take 25 mg by mouth nightly.      GABAPENTIN (NEURONTIN) 300 MG CAPSULE    TAKE 1 CAPSULE BY MOUTH THREE TIMES DAILY    HUMALOG 100 UNIT/ML INJECTION    Pt uses approx 150 units daily via insulin pump    KETOCONAZOLE (NIZORAL) 2 % SHAMPOO    Wash hair with medicated shampoo at least 2x/week - let sit on scalp at least 5 minutes prior to rinsing    LOSARTAN (COZAAR) 100 MG TABLET    TAKE 1 TABLET BY MOUTH DAILY    METOPROLOL TARTRATE (LOPRESSOR) 25 MG TABLET    Take 1 tablet (25 mg total) by mouth 2 (two) times daily.    MICROLET 2 LANCING DEVICE KIT    Use 4x/daily to check glucose.    MICROLET LANCET MISC    use FOUR TIMES DAILY    PIOGLITAZONE (ACTOS) 15 MG TABLET    Take 15 mg by mouth once daily.    SALICYLIC ACID 6 % CREA    Apply daily   Changed and/or Refilled Medications    Modified Medication Previous Medication    AMLODIPINE (NORVASC) 10 MG TABLET amlodipine (NORVASC) 10 MG tablet       Take 1 tablet (10 mg total) by mouth once daily.    Take 1 tablet (10 mg total) by mouth once daily.   Discontinued Medications    DOXYCYCLINE (MONODOX) 100 MG CAPSULE    Take 1 capsule (100 mg total) by mouth 2 (two) " times daily.    FLUTICASONE (FLONASE) 50 MCG/ACTUATION NASAL SPRAY    1 spray by Each Nare route once daily.    ONDANSETRON (ZOFRAN-ODT) 8 MG TBDL    Take 1 tablet (8 mg total) by mouth every 6 (six) hours as needed.    PREDNISONE (DELTASONE) 10 MG TABLET    Take 3 tabs daily for 2 days thenTake 2 tabs daily for 2 days then Take 1 tab daily for 2 days then stop

## 2018-03-07 ENCOUNTER — TELEPHONE (OUTPATIENT)
Dept: FAMILY MEDICINE | Facility: CLINIC | Age: 53
End: 2018-03-07

## 2018-03-07 NOTE — TELEPHONE ENCOUNTER
----- Message from Peyton Torres sent at 3/7/2018  1:22 PM CST -----  Please call patient in regards to [employer] paperwork that she states she needs to come , [phone]

## 2018-03-08 ENCOUNTER — TELEPHONE (OUTPATIENT)
Dept: PAIN MEDICINE | Facility: CLINIC | Age: 53
End: 2018-03-08

## 2018-03-08 RX ORDER — CYCLOBENZAPRINE HCL 10 MG
10 TABLET ORAL 2 TIMES DAILY PRN
Qty: 60 TABLET | Refills: 0 | Status: ON HOLD | OUTPATIENT
Start: 2018-03-08 | End: 2018-08-09 | Stop reason: CLARIF

## 2018-03-08 NOTE — TELEPHONE ENCOUNTER
----- Message from Brionna Caldwell sent at 3/8/2018 10:35 AM CST -----  Patient is calling to see if office can call something in for her for the pain in her back. Please remit to:      [facility] - [place] - [address]  [address]  [place] [address]  Phone: [phone] Fax: [phone]    Please call with questions or when completed at [phone]

## 2018-03-11 NOTE — PROGRESS NOTES
This note was completed with dictation software and grammatical errors may exist.    CC: Low back pain    HPI: The patient is a 52-year-old woman with a history of diabetes with neuropathy, hypertension, obesity who presents in self-referral for low back pain. She returns in follow-up today with low back pain.  I had sent her to physical therapy but she went just a few visits and decided to stop going.  She continues to complain of back pain when she stands or walks, improved with sitting.  She denies weakness, numbness, bladder or bowel incontinence.    Pain intervention history: She had done 3-4 weeks of physical therapy at [facility] with no sustained relief.  She tried gabapentin but it causes drowsiness.  She is status post left L4 and L5 transforaminal epidural steroid injection on 8/31/17 with 0% relief. She is status post bilateral L3, 4 and 5 medial branch radiofrequency ablation on 12/5/17 with 0% relief.    ROS: She reports weight gain, rash, itching, headaches, shortness of breath, diarrhea, diabetes, back pain, difficulty sleeping, anxiety, depression.  Balance of review of systems is negative.    Past Medical History:   Diagnosis Date    Abnormal stress test 10/2016    Acne     Acute diastolic heart failure secondary to idiopathic cardiomyopathy 10/2016    Allergy     Anxiety     Arthritis     Carpal tunnel syndrome of right wrist     bilateral    Chest pain     Diabetes mellitus     Type 2 IDDM - Uncontrolled    Diabetic neuropathy     Difficult intubation     needed glidescope for cholecystectomy 2009    Dry scalp     Fatty liver     GERD (gastroesophageal reflux disease)     on no meds    Hyperlipidemia     Hypertension     Insomnia     Knee pain     Morbid obesity     Neuropathy due to type 2 diabetes mellitus     anaya feet    MARLON (obstructive sleep apnea)     CPAP, says she is compliant with use    Parotid mass 2014    had Biopsy    SOB (shortness of breath) 10/2016     "Tachycardia     frequently,chronic for years per chart       Past Surgical History:   Procedure Laterality Date    CARDIAC CATHETERIZATION  2016    WDL per patient     SECTION      x 1    CHOLECYSTECTOMY  2009    ESOPHAGOGASTRODUODENOSCOPY  2009    Dr GRACIELA Jones - [facility]    HEMORRHOID SURGERY  2012    PPH with External Hemorrhoidectomy - Dr JAMAL Rollins, had spinal anesthesia    ORIF FEMUR FRACTURE Left     IM dmitry.      TRIGGER FINGER RELEASE Right     TUBAL LIGATION         Social History     Social History    Marital status: Single     Spouse name: N/A    Number of children: N/A    Years of education: N/A     Social History Main Topics    Smoking status: Never Smoker    Smokeless tobacco: Never Used    Alcohol use 12.6 oz/week     21 Shots of liquor per week    Drug use: No    Sexual activity: Not Asked     Other Topics Concern    None     Social History Narrative    None         Medications/Allergies: See med card    Vitals:    18 0921   BP: 134/62   Pulse: 87   Weight: 101.1 kg (222 lb 12.4 oz)   Height: 4' 8" (1.422 m)   PainSc:   8   PainLoc: Back         Physical exam:  Gen: A and O x3, pleasant, obese  Skin: No rashes or obvious lesions  HEENT: PERRLA, no obvious deformities on ears or in canals. Trachea midline.  CVS: Regular rate and rhythm, normal palpable pulses.  Resp:No increased work of breathing, symmetrical chest rise.  Abdomen: Soft, NT/ND.  Musculoskeletal:  No antalgic gait.     Neuro:  Lower extremities: 5/5 strength bilaterally  Reflexes: Patellar 2+, Achilles 2+ bilaterally.  Sensory:  Intact and symmetrical to light touch and pinprick in L2-S1 dermatomes bilaterally.    Lumbar spine:  Lumbar spine: Range of motion is severely reduced with flexion and extension with increased bilateral low back pain during each maneuver but worse with flexion.  Saroj's test causes no increased pain on either side.    Supine straight leg raise is negative " bilaterally.  Internal and external rotation of the hip causes no increased pain on either side.  Myofascial exam: No tenderness to palpation across lumbar paraspinous muscles.    Imaging:  MRI lumbar spine 8/7/17  At the T12-L1 level, broad-based bulge appears to be present and of 3 mm with possible anterior cord contact. Facet arthropathy and ligamentous hypertrophy appear to be present. This may relate to a slightly irregular protrusion centrally of 3 mm. Mild thecal sac narrowing is noted the 9 mm. Mild bilateral neural foraminal stenosis appears to be present.  At the L1-L2 level, mild broad-based is noted towards each neural foramen, mild bilateral neural foraminal narrowing appears to be present. Minimal central canal narrowing is noted with the thecal sac narrowed to 10 mm.  At the L2-L3 level, facet arthropathy and ligamentous hypertrophy appears to be present. Mild broad-based bulge appears to be present of 2-3 mm, mild bilateral neural foraminal narrowing is noted. A congenitally small central canal is noted to 9 mm with mild central canal narrowing  At the L3-L4 level, broad-based bulging is noted towards each neural foramen of 3-4 mm, a congenitally small thecal sac is noted to 9 mm with mild central canal narrowing. Mild bilateral neural foraminal narrowing is noted.  At the L4-L5 level, mild facet arthropathy and ligamentous hypertrophy appears to be present, mild bilateral neural foraminal narrowing is noted. Mild thecal sac narrowing is noted to 8 mm.  At L5-S1 level, moderate right neural foraminal narrowing is noted with mild left neural foraminal narrowing. Mild thecal sac narrowing is noted to 9 mm.      Assessment:  The patient is a 52-year-old woman with a history of diabetes with neuropathy, hypertension, obesity who presents in self-referral for low back pain.    1. DDD (degenerative disc disease), lumbar  Ambulatory Referral to Physical/Occupational Therapy   2. Myofascial pain  Ambulatory  Referral to Physical/Occupational Therapy   3. Muscular pain  Ambulatory Referral to Physical/Occupational Therapy   4. Muscular deconditioning  Ambulatory Referral to Physical/Occupational Therapy       Plan:  1.  I explained to the patient that she should return to physical therapy but she would like to try somewhere new.  I completed orders for Christopher BUENO.  We had a long discussion regarding core strengthening, weight loss and having a healthy diet.  She will discuss bariatric surgery with her primary care physician.  2.  Follow-up in 2 months or sooner as needed.

## 2018-03-12 ENCOUNTER — TELEPHONE (OUTPATIENT)
Dept: FAMILY MEDICINE | Facility: CLINIC | Age: 53
End: 2018-03-12

## 2018-03-13 ENCOUNTER — TELEPHONE (OUTPATIENT)
Dept: FAMILY MEDICINE | Facility: CLINIC | Age: 53
End: 2018-03-13

## 2018-03-13 NOTE — TELEPHONE ENCOUNTER
----- Message from Louise Avina sent at 3/13/2018 10:55 AM CDT -----  Patient calling to confirm if paperwork she left is ready to be picked up today due to she will be in the area/please call back at [phone] to advise.

## 2018-03-23 ENCOUNTER — TELEPHONE (OUTPATIENT)
Dept: FAMILY MEDICINE | Facility: CLINIC | Age: 53
End: 2018-03-23

## 2018-03-23 DIAGNOSIS — R35.89 POLYURIA: Primary | ICD-10-CM

## 2018-03-23 NOTE — TELEPHONE ENCOUNTER
----- Message from Lala Medina sent at 3/23/2018 11:49 AM CDT -----  Contact: Self  Patient has a possible UTI and she has to come in on Tuesday 3/27 for some labs.  Would you add the urine test so we can check to see if she does. Call back  at [phone] after you put the order in the system so she will know. Thank you

## 2018-03-23 NOTE — TELEPHONE ENCOUNTER
Phoned pt in regards to message. Instructed pt that there was already a lab in for urine Please review and add lab for a U/A for C/O UTI. Thank you. CLC

## 2018-03-27 ENCOUNTER — LAB VISIT (OUTPATIENT)
Dept: LAB | Facility: HOSPITAL | Age: 53
End: 2018-03-27
Attending: NURSE PRACTITIONER
Payer: COMMERCIAL

## 2018-03-27 ENCOUNTER — CLINICAL SUPPORT (OUTPATIENT)
Dept: FAMILY MEDICINE | Facility: CLINIC | Age: 53
End: 2018-03-27
Payer: COMMERCIAL

## 2018-03-27 VITALS — SYSTOLIC BLOOD PRESSURE: 134 MMHG | DIASTOLIC BLOOD PRESSURE: 80 MMHG

## 2018-03-27 DIAGNOSIS — E11.42 TYPE 2 DIABETES MELLITUS WITH DIABETIC POLYNEUROPATHY, WITH LONG-TERM CURRENT USE OF INSULIN: ICD-10-CM

## 2018-03-27 DIAGNOSIS — I10 HYPERTENSION, UNSPECIFIED TYPE: Primary | ICD-10-CM

## 2018-03-27 DIAGNOSIS — Z79.4 TYPE 2 DIABETES MELLITUS WITH DIABETIC POLYNEUROPATHY, WITH LONG-TERM CURRENT USE OF INSULIN: ICD-10-CM

## 2018-03-27 LAB
ALBUMIN SERPL BCP-MCNC: 3.4 G/DL
ALP SERPL-CCNC: 86 U/L
ALT SERPL W/O P-5'-P-CCNC: 33 U/L
ANION GAP SERPL CALC-SCNC: 11 MMOL/L
AST SERPL-CCNC: 73 U/L
BILIRUB SERPL-MCNC: 0.8 MG/DL
BUN SERPL-MCNC: 19 MG/DL
CALCIUM SERPL-MCNC: 9.6 MG/DL
CHLORIDE SERPL-SCNC: 103 MMOL/L
CO2 SERPL-SCNC: 26 MMOL/L
CREAT SERPL-MCNC: 1 MG/DL
EST. GFR  (AFRICAN AMERICAN): >60 ML/MIN/1.73 M^2
EST. GFR  (NON AFRICAN AMERICAN): >60 ML/MIN/1.73 M^2
ESTIMATED AVG GLUCOSE: 206 MG/DL
GLUCOSE SERPL-MCNC: 74 MG/DL
HBA1C MFR BLD HPLC: 8.8 %
POTASSIUM SERPL-SCNC: 3.9 MMOL/L
PROT SERPL-MCNC: 8.3 G/DL
SODIUM SERPL-SCNC: 140 MMOL/L
TSH SERPL DL<=0.005 MIU/L-ACNC: 1.48 UIU/ML

## 2018-03-27 PROCEDURE — 80053 COMPREHEN METABOLIC PANEL: CPT

## 2018-03-27 PROCEDURE — 84443 ASSAY THYROID STIM HORMONE: CPT

## 2018-03-27 PROCEDURE — 99999 PR PBB SHADOW E&M-EST. PATIENT-LVL I: CPT | Mod: PBBFAC,,,

## 2018-03-27 PROCEDURE — 36415 COLL VENOUS BLD VENIPUNCTURE: CPT | Mod: PO

## 2018-03-27 PROCEDURE — 83036 HEMOGLOBIN GLYCOSYLATED A1C: CPT

## 2018-03-27 PROCEDURE — 99499 UNLISTED E&M SERVICE: CPT | Mod: S$GLB,,, | Performed by: FAMILY MEDICINE

## 2018-03-27 NOTE — PROGRESS NOTES
Pt here for BP check. First reading was 150/84 on R arm. Waited 5 minutes and re-checked and received reading of 134/80 on L arm. Compared patient's home BP monitor to clinics and her monitor received a reading of 145/91 on L arm.

## 2018-04-03 ENCOUNTER — OFFICE VISIT (OUTPATIENT)
Dept: OPHTHALMOLOGY | Facility: CLINIC | Age: 53
End: 2018-04-03
Payer: COMMERCIAL

## 2018-04-03 DIAGNOSIS — H40.053 OCULAR HYPERTENSION, BILATERAL: ICD-10-CM

## 2018-04-03 DIAGNOSIS — E11.42 DIABETIC POLYNEUROPATHY ASSOCIATED WITH TYPE 2 DIABETES MELLITUS: ICD-10-CM

## 2018-04-03 DIAGNOSIS — H25.13 NUCLEAR SCLEROSIS, BILATERAL: ICD-10-CM

## 2018-04-03 DIAGNOSIS — H26.9 CORTICAL CATARACT OF BOTH EYES: Primary | ICD-10-CM

## 2018-04-03 DIAGNOSIS — H52.7 REFRACTIVE ERROR: ICD-10-CM

## 2018-04-03 PROCEDURE — 99999 PR PBB SHADOW E&M-EST. PATIENT-LVL III: CPT | Mod: PBBFAC,,, | Performed by: OPHTHALMOLOGY

## 2018-04-03 PROCEDURE — 92015 DETERMINE REFRACTIVE STATE: CPT | Mod: S$GLB,,, | Performed by: OPHTHALMOLOGY

## 2018-04-03 PROCEDURE — 76514 ECHO EXAM OF EYE THICKNESS: CPT | Mod: S$GLB,,, | Performed by: OPHTHALMOLOGY

## 2018-04-03 PROCEDURE — 92014 COMPRE OPH EXAM EST PT 1/>: CPT | Mod: S$GLB,,, | Performed by: OPHTHALMOLOGY

## 2018-04-03 PROCEDURE — 92020 GONIOSCOPY: CPT | Mod: S$GLB,,, | Performed by: OPHTHALMOLOGY

## 2018-04-03 PROCEDURE — 92133 CPTRZD OPH DX IMG PST SGM ON: CPT | Mod: S$GLB,,, | Performed by: OPHTHALMOLOGY

## 2018-04-03 RX ORDER — TIMOLOL MALEATE 5 MG/ML
1 SOLUTION/ DROPS OPHTHALMIC 2 TIMES DAILY
Qty: 10 ML | Refills: 5 | Status: SHIPPED | OUTPATIENT
Start: 2018-04-03 | End: 2019-03-08

## 2018-04-03 NOTE — Clinical Note
No retinopathy, but she has visually significant cataracts and ocular hypertension. I'd like to see her A1c heading down closer to 8 or below and I also emphasized the importance of compliance with her blood pressure meds and diabetic regimen.

## 2018-04-03 NOTE — PROGRESS NOTES
HPI     Diabetic Eye Exam    Additional comments: DM eye exam           Comments   DLS: 9/12/16 by Dr Rodrigues    Pt states vision seems cloudy most of the time x 6 months. Reads without   gls. No floaters but occasional flashes.     LBSL: 147 last night  Hemoglobin A1C       Date                     Value               Ref Range             Status                03/27/2018               8.8 (H)             4.0 - 5.6 %           Final                  11/20/2017               7.9 (H)             4.0 - 5.6 %           Final                 08/22/2017               8.3 (H)             4.0 - 5.6 %           Final              Agree with above. Came on gradual, never paid attention to weather it was   worse in one eye or the other. Denies eye pain. No known family history of   glaucoma. Sometimes misses BP meds and DM meds.        Last edited by Yael San MD on 4/3/2018 10:32 AM.   (History)        ROS     Positive for: Neurological (neuropathy), Endocrine (DM diagnosed around   age 40, on insulin now 4-5 years), Cardiovascular (HTN - not currently   controlled with meds per pt; last 's/90's)    Negative for: Genitourinary (denies nephropathy, denies flomax), Eyes   (denies surgery/trauma/laser), Respiratory (denies asthma, but sleeps with   CPAP machine, sleeps on 2 pillows, thinks she would be ok lying flat),   Heme/Lymph (denies ASA)    Last edited by Yael San MD on 4/3/2018  9:16 AM.   (History)        Assessment /Plan     For exam results, see Encounter Report.    Cortical cataract of both eyes    Diabetic polyneuropathy associated with type 2 diabetes mellitus    Ocular hypertension, bilateral  -     Posterior Segment OCT Optic Nerve- Both eyes; Standing    Nuclear sclerosis, bilateral    Refractive error    Other orders  -     timolol maleate 0.5% (TIMOPTIC) 0.5 % Drop; Place 1 drop into both eyes 2 (two) times daily.  Dispense: 10 mL; Refill: 5          Cortical cataract of  both eyes - visually significant, no red reflex on retinoscopy undilated, affecting view of retina. IOP currently too high, also reports BP and glucose not well controlled. Will treat IOP first, emphasized importance of HTN and glucose control prior to scheduling cataract surgery.     Diabetic polyneuropathy associated with type 2 diabetes mellitus - no retinopathy noted on DFE, view affected by cataracts - see above.    Ocular hypertension, bilateral - no NVI/NVA noted. ONH appears WNL on clinical exam and OCT. IOP began to come down in clinic with 1 gtt Combigan OU. Will Rx Timolol BID OU, f/u 4 weeks IOP check. If still too high, consider adding Brimonidine.   No known family history  Thick //634  Baseline IOP 33 OU  Timolol started 4/3/18    Nuclear sclerosis, bilateral - visually significant combined with anterior cortical cataracts. Dilates well, denies flomax. See above.    Refractive error - MRx given at pt request, as she would like to purchase prescription sunglasses for upcoming vacation. Advised her Rx will change after cataract surgery. She would like to target emmetropia with [facility], understands she will become dependent on bifocal/readers afterward.    Other orders  -     timolol maleate 0.5% (TIMOPTIC) 0.5 % Drop; Place 1 drop into both eyes 2 (two) times daily.  Dispense: 10 mL; Refill: 5

## 2018-04-03 NOTE — LETTER
April 3, 2018      Hai Pitt MD  [address] [facility] [address] [place] [address]           [facility]  [address] [facility] [address] [place] [address]  Phone: [phone]  Fax: [phone]          Patient: Mine Quiros   MR Number: [id number]   YOB: 1965   Date of Visit: 4/3/2018       Dear Dr. Hai Pitt:    Thank you for referring Mine Quiros to me for evaluation. Attached you will find relevant portions of my assessment and plan of care.    If you have questions, please do not hesitate to call me. I look forward to following Mine Quiros along with you.    Sincerely,    Yael Sukumar San MD    Enclosure  CC:  No Recipients    If you would like to receive this communication electronically, please contact [email or web address] or [phone] to request more information on [employer] access.    For providers and/or their staff who would like to refer a patient to [facility], please contact us through our one-stop-shop provider referral line, [facility], at [phone].    If you feel you have received this communication in error or would no longer like to receive these types of communications, please e-mail [email or web address]

## 2018-04-03 NOTE — PATIENT INSTRUCTIONS
What Are Cataracts?  A clear lens in the eye focuses light. This lets the eye see images sharply. With age, the lens slowly becomes cloudy. The cloudy lens is a cataract. A cataract scatters light and makes it hard for the eye to focus. Cataracts often form in both eyes. But one lens may cloud faster than the other.      The aging of your lens  Your lens may cloud so slowly that you don`t notice any vision changes at first. But as the cataract gets worse, the eye has a harder time focusing. In early stages, glasses may help you see better. As the lens gets more cloudy, your doctor may recommend surgery to restore your vision.  Date Last Reviewed: 6/14/2015  © 4477-0779 [employer]. [address] [place] [address]. All rights reserved. This information is not intended as a substitute for professional medical care. Always follow your healthcare professional's instructions.        Small-Incision Cataract Surgery: Removing the Old Lens  You may be surprised by how little time small-incision cataract surgery takes.  The procedure  Your doctor uses a microscope and tiny tools to make the incision and remove the old lens. A special tool breaks apart the old lens with sound waves (ultrasound) and then takes out the pieces. This process is called phacoemulsification. The natural membrane (capsule) that held your lens is left in place.  Incision size  A smaller incision (top) means a shorter recovery time for you. The location of the incision will vary.         Date Last Reviewed: 6/14/2015  © 4793-2364 [employer]. [address] [place] [address]. All rights reserved. This information is not intended as a substitute for professional medical care. Always follow your healthcare professional's instructions.        Small-Incision Cataract Surgery: Implanting the New Lens  Once your old lens has been removed, your doctor slips the new lens (IOL or intraocular lens) in through the  incision. The IOL is then placed in the capsule that held your old lens. With the new lens in place, your doctor is ready to close the incision. Some incisions may be closed with stitches. Others are self-sealing. That means they will stay closed on their own without stitches. The IOL does much the same thing as your old lens did before it became cloudy. It focuses light, letting you see sharp images and vivid colors. The IOL normally lasts a lifetime.  How small is an IOL?        Flexible tabs hold the IOL in place in the eye's natural capsule.     Date Last Reviewed: 6/14/2015  © 8731-7635 [employer]. [address] [place] [address], [place] [address]. All rights reserved. This information is not intended as a substitute for professional medical care. Always follow your healthcare professional's instructions.        Before Small-Incision Cataract Surgery    Like any operation, small-incision cataract surgery requires preparation.  Your health history  Your eye doctor will review your health history. Based on that, he or she may order tests or talk to your other health care providers. Tell your eye doctor which medicines you take. That includes over-the-counter medicine such as aspirin.  Your eye exam  You will have a complete eye exam. Your eye doctor or a technician will use devices that measure the length and curve of your eye. These measurements let your doctor select the proper new lens (IOL or intraocular lens) for you.  The night before surgery  Dont eat or drink anything after midnight the night before your surgery. This includes water, coffee, chewing gum, and mints. If you have been told to continue your daily medicine, take it only with small sips of water. Make sure you follow any other instructions your doctor gives you.  The day of surgery  Have someone you know drive you to and from the outpatient surgery clinic or hospital. Plan to be there for about 2 to 3 hours. When you arrive, youll sign  a consent form if you havent done so already. This form explains the risks of surgery. Just before surgery, your doctor will give you medicine that will relax you and keep you from feeling pain. You may sleep lightly.  Risks and complications  · Your doctor may have to shift from a small incision to a larger incision.  · There is a small chance of bleeding, infection, or swelling.   Date Last Reviewed: 6/14/2015 © 2000-2016 [employer]. [address] [place] [address], [place] [address]. All rights reserved. This information is not intended as a substitute for professional medical care. Always follow your healthcare professional's instructions.        After Small-Incision Cataract Surgery: The First 24 Hours    After surgery, youll rest in a recovery area for about an hour. Even though you may feel fine, you should take it easy. Your doctor will let you know what you should and shouldnt do once you get home. You may need to wear eye protection the first day. Also remember to take any eye drops or other medicine your doctor prescribes.  Back at home  Spend your first day relaxing at home. Watch TV, read, or talk to a friend. Be careful to:  · Not rub your eye  · Not lift anything that makes you strain  · Not drink alcohol within the first 24 hours  What to expect  Its normal for your eye to be bruised or bloodshot at first. You may also feel itching or mild discomfort. You may have some short-term (temporary) fluid discharge. These wont last long.   Getting back in action  You may be able to get back to much of your routine on the first day. But with some tasks, your doctor may ask you to wait. Always follow your doctor's recommendations.  Here are some general guidelines:  · You can shower again in 2 to 3 days.  · You can cook again in 2 to 3 days.  · You can drive again in 5 to 7 days.  · You can exercise again in 14 days.  When to call your doctor  Call your doctor if:  · Your pain is not relieved by  over-the-counter medicine.  · You have nausea or vomiting.  · Your vision suddenly becomes worse.   Date Last Reviewed: 6/14/2015  © 4695-5025 [employer]. [address] [place] [address], [place] [address]. All rights reserved. This information is not intended as a substitute for professional medical care. Always follow your healthcare professional's instructions.

## 2018-04-13 ENCOUNTER — LAB VISIT (OUTPATIENT)
Dept: LAB | Facility: HOSPITAL | Age: 53
End: 2018-04-13
Attending: NURSE PRACTITIONER
Payer: COMMERCIAL

## 2018-04-13 ENCOUNTER — OFFICE VISIT (OUTPATIENT)
Dept: ENDOCRINOLOGY | Facility: CLINIC | Age: 53
End: 2018-04-13
Payer: COMMERCIAL

## 2018-04-13 VITALS
DIASTOLIC BLOOD PRESSURE: 68 MMHG | HEART RATE: 93 BPM | WEIGHT: 231.13 LBS | HEIGHT: 56 IN | BODY MASS INDEX: 51.99 KG/M2 | SYSTOLIC BLOOD PRESSURE: 124 MMHG

## 2018-04-13 DIAGNOSIS — E11.8 TYPE 2 DIABETES MELLITUS WITH COMPLICATION, WITH LONG-TERM CURRENT USE OF INSULIN: ICD-10-CM

## 2018-04-13 DIAGNOSIS — E11.8 TYPE 2 DIABETES MELLITUS WITH COMPLICATION, WITH LONG-TERM CURRENT USE OF INSULIN: Primary | ICD-10-CM

## 2018-04-13 DIAGNOSIS — E66.01 MORBID OBESITY WITH BMI OF 45.0-49.9, ADULT: ICD-10-CM

## 2018-04-13 DIAGNOSIS — Z46.81 FITTING OR ADJUSTMENT OF INSULIN PUMP: ICD-10-CM

## 2018-04-13 DIAGNOSIS — E11.9 DIABETES MELLITUS WITHOUT COMPLICATION: ICD-10-CM

## 2018-04-13 DIAGNOSIS — Z79.4 TYPE 2 DIABETES MELLITUS WITH COMPLICATION, WITH LONG-TERM CURRENT USE OF INSULIN: ICD-10-CM

## 2018-04-13 DIAGNOSIS — Z79.4 TYPE 2 DIABETES MELLITUS WITH COMPLICATION, WITH LONG-TERM CURRENT USE OF INSULIN: Primary | ICD-10-CM

## 2018-04-13 DIAGNOSIS — E78.5 HYPERLIPIDEMIA, UNSPECIFIED HYPERLIPIDEMIA TYPE: ICD-10-CM

## 2018-04-13 DIAGNOSIS — F10.10 ALCOHOL ABUSE: ICD-10-CM

## 2018-04-13 LAB
BILIRUB UR QL STRIP: NEGATIVE
CLARITY UR: CLEAR
COLOR UR: YELLOW
GLUCOSE UR QL STRIP: NEGATIVE
HGB UR QL STRIP: NEGATIVE
KETONES UR QL STRIP: NEGATIVE
LEUKOCYTE ESTERASE UR QL STRIP: NEGATIVE
NITRITE UR QL STRIP: NEGATIVE
PH UR STRIP: 6 [PH] (ref 5–8)
PROT UR QL STRIP: NEGATIVE
SP GR UR STRIP: 1.01 (ref 1–1.03)
URN SPEC COLLECT METH UR: NORMAL

## 2018-04-13 PROCEDURE — 3045F PR MOST RECENT HEMOGLOBIN A1C LEVEL 7.0-9.0%: CPT | Mod: CPTII,S$GLB,, | Performed by: NURSE PRACTITIONER

## 2018-04-13 PROCEDURE — 3078F DIAST BP <80 MM HG: CPT | Mod: CPTII,S$GLB,, | Performed by: NURSE PRACTITIONER

## 2018-04-13 PROCEDURE — 99999 PR PBB SHADOW E&M-EST. PATIENT-LVL IV: CPT | Mod: PBBFAC,,, | Performed by: NURSE PRACTITIONER

## 2018-04-13 PROCEDURE — 3074F SYST BP LT 130 MM HG: CPT | Mod: CPTII,S$GLB,, | Performed by: NURSE PRACTITIONER

## 2018-04-13 PROCEDURE — 99215 OFFICE O/P EST HI 40 MIN: CPT | Mod: S$GLB,,, | Performed by: NURSE PRACTITIONER

## 2018-04-13 PROCEDURE — 81003 URINALYSIS AUTO W/O SCOPE: CPT | Mod: PO

## 2018-04-13 RX ORDER — PIOGLITAZONEHYDROCHLORIDE 15 MG/1
15 TABLET ORAL DAILY
Qty: 90 TABLET | Refills: 3 | Status: SHIPPED | OUTPATIENT
Start: 2018-04-13 | End: 2022-10-20

## 2018-04-13 RX ORDER — GABAPENTIN 300 MG/1
300 CAPSULE ORAL 3 TIMES DAILY
Qty: 90 CAPSULE | Refills: 3 | Status: SHIPPED | OUTPATIENT
Start: 2018-04-13 | End: 2018-10-19 | Stop reason: SDUPTHER

## 2018-04-13 NOTE — PROGRESS NOTES
Subjective:       Patient ID: Mine Quiros is a 52 y.o. female.    Chief Complaint: No chief complaint on file.    HPI Pt is a 52 y.o. AAF with a long hx of very uncontrolled Type 2 DM-- as well as chronic conditions pending review including HTN, peripheral neuropathy, diastolic dysfunciton, morbid obesity- now on insulin pump.She has had multiple difficulties being able to navigate pump and having freq hypoglycemia.     Interim Events:  Now medically disabled.  Continues to drink--Carmelina--1/2 gallon every 2-3 days. Pump download overall with dearth of data.                Review of Systems   Constitutional: Positive for fatigue. Negative for activity change.   HENT: Negative for hearing loss and trouble swallowing.    Eyes: Positive for visual disturbance. Negative for photophobia.        Last Eye Exam: recent--cataracts.    Respiratory: Negative for cough and shortness of breath.    Cardiovascular: Negative for chest pain and palpitations.   Gastrointestinal: Positive for diarrhea (but improved. ). Negative for constipation.   Genitourinary: Positive for frequency. Negative for urgency.        Unusual odor,   No burning.   No color.    Musculoskeletal: Positive for back pain. Negative for arthralgias and myalgias.   Skin: Negative for rash and wound.        Lesion R lateral aspect of ankle/lower leg--from excessive scratching/excoration/scarrring, continues to itch    Allergic/Immunologic: Negative for food allergies.   Neurological: Positive for numbness (hands and feet. ). Negative for weakness.   Psychiatric/Behavioral: Negative for sleep disturbance. The patient is not nervous/anxious.         Sleeps well with gabapentin.         Objective:      Physical Exam   Constitutional: She is oriented to person, place, and time. She appears well-developed and well-nourished.   Appears older than age, morbidly obese   HENT:   Head: Normocephalic and atraumatic.   Nose: Nose normal.   Mouth/Throat: Oropharynx is  clear and moist.   Eyes: Conjunctivae and EOM are normal. Pupils are equal, round, and reactive to light.   Neck: Normal range of motion. Neck supple. No tracheal deviation present. No thyromegaly present.   Cardiovascular: Normal rate and regular rhythm.    Pulmonary/Chest: Effort normal and breath sounds normal.   Musculoskeletal: Normal range of motion. She exhibits no deformity.   Feet: no open wounds or calluses. Good pedal care. vibraorty sensation slightly decreased.    Flip flops.    Neurological: She is alert and oriented to person, place, and time.   Skin: Skin is warm and dry.   Psychiatric: She has a normal mood and affect. Her behavior is normal. Judgment and thought content normal.       Hemoglobin A1C   Date Value Ref Range Status   03/27/2018 8.8 (H) 4.0 - 5.6 % Final     Comment:     According to ADA guidelines, hemoglobin A1c <7.0% represents  optimal control in non-pregnant diabetic patients. Different  metrics may apply to specific patient populations.   Standards of Medical Care in Diabetes-2016.  For the purpose of screening for the presence of diabetes:  <5.7%     Consistent with the absence of diabetes  5.7-6.4%  Consistent with increasing risk for diabetes   (prediabetes)  >or=6.5%  Consistent with diabetes  Currently, no consensus exists for use of hemoglobin A1c  for diagnosis of diabetes for children.  This Hemoglobin A1c assay has significant interference with fetal   hemoglobin   (HbF). The results are invalid for patients with abnormal amounts of   HbF,   including those with known Hereditary Persistence   of Fetal Hemoglobin. Heterozygous hemoglobin variants (HbAS, HbAC,   HbAD, HbAE, HbA2) do not significantly interfere with this assay;   however, presence of multiple variants in a sample may impact the %   interference.     11/20/2017 7.9 (H) 4.0 - 5.6 % Final     Comment:     According to ADA guidelines, hemoglobin A1c <7.0% represents  optimal control in non-pregnant diabetic  patients. Different  metrics may apply to specific patient populations.   Standards of Medical Care in Diabetes-2016.  For the purpose of screening for the presence of diabetes:  <5.7%     Consistent with the absence of diabetes  5.7-6.4%  Consistent with increasing risk for diabetes   (prediabetes)  >or=6.5%  Consistent with diabetes  Currently, no consensus exists for use of hemoglobin A1c  for diagnosis of diabetes for children.  This Hemoglobin A1c assay has significant interference with fetal   hemoglobin   (HbF). The results are invalid for patients with abnormal amounts of   HbF,   including those with known Hereditary Persistence   of Fetal Hemoglobin. Heterozygous hemoglobin variants (HbAS, HbAC,   HbAD, HbAE, HbA2) do not significantly interfere with this assay;   however, presence of multiple variants in a sample may impact the %   interference.     08/22/2017 8.3 (H) 4.0 - 5.6 % Final     Comment:     According to ADA guidelines, hemoglobin A1c <7.0% represents  optimal control in non-pregnant diabetic patients. Different  metrics may apply to specific patient populations.   Standards of Medical Care in Diabetes-2016.  For the purpose of screening for the presence of diabetes:  <5.7%     Consistent with the absence of diabetes  5.7-6.4%  Consistent with increasing risk for diabetes   (prediabetes)  >or=6.5%  Consistent with diabetes  Currently, no consensus exists for use of hemoglobin A1c  for diagnosis of diabetes for children.  This Hemoglobin A1c assay has significant interference with fetal   hemoglobin   (HbF). The results are invalid for patients with abnormal amounts of   HbF,   including those with known Hereditary Persistence   of Fetal Hemoglobin. Heterozygous hemoglobin variants (HbAS, HbAC,   HbAD, HbAE, HbA2) do not significantly interfere with this assay;   however, presence of multiple variants in a sample may impact the %   interference.         Chemistry        Component Value Date/Time      03/27/2018 1019    K 3.9 03/27/2018 1019     03/27/2018 1019    CO2 26 03/27/2018 1019    BUN 19 03/27/2018 1019    CREATININE 1.0 03/27/2018 1019    GLU 74 03/27/2018 1019        Component Value Date/Time    CALCIUM 9.6 03/27/2018 1019    ALKPHOS 86 03/27/2018 1019    AST 73 (H) 03/27/2018 1019    ALT 33 03/27/2018 1019    BILITOT 0.8 03/27/2018 1019    ESTGFRAFRICA >60.0 03/27/2018 1019    EGFRNONAA >60.0 03/27/2018 1019        Lab Results   Component Value Date    LDLCALC 126.2 04/17/2017     Lab Results   Component Value Date    TSH 1.484 03/27/2018     Component      Latest Ref Rng & Units 3/27/2018   Microalbum.,U,Random      ug/mL 43.0       Assessment:       No diagnosis found.    Plan:         1. Type 2 diabetes mellitus with complication, with long-term current use of insulin  gabapentin (NEURONTIN) 300 MG capsule    pioglitazone (ACTOS) 15 MG tablet   2. Fitting or adjustment of insulin pump     3. Morbid obesity with BMI of 45.0-49.9, adult     4. Hyperlipidemia, unspecified hyperlipidemia type     5. Alcohol abuse         Pt counseled--ai has 150 christina per oz--1/2 gal over 2-3 days =9600 calories!!!  Not too mention other health concerns.   Pt also counseled her insurance may deny future pump supplies if she is not entering at least 4 glucose readings a day, and bolusing for meals.    Verbalizes understanding.      ORDERS 04/13/2018     3 mo with a1c prior    UA today.--results negative     Complex-.45 .50% counseling on above.

## 2018-04-20 DIAGNOSIS — E11.9 TYPE 2 DIABETES MELLITUS WITHOUT COMPLICATION: ICD-10-CM

## 2018-04-23 ENCOUNTER — PATIENT OUTREACH (OUTPATIENT)
Dept: ADMINISTRATIVE | Facility: HOSPITAL | Age: 53
End: 2018-04-23

## 2018-04-23 NOTE — LETTER
April 23, 2018    Mine Quiros  [address]  [place] [address]             [facility]  [address]  [facility] [address]  Phone: [phone] Dear Ms. Quiros:    [facility] is committed to your overall health and would like to ensure that you are up to date on your recommended test and/or procedures.   Dr. Aldana found that your chart shows you may be due for the following:    Fasting cholesterol labs,LIPID panel  (lab has been ordered, please schedule)    Also,  Our records indicate that you are overdue for your colorectal cancer screening.  After reviewing your chart, it has been documented that a FIT KIT (colorectal cancer screening kit) was dispensed to you on 2/5/18,  but the lab has yet to receive the kit so that we may update your health maintenance.   This is a friendly reminder to complete this test (the instructions will inform you how to complete this test) and then return your sample in the postage-paid return envelope within 24 hours of collection.       Please schedule lab appointment at your earliest convenience by calling [phone] or going to [email or web address].       Sincerely,    Katerina Yates  Clinical Care Coordinator  [facility]  [address] [facility] [address]  Phone: [phone]   Fax: [phone]

## 2018-04-23 NOTE — PROGRESS NOTES
Health Maintenance Due   Topic Date Due    Colonoscopy  09/18/2015    Lipid Panel  04/17/2018     Letter mailed

## 2018-05-01 ENCOUNTER — TELEPHONE (OUTPATIENT)
Dept: FAMILY MEDICINE | Facility: CLINIC | Age: 53
End: 2018-05-01

## 2018-05-01 DIAGNOSIS — L66.9 CICATRICIAL ALOPECIA: ICD-10-CM

## 2018-05-01 RX ORDER — KETOCONAZOLE 20 MG/ML
SHAMPOO, SUSPENSION TOPICAL
Qty: 120 ML | Refills: 6 | Status: ON HOLD | OUTPATIENT
Start: 2018-05-01 | End: 2018-08-09 | Stop reason: CLARIF

## 2018-05-01 NOTE — TELEPHONE ENCOUNTER
----- Message from Louise Ramírez sent at 5/1/2018  2:25 PM CDT -----  Type: Needs Medical Advice    Who Called:  Patient   Symptoms (please be specific):  n/a  How long has patient had these symptoms:  n/a  Pharmacy name and phone #:  n/a  Best Call Back Number: [phone]  Additional Information: contact patient regarding C pap supplies, she needs to know where she to get them.    Thank you

## 2018-05-04 ENCOUNTER — TELEPHONE (OUTPATIENT)
Dept: FAMILY MEDICINE | Facility: CLINIC | Age: 53
End: 2018-05-04

## 2018-05-04 ENCOUNTER — OFFICE VISIT (OUTPATIENT)
Dept: OPHTHALMOLOGY | Facility: CLINIC | Age: 53
End: 2018-05-04
Payer: COMMERCIAL

## 2018-05-04 DIAGNOSIS — H25.13 NUCLEAR SCLEROSIS, BILATERAL: ICD-10-CM

## 2018-05-04 DIAGNOSIS — E11.42 DIABETIC POLYNEUROPATHY ASSOCIATED WITH TYPE 2 DIABETES MELLITUS: ICD-10-CM

## 2018-05-04 DIAGNOSIS — H52.7 REFRACTIVE ERROR: ICD-10-CM

## 2018-05-04 DIAGNOSIS — H26.9 CORTICAL CATARACT OF BOTH EYES: ICD-10-CM

## 2018-05-04 DIAGNOSIS — H40.053 OCULAR HYPERTENSION, BILATERAL: Primary | ICD-10-CM

## 2018-05-04 PROCEDURE — 92012 INTRM OPH EXAM EST PATIENT: CPT | Mod: S$GLB,,, | Performed by: OPHTHALMOLOGY

## 2018-05-04 PROCEDURE — 99999 PR PBB SHADOW E&M-EST. PATIENT-LVL III: CPT | Mod: PBBFAC,,, | Performed by: OPHTHALMOLOGY

## 2018-05-04 NOTE — PROGRESS NOTES
HPI     Glaucoma    Additional comments: 4 wk iop ch// timolol BID OU//           Comments   4 wk iop ch// timolol BID OU//    Agree with above. Denies adverse side effects. Did not take this am.        Last edited by Yael San MD on 5/4/2018  8:45 AM.   (History)            Assessment /Plan     For exam results, see Encounter Report.    Ocular hypertension, bilateral    Cortical cataract of both eyes    Nuclear sclerosis, bilateral    Diabetic polyneuropathy associated with type 2 diabetes mellitus    Refractive error              Cortical cataract of both eyes - visually significant, no red reflex on retinoscopy undilated, affecting view of retina. IOP still too high (did not take timolol this am), BP and glucose control improving. F/u 6 weeks for pre-op CE OD, as she will be having another A1c around that time, also check IOP OU, emphasized taking gtts.    Diabetic polyneuropathy associated with type 2 diabetes mellitus - no retinopathy noted on DFE, view affected by cataracts - see above.    Ocular hypertension, bilateral - no NVI/NVA noted. ONH appears WNL on clinical exam and OCT. IOP began to come down in clinic with 1 gtt Combigan OU. Will Rx Timolol BID OU, f/u 6 weeks IOP check. If still too high, consider adding Brimonidine.   No known family history  Thick //634  Baseline IOP 33 OU  Timolol started 4/3/18 --> IOP 26 today, better than baseline but forgot gtts this am.  Re-check IOP OU at pre-op visit for cataract syrgery    Nuclear sclerosis, bilateral - visually significant combined with anterior cortical cataracts. Dilates well, denies flomax. See above.    Refractive error - MRx given at pt request, as she would like to purchase prescription sunglasses for upcoming vacation. Advised her Rx will change after cataract surgery. She would like to target emmetropia with CE, understands she will become dependent on bifocal/readers afterward.

## 2018-05-04 NOTE — TELEPHONE ENCOUNTER
----- Message from Sue Briseno sent at 5/4/2018  1:22 PM CDT -----  Contact: self  Pt calling to see if orders were received in regards to CPAP supplies. Please call [phone].

## 2018-05-08 ENCOUNTER — OFFICE VISIT (OUTPATIENT)
Dept: PAIN MEDICINE | Facility: CLINIC | Age: 53
End: 2018-05-08
Payer: COMMERCIAL

## 2018-05-08 VITALS
SYSTOLIC BLOOD PRESSURE: 129 MMHG | DIASTOLIC BLOOD PRESSURE: 58 MMHG | TEMPERATURE: 97 F | OXYGEN SATURATION: 96 % | BODY MASS INDEX: 50.24 KG/M2 | HEART RATE: 91 BPM | WEIGHT: 224.13 LBS | RESPIRATION RATE: 18 BRPM

## 2018-05-08 DIAGNOSIS — R29.898 MUSCULAR DECONDITIONING: ICD-10-CM

## 2018-05-08 DIAGNOSIS — E66.01 MORBID OBESITY WITH BODY MASS INDEX OF 50 OR HIGHER: ICD-10-CM

## 2018-05-08 DIAGNOSIS — M51.36 DDD (DEGENERATIVE DISC DISEASE), LUMBAR: Primary | ICD-10-CM

## 2018-05-08 DIAGNOSIS — M79.18 MYOFASCIAL PAIN: ICD-10-CM

## 2018-05-08 DIAGNOSIS — M79.10 MUSCULAR PAIN: ICD-10-CM

## 2018-05-08 PROCEDURE — 3008F BODY MASS INDEX DOCD: CPT | Mod: CPTII,S$GLB,, | Performed by: PHYSICIAN ASSISTANT

## 2018-05-08 PROCEDURE — 3074F SYST BP LT 130 MM HG: CPT | Mod: CPTII,S$GLB,, | Performed by: PHYSICIAN ASSISTANT

## 2018-05-08 PROCEDURE — 99213 OFFICE O/P EST LOW 20 MIN: CPT | Mod: S$GLB,,, | Performed by: PHYSICIAN ASSISTANT

## 2018-05-08 PROCEDURE — 3078F DIAST BP <80 MM HG: CPT | Mod: CPTII,S$GLB,, | Performed by: PHYSICIAN ASSISTANT

## 2018-05-08 PROCEDURE — 99999 PR PBB SHADOW E&M-EST. PATIENT-LVL IV: CPT | Mod: PBBFAC,,, | Performed by: PHYSICIAN ASSISTANT

## 2018-05-09 NOTE — PROGRESS NOTES
This note was completed with dictation software and grammatical errors may exist.    CC: Low back pain    HPI: The patient is a 52-year-old woman with a history of diabetes with neuropathy, hypertension, obesity who presents in self-referral for low back pain. She returns in follow-up today with low back pain.  She describes this as aching, worse with standing and walking, improved with sitting.  She stopped going to physical therapy again after a few visits.  She denies weakness, numbness, bladder or bowel incontinence.    Pain intervention history: She had done 3-4 weeks of physical therapy at [facility] with no sustained relief.  She tried gabapentin but it causes drowsiness.  She is status post left L4 and L5 transforaminal epidural steroid injection on 8/31/17 with 0% relief. She is status post bilateral L3, 4 and 5 medial branch radiofrequency ablation on 12/5/17 with 0% relief.    ROS: She reports weight gain, rash, itching, headaches, shortness of breath, diarrhea, diabetes, back pain, difficulty sleeping, anxiety, depression.  Balance of review of systems is negative.    Past Medical History:   Diagnosis Date    Abnormal stress test 10/2016    Acne     Acute diastolic heart failure secondary to idiopathic cardiomyopathy 10/2016    Allergy     Anxiety     Arthritis     Carpal tunnel syndrome of right wrist     bilateral    Cataract     Chest pain     Diabetes mellitus     Type 2 IDDM - Uncontrolled    Diabetic neuropathy     Difficult intubation     needed glidescope for cholecystectomy 2009    Dry scalp     Fatty liver     GERD (gastroesophageal reflux disease)     on no meds    Glaucoma     Hyperlipidemia     Hypertension     Insomnia     Knee pain     Morbid obesity     Neuropathy due to type 2 diabetes mellitus     anaya feet    MARLON (obstructive sleep apnea)     CPAP, says she is compliant with use    Parotid mass 2014    had Biopsy    SOB (shortness of breath) 10/2016    Tachycardia      frequently,chronic for years per chart       Past Surgical History:   Procedure Laterality Date    CARDIAC CATHETERIZATION  2016    WDL per patient     SECTION      x 1    CHOLECYSTECTOMY  2009    ESOPHAGOGASTRODUODENOSCOPY  2009    Dr GRACIELA Jones - [facility]    HEMORRHOID SURGERY  2012    PPH with External Hemorrhoidectomy - Dr JAMAL Rollins, had spinal anesthesia    ORIF FEMUR FRACTURE Left     IM dmitry.      TRIGGER FINGER RELEASE Right     TUBAL LIGATION         Social History     Social History    Marital status: Single     Spouse name: N/A    Number of children: N/A    Years of education: N/A     Social History Main Topics    Smoking status: Never Smoker    Smokeless tobacco: Never Used    Alcohol use 12.6 oz/week     21 Shots of liquor per week      Comment: Daily    Drug use: No    Sexual activity: Not Asked     Other Topics Concern    None     Social History Narrative    None         Medications/Allergies: See med card    Vitals:    18 1021   BP: (!) 129/58   Pulse: 91   Resp: 18   Temp: 97.1 °F (36.2 °C)   TempSrc: Oral   SpO2: 96%   Weight: 101.6 kg (224 lb 1.6 oz)   PainSc:   4   PainLoc: Back         Physical exam:  Gen: A and O x3, pleasant, obese  Skin: No rashes or obvious lesions  HEENT: PERRLA, no obvious deformities on ears or in canals. Trachea midline.  CVS: Regular rate and rhythm, normal palpable pulses.  Resp:No increased work of breathing, symmetrical chest rise.  Abdomen: Soft, NT/ND.  Musculoskeletal:  No antalgic gait.     Neuro:  Lower extremities: 5/5 strength bilaterally  Reflexes: Patellar 2+, Achilles 2+ bilaterally.  Sensory:  Intact and symmetrical to light touch and pinprick in L2-S1 dermatomes bilaterally.    Lumbar spine:  Lumbar spine: Range of motion is severely reduced with flexion and extension with increased bilateral low back pain during each maneuver but worse with flexion.  Saroj's test causes no increased pain on either side.    Supine  straight leg raise is negative bilaterally.  Internal and external rotation of the hip causes no increased pain on either side.  Myofascial exam: No tenderness to palpation across lumbar paraspinous muscles.    Imaging:  MRI lumbar spine 8/7/17  At the T12-L1 level, broad-based bulge appears to be present and of 3 mm with possible anterior cord contact. Facet arthropathy and ligamentous hypertrophy appear to be present. This may relate to a slightly irregular protrusion centrally of 3 mm. Mild thecal sac narrowing is noted the 9 mm. Mild bilateral neural foraminal stenosis appears to be present.  At the L1-L2 level, mild broad-based is noted towards each neural foramen, mild bilateral neural foraminal narrowing appears to be present. Minimal central canal narrowing is noted with the thecal sac narrowed to 10 mm.  At the L2-L3 level, facet arthropathy and ligamentous hypertrophy appears to be present. Mild broad-based bulge appears to be present of 2-3 mm, mild bilateral neural foraminal narrowing is noted. A congenitally small central canal is noted to 9 mm with mild central canal narrowing  At the L3-L4 level, broad-based bulging is noted towards each neural foramen of 3-4 mm, a congenitally small thecal sac is noted to 9 mm with mild central canal narrowing. Mild bilateral neural foraminal narrowing is noted.  At the L4-L5 level, mild facet arthropathy and ligamentous hypertrophy appears to be present, mild bilateral neural foraminal narrowing is noted. Mild thecal sac narrowing is noted to 8 mm.  At L5-S1 level, moderate right neural foraminal narrowing is noted with mild left neural foraminal narrowing. Mild thecal sac narrowing is noted to 9 mm.      Assessment:  The patient is a 52-year-old woman with a history of diabetes with neuropathy, hypertension, obesity who presents in self-referral for low back pain.    1. DDD (degenerative disc disease), lumbar     2. Myofascial pain     3. Muscular pain     4.  Muscular deconditioning     5. Morbid obesity with body mass index of 50 or higher         Plan:  1.  We again discussed long-term plans to improve her back pain such as physical therapy, exercise, nutrition and weight loss.  She again stopped going to physical therapy after a few visits.  We could consider an L5/S1 interlaminar YUN, however her hemoglobin A1c has worsened, 8.8.  This would have to improve prior to considering an injection.  2.  Follow-up in 2 months or sooner as needed.

## 2018-05-17 ENCOUNTER — TELEPHONE (OUTPATIENT)
Dept: ENDOCRINOLOGY | Facility: CLINIC | Age: 53
End: 2018-05-17

## 2018-05-17 NOTE — TELEPHONE ENCOUNTER
Faxed Physician order to dispense DME supplies and chart notes to [facility] at

## 2018-06-13 ENCOUNTER — LAB VISIT (OUTPATIENT)
Dept: LAB | Facility: HOSPITAL | Age: 53
End: 2018-06-13
Attending: FAMILY MEDICINE
Payer: COMMERCIAL

## 2018-06-13 DIAGNOSIS — Z12.11 COLON CANCER SCREENING: ICD-10-CM

## 2018-06-13 LAB — HEMOCCULT STL QL IA: NEGATIVE

## 2018-06-13 PROCEDURE — 82274 ASSAY TEST FOR BLOOD FECAL: CPT

## 2018-06-21 RX ORDER — INSULIN LISPRO 100 [IU]/ML
INJECTION, SOLUTION INTRAVENOUS; SUBCUTANEOUS
Qty: 50 ML | Refills: 12 | Status: SHIPPED | OUTPATIENT
Start: 2018-06-21 | End: 2018-10-19 | Stop reason: SDUPTHER

## 2018-06-22 ENCOUNTER — OFFICE VISIT (OUTPATIENT)
Dept: OPHTHALMOLOGY | Facility: CLINIC | Age: 53
End: 2018-06-22
Payer: COMMERCIAL

## 2018-06-22 DIAGNOSIS — H52.7 REFRACTIVE ERROR: ICD-10-CM

## 2018-06-22 DIAGNOSIS — H26.9 CORTICAL CATARACT OF BOTH EYES: ICD-10-CM

## 2018-06-22 DIAGNOSIS — H25.13 NUCLEAR SCLEROSIS, BILATERAL: ICD-10-CM

## 2018-06-22 DIAGNOSIS — H40.053 OCULAR HYPERTENSION, BILATERAL: Primary | ICD-10-CM

## 2018-06-22 DIAGNOSIS — E11.42 DIABETIC POLYNEUROPATHY ASSOCIATED WITH TYPE 2 DIABETES MELLITUS: ICD-10-CM

## 2018-06-22 PROCEDURE — 99999 PR PBB SHADOW E&M-EST. PATIENT-LVL III: CPT | Mod: PBBFAC,,, | Performed by: OPHTHALMOLOGY

## 2018-06-22 PROCEDURE — 92012 INTRM OPH EXAM EST PATIENT: CPT | Mod: S$GLB,,, | Performed by: OPHTHALMOLOGY

## 2018-06-22 NOTE — PATIENT INSTRUCTIONS
What Are Cataracts?  A clear lens in the eye focuses light. This lets the eye see images sharply. With age, the lens slowly becomes cloudy. The cloudy lens is a cataract. A cataract scatters light and makes it hard for the eye to focus. Cataracts often form in both eyes. But one lens may cloud faster than the other.      The aging of your lens  Your lens may cloud so slowly that you don`t notice any vision changes at first. But as the cataract gets worse, the eye has a harder time focusing. In early stages, glasses may help you see better. As the lens gets more cloudy, your doctor may recommend surgery to restore your vision.  Date Last Reviewed: 6/14/2015  © 4465-1414 [facility]. [address] [place] [address]. All rights reserved. This information is not intended as a substitute for professional medical care. Always follow your healthcare professional's instructions.        Small-Incision Cataract Surgery: Removing the Old Lens  You may be surprised by how little time small-incision cataract surgery takes.  The procedure  Your doctor uses a microscope and tiny tools to make the incision and remove the old lens. A special tool breaks apart the old lens with sound waves (ultrasound) and then takes out the pieces. This process is called phacoemulsification. The natural membrane (capsule) that held your lens is left in place.  Incision size  A smaller incision (top) means a shorter recovery time for you. The location of the incision will vary.         Date Last Reviewed: 6/14/2015  © 8659-1924 [facility]. [address] [place] [address]. All rights reserved. This information is not intended as a substitute for professional medical care. Always follow your healthcare professional's instructions.        Small-Incision Cataract Surgery: Implanting the New Lens  Once your old lens has been removed, your doctor slips the new lens (IOL or intraocular lens) in through  the incision. The IOL is then placed in the capsule that held your old lens. With the new lens in place, your doctor is ready to close the incision. Some incisions may be closed with stitches. Others are self-sealing. That means they will stay closed on their own without stitches. The IOL does much the same thing as your old lens did before it became cloudy. It focuses light, letting you see sharp images and vivid colors. The IOL normally lasts a lifetime.  How small is an IOL?        Flexible tabs hold the IOL in place in the eye's natural capsule.     Date Last Reviewed: 6/14/2015  © 4174-2743 [employer]. [address] [place] [address], [place] [address]. All rights reserved. This information is not intended as a substitute for professional medical care. Always follow your healthcare professional's instructions.        Before Small-Incision Cataract Surgery    Like any operation, small-incision cataract surgery requires preparation.  Your health history  Your eye doctor will review your health history. Based on that, he or she may order tests or talk to your other health care providers. Tell your eye doctor which medicines you take. That includes over-the-counter medicine such as aspirin.  Your eye exam  You will have a complete eye exam. Your eye doctor or a technician will use devices that measure the length and curve of your eye. These measurements let your doctor select the proper new lens (IOL or intraocular lens) for you.  The night before surgery  Dont eat or drink anything after midnight the night before your surgery. This includes water, coffee, chewing gum, and mints. If you have been told to continue your daily medicine, take it only with small sips of water. Make sure you follow any other instructions your doctor gives you.  The day of surgery  Have someone you know drive you to and from the outpatient surgery clinic or hospital. Plan to be there for about 2 to 3 hours. When you arrive, youll  sign a consent form if you havent done so already. This form explains the risks of surgery. Just before surgery, your doctor will give you medicine that will relax you and keep you from feeling pain. You may sleep lightly.  Risks and complications  · Your doctor may have to shift from a small incision to a larger incision.  · There is a small chance of bleeding, infection, or swelling.   Date Last Reviewed: 6/14/2015 © 2000-2016 [employer]. [address] [place] [address], [place] [address]. All rights reserved. This information is not intended as a substitute for professional medical care. Always follow your healthcare professional's instructions.        After Small-Incision Cataract Surgery: The First 24 Hours    After surgery, youll rest in a recovery area for about an hour. Even though you may feel fine, you should take it easy. Your doctor will let you know what you should and shouldnt do once you get home. You may need to wear eye protection the first day. Also remember to take any eye drops or other medicine your doctor prescribes.  Back at home  Spend your first day relaxing at home. Watch TV, read, or talk to a friend. Be careful to:  · Not rub your eye  · Not lift anything that makes you strain  · Not drink alcohol within the first 24 hours  What to expect  Its normal for your eye to be bruised or bloodshot at first. You may also feel itching or mild discomfort. You may have some short-term (temporary) fluid discharge. These wont last long.   Getting back in action  You may be able to get back to much of your routine on the first day. But with some tasks, your doctor may ask you to wait. Always follow your doctor's recommendations.  Here are some general guidelines:  · You can shower again in 2 to 3 days.  · You can cook again in 2 to 3 days.  · You can drive again in 5 to 7 days.  · You can exercise again in 14 days.  When to call your doctor  Call your doctor if:  · Your pain is not relieved  by over-the-counter medicine.  · You have nausea or vomiting.  · Your vision suddenly becomes worse.   Date Last Reviewed: 6/14/2015  © 8118-9747 [employer]. [address] [place] [address], [place] [address]. All rights reserved. This information is not intended as a substitute for professional medical care. Always follow your healthcare professional's instructions.

## 2018-06-22 NOTE — PROGRESS NOTES
HPI     6 week iop check-dle-5/4/18    Pt denies any changes since last visit. No eye pain. Last A1c 8.8. No   preop today.  timolol BID ou    Hemoglobin A1C       Date                     Value               Ref Range             Status                03/27/2018               8.8 (H)             4.0 - 5.6 %           Final              Comment:    According to ADA guidelines, hemoglobin A1c <7.0% represents  optimal   control in non-pregnant diabetic patients. Different  metrics may apply to   specific patient populations.   Standards of Medical Care in   Diabetes-2016.  For the purpose of screening for the presence of   diabetes:  <5.7%     Consistent with the absence of diabetes  5.7-6.4%    Consistent with increasing risk for diabetes   (prediabetes)  >or=6.5%    Consistent with diabetes  Currently, no consensus exists for use of   hemoglobin A1c  for diagnosis of diabetes for children.  This Hemoglobin   A1c assay has significant interference with fetal   hemoglobin   (HbF).   The results are invalid for patients with abnormal amounts of   HbF,     including those with known Hereditary Persistence   of Fetal Hemoglobin.   Heterozygous hemoglobin variants (HbAS, HbAC,   HbAD, HbAE, HbA2) do not   significantly interfere with this assay;   however, presence of multiple   variants in a sample may impact the %   interference.         11/20/2017               7.9 (H)             4.0 - 5.6 %           Final              Comment:    According to ADA guidelines, hemoglobin A1c <7.0% represents  optimal   control in non-pregnant diabetic patients. Different  metrics may apply to   specific patient populations.   Standards of Medical Care in   Diabetes-2016.  For the purpose of screening for the presence of   diabetes:  <5.7%     Consistent with the absence of diabetes  5.7-6.4%    Consistent with increasing risk for diabetes   (prediabetes)  >or=6.5%    Consistent with diabetes  Currently, no consensus exists for use of    hemoglobin A1c  for diagnosis of diabetes for children.  This Hemoglobin   A1c assay has significant interference with fetal   hemoglobin   (HbF).   The results are invalid for patients with abnormal amounts of   HbF,     including those with known Hereditary Persistence   of Fetal Hemoglobin.   Heterozygous hemoglobin variants (HbAS, HbAC,   HbAD, HbAE, HbA2) do not   significantly interfere with this assay;   however, presence of multiple   variants in a sample may impact the %   interference.         08/22/2017               8.3 (H)             4.0 - 5.6 %           Final              Comment:    According to ADA guidelines, hemoglobin A1c <7.0% represents  optimal   control in non-pregnant diabetic patients. Different  metrics may apply to   specific patient populations.   Standards of Medical Care in   Diabetes-2016.  For the purpose of screening for the presence of   diabetes:  <5.7%     Consistent with the absence of diabetes  5.7-6.4%    Consistent with increasing risk for diabetes   (prediabetes)  >or=6.5%    Consistent with diabetes  Currently, no consensus exists for use of   hemoglobin A1c  for diagnosis of diabetes for children.  This Hemoglobin   A1c assay has significant interference with fetal   hemoglobin   (HbF).   The results are invalid for patients with abnormal amounts of   HbF,     including those with known Hereditary Persistence   of Fetal Hemoglobin.   Heterozygous hemoglobin variants (HbAS, HbAC,   HbAD, HbAE, HbA2) do not   significantly interfere with this assay;   however, presence of multiple   variants in a sample may impact the %   interference.    ----------      Last edited by Karla Lerner on 6/22/2018  1:58 PM. (History)            Assessment /Plan     For exam results, see Encounter Report.    Ocular hypertension, bilateral    Cortical cataract of both eyes    Nuclear sclerosis, bilateral    Diabetic polyneuropathy associated with type 2 diabetes mellitus    Refractive  error              Cortical cataract of both eyes - visually significant, no red reflex on retinoscopy undilated, affecting view of retina. IOP better on timolol, likely ok given thick CCT. BP and glucose control improving. Pt would like to wait for Dr. Boothe to schedule cataract surgery.    Diabetic polyneuropathy associated with type 2 diabetes mellitus - no retinopathy noted on DFE, view affected by cataracts - see above.    Ocular hypertension, bilateral - no NVI/NVA noted. ONH appears WNL on clinical exam and OCT. IOP began to come down in clinic with 1 gtt Combigan OU. Rx'd Timolol BID OU on 4/3/18, skipped it in the am following visit so was still too high. Better today, likely ok given thick CCT.   No known family history  Thick //634  Baseline IOP 33 OU  Timolol started 4/3/18 --> IOP 22 today    Nuclear sclerosis, bilateral - visually significant combined with anterior cortical cataracts. Dilates well, denies flomax. See above.    Refractive error - MRx given at pt request, as she would like to purchase prescription sunglasses for upcoming vacation. Advised her Rx will change after cataract surgery. She would like to target emmetropia with [facility], understands she will become dependent on bifocal/readers afterward.

## 2018-07-10 ENCOUNTER — LAB VISIT (OUTPATIENT)
Dept: LAB | Facility: HOSPITAL | Age: 53
End: 2018-07-10
Attending: NURSE PRACTITIONER
Payer: COMMERCIAL

## 2018-07-10 ENCOUNTER — TELEPHONE (OUTPATIENT)
Dept: FAMILY MEDICINE | Facility: CLINIC | Age: 53
End: 2018-07-10

## 2018-07-10 DIAGNOSIS — Z79.4 TYPE 2 DIABETES MELLITUS WITH COMPLICATION, WITH LONG-TERM CURRENT USE OF INSULIN: ICD-10-CM

## 2018-07-10 DIAGNOSIS — E11.8 TYPE 2 DIABETES MELLITUS WITH COMPLICATION, WITH LONG-TERM CURRENT USE OF INSULIN: ICD-10-CM

## 2018-07-10 DIAGNOSIS — E11.9 TYPE 2 DIABETES MELLITUS WITHOUT COMPLICATION: ICD-10-CM

## 2018-07-10 LAB
CHOLEST SERPL-MCNC: 200 MG/DL
CHOLEST/HDLC SERPL: 4.2 {RATIO}
ESTIMATED AVG GLUCOSE: 174 MG/DL
HBA1C MFR BLD HPLC: 7.7 %
HDLC SERPL-MCNC: 48 MG/DL
HDLC SERPL: 24 %
LDLC SERPL CALC-MCNC: 131 MG/DL
NONHDLC SERPL-MCNC: 152 MG/DL
TRIGL SERPL-MCNC: 105 MG/DL

## 2018-07-10 PROCEDURE — 83036 HEMOGLOBIN GLYCOSYLATED A1C: CPT

## 2018-07-10 PROCEDURE — 36415 COLL VENOUS BLD VENIPUNCTURE: CPT | Mod: PO

## 2018-07-10 PROCEDURE — 80061 LIPID PANEL: CPT

## 2018-07-10 NOTE — TELEPHONE ENCOUNTER
----- Message from Milton Lenz sent at 7/10/2018  1:57 PM CDT -----  Contact: patient  Type: Needs Medical Advice    Who Called:  patient  Symptoms (please be specific):    How long has patient had these symptoms:    Pharmacy name and phone #:    Best Call Back Number: [phone] or [phone]  Additional Information:  requesting new Rx for new cpac machine stated machine no longer works, call back

## 2018-07-11 NOTE — TELEPHONE ENCOUNTER
Left message on pt recorder to return call to clinic.  Need further info on DME company and supplies

## 2018-07-12 ENCOUNTER — TELEPHONE (OUTPATIENT)
Dept: FAMILY MEDICINE | Facility: CLINIC | Age: 53
End: 2018-07-12

## 2018-07-12 NOTE — TELEPHONE ENCOUNTER
----- Message from Mary Deshpande sent at 7/12/2018  8:27 AM CDT -----  Contact: pt  Pt calling states that she needs to see the  concerning sleep study appointment,pt had a CPAP machine and it broke,she has seen Dr kwan back and he was the one that prescribed for her so she is thinking that she needs to come see him. Pt has not been sleeping cause of it. Pt would like a call back today,please....[phone]

## 2018-07-17 ENCOUNTER — OFFICE VISIT (OUTPATIENT)
Dept: OPHTHALMOLOGY | Facility: CLINIC | Age: 53
End: 2018-07-17
Payer: COMMERCIAL

## 2018-07-17 ENCOUNTER — OFFICE VISIT (OUTPATIENT)
Dept: ENDOCRINOLOGY | Facility: CLINIC | Age: 53
End: 2018-07-17
Payer: COMMERCIAL

## 2018-07-17 ENCOUNTER — TELEPHONE (OUTPATIENT)
Dept: FAMILY MEDICINE | Facility: CLINIC | Age: 53
End: 2018-07-17

## 2018-07-17 VITALS
DIASTOLIC BLOOD PRESSURE: 76 MMHG | HEIGHT: 56 IN | WEIGHT: 223.13 LBS | BODY MASS INDEX: 50.19 KG/M2 | HEART RATE: 84 BPM | SYSTOLIC BLOOD PRESSURE: 142 MMHG

## 2018-07-17 DIAGNOSIS — G62.9 PERIPHERAL POLYNEUROPATHY: ICD-10-CM

## 2018-07-17 DIAGNOSIS — H25.13 NUCLEAR SCLEROSIS, BILATERAL: Primary | ICD-10-CM

## 2018-07-17 DIAGNOSIS — H40.053 OCULAR HYPERTENSION, BILATERAL: ICD-10-CM

## 2018-07-17 DIAGNOSIS — E78.5 HYPERLIPIDEMIA, UNSPECIFIED HYPERLIPIDEMIA TYPE: ICD-10-CM

## 2018-07-17 DIAGNOSIS — F10.10 ALCOHOL ABUSE: ICD-10-CM

## 2018-07-17 DIAGNOSIS — H26.9 CORTICAL CATARACT OF BOTH EYES: ICD-10-CM

## 2018-07-17 DIAGNOSIS — E11.42 TYPE 2 DIABETES MELLITUS WITH DIABETIC POLYNEUROPATHY, WITH LONG-TERM CURRENT USE OF INSULIN: Primary | ICD-10-CM

## 2018-07-17 DIAGNOSIS — I10 HTN (HYPERTENSION), BENIGN: ICD-10-CM

## 2018-07-17 DIAGNOSIS — Z46.81 FITTING OR ADJUSTMENT OF INSULIN PUMP: ICD-10-CM

## 2018-07-17 DIAGNOSIS — Z79.4 TYPE 2 DIABETES MELLITUS WITH DIABETIC POLYNEUROPATHY, WITH LONG-TERM CURRENT USE OF INSULIN: Primary | ICD-10-CM

## 2018-07-17 DIAGNOSIS — E66.01 MORBID OBESITY WITH BMI OF 50.0-59.9, ADULT: ICD-10-CM

## 2018-07-17 DIAGNOSIS — Z79.4 INSULIN LONG-TERM USE: ICD-10-CM

## 2018-07-17 DIAGNOSIS — Z96.41 INSULIN PUMP STATUS: Chronic | ICD-10-CM

## 2018-07-17 PROCEDURE — 99999 PR PBB SHADOW E&M-EST. PATIENT-LVL II: CPT | Mod: PBBFAC,,, | Performed by: OPHTHALMOLOGY

## 2018-07-17 PROCEDURE — 92020 GONIOSCOPY: CPT | Mod: S$GLB,,, | Performed by: OPHTHALMOLOGY

## 2018-07-17 PROCEDURE — 99999 PR PBB SHADOW E&M-EST. PATIENT-LVL V: CPT | Mod: PBBFAC,,, | Performed by: NURSE PRACTITIONER

## 2018-07-17 PROCEDURE — 99214 OFFICE O/P EST MOD 30 MIN: CPT | Mod: S$GLB,,, | Performed by: NURSE PRACTITIONER

## 2018-07-17 PROCEDURE — 92014 COMPRE OPH EXAM EST PT 1/>: CPT | Mod: S$GLB,,, | Performed by: OPHTHALMOLOGY

## 2018-07-17 NOTE — PROGRESS NOTES
HPI     Blurred Vision    Additional comments: blurred va x 1 yr//             Glaucoma    Additional comments: [place] timolol BID OU//           Comments   blurred va x 1 yr//    Pos for flashes //  pos for floaters not new o the   pt//  Glaucoma timolol BID OU  Dm Hemoglobin A1C       Date                     Value               Ref Range             Status                07/10/2018               7.7 (H)             4.0 - 5.6 %           Final                     03/27/2018               8.8 (H)             4.0 - 5.6 %           Final                      11/20/2017               7.9 (H)             4.0 - 5.6 %           Final                     Last edited by Marlena Crowe on 7/17/2018 11:41 AM. (History)        ROS     Negative for: Constitutional, Gastrointestinal, Neurological, Skin,   Genitourinary, Musculoskeletal, HENT, Endocrine, Cardiovascular, Eyes,   Respiratory, Psychiatric, Allergic/Imm, Heme/Lymph    Last edited by Isaac Boothe Jr., MD on 7/17/2018 11:58 AM. (History)        Assessment /Plan     For exam results, see Encounter Report.    Nuclear sclerosis, bilateral  -schedule cat surgery OD; f/u for pre-op clearance and scheduling  -R&B explained to the patient and the patient agreed to move forward with the surgery    Cortical cataract of both eyes- dilates well  -schedule cat surgery OD    Ocular hypertension, bilateral- non-adherence to drop regimen, Open angles OU  -encouraged compliance with meds  -continue timolol BID OU

## 2018-07-17 NOTE — TELEPHONE ENCOUNTER
----- Message from RT Riley sent at 7/17/2018  8:47 AM CDT -----  Contact: pt    pt , requesting to inform she is having popping noise in her hear again, seeking medical advise, thanks.

## 2018-07-17 NOTE — PROGRESS NOTES
Subjective:       Patient ID: Mine Quiros is a 52 y.o. female.    Chief Complaint: Diabetes    Diabetes   Pertinent negatives for hypoglycemia include no nervousness/anxiousness. Associated symptoms include fatigue. Pertinent negatives for diabetes include no chest pain and no weakness.      Pt is a 52 y.o. AAF with a long hx of very uncontrolled Type 2 DM-- as well as chronic conditions pending review including HTN, peripheral neuropathy, diastolic dysfunciton, morbid obesity- now on insulin pump.She has had multiple difficulties being able to navigate pump and having freq hypoglycemia. Medically disbabled she states due to neuropathy.     Interim Events: .  No acute events.  Continues to drink--Carmelina--1/2 gallon every 2-3 days.Though she states this is improved.  Pump download overall with dearth of data.  She has been counseled on need for Serial SBGM for both improved DM status and safety.      See todays' pump download.           Review of Systems   Constitutional: Positive for fatigue. Negative for activity change.   HENT: Negative for hearing loss and trouble swallowing.    Eyes: Positive for visual disturbance. Negative for photophobia.        Last Eye Exam: recent--cataracts.    Respiratory: Negative for cough and shortness of breath.    Cardiovascular: Negative for chest pain and palpitations.   Gastrointestinal: Positive for diarrhea (but improved. intermittent ). Negative for constipation.   Genitourinary: Negative for frequency and urgency.        Unusual odor,   No burning.   No color.    Musculoskeletal: Positive for back pain. Negative for arthralgias and myalgias.   Skin: Negative for rash and wound.   Allergic/Immunologic: Negative for food allergies.   Neurological: Positive for numbness (hands and feet. ). Negative for weakness.   Psychiatric/Behavioral: Negative for sleep disturbance. The patient is not nervous/anxious.         Sleeps well with gabapentin.         Objective:       Physical Exam   Constitutional: She is oriented to person, place, and time. She appears well-developed and well-nourished.   Appears older than age, morbidly obese   HENT:   Head: Normocephalic and atraumatic.   Nose: Nose normal.   Mouth/Throat: Oropharynx is clear and moist.   Eyes: Conjunctivae and EOM are normal. Pupils are equal, round, and reactive to light.   Neck: Normal range of motion. Neck supple. No tracheal deviation present. No thyromegaly present.   Cardiovascular: Normal rate and regular rhythm.    Pulmonary/Chest: Effort normal and breath sounds normal.   Musculoskeletal: Normal range of motion. She exhibits no deformity.   Feet: no open wounds or calluses. Good pedal care. vibraorty sensation slightly decreased left  Virtually absent on R.     Flip flops.    Neurological: She is alert and oriented to person, place, and time.   Skin: Skin is warm and dry.   Psychiatric: She has a normal mood and affect. Her behavior is normal. Judgment and thought content normal.       Hemoglobin A1C   Date Value Ref Range Status   07/10/2018 7.7 (H) 4.0 - 5.6 % Final     Comment:     ADA Screening Guidelines:  5.7-6.4%  Consistent with prediabetes  >or=6.5%  Consistent with diabetes  High levels of fetal hemoglobin interfere with the HbA1C  assay. Heterozygous hemoglobin variants (HbS, HgC, etc)do  not significantly interfere with this assay.   However, presence of multiple variants may affect accuracy.     03/27/2018 8.8 (H) 4.0 - 5.6 % Final     Comment:     According to ADA guidelines, hemoglobin A1c <7.0% represents  optimal control in non-pregnant diabetic patients. Different  metrics may apply to specific patient populations.   Standards of Medical Care in Diabetes-2016.  For the purpose of screening for the presence of diabetes:  <5.7%     Consistent with the absence of diabetes  5.7-6.4%  Consistent with increasing risk for diabetes   (prediabetes)  >or=6.5%  Consistent with diabetes  Currently, no  consensus exists for use of hemoglobin A1c  for diagnosis of diabetes for children.  This Hemoglobin A1c assay has significant interference with fetal   hemoglobin   (HbF). The results are invalid for patients with abnormal amounts of   HbF,   including those with known Hereditary Persistence   of Fetal Hemoglobin. Heterozygous hemoglobin variants (HbAS, HbAC,   HbAD, HbAE, HbA2) do not significantly interfere with this assay;   however, presence of multiple variants in a sample may impact the %   interference.     11/20/2017 7.9 (H) 4.0 - 5.6 % Final     Comment:     According to ADA guidelines, hemoglobin A1c <7.0% represents  optimal control in non-pregnant diabetic patients. Different  metrics may apply to specific patient populations.   Standards of Medical Care in Diabetes-2016.  For the purpose of screening for the presence of diabetes:  <5.7%     Consistent with the absence of diabetes  5.7-6.4%  Consistent with increasing risk for diabetes   (prediabetes)  >or=6.5%  Consistent with diabetes  Currently, no consensus exists for use of hemoglobin A1c  for diagnosis of diabetes for children.  This Hemoglobin A1c assay has significant interference with fetal   hemoglobin   (HbF). The results are invalid for patients with abnormal amounts of   HbF,   including those with known Hereditary Persistence   of Fetal Hemoglobin. Heterozygous hemoglobin variants (HbAS, HbAC,   HbAD, HbAE, HbA2) do not significantly interfere with this assay;   however, presence of multiple variants in a sample may impact the %   interference.         Chemistry        Component Value Date/Time     03/27/2018 1019    K 3.9 03/27/2018 1019     03/27/2018 1019    CO2 26 03/27/2018 1019    BUN 19 03/27/2018 1019    CREATININE 1.0 03/27/2018 1019    GLU 74 03/27/2018 1019        Component Value Date/Time    CALCIUM 9.6 03/27/2018 1019    ALKPHOS 86 03/27/2018 1019    AST 73 (H) 03/27/2018 1019    ALT 33 03/27/2018 1019    BILITOT  0.8 03/27/2018 1019    ESTGFRAFRICA >60.0 03/27/2018 1019    EGFRNONAA >60.0 03/27/2018 1019        Lab Results   Component Value Date    LDLCALC 131.0 07/10/2018     Lab Results   Component Value Date    TSH 1.484 03/27/2018     Component      Latest Ref Rng & Units 3/27/2018   Microalbum.,U,Random      ug/mL 43.0       Assessment:       1. Type 2 diabetes mellitus with diabetic polyneuropathy, with long-term current use of insulin  HM DIABETES FOOT EXAM  Chronic-stable-see plan     Hemoglobin A1c   2. Insulin pump status     3. Insulin long-term use     4. Fitting or adjustment of insulin pump     5. HTN (hypertension), benign  -chronic-stable-cont losartan 100 mg    6. Hyperlipidemia, unspecified hyperlipidemia type  -chronic-above goal--no statin--and with ETOH intake hesitant to add    7. Peripheral polyneuropathy  -crhonic-stable-monitor foot care    8. Morbid obesity with BMI of 50.0-59.9, adult  -qvlsqoy-hviria-sueprewhd.    9. Alcohol abuse  -crhronic-counseled on hypoglycemia risks-as well as empthy calories.        Plan:           Pt counseled--ai has 150 christina per oz--1/2 gal over 2-3 days =9600 calories!!! Also calculates to $220 t $300 month.   Not too mention other health concerns.   Pt also counseled her insurance may deny future pump supplies if she is not entering at least 4 glucose readings a day, and bolusing for meals.    AGAIN, warned. Verbalizes understanding.      ORDERS 07/17/2018     3 mo with a1c prior

## 2018-07-17 NOTE — PATIENT INSTRUCTIONS
Small-Incision Cataract Surgery: Implanting the New Lens    Once your old lens has been removed, your doctor slips the new lens (IOL or intraocular lens) in through the incision. The IOL is then placed in the capsule that held your old lens. With the new lens in place, your doctor is ready to close the incision. Some incisions may be closed with stitches. Others are self-sealing. That means they will stay closed on their own without stitches. The IOL does much the same thing as your old lens did before it became cloudy. It focuses light, letting you see sharp images and vivid colors. The IOL normally lasts a lifetime.  How small is an IOL?     Date Last Reviewed: 6/14/2015  © 2559-3621 [email or web address]. [address] [place] [address], [place] [address]. All rights reserved. This information is not intended as a substitute for professional medical care. Always follow your healthcare professional's instructions.

## 2018-07-25 ENCOUNTER — TELEPHONE (OUTPATIENT)
Dept: OPHTHALMOLOGY | Facility: CLINIC | Age: 53
End: 2018-07-25

## 2018-07-27 ENCOUNTER — OFFICE VISIT (OUTPATIENT)
Dept: FAMILY MEDICINE | Facility: CLINIC | Age: 53
End: 2018-07-27
Payer: COMMERCIAL

## 2018-07-27 ENCOUNTER — OFFICE VISIT (OUTPATIENT)
Dept: OPHTHALMOLOGY | Facility: CLINIC | Age: 53
End: 2018-07-27
Payer: COMMERCIAL

## 2018-07-27 VITALS
HEIGHT: 56 IN | SYSTOLIC BLOOD PRESSURE: 146 MMHG | DIASTOLIC BLOOD PRESSURE: 90 MMHG | HEART RATE: 100 BPM | WEIGHT: 221.56 LBS | BODY MASS INDEX: 49.84 KG/M2

## 2018-07-27 DIAGNOSIS — H25.13 NUCLEAR SCLEROSIS, BILATERAL: ICD-10-CM

## 2018-07-27 DIAGNOSIS — H25.11 AGE-RELATED NUCLEAR CATARACT OF RIGHT EYE: ICD-10-CM

## 2018-07-27 DIAGNOSIS — H26.9 CATARACT, RIGHT EYE: Primary | ICD-10-CM

## 2018-07-27 DIAGNOSIS — J32.9 SINUSITIS, UNSPECIFIED CHRONICITY, UNSPECIFIED LOCATION: Primary | ICD-10-CM

## 2018-07-27 DIAGNOSIS — J01.00 ACUTE NON-RECURRENT MAXILLARY SINUSITIS: ICD-10-CM

## 2018-07-27 PROCEDURE — 3080F DIAST BP >= 90 MM HG: CPT | Mod: CPTII,S$GLB,, | Performed by: PHYSICIAN ASSISTANT

## 2018-07-27 PROCEDURE — 99999 PR PBB SHADOW E&M-EST. PATIENT-LVL III: CPT | Mod: PBBFAC,,, | Performed by: PHYSICIAN ASSISTANT

## 2018-07-27 PROCEDURE — 99499 UNLISTED E&M SERVICE: CPT | Mod: S$GLB,,, | Performed by: OPHTHALMOLOGY

## 2018-07-27 PROCEDURE — 3008F BODY MASS INDEX DOCD: CPT | Mod: CPTII,S$GLB,, | Performed by: PHYSICIAN ASSISTANT

## 2018-07-27 PROCEDURE — 3077F SYST BP >= 140 MM HG: CPT | Mod: CPTII,S$GLB,, | Performed by: PHYSICIAN ASSISTANT

## 2018-07-27 PROCEDURE — 99214 OFFICE O/P EST MOD 30 MIN: CPT | Mod: S$GLB,,, | Performed by: PHYSICIAN ASSISTANT

## 2018-07-27 PROCEDURE — 99999 PR PBB SHADOW E&M-EST. PATIENT-LVL IV: CPT | Mod: PBBFAC,,, | Performed by: OPHTHALMOLOGY

## 2018-07-27 RX ORDER — FLUTICASONE PROPIONATE 50 MCG
2 SPRAY, SUSPENSION (ML) NASAL DAILY
Qty: 16 G | Refills: 1 | Status: SHIPPED | OUTPATIENT
Start: 2018-07-27 | End: 2019-05-01 | Stop reason: SDUPTHER

## 2018-07-27 RX ORDER — PREDNISOLONE ACETATE 10 MG/ML
1 SUSPENSION/ DROPS OPHTHALMIC 4 TIMES DAILY
Qty: 10 ML | Refills: 2 | Status: CANCELLED | OUTPATIENT
Start: 2018-07-27

## 2018-07-27 RX ORDER — MOXIFLOXACIN 5 MG/ML
1 SOLUTION/ DROPS OPHTHALMIC 4 TIMES DAILY
Qty: 3 ML | Refills: 0 | Status: CANCELLED | OUTPATIENT
Start: 2018-07-27 | End: 2018-08-03

## 2018-07-27 RX ORDER — AMOXICILLIN AND CLAVULANATE POTASSIUM 875; 125 MG/1; MG/1
1 TABLET, FILM COATED ORAL EVERY 12 HOURS
Qty: 20 TABLET | Refills: 0 | Status: SHIPPED | OUTPATIENT
Start: 2018-07-27 | End: 2018-08-06

## 2018-07-27 NOTE — H&P
CC: H&P for cataract surgery  HPI: Patient has been diagnosed with cataracts, which are interfering with daily activities due to blurred vision and glare which cannot adequately be corrected with glasses.   PMH:  Past Medical History:   Diagnosis Date    Abnormal stress test 10/2016    Acne     Acute diastolic heart failure secondary to idiopathic cardiomyopathy 10/2016    Allergy     Anxiety     Arthritis     Carpal tunnel syndrome of right wrist     bilateral    Cataract     Chest pain     Diabetes mellitus     Type 2 IDDM - Uncontrolled    Diabetic neuropathy     Difficult intubation     needed glidescope for cholecystectomy 2009    Dry scalp     Fatty liver     GERD (gastroesophageal reflux disease)     on no meds    Glaucoma     Hyperlipidemia     Hypertension     Insomnia     Knee pain     Morbid obesity     Neuropathy due to type 2 diabetes mellitus     anaya feet    MARLON (obstructive sleep apnea)     CPAP, says she is compliant with use    Parotid mass 2014    had Biopsy    SOB (shortness of breath) 10/2016    Tachycardia     frequently,chronic for years per chart       FH:  Family History   Problem Relation Age of Onset    Diabetes Mother     Kidney disease Mother     Hyperlipidemia Father     Heart disease Father     Hypertension Father     Diabetes Sister     Heart attacks under age 50 Brother     No Known Problems Maternal Grandmother     No Known Problems Maternal Grandfather     No Known Problems Paternal Grandmother     No Known Problems Paternal Grandfather     Diabetes Sister     Hepatitis Brother     Kidney failure Sister     Diabetes Sister     Amblyopia Neg Hx     Blindness Neg Hx     Cancer Neg Hx     Cataracts Neg Hx     Glaucoma Neg Hx     Macular degeneration Neg Hx     Retinal detachment Neg Hx     Strabismus Neg Hx     Stroke Neg Hx     Thyroid disease Neg Hx        SH:  Social History     Social History    Marital status: Single     Spouse  name: N/A    Number of children: N/A    Years of education: N/A     Occupational History    Not on file.     Social History Main Topics    Smoking status: Never Smoker    Smokeless tobacco: Never Used    Alcohol use 12.6 oz/week     21 Shots of liquor per week      Comment: Daily    Drug use: No    Sexual activity: Not on file     Other Topics Concern    Not on file     Social History Narrative    No narrative on file       ROS:  HEENT: Blurred vision  CV: no chest pain  Pulm:  No wheezing or SOB  Please see my last exam note for additional ROS details    PE:  BP:143/83  Pulse:96  HEENT: Cataract  CV: N s1 s2 no murmurs  Pulm:CTAB  Abd: Soft, NABS  Extremeties: no edema    A/P  1. Cataract - Indications, risks and alternatives to the procedure were explained to the patient. The patient was given the opportunity to ask questions and consented to the procedure in writing.  Proceed with cataract surgery as scheduled.  2. Other health issues - patient has been cleared by their PCP. See last note for details.

## 2018-07-27 NOTE — PROGRESS NOTES
HPI     Pre op--sx 8/9/18    Pt denies any changes since last visit.     Last edited by Karla Lerner on 7/27/2018 10:19 AM. (History)            Assessment /Plan     For exam results, see Encounter Report.    Nuclear sclerosis, right eye    -schedule cataract surgery right eye  -R&B benefits discussed with patient  -Risks of worse vision, loss of vision, infection, bleeding, re-surgery,and may need to have surgery done by a different physician was explained to the patient  -patient was also counseled on premium lens options, laser cataract, and astigmatic correction  -patient was also counseled that they may still need glasses after surgery to help improve their vision, especially the need for reading glasses for reading after surgery  -patient understood the risks involved with cataract surgery and wanted to move forward with surgery

## 2018-07-27 NOTE — PROGRESS NOTES
Subjective:       Patient ID: Mine Quiros is a 52 y.o. female    Chief Complaint: Otalgia (R ear popping)    HPI  Mrs Quiros presents today CO sinus pressure and right ear pressure and popping for the past 3 weeks.  She has not yet started taking any medications for the symptoms.      Review of Systems   Constitutional: Negative for chills and fever.   HENT: Positive for congestion, ear pain and sinus pressure.    Eyes: Negative for visual disturbance.   Respiratory: Negative for cough.    Cardiovascular: Negative for chest pain.   Gastrointestinal: Negative for abdominal pain, diarrhea, nausea and vomiting.   Skin: Negative for rash.   Neurological: Negative for headaches.        Objective:   Physical Exam   Constitutional: She is oriented to person, place, and time. She appears well-developed. No distress.   Morbidly obese   HENT:   Head: Normocephalic and atraumatic.   Right Ear: External ear normal.   Left Ear: External ear normal.   Clear effusion bilateral TMs, ttp over the right maxillary sinus, nasal congestion present   Eyes: EOM are normal. Pupils are equal, round, and reactive to light.   Neck: Normal range of motion. Neck supple.   Cardiovascular: Normal rate, regular rhythm, normal heart sounds and intact distal pulses.  Exam reveals no gallop and no friction rub.    No murmur heard.  Pulmonary/Chest: Effort normal and breath sounds normal. No respiratory distress. She has no wheezes. She has no rales. She exhibits no tenderness.   Musculoskeletal: Normal range of motion.   Neurological: She is alert and oriented to person, place, and time.   Skin: Skin is warm and dry. No rash noted. She is not diaphoretic.   Psychiatric: She has a normal mood and affect. Her behavior is normal. Judgment and thought content normal.        Assessment:       1. Sinusitis, unspecified chronicity, unspecified location  fluticasone (FLONASE) 50 mcg/actuation nasal spray   2. Acute non-recurrent maxillary sinusitis   amoxicillin-clavulanate 875-125mg (AUGMENTIN) 875-125 mg per tablet        Plan:       Sinusitis, unspecified chronicity, unspecified location  -     fluticasone (FLONASE) 50 mcg/actuation nasal spray; 2 sprays (100 mcg total) by Each Nare route once daily.  Dispense: 16 g; Refill: 1    Acute non-recurrent maxillary sinusitis  -     amoxicillin-clavulanate 875-125mg (AUGMENTIN) 875-125 mg per tablet; Take 1 tablet by mouth every 12 (twelve) hours. for 10 days  Dispense: 20 tablet; Refill: 0

## 2018-08-01 RX ORDER — DICLOFENAC SODIUM 1 MG/ML
1 SOLUTION/ DROPS OPHTHALMIC 4 TIMES DAILY
Qty: 5 ML | Refills: 1 | Status: SHIPPED | OUTPATIENT
Start: 2018-08-01 | End: 2018-10-08 | Stop reason: ALTCHOICE

## 2018-08-01 RX ORDER — DICLOFENAC SODIUM 1 MG/ML
1 SOLUTION/ DROPS OPHTHALMIC 4 TIMES DAILY
Qty: 5 ML | Refills: 1 | Status: SHIPPED | OUTPATIENT
Start: 2018-08-01 | End: 2018-08-01 | Stop reason: SDUPTHER

## 2018-08-01 RX ORDER — PREDNISOLONE ACETATE 10 MG/ML
1 SUSPENSION/ DROPS OPHTHALMIC 4 TIMES DAILY
Qty: 5 BOTTLE | Refills: 1 | Status: SHIPPED | OUTPATIENT
Start: 2018-08-01 | End: 2018-08-01 | Stop reason: SDUPTHER

## 2018-08-01 RX ORDER — MOXIFLOXACIN 5 MG/ML
1 SOLUTION/ DROPS OPHTHALMIC 4 TIMES DAILY
Qty: 1.8667 ML | Refills: 0 | Status: SHIPPED | OUTPATIENT
Start: 2018-08-01 | End: 2018-08-09

## 2018-08-01 RX ORDER — PREDNISOLONE ACETATE 10 MG/ML
1 SUSPENSION/ DROPS OPHTHALMIC 4 TIMES DAILY
Qty: 1 BOTTLE | Refills: 1 | Status: SHIPPED | OUTPATIENT
Start: 2018-08-01 | End: 2018-08-31

## 2018-08-02 NOTE — SUBJECTIVE & OBJECTIVE
"No current facility-administered medications for this encounter.      Current Outpatient Prescriptions   Medication Sig    amLODIPine (NORVASC) 10 MG tablet Take 1 tablet (10 mg total) by mouth once daily.    amoxicillin-clavulanate 875-125mg (AUGMENTIN) 875-125 mg per tablet Take 1 tablet by mouth every 12 (twelve) hours. for 10 days    BD INSULIN PEN NEEDLE UF MINI 31 gauge x 3/16" Ndle USE AS DIRECTED    betamethasone dipropionate (DIPROLENE) 0.05 % cream Apply topically 2 (two) times daily.    chlorthalidone (HYGROTEN) 25 MG Tab TAKE 1 TABLET BY MOUTH DAILY    citalopram (CELEXA) 40 MG tablet Take 40 mg by mouth once daily.    clindamycin-benzoyl peroxide (BENZACLIN) gel aaa facial acne bid prn    clobetasol (TEMOVATE) 0.05 % external solution Use on scalp one - two times daily as needed for scaling or itching    cyclobenzaprine (FLEXERIL) 10 MG tablet Take 1 tablet (10 mg total) by mouth 2 (two) times daily as needed for Muscle spasms.    diclofenac (VOLTAREN) 0.1 % ophthalmic solution Place 1 drop into the right eye 4 (four) times daily.    diphenhydrAMINE (BENADRYL) 25 mg capsule Take 25 mg by mouth nightly.      fluticasone (FLONASE) 50 mcg/actuation nasal spray 2 sprays (100 mcg total) by Each Nare route once daily.    gabapentin (NEURONTIN) 300 MG capsule Take 1 capsule (300 mg total) by mouth 3 (three) times daily.    HUMALOG U-100 INSULIN 100 unit/mL injection USE AS DIRECTED via insulin pump approx 150 UNITS EVERY DAY    ketoconazole (NIZORAL) 2 % shampoo wash hair at least TWICE A WEEK let sit on scalp at least 5 MINUTES    losartan (COZAAR) 100 MG tablet TAKE 1 TABLET BY MOUTH DAILY    metoprolol tartrate (LOPRESSOR) 25 MG tablet Take 1 tablet (25 mg total) by mouth 2 (two) times daily.    MICROLET 2 LANCING DEVICE Kit Use 4x/daily to check glucose.    MICROLET LANCET Misc use FOUR TIMES DAILY    moxifloxacin (VIGAMOX) 0.5 % ophthalmic solution Place 1 drop into the right eye 4 " (four) times daily. for 7 days    pioglitazone (ACTOS) 15 MG tablet Take 1 tablet (15 mg total) by mouth once daily.    prednisoLONE acetate (PRED FORTE) 1 % DrpS Place 1 drop into the right eye 4 (four) times daily.    salicylic acid 6 % Crea Apply daily (Patient taking differently: Apply 1 application topically daily as needed. Apply daily PRN)    timolol maleate 0.5% (TIMOPTIC) 0.5 % Drop Place 1 drop into both eyes 2 (two) times daily.       Review of Systems  Objective:     Vital Signs (Most Recent):    Vital Signs (24h Range):           There is no height or weight on file to calculate BMI.    Significant Labs:  {Results:46015}    Significant Imaging:  {Results; Diagnostics:36550}

## 2018-08-08 ENCOUNTER — ANESTHESIA EVENT (OUTPATIENT)
Dept: SURGERY | Facility: HOSPITAL | Age: 53
End: 2018-08-08
Payer: COMMERCIAL

## 2018-08-08 RX ORDER — TROPICAMIDE 10 MG/ML
1 SOLUTION/ DROPS OPHTHALMIC
Status: CANCELLED | OUTPATIENT
Start: 2018-08-09 | End: 2018-08-09

## 2018-08-08 RX ORDER — PHENYLEPHRINE HYDROCHLORIDE 100 MG/ML
1 SOLUTION/ DROPS OPHTHALMIC
Status: CANCELLED | OUTPATIENT
Start: 2018-08-08 | End: 2018-08-08

## 2018-08-08 RX ORDER — PHENYLEPHRINE HYDROCHLORIDE 25 MG/ML
1 SOLUTION/ DROPS OPHTHALMIC
Status: CANCELLED | OUTPATIENT
Start: 2018-08-09 | End: 2018-08-09

## 2018-08-09 ENCOUNTER — ANESTHESIA (OUTPATIENT)
Dept: SURGERY | Facility: HOSPITAL | Age: 53
End: 2018-08-09
Payer: COMMERCIAL

## 2018-08-09 ENCOUNTER — HOSPITAL ENCOUNTER (OUTPATIENT)
Facility: HOSPITAL | Age: 53
Discharge: HOME | End: 2018-08-09
Attending: OPHTHALMOLOGY | Admitting: OPHTHALMOLOGY
Payer: COMMERCIAL

## 2018-08-09 ENCOUNTER — SURGERY (OUTPATIENT)
Age: 53
End: 2018-08-09

## 2018-08-09 VITALS
HEIGHT: 56 IN | SYSTOLIC BLOOD PRESSURE: 133 MMHG | BODY MASS INDEX: 49.71 KG/M2 | RESPIRATION RATE: 20 BRPM | TEMPERATURE: 97 F | DIASTOLIC BLOOD PRESSURE: 75 MMHG | OXYGEN SATURATION: 94 % | WEIGHT: 221 LBS | HEART RATE: 80 BPM

## 2018-08-09 DIAGNOSIS — H26.9 CATARACT, RIGHT EYE: ICD-10-CM

## 2018-08-09 DIAGNOSIS — H25.011 CORTICAL AGE-RELATED CATARACT OF RIGHT EYE: ICD-10-CM

## 2018-08-09 PROCEDURE — 25000003 PHARM REV CODE 250: Mod: PO | Performed by: OPHTHALMOLOGY

## 2018-08-09 PROCEDURE — V2632 POST CHMBR INTRAOCULAR LENS: HCPCS | Mod: PO | Performed by: OPHTHALMOLOGY

## 2018-08-09 PROCEDURE — 71000033 HC RECOVERY, INTIAL HOUR: Mod: PO | Performed by: OPHTHALMOLOGY

## 2018-08-09 PROCEDURE — 36000707: Mod: PO | Performed by: OPHTHALMOLOGY

## 2018-08-09 PROCEDURE — 66984 XCAPSL CTRC RMVL W/O ECP: CPT | Mod: RT,,, | Performed by: OPHTHALMOLOGY

## 2018-08-09 PROCEDURE — 37000008 HC ANESTHESIA 1ST 15 MINUTES: Mod: PO | Performed by: OPHTHALMOLOGY

## 2018-08-09 PROCEDURE — 63600175 PHARM REV CODE 636 W HCPCS: Mod: PO | Performed by: NURSE ANESTHETIST, CERTIFIED REGISTERED

## 2018-08-09 PROCEDURE — 25000003 PHARM REV CODE 250: Mod: PO | Performed by: ANESTHESIOLOGY

## 2018-08-09 PROCEDURE — 27201423 OPTIME MED/SURG SUP & DEVICES STERILE SUPPLY: Mod: PO | Performed by: OPHTHALMOLOGY

## 2018-08-09 PROCEDURE — 63600175 PHARM REV CODE 636 W HCPCS: Mod: PO | Performed by: OPHTHALMOLOGY

## 2018-08-09 PROCEDURE — D9220A PRA ANESTHESIA: Mod: ANES,,, | Performed by: ANESTHESIOLOGY

## 2018-08-09 PROCEDURE — 37000009 HC ANESTHESIA EA ADD 15 MINS: Mod: PO | Performed by: OPHTHALMOLOGY

## 2018-08-09 PROCEDURE — 36000706: Mod: PO | Performed by: OPHTHALMOLOGY

## 2018-08-09 PROCEDURE — D9220A PRA ANESTHESIA: Mod: CRNA,,, | Performed by: NURSE ANESTHETIST, CERTIFIED REGISTERED

## 2018-08-09 DEVICE — LENS 23.0: Type: IMPLANTABLE DEVICE | Site: EYE | Status: FUNCTIONAL

## 2018-08-09 RX ORDER — TROPICAMIDE 10 MG/ML
1 SOLUTION/ DROPS OPHTHALMIC
Status: COMPLETED | OUTPATIENT
Start: 2018-08-09 | End: 2018-08-09

## 2018-08-09 RX ORDER — TROPICAMIDE 10 MG/ML
1 SOLUTION/ DROPS OPHTHALMIC ONCE
Status: COMPLETED | OUTPATIENT
Start: 2018-08-09 | End: 2018-08-09

## 2018-08-09 RX ORDER — PHENYLEPHRINE HYDROCHLORIDE 100 MG/ML
1 SOLUTION/ DROPS OPHTHALMIC
Status: COMPLETED | OUTPATIENT
Start: 2018-08-09 | End: 2018-08-09

## 2018-08-09 RX ORDER — PROPARACAINE HYDROCHLORIDE 5 MG/ML
1 SOLUTION/ DROPS OPHTHALMIC ONCE
Status: COMPLETED | OUTPATIENT
Start: 2018-08-09 | End: 2018-08-09

## 2018-08-09 RX ORDER — ONDANSETRON 2 MG/ML
INJECTION INTRAMUSCULAR; INTRAVENOUS
Status: DISCONTINUED | OUTPATIENT
Start: 2018-08-09 | End: 2018-08-09

## 2018-08-09 RX ORDER — PHENYLEPHRINE HYDROCHLORIDE 25 MG/ML
1 SOLUTION/ DROPS OPHTHALMIC
Status: COMPLETED | OUTPATIENT
Start: 2018-08-09 | End: 2018-08-09

## 2018-08-09 RX ORDER — LIDOCAINE HYDROCHLORIDE 10 MG/ML
1 INJECTION, SOLUTION EPIDURAL; INFILTRATION; INTRACAUDAL; PERINEURAL ONCE
Status: COMPLETED | OUTPATIENT
Start: 2018-08-09 | End: 2018-08-09

## 2018-08-09 RX ORDER — MIDAZOLAM HYDROCHLORIDE 1 MG/ML
INJECTION, SOLUTION INTRAMUSCULAR; INTRAVENOUS
Status: DISCONTINUED | OUTPATIENT
Start: 2018-08-09 | End: 2018-08-09

## 2018-08-09 RX ORDER — SODIUM CHLORIDE, SODIUM LACTATE, POTASSIUM CHLORIDE, CALCIUM CHLORIDE 600; 310; 30; 20 MG/100ML; MG/100ML; MG/100ML; MG/100ML
INJECTION, SOLUTION INTRAVENOUS CONTINUOUS
Status: DISCONTINUED | OUTPATIENT
Start: 2018-08-09 | End: 2018-08-09 | Stop reason: HOSPADM

## 2018-08-09 RX ORDER — EPINEPHRINE 1 MG/ML
INJECTION INTRAMUSCULAR; INTRAVENOUS; SUBCUTANEOUS
Status: DISCONTINUED | OUTPATIENT
Start: 2018-08-09 | End: 2018-08-09 | Stop reason: HOSPADM

## 2018-08-09 RX ORDER — DEXAMETHASONE SODIUM PHOSPHATE 4 MG/ML
8 INJECTION, SOLUTION INTRA-ARTICULAR; INTRALESIONAL; INTRAMUSCULAR; INTRAVENOUS; SOFT TISSUE
Status: DISCONTINUED | OUTPATIENT
Start: 2018-08-09 | End: 2018-08-09

## 2018-08-09 RX ORDER — PROPARACAINE HYDROCHLORIDE 5 MG/ML
1 SOLUTION/ DROPS OPHTHALMIC
Status: COMPLETED | OUTPATIENT
Start: 2018-08-09 | End: 2018-08-09

## 2018-08-09 RX ORDER — SODIUM CHLORIDE 0.9 % (FLUSH) 0.9 %
3 SYRINGE (ML) INJECTION
Status: CANCELLED | OUTPATIENT
Start: 2018-08-09

## 2018-08-09 RX ORDER — LIDOCAINE HYDROCHLORIDE 10 MG/ML
INJECTION, SOLUTION EPIDURAL; INFILTRATION; INTRACAUDAL; PERINEURAL
Status: DISCONTINUED | OUTPATIENT
Start: 2018-08-09 | End: 2018-08-09 | Stop reason: HOSPADM

## 2018-08-09 RX ORDER — MOXIFLOXACIN 5 MG/ML
SOLUTION/ DROPS OPHTHALMIC
Status: DISCONTINUED | OUTPATIENT
Start: 2018-08-09 | End: 2018-08-09 | Stop reason: HOSPADM

## 2018-08-09 RX ADMIN — PHENYLEPHRINE HYDROCHLORIDE 1 DROP: 25 SOLUTION/ DROPS OPHTHALMIC at 06:08

## 2018-08-09 RX ADMIN — ONDANSETRON 4 MG: 2 INJECTION, SOLUTION INTRAMUSCULAR; INTRAVENOUS at 08:08

## 2018-08-09 RX ADMIN — EPINEPHRINE 0.3 MG: 1 INJECTION, SOLUTION INTRAMUSCULAR; INTRAVENOUS; SUBCUTANEOUS at 08:08

## 2018-08-09 RX ADMIN — PROPARACAINE HYDROCHLORIDE 1 DROP: 5 SOLUTION/ DROPS OPHTHALMIC at 06:08

## 2018-08-09 RX ADMIN — TROPICAMIDE 1 DROP: 10 SOLUTION/ DROPS OPHTHALMIC at 07:08

## 2018-08-09 RX ADMIN — MIDAZOLAM HYDROCHLORIDE 1 MG: 1 INJECTION, SOLUTION INTRAMUSCULAR; INTRAVENOUS at 08:08

## 2018-08-09 RX ADMIN — PROPARACAINE HYDROCHLORIDE 1 DROP: 5 SOLUTION/ DROPS OPHTHALMIC at 07:08

## 2018-08-09 RX ADMIN — ACETYLCHOLINE CHLORIDE 2 ML: KIT at 08:08

## 2018-08-09 RX ADMIN — SODIUM CHLORIDE, SODIUM LACTATE, POTASSIUM CHLORIDE, AND CALCIUM CHLORIDE: .6; .31; .03; .02 INJECTION, SOLUTION INTRAVENOUS at 07:08

## 2018-08-09 RX ADMIN — PHENYLEPHRINE HYDROCHLORIDE 1 DROP: 100 SOLUTION/ DROPS OPHTHALMIC at 07:08

## 2018-08-09 RX ADMIN — TROPICAMIDE 1 DROP: 10 SOLUTION/ DROPS OPHTHALMIC at 06:08

## 2018-08-09 RX ADMIN — PHENYLEPHRINE HYDROCHLORIDE 1 DROP: 25 SOLUTION/ DROPS OPHTHALMIC at 07:08

## 2018-08-09 RX ADMIN — MOXIFLOXACIN HYDROCHLORIDE 5 DROP: 5 SOLUTION/ DROPS OPHTHALMIC at 08:08

## 2018-08-09 RX ADMIN — LIDOCAINE HYDROCHLORIDE: 10 INJECTION, SOLUTION EPIDURAL; INFILTRATION; INTRACAUDAL; PERINEURAL at 07:08

## 2018-08-09 RX ADMIN — LIDOCAINE HYDROCHLORIDE 1 ML: 10 INJECTION, SOLUTION EPIDURAL; INFILTRATION; INTRACAUDAL; PERINEURAL at 08:08

## 2018-08-09 NOTE — DISCHARGE SUMMARY
Discharge to home, keep shield on eye, follow- up tomorrow in clinic for POD#1 visit, OTC tylenol for pain or discomfort

## 2018-08-09 NOTE — PLAN OF CARE
Patient tolerating oral liquids without difficulty. No apparent s&s of distress noted at this time, no complaints voiced at this time. Eye shield in place to right eye. Discharge instructions reviewed with patient/family/friend with good verbal feedback received. Patient ready for discharge

## 2018-08-09 NOTE — BRIEF OP NOTE
Date of surgery: 8/9/2018    Operative Report    Preoperative Diagnosis: Nuclear sclerosis, right eye    Postoperative Diagnosis: Nuclear sclerosis, right eye    Procedure: Phacoemulsification with posterior chamber intraocular lens, right eye    Surgeon: Isaac Boothe Jr. M.D.    Anesthesia: MAC with topical    Brief Preoperative Note:  The patient was seen in the office with cataract of the eye. Because of the impairment of the patient's reading and ambulation, it was elected to remove the cataract and place a lens implant into the right eye, if possible.    Procedure In Detail:  After adequate preparation in the pre-op area, the patient was transferred to the operating room and placed in the supine position. The patient was identified by Dr. Boothe. Time out was called by the surgeon by verfying the patient's name, surgery site, and lens power.  The face was then prepped and draped in the usual surgical fashion for intraocular surgery at [facility].  1% lidocaine preservative-free topical was placed on to the cornea, then a lid speculum was placed in the right eye.  The temporal clear corneal incision was developed using a LRI sarah knife and crescent blade for a 3 plane incision.  A paracentesis was done with a 1 mm sarah blade.  Approximately 0.3cc of diluted preservative free intraocular Lidocaine was instilled.  Viscoat was injected into the anterior chamber with 27 gauge needle.  A 2.4 mm sarah blade was used to make a temporal clear corneal incision.  Afterwards, a cystotome was used to perform a continuous circular capsulorrhexsis with the assistance of  Utrata forceps.  Hydrodissection was performed using balanced salt solution, injected under the anterior capsule using a perez cannula and hydrodelination was perfromed using a blunt-tipped cannula.  Next, phacoemulsification was performed in a divide and conquer fashion with the use of a nucleus rotator to turn the lens and  protect the posterior capsule.  Cortical material was then removed using irrgation and aspiration.  Healon was then injected into the anterior and posterior chamber to keep the vitreous from prolapsing and the and Raul SN60WF 24.5 diopter lens was injected into the capsular bag and rotated in position with assistance of a Sinsky hook.  Irrigation and aspiration were used to remove the remaining viscoelastic.  Next, Miochol was injected into the anterior chamber and the pupil was allowed to constrict in a symmetrical fashion.    A collagen shield was placed into the eye was covered with a clear plastic shield.  The patient tolerated the procedure well and was transferred to the recovery area in good condition  Disposition:   stable to PACU  Discharge home from PACU, keep shield on right eye until seen tomorrow in clinic for POD#1 visit, can take OTC tylenol for pain or discomfort

## 2018-08-09 NOTE — ANESTHESIA POSTPROCEDURE EVALUATION
"Anesthesia Post Evaluation    Patient: Mine Quiros    Procedure(s) Performed: Procedure(s) (LRB):  EXTRACTION, CATARACT, WITH IOL INSERTION (Right)    Final Anesthesia Type: MAC  Patient location during evaluation: PACU  Patient participation: Yes- Able to Participate  Level of consciousness: awake and alert and oriented  Post-procedure vital signs: reviewed and stable  Pain management: adequate  Airway patency: patent  PONV status at discharge: No PONV  Anesthetic complications: no      Cardiovascular status: blood pressure returned to baseline  Respiratory status: unassisted, spontaneous ventilation and room air  Hydration status: euvolemic  Follow-up not needed.        Visit Vitals  /63   Pulse 85   Temp 36.3 °C (97.3 °F) (Skin)   Resp 18   Ht 4' 8" (1.422 m)   Wt 100.2 kg (221 lb)   LMP 08/31/2007   SpO2 (!) 94%   Breastfeeding? No   BMI 49.55 kg/m²       Pain/Erin Score: Pain Assessment Performed: Yes (8/9/2018  9:29 AM)  Presence of Pain: complains of pain/discomfort (8/9/2018  9:29 AM)  Erin Score: 10 (8/9/2018  9:29 AM)      "

## 2018-08-09 NOTE — TRANSFER OF CARE
"Anesthesia Transfer of Care Note    Patient: Mine Quiros    Procedure(s) Performed: Procedure(s) (LRB):  EXTRACTION, CATARACT, WITH IOL INSERTION (Right)    Patient location: PACU    Anesthesia Type: MAC    Transport from OR: Transported from OR on room air with adequate spontaneous ventilation    Post pain: adequate analgesia    Post assessment: no apparent anesthetic complications    Post vital signs: stable    Level of consciousness: awake, alert and oriented    Nausea/Vomiting: no nausea/vomiting    Complications: none    Transfer of care protocol was followed      Last vitals:   Visit Vitals  /77 (BP Location: Right arm, Patient Position: Lying)   Pulse 87   Temp 36.2 °C (97.2 °F) (Skin)   Resp 18   Ht 4' 8" (1.422 m)   Wt 100.2 kg (221 lb)   LMP 08/31/2007   SpO2 100%   Breastfeeding? No   BMI 49.55 kg/m²     "

## 2018-08-09 NOTE — DISCHARGE INSTRUCTIONS
Keep eye shield in place today.  No eye drops today  Return to clinic tomorrow with eye drops  Tylenol as needed for pain      Discharge Instructions: After Your Surgery  Youve just had surgery. During surgery, you were given medicine called anesthesia to keep you relaxed and free of pain. After surgery, you may have some pain or nausea. This is common. Here are some tips for feeling better and getting well after surgery.     Stay on schedule with your medicine.   Going home  Your healthcare provider will show you how to take care of yourself when you go home. He or she will also answer your questions. Have an adult family member or friend drive you home. For the first 24 hours after your surgery:  · Do not drive or use heavy equipment.  · Do not make important decisions or sign legal papers.  · Do not drink alcohol.  · Have someone stay with you, if needed. He or she can watch for problems and help keep you safe.  Be sure to go to all follow-up visits with your healthcare provider. And rest after your surgery for as long as your healthcare provider tells you to.  Coping with pain  If you have pain after surgery, pain medicine will help you feel better. Take it as told, before pain becomes severe. Also, ask your healthcare provider or pharmacist about other ways to control pain. This might be with heat, ice, or relaxation. And follow any other instructions your surgeon or nurse gives you.  Tips for taking pain medicine  To get the best relief possible, remember these points:  · Pain medicines can upset your stomach. Taking them with a little food may help.  · Most pain relievers taken by mouth need at least 20 to 30 minutes to start to work.  · Taking medicine on a schedule can help you remember to take it. Try to time your medicine so that you can take it before starting an activity. This might be before you get dressed, go for a walk, or sit down for dinner.  · Constipation is a common side effect of pain  medicines. Call your healthcare provider before taking any medicines such as laxatives or stool softeners to help ease constipation. Also ask if you should skip any foods. Drinking lots of fluids and eating foods such as fruits and vegetables that are high in fiber can also help. Remember, do not take laxatives unless your surgeon has prescribed them.  · Drinking alcohol and taking pain medicine can cause dizziness and slow your breathing. It can even be deadly. Do not drink alcohol while taking pain medicine.  · Pain medicine can make you react more slowly to things. Do not drive or run machinery while taking pain medicine.  Your healthcare provider may tell you to take acetaminophen to help ease your pain. Ask him or her how much you are supposed to take each day. Acetaminophen or other pain relievers may interact with your prescription medicines or other over-the-counter (OTC) medicines. Some prescription medicines have acetaminophen and other ingredients. Using both prescription and OTC acetaminophen for pain can cause you to overdose. Read the labels on your OTC medicines with care. This will help you to clearly know the list of ingredients, how much to take, and any warnings. It may also help you not take too much acetaminophen. If you have questions or do not understand the information, ask your pharmacist or healthcare provider to explain it to you before you take the OTC medicine.  Managing nausea  Some people have an upset stomach after surgery. This is often because of anesthesia, pain, or pain medicine, or the stress of surgery. These tips will help you handle nausea and eat healthy foods as you get better. If you were on a special food plan before surgery, ask your healthcare provider if you should follow it while you get better. These tips may help:  · Do not push yourself to eat. Your body will tell you when to eat and how much.  · Start off with clear liquids and soup. They are easier to  digest.  · Next try semi-solid foods, such as mashed potatoes, applesauce, and gelatin, as you feel ready.  · Slowly move to solid foods. Dont eat fatty, rich, or spicy foods at first.  · Do not force yourself to have 3 large meals a day. Instead eat smaller amounts more often.  · Take pain medicines with a small amount of solid food, such as crackers or toast, to avoid nausea.     Call your surgeon if  · You still have pain an hour after taking medicine. The medicine may not be strong enough.  · You feel too sleepy, dizzy, or groggy. The medicine may be too strong.  · You have side effects like nausea, vomiting, or skin changes, such as rash, itching, or hives.       If you have obstructive sleep apnea  You were given anesthesia medicine during surgery to keep you comfortable and free of pain. After surgery, you may have more apnea spells because of this medicine and other medicines you were given. The spells may last longer than usual.   At home:  · Keep using the continuous positive airway pressure (CPAP) device when you sleep. Unless your healthcare provider tells you not to, use it when you sleep, day or night. CPAP is a common device used to treat obstructive sleep apnea.  · Talk with your provider before taking any pain medicine, muscle relaxants, or sedatives. Your provider will tell you about the possible dangers of taking these medicines.  Date Last Reviewed: 12/1/2016  © 2590-1096 [email or web address]. [address] [place] [address], [place] [address]. All rights reserved. This information is not intended as a substitute for professional medical care. Always follow your healthcare professional's instructions.

## 2018-08-09 NOTE — ANESTHESIA PREPROCEDURE EVALUATION
08/09/2018  Mine Quiros is a 52 y.o., female.    Anesthesia Evaluation    I have reviewed the Patient Summary Reports.    I have reviewed the Nursing Notes.   I have reviewed the Medications.     Review of Systems  Anesthesia Hx:  Hx of Anesthetic complications    Social:  Smoking Status: Never Smoker  Smokeless Tobacco Status: Never Used  Alcohol use: Yes; 12.6 oz per week  Drug use: No       Cardiovascular:   Hypertension    Pulmonary:   Shortness of breath    Hepatic/GI:   GERD, poorly controlled Liver Disease,    Neurological:   Neuromuscular Disease,    Endocrine:   Diabetes, poorly controlled, type 2    Psych:   Psychiatric History anxiety          Physical Exam  General:  Morbid Obesity    Airway/Jaw/Neck:  Airway Findings: Mouth Opening: Normal Tongue: Normal  General Airway Assessment: Adult, Good  Mallampati: II  Improves to II with phonation.  TM Distance: 4-6 cm      Dental:  Dental Findings: In tact   Chest/Lungs:  Chest/Lungs Findings: Clear to auscultation, Normal Respiratory Rate     Heart/Vascular:  Heart Findings: Rate: Normal  Rhythm: Regular Rhythm  Sounds: Normal  Heart murmur: negative       Mental Status:  Mental Status Findings:  Cooperative, Alert and Oriented         Anesthesia Plan  Type of Anesthesia, risks & benefits discussed:  Anesthesia Type:  MAC  Patient's Preference:   Intra-op Monitoring Plan: standard ASA monitors  Intra-op Monitoring Plan Comments:   Post Op Pain Control Plan:   Post Op Pain Control Plan Comments:   Induction:    Beta Blocker:  Patient is on a Beta-Blocker and has received one dose within the past 24 hours (No further documentation required).       Informed Consent: Patient understands risks and agrees with Anesthesia plan.  Questions answered. Anesthesia consent signed with patient.  ASA Score: 4     Day of Surgery Review of History & Physical: I  have interviewed and examined the patient. I have reviewed the patient's H&P dated:  There are no significant changes.          Ready For Surgery From Anesthesia Perspective.

## 2018-08-10 ENCOUNTER — OFFICE VISIT (OUTPATIENT)
Dept: OPHTHALMOLOGY | Facility: CLINIC | Age: 53
End: 2018-08-10
Payer: COMMERCIAL

## 2018-08-10 DIAGNOSIS — Z96.1 STATUS POST CATARACT EXTRACTION AND INSERTION OF INTRAOCULAR LENS OF RIGHT EYE: Primary | ICD-10-CM

## 2018-08-10 DIAGNOSIS — Z98.41 STATUS POST CATARACT EXTRACTION AND INSERTION OF INTRAOCULAR LENS OF RIGHT EYE: Primary | ICD-10-CM

## 2018-08-10 PROCEDURE — 99499 UNLISTED E&M SERVICE: CPT | Mod: S$GLB,,, | Performed by: OPHTHALMOLOGY

## 2018-08-10 PROCEDURE — 99999 PR PBB SHADOW E&M-EST. PATIENT-LVL III: CPT | Mod: PBBFAC,,, | Performed by: OPHTHALMOLOGY

## 2018-08-10 NOTE — PROGRESS NOTES
HPI     Post-op Evaluation    Additional comments: 1 d po phaco iol OD d 8/9/2018// no drops taken   since sx//           Comments   1 d po phaco iol OD d 8/9/2018// no drops taken since sx//  Pain with   lights only// +photophobia       Last edited by Isaac Boothe Jr., MD on 8/10/2018  8:56 AM. (History)        ROS     Negative for: Constitutional, Gastrointestinal, Neurological, Skin,   Genitourinary, Musculoskeletal, HENT, Endocrine, Cardiovascular, Eyes,   Respiratory, Psychiatric, Allergic/Imm, Heme/Lymph    Last edited by Isaac Boothe Jr., MD on 8/10/2018  8:56 AM. (History)        Assessment /Plan     For exam results, see Encounter Report.    Status post cataract extraction and insertion of intraocular lens of right eye- doing well, with some mild photophobia  -POD1 drop regimen instruction handout given, patient had all their drops present today, advised to start drops today  Follow-up in about 10 days (around 8/20/2018) for POW #1, schedule for Cat OS.

## 2018-08-15 NOTE — OP NOTE
Date of surgery: 8/09/2018    Operative Report    Preoperative Diagnosis: Nuclear sclerosis, right eye    Postoperative Diagnosis: Nuclear sclerosis, right eye    Procedure: Phacoemulsification with posterior chamber intraocular lens, right eye    Surgeon: Isaac Boothe Jr. M.D.    Anesthesia: MAC with topical     Brief Preoperative Note:  The patient was seen in the office with cataract of the right eye.  Because of the impairment of the patient's reading, driving, ambulation, and other activities of daily living needing a good quality of vision, the patient elected to remove the cataract and place a acrylic lens implant, if possible    Procedure in detail:    Informed consent was obtained. Indications, risks and alternatives to the procedure were explained to the patient. The patient was given the opportunity to ask questions and consented to the procedure in writing. In the preoperative holding area, the patients identity and operative site were confirmed.  Topical anesthetic was administered, consisting of alternating 0.5% Proparacaine and 4% preservative-free Lidocaine Q 5 minutes times 3.  The patient was taken to the operative suite.  A time-out was performed.  The left eye was prepped with 5% Betadine and draped in sterile fashion.  A lid speculum was placed in the right eye.  The temporal clear corneal incision was developed using a LRI sarah knife and crescent blade for a 3 plane incision.  A paracentesis was done with a 1mm sarah blade.  Before instillation of the Viscoat, approximately 0.1 to 0.3cc of diluted preservative free intracameral lidocaine was instilled.  Viscoat was injected into the anterior chamber with a 27-gauge needle.  A 2.4mm sarah blade was used to make a temporal clear corneal incision.  Afterwards, a cystotome was used to perform a continuous curvilinear capsulorrhexis with the assistance of utrata forceps.  Hydrodissection was performed using balanced salt  solution,injected under the anterior capsule using a perez cannula and hydrodelineation was performed using a blunt-tipped cannula.  Next, phacoemulsification performed in a divide and conquer fashion with the use of a nucleus rotator to turn the lens and protect the posterior capsule.  Cortical material was then removed using irrigation and aspiration.  Healon was then injected into the anterior chamber and into capsular bag to inflate the bag for the placement of the lens implant  and then an Raul SN60WF 23.0 D lens was injected into the capsular bag and rotated in position with the assistance of a Sinsky hook.  Irrigation and aspiration were used to remove the remaining viscoelastic. Next, Miochol was injected into the anterior chamber and the pupil was allowed to constrict in a symmetrical fashion.  A collagen shield was placed into the eye and the eye was covered with a Flores shield.  The patient tolerated the procedure well and was transferred to the recovery area in good condition.  Estimated blood loss:  none  Specimens:   none  Complications:  none  Disposition:   stable to PACU

## 2018-08-16 NOTE — PROGRESS NOTES
Refill Authorization Note     is requesting a refill authorization.    Brief assessment and rationale for refill: DEFER; not sure if pt still taking  Amount/Quantity of medication ordered: 90d        Refills Authorized: Yes  If authorized number of refills: 1           Medication Therapy Plan: BP last commented as stable but has elevated readings in recent past; Pt hasn't picked up since 11/17 per medmined; not sure if still taking medication, defer to you  Name and strength of medication: METOPROLOL TARTRATE 25 MG TABLET  How patient will take medication: t1t bid  Medication reconciliation completed: Yes  Comments:   BP Readings from Last 3 Encounters:   08/09/18 133/75   07/27/18 (!) 146/90   07/17/18 (!) 142/76     Pulse Readings from Last 3 Encounters:   08/09/18 80   07/27/18 100   07/17/18 84

## 2018-08-19 RX ORDER — METOPROLOL TARTRATE 25 MG/1
TABLET, FILM COATED ORAL
Qty: 60 TABLET | Refills: 11 | Status: SHIPPED | OUTPATIENT
Start: 2018-08-19 | End: 2021-02-08

## 2018-08-20 ENCOUNTER — OFFICE VISIT (OUTPATIENT)
Dept: OPHTHALMOLOGY | Facility: CLINIC | Age: 53
End: 2018-08-20
Payer: COMMERCIAL

## 2018-08-20 DIAGNOSIS — Z96.1 STATUS POST CATARACT EXTRACTION AND INSERTION OF INTRAOCULAR LENS OF RIGHT EYE: Primary | ICD-10-CM

## 2018-08-20 DIAGNOSIS — H25.11 AGE-RELATED NUCLEAR CATARACT OF RIGHT EYE: ICD-10-CM

## 2018-08-20 DIAGNOSIS — Z98.41 STATUS POST CATARACT EXTRACTION AND INSERTION OF INTRAOCULAR LENS OF RIGHT EYE: Primary | ICD-10-CM

## 2018-08-20 PROCEDURE — 99999 PR PBB SHADOW E&M-EST. PATIENT-LVL III: CPT | Mod: PBBFAC,,, | Performed by: OPHTHALMOLOGY

## 2018-08-20 PROCEDURE — 99024 POSTOP FOLLOW-UP VISIT: CPT | Mod: S$GLB,,, | Performed by: OPHTHALMOLOGY

## 2018-08-20 NOTE — PROGRESS NOTES
HPI     Post-op Evaluation      Additional comments: 1 wk phaco iol OD d 8/9/2018//  drops instilled as   dirtected, not htis AM//              Comments     1 wk phaco iol OD d 8/9/2018//  drops instilled as dirtected, not this   AM//   Patient admits to missing some drops          Last edited by Isaac Boothe Jr., MD on 8/20/2018 10:27 AM. (History)            Assessment /Plan     For exam results, see Encounter Report.    Status post cataract extraction and insertion of intraocular lens of right eye    Age-related nuclear cataract of right eye    Patient doing well follow-up in 3 weeks for refraction right eye  Prednisolone acetate 4 times daily to the right eye x1 week then go to 1 drop to right eye until next visit  Hold moxifloxacin right eye  Diclofenac 1 drop right eye once daily  Follow-up for preop left eye cataract  And schedule cataract surgery left eye  -R&B benefits discussed with patient  -Risks of worse vision, loss of vision, infection, bleeding, re-surgery,and may need to have surgery done by a different physician was explained to the patient  -patient was also counseled on premium lens options, laser cataract, and astigmatic correction  -patient was also counseled that they may still need glasses after surgery to help improve their vision, especially the need for reading glasses for reading after surgery  -patient understood the risks involved with cataract surgery and wanted to move forward with surgery

## 2018-08-20 NOTE — PATIENT INSTRUCTIONS
1.  Prednisolone acetate 1 drop 4 x daily Right eye for another week then bring down to 1 x daily Right eye    2. Hold Moxifloxacin for right eye, save for left eye    3. Diclofenac (gray top) decrease to 1 drop right eye daily

## 2018-08-24 ENCOUNTER — OFFICE VISIT (OUTPATIENT)
Dept: OPHTHALMOLOGY | Facility: CLINIC | Age: 53
End: 2018-08-24
Payer: COMMERCIAL

## 2018-08-24 DIAGNOSIS — H25.12 AGE-RELATED NUCLEAR CATARACT OF LEFT EYE: Primary | ICD-10-CM

## 2018-08-24 PROCEDURE — 99999 PR PBB SHADOW E&M-EST. PATIENT-LVL II: CPT | Mod: PBBFAC,,, | Performed by: OPHTHALMOLOGY

## 2018-08-24 PROCEDURE — 99499 UNLISTED E&M SERVICE: CPT | Mod: S$GLB,,, | Performed by: OPHTHALMOLOGY

## 2018-08-24 RX ORDER — PROPARACAINE HYDROCHLORIDE 5 MG/ML
1 SOLUTION/ DROPS OPHTHALMIC
Status: CANCELLED | OUTPATIENT
Start: 2018-08-30 | End: 2018-08-30

## 2018-08-24 RX ORDER — PHENYLEPHRINE HYDROCHLORIDE 25 MG/ML
1 SOLUTION/ DROPS OPHTHALMIC
Status: CANCELLED | OUTPATIENT
Start: 2018-08-30 | End: 2018-08-30

## 2018-08-24 RX ORDER — TROPICAMIDE 10 MG/ML
1 SOLUTION/ DROPS OPHTHALMIC
Status: CANCELLED | OUTPATIENT
Start: 2018-08-30 | End: 2018-08-30

## 2018-08-24 RX ORDER — PHENYLEPHRINE HYDROCHLORIDE 100 MG/ML
1 SOLUTION/ DROPS OPHTHALMIC
Status: CANCELLED | OUTPATIENT
Start: 2018-08-30 | End: 2018-08-30

## 2018-08-24 NOTE — H&P (VIEW-ONLY)
CC: H&P for cataract surgery  HPI: Patient has been diagnosed with cataracts, which are interfering with daily activities due to blurred vision and glare which cannot adequately be corrected with glasses.   PMH:  Past Medical History:   Diagnosis Date    Abnormal stress test 10/2016    Acne     Acute diastolic heart failure secondary to idiopathic cardiomyopathy 10/2016    Allergy     Anxiety     Arthritis     Carpal tunnel syndrome of right wrist     bilateral    Cataract     OS    Chest pain     Diabetes mellitus     Type 2 IDDM - Uncontrolled    Diabetic neuropathy     Difficult intubation     needed glidescope for cholecystectomy 2009    Dry scalp     Fatty liver     GERD (gastroesophageal reflux disease)     on no meds    Glaucoma     Hyperlipidemia     Hypertension     Insomnia     Knee pain     Morbid obesity     Neuropathy due to type 2 diabetes mellitus     anaya feet    MARLON (obstructive sleep apnea)     CPAP, says she is compliant with use    Parotid mass 2014    had Biopsy    SOB (shortness of breath) 10/2016    Tachycardia     frequently,chronic for years per chart       FH:  Family History   Problem Relation Age of Onset    Diabetes Mother     Kidney disease Mother     Hyperlipidemia Father     Heart disease Father     Hypertension Father     Diabetes Sister     Heart attacks under age 50 Brother     No Known Problems Maternal Grandmother     No Known Problems Maternal Grandfather     No Known Problems Paternal Grandmother     No Known Problems Paternal Grandfather     Diabetes Sister     Hepatitis Brother     Kidney failure Sister     Diabetes Sister     Amblyopia Neg Hx     Blindness Neg Hx     Cancer Neg Hx     Cataracts Neg Hx     Glaucoma Neg Hx     Macular degeneration Neg Hx     Retinal detachment Neg Hx     Strabismus Neg Hx     Stroke Neg Hx     Thyroid disease Neg Hx        SH:  Social History     Socioeconomic History    Marital status: Single      Spouse name: Not on file    Number of children: Not on file    Years of education: Not on file    Highest education level: Not on file   Social Needs    Financial resource strain: Not on file    Food insecurity - worry: Not on file    Food insecurity - inability: Not on file    Transportation needs - medical: Not on file    Transportation needs - non-medical: Not on file   Occupational History    Not on file   Tobacco Use    Smoking status: Never Smoker    Smokeless tobacco: Never Used   Substance and Sexual Activity    Alcohol use: Yes     Alcohol/week: 12.6 oz     Types: 21 Shots of liquor per week     Comment: Daily    Drug use: No    Sexual activity: Not on file   Other Topics Concern    Not on file   Social History Narrative    Not on file       ROS:  HEENT: Blurred vision  CV: No chest pain   Pulm: No SOB  Please see my last exam note for additional ROS details    PE:  BP:151/86  Pulse:103  HEENT: Cataract  CV: N S1 S2 no murmurs  Pulm:CTAB  Abd:soft NABS NTND  Extremeties:No edema    A/P  1. Cataract - Indications, risks and alternatives to the procedure were explained to the patient. The patient was given the opportunity to ask questions and consented to the procedure in writing.  Proceed with cataract surgery as scheduled.  2. Other health issues - patient has been cleared by their PCP. See last note for details.

## 2018-08-24 NOTE — PROGRESS NOTES
HPI     Pre-op Exam      Additional comments: Pre-op OS               Comments     Pt here for per-op for cat sx OS today. OD doing well and still using   drops qd.           Last edited by Cheryle Quintana on 8/24/2018 11:05 AM. (History)            Assessment /Plan     For exam results, see Encounter Report.    Age-related nuclear cataract of left eye  -schedule cataract surgery Left eye  -R&B benefits discussed with patient  -Risks of worse vision, loss of vision, infection, bleeding, re-surgery,and may need to have surgery done by a different physician was explained to the patient  -patient was also counseled on premium lens options, laser cataract, and astigmatic correction  -patient was also counseled that they may still need glasses after surgery to help improve their vision, especially the need for reading glasses for reading after surgery  -patient understood the risks involved with cataract surgery and wanted to move forward with surgery

## 2018-08-29 ENCOUNTER — ANESTHESIA EVENT (OUTPATIENT)
Dept: SURGERY | Facility: HOSPITAL | Age: 53
End: 2018-08-29
Payer: COMMERCIAL

## 2018-08-30 ENCOUNTER — ANESTHESIA (OUTPATIENT)
Dept: SURGERY | Facility: HOSPITAL | Age: 53
End: 2018-08-30
Payer: COMMERCIAL

## 2018-08-30 ENCOUNTER — HOSPITAL ENCOUNTER (OUTPATIENT)
Facility: HOSPITAL | Age: 53
Discharge: HOME | End: 2018-08-30
Attending: OPHTHALMOLOGY | Admitting: OPHTHALMOLOGY
Payer: COMMERCIAL

## 2018-08-30 VITALS
OXYGEN SATURATION: 100 % | HEART RATE: 78 BPM | DIASTOLIC BLOOD PRESSURE: 68 MMHG | TEMPERATURE: 98 F | SYSTOLIC BLOOD PRESSURE: 130 MMHG | RESPIRATION RATE: 19 BRPM

## 2018-08-30 DIAGNOSIS — H25.12 AGE-RELATED NUCLEAR CATARACT OF LEFT EYE: ICD-10-CM

## 2018-08-30 DIAGNOSIS — H26.9 CATARACT, RIGHT EYE: ICD-10-CM

## 2018-08-30 LAB — GLUCOSE SERPL-MCNC: 188 MG/DL (ref 70–110)

## 2018-08-30 PROCEDURE — 63600175 PHARM REV CODE 636 W HCPCS: Mod: PO | Performed by: NURSE ANESTHETIST, CERTIFIED REGISTERED

## 2018-08-30 PROCEDURE — 66984 XCAPSL CTRC RMVL W/O ECP: CPT | Mod: 79,LT,, | Performed by: OPHTHALMOLOGY

## 2018-08-30 PROCEDURE — 63600175 PHARM REV CODE 636 W HCPCS: Mod: PO | Performed by: OPHTHALMOLOGY

## 2018-08-30 PROCEDURE — 36000706: Mod: PO | Performed by: OPHTHALMOLOGY

## 2018-08-30 PROCEDURE — 36000707: Mod: PO | Performed by: OPHTHALMOLOGY

## 2018-08-30 PROCEDURE — 27201423 OPTIME MED/SURG SUP & DEVICES STERILE SUPPLY: Mod: PO | Performed by: OPHTHALMOLOGY

## 2018-08-30 PROCEDURE — D9220A PRA ANESTHESIA: Mod: CRNA,,, | Performed by: NURSE ANESTHETIST, CERTIFIED REGISTERED

## 2018-08-30 PROCEDURE — 71000033 HC RECOVERY, INTIAL HOUR: Mod: PO | Performed by: OPHTHALMOLOGY

## 2018-08-30 PROCEDURE — V2632 POST CHMBR INTRAOCULAR LENS: HCPCS | Mod: PO | Performed by: OPHTHALMOLOGY

## 2018-08-30 PROCEDURE — 37000008 HC ANESTHESIA 1ST 15 MINUTES: Mod: PO | Performed by: OPHTHALMOLOGY

## 2018-08-30 PROCEDURE — 25000003 PHARM REV CODE 250: Mod: PO | Performed by: OPHTHALMOLOGY

## 2018-08-30 PROCEDURE — D9220A PRA ANESTHESIA: Mod: ANES,,, | Performed by: ANESTHESIOLOGY

## 2018-08-30 PROCEDURE — 25000003 PHARM REV CODE 250: Mod: PO | Performed by: ANESTHESIOLOGY

## 2018-08-30 PROCEDURE — 37000009 HC ANESTHESIA EA ADD 15 MINS: Mod: PO | Performed by: OPHTHALMOLOGY

## 2018-08-30 DEVICE — LENS 23.0: Type: IMPLANTABLE DEVICE | Site: EYE | Status: FUNCTIONAL

## 2018-08-30 RX ORDER — PREDNISOLONE ACETATE 10 MG/ML
1 SUSPENSION/ DROPS OPHTHALMIC 4 TIMES DAILY
Qty: 10 ML | Refills: 2 | Status: SHIPPED | OUTPATIENT
Start: 2018-08-30 | End: 2018-10-08 | Stop reason: ALTCHOICE

## 2018-08-30 RX ORDER — SODIUM CHLORIDE, SODIUM LACTATE, POTASSIUM CHLORIDE, CALCIUM CHLORIDE 600; 310; 30; 20 MG/100ML; MG/100ML; MG/100ML; MG/100ML
INJECTION, SOLUTION INTRAVENOUS CONTINUOUS
Status: DISCONTINUED | OUTPATIENT
Start: 2018-08-30 | End: 2018-08-30 | Stop reason: HOSPADM

## 2018-08-30 RX ORDER — FENTANYL CITRATE 50 UG/ML
INJECTION, SOLUTION INTRAMUSCULAR; INTRAVENOUS
Status: DISCONTINUED | OUTPATIENT
Start: 2018-08-30 | End: 2018-08-30

## 2018-08-30 RX ORDER — SODIUM CHLORIDE 0.9 % (FLUSH) 0.9 %
3 SYRINGE (ML) INJECTION
Status: DISCONTINUED | OUTPATIENT
Start: 2018-08-30 | End: 2018-08-30 | Stop reason: HOSPADM

## 2018-08-30 RX ORDER — EPINEPHRINE 1 MG/ML
INJECTION INTRAMUSCULAR; INTRAVENOUS; SUBCUTANEOUS
Status: DISCONTINUED | OUTPATIENT
Start: 2018-08-30 | End: 2018-08-30 | Stop reason: HOSPADM

## 2018-08-30 RX ORDER — MOXIFLOXACIN 5 MG/ML
SOLUTION/ DROPS OPHTHALMIC
Status: DISCONTINUED | OUTPATIENT
Start: 2018-08-30 | End: 2018-08-30 | Stop reason: HOSPADM

## 2018-08-30 RX ORDER — LIDOCAINE HYDROCHLORIDE 40 MG/ML
INJECTION, SOLUTION RETROBULBAR
Status: DISCONTINUED | OUTPATIENT
Start: 2018-08-30 | End: 2018-08-30 | Stop reason: HOSPADM

## 2018-08-30 RX ORDER — PHENYLEPHRINE HYDROCHLORIDE 25 MG/ML
1 SOLUTION/ DROPS OPHTHALMIC
Status: COMPLETED | OUTPATIENT
Start: 2018-08-30 | End: 2018-08-30

## 2018-08-30 RX ORDER — LIDOCAINE HYDROCHLORIDE 20 MG/ML
INJECTION, SOLUTION INFILTRATION; PERINEURAL
Status: DISCONTINUED | OUTPATIENT
Start: 2018-08-30 | End: 2018-08-30 | Stop reason: HOSPADM

## 2018-08-30 RX ORDER — LIDOCAINE HYDROCHLORIDE 10 MG/ML
1 INJECTION, SOLUTION EPIDURAL; INFILTRATION; INTRACAUDAL; PERINEURAL ONCE
Status: COMPLETED | OUTPATIENT
Start: 2018-08-30 | End: 2018-08-30

## 2018-08-30 RX ORDER — ONDANSETRON 2 MG/ML
INJECTION INTRAMUSCULAR; INTRAVENOUS
Status: DISCONTINUED | OUTPATIENT
Start: 2018-08-30 | End: 2018-08-30

## 2018-08-30 RX ORDER — MOXIFLOXACIN 5 MG/ML
1 SOLUTION/ DROPS OPHTHALMIC 4 TIMES DAILY
Qty: 3 ML | Refills: 0 | Status: SHIPPED | OUTPATIENT
Start: 2018-08-30 | End: 2018-08-31 | Stop reason: SDUPTHER

## 2018-08-30 RX ORDER — PROPARACAINE HYDROCHLORIDE 5 MG/ML
1 SOLUTION/ DROPS OPHTHALMIC
Status: COMPLETED | OUTPATIENT
Start: 2018-08-30 | End: 2018-08-30

## 2018-08-30 RX ORDER — TROPICAMIDE 10 MG/ML
1 SOLUTION/ DROPS OPHTHALMIC
Status: COMPLETED | OUTPATIENT
Start: 2018-08-30 | End: 2018-08-30

## 2018-08-30 RX ORDER — PHENYLEPHRINE HYDROCHLORIDE 100 MG/ML
1 SOLUTION/ DROPS OPHTHALMIC
Status: COMPLETED | OUTPATIENT
Start: 2018-08-30 | End: 2018-08-30

## 2018-08-30 RX ORDER — MIDAZOLAM HYDROCHLORIDE 1 MG/ML
INJECTION, SOLUTION INTRAMUSCULAR; INTRAVENOUS
Status: DISCONTINUED | OUTPATIENT
Start: 2018-08-30 | End: 2018-08-30

## 2018-08-30 RX ADMIN — PROPARACAINE HYDROCHLORIDE 1 DROP: 5 SOLUTION/ DROPS OPHTHALMIC at 07:08

## 2018-08-30 RX ADMIN — TROPICAMIDE 1 DROP: 10 SOLUTION/ DROPS OPHTHALMIC at 07:08

## 2018-08-30 RX ADMIN — PHENYLEPHRINE HYDROCHLORIDE 1 DROP: 25 SOLUTION/ DROPS OPHTHALMIC at 07:08

## 2018-08-30 RX ADMIN — PHENYLEPHRINE HYDROCHLORIDE 1 DROP: 100 SOLUTION/ DROPS OPHTHALMIC at 07:08

## 2018-08-30 RX ADMIN — MIDAZOLAM HYDROCHLORIDE 1 MG: 1 INJECTION, SOLUTION INTRAMUSCULAR; INTRAVENOUS at 10:08

## 2018-08-30 RX ADMIN — SODIUM CHLORIDE, SODIUM LACTATE, POTASSIUM CHLORIDE, AND CALCIUM CHLORIDE: .6; .31; .03; .02 INJECTION, SOLUTION INTRAVENOUS at 07:08

## 2018-08-30 RX ADMIN — FENTANYL CITRATE 25 MCG: 50 INJECTION, SOLUTION INTRAMUSCULAR; INTRAVENOUS at 10:08

## 2018-08-30 RX ADMIN — MIDAZOLAM HYDROCHLORIDE 1 MG: 1 INJECTION, SOLUTION INTRAMUSCULAR; INTRAVENOUS at 09:08

## 2018-08-30 RX ADMIN — ONDANSETRON 4 MG: 2 INJECTION, SOLUTION INTRAMUSCULAR; INTRAVENOUS at 09:08

## 2018-08-30 RX ADMIN — LIDOCAINE HYDROCHLORIDE: 10 INJECTION, SOLUTION EPIDURAL; INFILTRATION; INTRACAUDAL; PERINEURAL at 08:08

## 2018-08-30 NOTE — ANESTHESIA PREPROCEDURE EVALUATION
08/29/2018  Mine Quiros is a 52 y.o., female.    Pre-op Assessment    I have reviewed the Patient Summary Reports.     I have reviewed the Nursing Notes.   I have reviewed the Medications.     Review of Systems  Anesthesia Hx:  Hx of Anesthetic complications  Personal Hx of Anesthesia complications   Social:  Non-Smoker, Alcohol Use Smoking Status: Never Smoker  Smokeless Tobacco Status: Never Used  Alcohol use: Yes; 12.6 oz per week  Drug use: No    Alcohol abuse     Cardiovascular:   Hypertension CHF Acute diastolic heart failure secondary to idiopathic cardiomyopathy   Pulmonary:   Shortness of breath Sleep Apnea    Hepatic/GI:   GERD, poorly controlled Liver Disease,    Neurological:   Neuromuscular Disease,    Endocrine:   Diabetes, poorly controlled, type 2, using insulin    Psych:   Psychiatric History anxiety          Physical Exam  General:  Morbid Obesity    Airway/Jaw/Neck:  Airway Findings: Mouth Opening: Normal Tongue: Normal  General Airway Assessment: Adult, Good  Mallampati: II  Improves to II with phonation.  TM Distance: 4-6 cm      Dental:  Dental Findings: In tact   Chest/Lungs:  Chest/Lungs Findings: Clear to auscultation, Normal Respiratory Rate     Heart/Vascular:  Heart Findings: Rate: Normal  Rhythm: Regular Rhythm  Sounds: Normal  Heart murmur: negative       Mental Status:  Mental Status Findings:  Cooperative, Alert and Oriented         Anesthesia Plan  Type of Anesthesia, risks & benefits discussed:  Anesthesia Type:  MAC  Patient's Preference:   Intra-op Monitoring Plan: standard ASA monitors  Intra-op Monitoring Plan Comments:   Post Op Pain Control Plan:   Post Op Pain Control Plan Comments:   Induction:    Beta Blocker:  Patient is on a Beta-Blocker and has received one dose within the past 24 hours (No further documentation required).       Informed Consent: Patient  understands risks and agrees with Anesthesia plan.  Questions answered. Anesthesia consent signed with patient.  ASA Score: 4     Day of Surgery Review of History & Physical: I have interviewed and examined the patient. I have reviewed the patient's H&P dated:  There are no significant changes.          Ready For Surgery From Anesthesia Perspective.

## 2018-08-30 NOTE — DISCHARGE INSTRUCTIONS
Keep Shield on eye until seen tomorrow  No bending or lifting  Keep head elevated (lay in a recliner or elevate head with pillows)  F/U in clinic tomorrow

## 2018-08-30 NOTE — BRIEF OP NOTE
Operative Note     SUMMARY     Surgery Date: 8/30/2018     Surgeon(s) and Role:     * Isaac Boothe Jr., MD - Primary    Pre-op Diagnosis:  Age-related nuclear cataract of left eye [H25.12]    Post-op Diagnosis:  Age-related nuclear cataract of left eye [H25.12]    Procedure(s) (LRB):  PHACOEMULSIFICATION, CATARACT (Left)    Anesthesia: Monitor Anesthesia Care    Findings/Key Components:  Same as post op diagnosis    Estimated Blood Loss: * No values recorded between 8/30/2018 10:14 AM and 8/30/2018 10:48 AM *         Specimens (From admission, onward)    None            Discharge Note      SUMMARY     Admit Date: 8/30/2018    Attending Physician: Isaac Boothe Jr., MD     Discharge Physician: Isaac Boothe Jr., MD    Discharge Date: 8/30/2018     Final Diagnosis: Age-related nuclear cataract of left eye [H25.12]    Hospital Course: The patient was admitted for outpatient surgery and tolerated the procedure well. The patient was discharged home in stable condition the same day.    Diet: Advance as tolerated    Follow-Up: Follow up with Dr. Boothe, next day, as previously scheduled  Activity as tolerated.      Activity: Per Handout Instructions    Disposition: Home or self care    Patient Instructions:   Current Discharge Medication List      CONTINUE these medications which have NOT CHANGED    Details   amLODIPine (NORVASC) 10 MG tablet Take 1 tablet (10 mg total) by mouth once daily.  Qty: 90 tablet, Refills: 3      chlorthalidone (HYGROTEN) 25 MG Tab TAKE 1 TABLET BY MOUTH DAILY  Qty: 30 tablet, Refills: 11      citalopram (CELEXA) 40 MG tablet Take 40 mg by mouth once daily.  Refills: 11      diphenhydrAMINE (BENADRYL) 25 mg capsule Take 25 mg by mouth nightly.        fluticasone (FLONASE) 50 mcg/actuation nasal spray 2 sprays (100 mcg total) by Each Nare route once daily.  Qty: 16 g, Refills: 1    Associated Diagnoses: Sinusitis, unspecified chronicity, unspecified location      gabapentin (NEURONTIN)  "300 MG capsule Take 1 capsule (300 mg total) by mouth 3 (three) times daily.  Qty: 90 capsule, Refills: 3    Associated Diagnoses: Type 2 diabetes mellitus with complication, with long-term current use of insulin      HUMALOG U-100 INSULIN 100 unit/mL injection USE AS DIRECTED via insulin pump approx 150 UNITS EVERY DAY  Qty: 50 mL, Refills: 12      losartan (COZAAR) 100 MG tablet TAKE 1 TABLET BY MOUTH DAILY  Qty: 90 tablet, Refills: 3      metoprolol tartrate (LOPRESSOR) 25 MG tablet TAKE 1 TABLET BY MOUTH TWICE DAILY  Qty: 60 tablet, Refills: 11      pioglitazone (ACTOS) 15 MG tablet Take 1 tablet (15 mg total) by mouth once daily.  Qty: 90 tablet, Refills: 3    Associated Diagnoses: Type 2 diabetes mellitus with complication, with long-term current use of insulin      BD INSULIN PEN NEEDLE UF MINI 31 gauge x 3/16" Ndle USE AS DIRECTED  Qty: 100 each, Refills: 5      betamethasone dipropionate (DIPROLENE) 0.05 % cream Apply topically 2 (two) times daily.  Qty: 45 g, Refills: 2      clobetasol (TEMOVATE) 0.05 % external solution Use on scalp one - two times daily as needed for scaling or itching  Qty: 60 mL, Refills: 3    Associated Diagnoses: Cicatricial alopecia      diclofenac (VOLTAREN) 0.1 % ophthalmic solution Place 1 drop into the right eye 4 (four) times daily.  Qty: 5 mL, Refills: 1    Associated Diagnoses: Age-related nuclear cataract of right eye      MICROLET 2 LANCING DEVICE Kit Use 4x/daily to check glucose.  Qty: 1 each, Refills: 2    Associated Diagnoses: Type 2 diabetes mellitus with diabetic polyneuropathy, with long-term current use of insulin      MICROLET LANCET Misc use FOUR TIMES DAILY  Refills: 2      prednisoLONE acetate (PRED FORTE) 1 % DrpS Place 1 drop into the right eye 4 (four) times daily.  Qty: 1 Bottle, Refills: 1    Associated Diagnoses: Age-related nuclear cataract of right eye      timolol maleate 0.5% (TIMOPTIC) 0.5 % Drop Place 1 drop into both eyes 2 (two) times daily.  Qty: " 10 mL, Refills: 5             Discharge Procedure Orders (must include Diet, Follow-up, Activity)  No discharge procedures on file.

## 2018-08-30 NOTE — ANESTHESIA POSTPROCEDURE EVALUATION
Anesthesia Post Evaluation    Patient: Mine Quiros    Procedure(s) Performed: Procedure(s) (LRB):  PHACOEMULSIFICATION, CATARACT (Left)    Final Anesthesia Type: MAC  Patient location during evaluation: PACU  Patient participation: Yes- Able to Participate  Level of consciousness: awake and alert and oriented  Post-procedure vital signs: reviewed and stable  Pain management: adequate  Airway patency: patent  PONV status at discharge: No PONV  Anesthetic complications: no      Cardiovascular status: blood pressure returned to baseline  Respiratory status: unassisted, spontaneous ventilation and room air  Hydration status: euvolemic  Follow-up not needed.        Visit Vitals  /70 (BP Location: Right arm, Patient Position: Lying)   Pulse 91   Temp 36.2 °C (97.2 °F) (Skin)   Resp 20   LMP 08/31/2007   SpO2 98%   Breastfeeding? No       Pain/Erin Score: Pain Assessment Performed: Yes (8/30/2018  7:36 AM)  Presence of Pain: denies (8/30/2018  7:36 AM)

## 2018-08-30 NOTE — INTERVAL H&P NOTE
The patient has been examined and the H&P has been reviewed:    I concur with the findings and no changes have occurred since H&P was written.    Anesthesia/Surgery risks, benefits and alternative options discussed and understood by patient/family.          Active Hospital Problems    Diagnosis  POA    Age-related nuclear cataract of left eye [H25.12]  Yes      Resolved Hospital Problems   No resolved problems to display.

## 2018-08-30 NOTE — OP NOTE
Date of surgery: 8/30/2018    Operative Report    Preoperative Diagnosis: Nuclear sclerosis, left eye    Postoperative Diagnosis: Nuclear sclerosis, left eye    Procedure: Phacoemulsification with posterior chamber intraocular lens, left eye    Surgeon: Isaac Boothe Jr. M.D.    Anesthesia: MAC with topical     Brief Preoperative Note:  The patient was seen in the office with cataract of the left eye.  Because of the impairment of the patient's reading, driving, ambulation, and other activities of daily living needing a good quality of vision, the patient elected to remove the cataract and place a acrylic lens implant, if possible    Procedure in detail:    Informed consent was obtained. Indications, risks and alternatives to the procedure were explained to the patient. The patient was given the opportunity to ask questions and consented to the procedure in writing. In the preoperative holding area, the patients identity and operative site were confirmed.  Topical anesthetic was administered, consisting of alternating 0.5% Proparacaine and 4% preservative-free Lidocaine Q 5 minutes times 3.  The patient was taken to the operative suite.  A time-out was performed.  The left eye was prepped with 5% Betadine and draped in sterile fashion.  A lid speculum was placed in the left eye.  The temporal clear corneal incision was developed using a LRI sarah knife and crescent blade for a 3 plane incision.  A paracentesis was done with a 1mm sarah blade.  Before instillation of the Viscoat, approximately 0.1 to 0.3cc of diluted preservative free intracameral lidocaine was instilled.  Viscoat was injected into the anterior chamber with a 27-gauge needle.  A 2.4mm sarah blade was used to make a temporal clear corneal incision.  Afterwards, a cystotome was used to perform a continuous curvilinear capsulorrhexis with the assistance of utrata forceps.  Hydrodissection was performed using balanced salt solution,injected  under the anterior capsule using a perez cannula and hydrodelineation was performed using a blunt-tipped cannula.  Next, phacoemulsification performed in a divide and conquer fashion with the use of a nucleus rotator to turn the lens and protect the posterior capsule.  Cortical material was then removed using irrigation and aspiration.  Healon was then injected into the anterior chamber and into capsular bag to inflate the bag for the placement of the lens implant and then an Raul SN60WF 23.0 D lens was injected into the capsular bag and rotated in position with the assistance of a Sinsky hook.  Irrigation and aspiration were used to remove the remaining viscoelastic. Next, Miochol was injected into the anterior chamber and the pupil was allowed to constrict in a symmetrical fashion.  A collagen shield was placed into the eye and the eye was covered with a Flores shield.  The patient tolerated the procedure well and was transferred to the recovery area in good condition.  Estimated blood loss:  none  Specimens:   none  Complications:  none  Disposition:   stable to PACU

## 2018-08-30 NOTE — TRANSFER OF CARE
Anesthesia Transfer of Care Note    Patient: Mine Quiros    Procedure(s) Performed: Procedure(s) (LRB):  PHACOEMULSIFICATION, CATARACT (Left)    Patient location: PACU    Anesthesia Type: MAC    Transport from OR: Transported from OR on room air with adequate spontaneous ventilation    Post pain: adequate analgesia    Post assessment: no apparent anesthetic complications    Post vital signs: stable    Level of consciousness: awake, alert and oriented    Nausea/Vomiting: no nausea/vomiting    Complications: none    Transfer of care protocol was followed      Last vitals:   Visit Vitals  /70 (BP Location: Right arm, Patient Position: Lying)   Pulse 91   Temp 36.2 °C (97.2 °F) (Skin)   Resp 20   LMP 08/31/2007   SpO2 98%   Breastfeeding? No

## 2018-08-31 ENCOUNTER — OFFICE VISIT (OUTPATIENT)
Dept: OPHTHALMOLOGY | Facility: CLINIC | Age: 53
End: 2018-08-31
Payer: COMMERCIAL

## 2018-08-31 DIAGNOSIS — Z98.42 STATUS POST CATARACT EXTRACTION AND INSERTION OF INTRAOCULAR LENS OF LEFT EYE: Primary | ICD-10-CM

## 2018-08-31 DIAGNOSIS — Z96.1 STATUS POST CATARACT EXTRACTION AND INSERTION OF INTRAOCULAR LENS OF LEFT EYE: Primary | ICD-10-CM

## 2018-08-31 PROCEDURE — 99024 POSTOP FOLLOW-UP VISIT: CPT | Mod: S$GLB,,, | Performed by: OPHTHALMOLOGY

## 2018-08-31 PROCEDURE — 99999 PR PBB SHADOW E&M-EST. PATIENT-LVL II: CPT | Mod: PBBFAC,,, | Performed by: OPHTHALMOLOGY

## 2018-08-31 RX ORDER — ERYTHROMYCIN 5 MG/G
OINTMENT OPHTHALMIC EVERY 6 HOURS
Qty: 3.5 G | Refills: 1 | Status: SHIPPED | OUTPATIENT
Start: 2018-08-31 | End: 2018-09-07

## 2018-08-31 RX ORDER — MOXIFLOXACIN 5 MG/ML
1 SOLUTION/ DROPS OPHTHALMIC 4 TIMES DAILY
Qty: 3 ML | Refills: 0 | Status: SHIPPED | OUTPATIENT
Start: 2018-08-31 | End: 2018-09-07

## 2018-08-31 NOTE — PROGRESS NOTES
HPI     Post-op Evaluation      Additional comments: 1 day s/p phaco iol OS 8/30              Comments     Pt states she is feeling a pain in and around the eye this am. She says   about a 8-9 on pain scale and it hurts when she moves her head and painful   to touch. No itching.     Gtts: Prednisolone, Diclofenac, Vigamos QID OS - has not been using   timolol.           Last edited by Cheryle Quintana on 8/31/2018  9:01 AM. (History)            Assessment /Plan     For exam results, see Encounter Report.    Status post cataract extraction and insertion of intraocular lens of left eye  -     moxifloxacin (VIGAMOX) 0.5 % ophthalmic solution; Place 1 drop into the right eye 4 (four) times daily. Start the Day after surgery for 7 days  Dispense: 3 mL; Refill: 0  -     erythromycin (ROMYCIN) ophthalmic ointment; Place into the left eye every 6 (six) hours. for 7 days  Dispense: 3.5 g; Refill: 1    Doing well, small epi-defect near corneal wound  Post-op instructions given  Add erythromycin ointment 4x daily OS  F/u 1 week

## 2018-09-07 ENCOUNTER — OFFICE VISIT (OUTPATIENT)
Dept: OPHTHALMOLOGY | Facility: CLINIC | Age: 53
End: 2018-09-07
Payer: COMMERCIAL

## 2018-09-07 DIAGNOSIS — Z98.42 STATUS POST CATARACT EXTRACTION AND INSERTION OF INTRAOCULAR LENS OF LEFT EYE: Primary | ICD-10-CM

## 2018-09-07 DIAGNOSIS — Z98.41 STATUS POST CATARACT EXTRACTION AND INSERTION OF INTRAOCULAR LENS OF RIGHT EYE: ICD-10-CM

## 2018-09-07 DIAGNOSIS — Z96.1 STATUS POST CATARACT EXTRACTION AND INSERTION OF INTRAOCULAR LENS OF LEFT EYE: Primary | ICD-10-CM

## 2018-09-07 DIAGNOSIS — H40.053 OCULAR HYPERTENSION, BILATERAL: ICD-10-CM

## 2018-09-07 DIAGNOSIS — Z96.1 STATUS POST CATARACT EXTRACTION AND INSERTION OF INTRAOCULAR LENS OF RIGHT EYE: ICD-10-CM

## 2018-09-07 PROCEDURE — 99999 PR PBB SHADOW E&M-EST. PATIENT-LVL III: CPT | Mod: PBBFAC,,, | Performed by: OPHTHALMOLOGY

## 2018-09-07 PROCEDURE — 99024 POSTOP FOLLOW-UP VISIT: CPT | Mod: S$GLB,,, | Performed by: OPHTHALMOLOGY

## 2018-09-07 NOTE — PROGRESS NOTES
HPI     Eye Pain      Additional comments: pain a 6 on the pain scale but if she touches it it   can get up to a 10 or more!  OS//  OD has no pain//              Post-op Evaluation      Additional comments: 1 wk sp phaco iol OS 8/30/2018// OD 8/9/2018//   drops instilled as directed for OU//              Comments     pain a 6 on the pain scale but if she touches it it can get up to a 10 or   more!  OD has no pain//  1 wk sp phaco iol OS 8/30/2018// OD 8/9/2018// drops instilled as directed   for OU//  Patient has not been taking timolol          Last edited by Isaac Boothe Jr., MD on 9/7/2018  1:37 PM. (History)        ROS     Negative for: Constitutional, Gastrointestinal, Neurological, Skin,   Genitourinary, Musculoskeletal, HENT, Endocrine, Cardiovascular, Eyes,   Respiratory, Psychiatric, Allergic/Imm, Heme/Lymph    Last edited by Isaac Boothe Jr., MD on 9/7/2018 11:01 AM. (History)        Assessment /Plan     For exam results, see Encounter Report.    Status post cataract extraction and insertion of intraocular lens of left eye  Continue PF 1 drop 4x daily  X 1 week then taper  1 drop 2x daily x 1 week then  1 drop 1x daily x 1 week then RTC for re-evaluation  Continue diclofenac 1 drop 1 x daily  Status post cataract extraction and insertion of intraocular lens of right eye  Taper PF 1 drop 2x daily x 1 week  Then  PF 1 drop 1 x daily x 2 weeks then stop  D/C diclofenac to eye  Ocular hypertension, bilateral  Restart Timolol BID OU  Written drop instructions given to patient and sister

## 2018-10-08 ENCOUNTER — OFFICE VISIT (OUTPATIENT)
Dept: OPHTHALMOLOGY | Facility: CLINIC | Age: 53
End: 2018-10-08
Payer: COMMERCIAL

## 2018-10-08 ENCOUNTER — LAB VISIT (OUTPATIENT)
Dept: LAB | Facility: HOSPITAL | Age: 53
End: 2018-10-08
Attending: NURSE PRACTITIONER
Payer: COMMERCIAL

## 2018-10-08 DIAGNOSIS — Z96.1 STATUS POST CATARACT EXTRACTION AND INSERTION OF INTRAOCULAR LENS OF LEFT EYE: Primary | ICD-10-CM

## 2018-10-08 DIAGNOSIS — H40.053 OCULAR HYPERTENSION, BILATERAL: ICD-10-CM

## 2018-10-08 DIAGNOSIS — H26.492 PCO (POSTERIOR CAPSULAR OPACIFICATION), LEFT: ICD-10-CM

## 2018-10-08 DIAGNOSIS — Z98.42 STATUS POST CATARACT EXTRACTION AND INSERTION OF INTRAOCULAR LENS OF LEFT EYE: Primary | ICD-10-CM

## 2018-10-08 DIAGNOSIS — Z79.4 TYPE 2 DIABETES MELLITUS WITH DIABETIC POLYNEUROPATHY, WITH LONG-TERM CURRENT USE OF INSULIN: ICD-10-CM

## 2018-10-08 DIAGNOSIS — E11.42 TYPE 2 DIABETES MELLITUS WITH DIABETIC POLYNEUROPATHY, WITH LONG-TERM CURRENT USE OF INSULIN: ICD-10-CM

## 2018-10-08 LAB
ESTIMATED AVG GLUCOSE: 174 MG/DL
HBA1C MFR BLD HPLC: 7.7 %

## 2018-10-08 PROCEDURE — 99999 PR PBB SHADOW E&M-EST. PATIENT-LVL II: CPT | Mod: PBBFAC,,, | Performed by: OPHTHALMOLOGY

## 2018-10-08 PROCEDURE — 36415 COLL VENOUS BLD VENIPUNCTURE: CPT | Mod: PO

## 2018-10-08 PROCEDURE — 83036 HEMOGLOBIN GLYCOSYLATED A1C: CPT

## 2018-10-08 PROCEDURE — 99024 POSTOP FOLLOW-UP VISIT: CPT | Mod: S$GLB,,, | Performed by: OPHTHALMOLOGY

## 2018-10-08 RX ORDER — PREDNISOLONE ACETATE 10 MG/ML
1 SUSPENSION/ DROPS OPHTHALMIC
Qty: 1 BOTTLE | Refills: 3 | Status: SHIPPED | OUTPATIENT
Start: 2018-10-08 | End: 2018-11-07

## 2018-10-08 RX ORDER — DICLOFENAC SODIUM 1 MG/ML
1 SOLUTION/ DROPS OPHTHALMIC 3 TIMES DAILY
Qty: 5 ML | Refills: 3 | Status: SHIPPED | OUTPATIENT
Start: 2018-10-08 | End: 2018-11-07

## 2018-10-08 NOTE — PROGRESS NOTES
HPI     Post-op Evaluation      Additional comments: 1 motnh sp phaco iol OS d 8/30/2018.  2 month sp   phaco iol OD d 8/9/2018//              Comments     1 motnh sp phaco iol OS d 8/30/2018.  2 month sp phaco iol OD d   8/9/2018//   pt states os still nopt seeing very well out of it//  And still a little   sore//  has finished drops OS//   Pt stopped timolol also per Dr Boothe last visit//  Has not taken for   about 2 weeks//  Floaters in OS since cat sx OS//  OD had one when had sx but went away//    No flashes//          Last edited by Isaac Boothe Jr., MD on 10/8/2018  2:29 PM. (History)            Assessment /Plan     For exam results, see Encounter Report.    Status post cataract extraction and insertion of intraocular lens of left eye  -     prednisoLONE acetate (PRED FORTE) 1 % DrpS; Place 1 drop into the left eye every 2 (two) hours.  Dispense: 1 Bottle; Refill: 3  -     diclofenac (VOLTAREN) 0.1 % ophthalmic solution; Place 1 drop into the left eye 3 (three) times daily.  Dispense: 5 mL; Refill: 3    Ocular hypertension, bilateral  - continue Timolol BID ou    PCO (posterior capsular opacification), left  - schedule YAG OS

## 2018-10-19 ENCOUNTER — OFFICE VISIT (OUTPATIENT)
Dept: ENDOCRINOLOGY | Facility: CLINIC | Age: 53
End: 2018-10-19
Payer: COMMERCIAL

## 2018-10-19 VITALS
DIASTOLIC BLOOD PRESSURE: 70 MMHG | HEART RATE: 84 BPM | WEIGHT: 223.19 LBS | HEIGHT: 56 IN | BODY MASS INDEX: 50.21 KG/M2 | SYSTOLIC BLOOD PRESSURE: 122 MMHG

## 2018-10-19 DIAGNOSIS — Z79.4 TYPE 2 DIABETES MELLITUS WITH COMPLICATION, WITH LONG-TERM CURRENT USE OF INSULIN: ICD-10-CM

## 2018-10-19 DIAGNOSIS — E11.42 TYPE 2 DIABETES MELLITUS WITH DIABETIC POLYNEUROPATHY, WITH LONG-TERM CURRENT USE OF INSULIN: Primary | ICD-10-CM

## 2018-10-19 DIAGNOSIS — Z46.81 FITTING OR ADJUSTMENT OF INSULIN PUMP: ICD-10-CM

## 2018-10-19 DIAGNOSIS — I10 HYPERTENSION, UNSPECIFIED TYPE: ICD-10-CM

## 2018-10-19 DIAGNOSIS — E11.8 TYPE 2 DIABETES MELLITUS WITH COMPLICATION, WITH LONG-TERM CURRENT USE OF INSULIN: ICD-10-CM

## 2018-10-19 DIAGNOSIS — E78.5 HYPERLIPIDEMIA, UNSPECIFIED HYPERLIPIDEMIA TYPE: ICD-10-CM

## 2018-10-19 DIAGNOSIS — E66.01 MORBID OBESITY WITH BMI OF 45.0-49.9, ADULT: ICD-10-CM

## 2018-10-19 DIAGNOSIS — G62.9 PERIPHERAL POLYNEUROPATHY: ICD-10-CM

## 2018-10-19 DIAGNOSIS — Z96.41 INSULIN PUMP STATUS: Chronic | ICD-10-CM

## 2018-10-19 DIAGNOSIS — F10.10 ALCOHOL ABUSE: ICD-10-CM

## 2018-10-19 DIAGNOSIS — Z79.4 TYPE 2 DIABETES MELLITUS WITH DIABETIC POLYNEUROPATHY, WITH LONG-TERM CURRENT USE OF INSULIN: Primary | ICD-10-CM

## 2018-10-19 PROCEDURE — 99999 PR PBB SHADOW E&M-EST. PATIENT-LVL IV: CPT | Mod: PBBFAC,,, | Performed by: NURSE PRACTITIONER

## 2018-10-19 PROCEDURE — 99214 OFFICE O/P EST MOD 30 MIN: CPT | Mod: S$GLB,,, | Performed by: NURSE PRACTITIONER

## 2018-10-19 RX ORDER — INSULIN LISPRO 100 [IU]/ML
INJECTION, SOLUTION INTRAVENOUS; SUBCUTANEOUS
Qty: 50 ML | Refills: 12 | Status: SHIPPED | OUTPATIENT
Start: 2018-10-19 | End: 2019-10-26 | Stop reason: SDUPTHER

## 2018-10-19 RX ORDER — GABAPENTIN 300 MG/1
300 CAPSULE ORAL 3 TIMES DAILY
Qty: 90 CAPSULE | Refills: 3 | Status: SHIPPED | OUTPATIENT
Start: 2018-10-19 | End: 2019-08-12 | Stop reason: SDUPTHER

## 2018-10-19 NOTE — PROGRESS NOTES
Subjective:       Patient ID: Mine Quiros is a 53 y.o. female.    Chief Complaint: Diabetes    Diabetes   Pertinent negatives for hypoglycemia include no nervousness/anxiousness. Associated symptoms include fatigue. Pertinent negatives for diabetes include no chest pain and no weakness.      Pt is a 53 y.o. AAF with a long hx of very uncontrolled Type 2 DM-- as well as chronic conditions pending review including HTN, peripheral neuropathy, diastolic dysfunciton, morbid obesity- now on insulin pump.She has had multiple difficulties being able to navigate pump and having freq hypoglycemia. Medically disbabled she states due to neuropathy.     Interim Events: .  No acute events.  Continues to drink--Carmelina--1/2 gallon every 2-3 days.T  Pump download overall with dearth of data.  She has been counseled on need for Serial SBGM for both improved DM status and safety.  I have advised her once pump supplies need to renewed I will not sign off as she is not meeting criteria.      See todays' pump download. --media.            Review of Systems   Constitutional: Positive for fatigue. Negative for activity change.   HENT: Negative for hearing loss and trouble swallowing.    Eyes: Positive for visual disturbance. Negative for photophobia.        Last Eye Exam: recent--cataracts.    Respiratory: Negative for cough and shortness of breath.    Cardiovascular: Negative for chest pain and palpitations.   Gastrointestinal: Negative for constipation and diarrhea.   Genitourinary: Negative for frequency and urgency.        Unusual odor,   No burning.   No color.    Musculoskeletal: Positive for back pain. Negative for arthralgias and myalgias.   Skin: Negative for rash and wound.   Allergic/Immunologic: Negative for food allergies.   Neurological: Positive for numbness (hands and feet. ). Negative for weakness.   Psychiatric/Behavioral: Negative for sleep disturbance. The patient is not nervous/anxious.         Sleeps well with  gabapentin.         Objective:      Physical Exam   Constitutional: She is oriented to person, place, and time. She appears well-developed and well-nourished.   Appears older than age, morbidly obese   HENT:   Head: Normocephalic and atraumatic.   Nose: Nose normal.   Mouth/Throat: Oropharynx is clear and moist.   Eyes: Conjunctivae and EOM are normal. Pupils are equal, round, and reactive to light.   Neck: Normal range of motion. Neck supple. No tracheal deviation present. No thyromegaly present.   Cardiovascular: Normal rate, regular rhythm, normal heart sounds and intact distal pulses.   Pulmonary/Chest: Effort normal and breath sounds normal.   Musculoskeletal: Normal range of motion. She exhibits no deformity.   Feet: no open wounds or calluses. Good pedal care. vibraorty sensation slightly decreased left  Virtually absent on R.     Flip flops.    Neurological: She is alert and oriented to person, place, and time.   Skin: Skin is warm and dry.   Psychiatric: She has a normal mood and affect. Her behavior is normal. Judgment and thought content normal.       Hemoglobin A1C   Date Value Ref Range Status   10/08/2018 7.7 (H) 4.0 - 5.6 % Final     Comment:     ADA Screening Guidelines:  5.7-6.4%  Consistent with prediabetes  >or=6.5%  Consistent with diabetes  High levels of fetal hemoglobin interfere with the HbA1C  assay. Heterozygous hemoglobin variants (HbS, HgC, etc)do  not significantly interfere with this assay.   However, presence of multiple variants may affect accuracy.     07/10/2018 7.7 (H) 4.0 - 5.6 % Final     Comment:     ADA Screening Guidelines:  5.7-6.4%  Consistent with prediabetes  >or=6.5%  Consistent with diabetes  High levels of fetal hemoglobin interfere with the HbA1C  assay. Heterozygous hemoglobin variants (HbS, HgC, etc)do  not significantly interfere with this assay.   However, presence of multiple variants may affect accuracy.     03/27/2018 8.8 (H) 4.0 - 5.6 % Final     Comment:      According to ADA guidelines, hemoglobin A1c <7.0% represents  optimal control in non-pregnant diabetic patients. Different  metrics may apply to specific patient populations.   Standards of Medical Care in Diabetes-2016.  For the purpose of screening for the presence of diabetes:  <5.7%     Consistent with the absence of diabetes  5.7-6.4%  Consistent with increasing risk for diabetes   (prediabetes)  >or=6.5%  Consistent with diabetes  Currently, no consensus exists for use of hemoglobin A1c  for diagnosis of diabetes for children.  This Hemoglobin A1c assay has significant interference with fetal   hemoglobin   (HbF). The results are invalid for patients with abnormal amounts of   HbF,   including those with known Hereditary Persistence   of Fetal Hemoglobin. Heterozygous hemoglobin variants (HbAS, HbAC,   HbAD, HbAE, HbA2) do not significantly interfere with this assay;   however, presence of multiple variants in a sample may impact the %   interference.         Chemistry        Component Value Date/Time     03/27/2018 1019    K 3.9 03/27/2018 1019     03/27/2018 1019    CO2 26 03/27/2018 1019    BUN 19 03/27/2018 1019    CREATININE 1.0 03/27/2018 1019    GLU 74 03/27/2018 1019        Component Value Date/Time    CALCIUM 9.6 03/27/2018 1019    ALKPHOS 86 03/27/2018 1019    AST 73 (H) 03/27/2018 1019    ALT 33 03/27/2018 1019    BILITOT 0.8 03/27/2018 1019    ESTGFRAFRICA >60.0 03/27/2018 1019    EGFRNONAA >60.0 03/27/2018 1019        Lab Results   Component Value Date    LDLCALC 131.0 07/10/2018     Lab Results   Component Value Date    TSH 1.484 03/27/2018     Component      Latest Ref Rng & Units 3/27/2018   Microalbum.,U,Random      ug/mL 43.0       Assessment:       1. Type 2 diabetes mellitus with diabetic polyneuropathy, with long-term current use of insulin   DIABETES FOOT EXAM  Chronic-stable-see plan     Hemoglobin A1c   2. Insulin pump status     3. Insulin long-term use     4. Fitting or  adjustment of insulin pump     5. HTN (hypertension), benign  -chronic-stable-cont losartan 100 mg    6. Hyperlipidemia, unspecified hyperlipidemia type  -chronic-above goal--no statin--and with ETOH intake hesitant to add    7. Peripheral polyneuropathy  -crhonic-stable-monitor foot care    8. Morbid obesity with BMI of 50.0-59.9, adult  -[id number]-rwakjg-laakktdeh.    9. Alcohol abuse  -crhronic-counseled on hypoglycemia risks-as well as empty calories.        Plan:           Pt counseled--ai has 150 christina per oz--1/2 gal over 2-3 days =9600 calories!!! Also calculates to $220 t $300 month.   Not too mention other health concerns.   Pt also counseled her insurance may deny future pump supplies if she is not entering at least 4 glucose readings a day, and bolusing for meals.    AGAIN, warned. Verbalizes understanding.      ORDERS 10/19/2018     3 mo with a1c prior

## 2018-10-23 ENCOUNTER — PROCEDURE VISIT (OUTPATIENT)
Dept: OPHTHALMOLOGY | Facility: CLINIC | Age: 53
End: 2018-10-23
Payer: COMMERCIAL

## 2018-10-23 DIAGNOSIS — H26.492 PCO (POSTERIOR CAPSULAR OPACIFICATION), LEFT: Primary | ICD-10-CM

## 2018-10-23 DIAGNOSIS — H59.032 CYSTOID MACULAR EDEMA OF LEFT EYE FOLLOWING CATARACT SURGERY: ICD-10-CM

## 2018-10-23 PROCEDURE — 99024 POSTOP FOLLOW-UP VISIT: CPT | Mod: S$GLB,,, | Performed by: OPHTHALMOLOGY

## 2018-10-23 PROCEDURE — 66821 AFTER CATARACT LASER SURGERY: CPT | Mod: 79,LT,S$GLB, | Performed by: OPHTHALMOLOGY

## 2018-10-23 NOTE — PROGRESS NOTES
HPI     Laser Treatment      Additional comments: Yag Cap OS               Comments     Pt is here for yag cap OS today.    Gtts: Timolol BID OU, DiclofenacTID, Prednisolone Q2hr OS          Last edited by Cheryle Quintana on 10/23/2018  2:24 PM. (History)        ROS     Negative for: Constitutional, Gastrointestinal, Neurological, Skin,   Genitourinary, Musculoskeletal, HENT, Endocrine, Cardiovascular, Eyes,   Respiratory, Psychiatric, Allergic/Imm, Heme/Lymph    Last edited by Isaac Boothe Jr., MD on 10/23/2018  4:37 PM. (History)        Assessment /Plan     For exam results, see Encounter Report.    PCO (posterior capsular opacification), left  -     Yag Capsulotomy - OS - Left Eye  [YAG laser] Procedure Note: Informed consent obtained.  YAG laser applied to posterior capsule.  Tolerated well.  Iopidine pre- and post- procedure.  - continue PF 1 drop Q2hrs OS, diclofenac TID OS, and timolol BID OS  Cystoid macular edema of left eye following cataract surgery- OCT  +cystoid spaces OS      Other orders  -     Cancel: Yag Capsulotomy - OU - Both Eyes

## 2018-11-14 ENCOUNTER — OFFICE VISIT (OUTPATIENT)
Dept: OPHTHALMOLOGY | Facility: CLINIC | Age: 53
End: 2018-11-14
Payer: COMMERCIAL

## 2018-11-14 ENCOUNTER — OFFICE VISIT (OUTPATIENT)
Dept: DERMATOLOGY | Facility: CLINIC | Age: 53
End: 2018-11-14
Payer: COMMERCIAL

## 2018-11-14 DIAGNOSIS — Z96.1 STATUS POST CATARACT EXTRACTION AND INSERTION OF INTRAOCULAR LENS OF LEFT EYE: Primary | ICD-10-CM

## 2018-11-14 DIAGNOSIS — L66.9 CICATRICIAL ALOPECIA: ICD-10-CM

## 2018-11-14 DIAGNOSIS — H40.053 OCULAR HYPERTENSION, BILATERAL: ICD-10-CM

## 2018-11-14 DIAGNOSIS — L70.0 ACNE VULGARIS: ICD-10-CM

## 2018-11-14 DIAGNOSIS — Z98.42 STATUS POST CATARACT EXTRACTION AND INSERTION OF INTRAOCULAR LENS OF LEFT EYE: Primary | ICD-10-CM

## 2018-11-14 DIAGNOSIS — Z98.41 STATUS POST CATARACT EXTRACTION AND INSERTION OF INTRAOCULAR LENS OF RIGHT EYE: ICD-10-CM

## 2018-11-14 DIAGNOSIS — Z96.1 STATUS POST CATARACT EXTRACTION AND INSERTION OF INTRAOCULAR LENS OF RIGHT EYE: ICD-10-CM

## 2018-11-14 DIAGNOSIS — E11.3392 MODERATE NONPROLIFERATIVE DIABETIC RETINOPATHY OF LEFT EYE WITHOUT MACULAR EDEMA ASSOCIATED WITH TYPE 2 DIABETES MELLITUS: ICD-10-CM

## 2018-11-14 DIAGNOSIS — L28.0 LICHEN SIMPLEX CHRONICUS: Primary | ICD-10-CM

## 2018-11-14 PROCEDURE — 99999 PR PBB SHADOW E&M-EST. PATIENT-LVL II: CPT | Mod: PBBFAC,,, | Performed by: OPHTHALMOLOGY

## 2018-11-14 PROCEDURE — 99214 OFFICE O/P EST MOD 30 MIN: CPT | Mod: S$GLB,,, | Performed by: DERMATOLOGY

## 2018-11-14 PROCEDURE — 99024 POSTOP FOLLOW-UP VISIT: CPT | Mod: S$GLB,,, | Performed by: OPHTHALMOLOGY

## 2018-11-14 PROCEDURE — 99999 PR PBB SHADOW E&M-EST. PATIENT-LVL II: CPT | Mod: PBBFAC,,, | Performed by: DERMATOLOGY

## 2018-11-14 RX ORDER — BETAMETHASONE DIPROPIONATE 0.5 MG/G
OINTMENT TOPICAL 2 TIMES DAILY
Qty: 45 G | Refills: 0 | Status: SHIPPED | OUTPATIENT
Start: 2018-11-14 | End: 2019-01-10 | Stop reason: SDUPTHER

## 2018-11-14 RX ORDER — KETOCONAZOLE 20 MG/ML
SHAMPOO, SUSPENSION TOPICAL
Qty: 120 ML | Refills: 6 | Status: SHIPPED | OUTPATIENT
Start: 2018-11-14 | End: 2022-05-13 | Stop reason: SDUPTHER

## 2018-11-14 RX ORDER — HYDROXYZINE HYDROCHLORIDE 25 MG/1
25 TABLET, FILM COATED ORAL NIGHTLY
Qty: 30 TABLET | Refills: 1 | Status: SHIPPED | OUTPATIENT
Start: 2018-11-14 | End: 2019-01-10 | Stop reason: SDUPTHER

## 2018-11-14 NOTE — PROGRESS NOTES
HPI     Post-op Evaluation      Additional comments: 2 wk sp yag cap OS d 10/23/2018//              Comments     2 wk sp yag cap OS d 10/23/2018// pt states she can see a floater and a   flash since yag OS done out of OS //          Last edited by Marlena Crowe on 11/14/2018  8:40 AM. (History)        ROS     Negative for: Constitutional, Gastrointestinal, Neurological, Skin,   Genitourinary, Musculoskeletal, HENT, Endocrine, Cardiovascular, Eyes,   Respiratory, Psychiatric, Allergic/Imm, Heme/Lymph    Last edited by Isaac Boothe Jr., MD on 11/14/2018  9:29 AM. (History)        Assessment /Plan     For exam results, see Encounter Report.    Status post cataract extraction and insertion of intraocular lens of left eye- doing well, satisfactory course  D/C diclofenac; PF 1 drop OS x 1 week then stop  Patient said she was doing fine with OTC    Status post cataract extraction and insertion of intraocular lens of right eye- doing well, satisfactory course    Ocular hypertension, bilateral  -continue timolol 1 drop BID OU  Follow-up in about 4 months (around 3/14/2019) for Dilated Diabetic Fundus Exam, IOP and Medication check.      Moderate nonproliferative diabetic retinopathy of left eye without macular edema associated with type 2 diabetes mellitus  - optimize BP and BG Ccontrol  - will dilate at next visit

## 2018-11-14 NOTE — PROGRESS NOTES
Subjective:       Patient ID:  Mine Quiros is a 53 y.o. female who presents for   Chief Complaint   Patient presents with    Rash     bilat lower leg, itchy scalp     HPI  Last OV with Dr Hernandez 2016.  52 yo F presents today for evaluation of a rash that started approx 1 year ago.  It may have started after a mosquito bite.  Prior treatments include Neosporin, Cortaid. She would also like a refill of her ketoconazole shampoo.  Additionally, she is c/o blackheads on her cheeks, flares intermittently.  No prior treatments    No h/o eczema as a child  Denies personal or family h/o skin cancer    Past Medical History:   Diagnosis Date    Abnormal stress test 10/2016    Acne     Acute diastolic heart failure secondary to idiopathic cardiomyopathy 10/2016    Allergy     Anxiety     Arthritis     Carpal tunnel syndrome of right wrist     bilateral    Cataract     Done OU    Chest pain     Diabetes mellitus     Type 2 IDDM - Uncontrolled    Diabetic neuropathy     Difficult intubation     needed glidescope for cholecystectomy 2009    Dry scalp     Fatty liver     GERD (gastroesophageal reflux disease)     on no meds    Glaucoma     Hyperlipidemia     Hypertension     Insomnia     Knee pain     Morbid obesity     Neuropathy due to type 2 diabetes mellitus     anaya feet    MARLON (obstructive sleep apnea)     CPAP, says she is compliant with use    Parotid mass 2014    had Biopsy    SOB (shortness of breath) 10/2016    Tachycardia     frequently,chronic for years per chart     Review of Systems   Skin: Positive for itching, rash, dry skin and tendency to form keloidal scars.   Hematologic/Lymphatic: Does not bruise/bleed easily.        Objective:    Physical Exam   Constitutional: She appears well-developed and well-nourished.   HENT:   Mouth/Throat: Lips normal.    Eyes: Lids are normal.    Neurological: She is alert and oriented to person, place, and time.   Psychiatric: She has a normal  mood and affect.   Skin:   Areas Examined (abnormalities noted in diagram):   Scalp / Hair Palpated and Inspected  Head / Face Inspection Performed  Neck Inspection Performed  RLE Inspected  LLE Inspection Performed                   Diagram Legend     Erythematous scaling macule/papule c/w actinic keratosis       Vascular papule c/w angioma      Pigmented verrucoid papule/plaque c/w seborrheic keratosis      Yellow umbilicated papule c/w sebaceous hyperplasia      Irregularly shaped tan macule c/w lentigo     1-2 mm smooth white papules consistent with Milia      Movable subcutaneous cyst with punctum c/w epidermal inclusion cyst      Subcutaneous movable cyst c/w pilar cyst      Firm pink to brown papule c/w dermatofibroma      Pedunculated fleshy papule(s) c/w skin tag(s)      Evenly pigmented macule c/w junctional nevus     Mildly variegated pigmented, slightly irregular-bordered macule c/w mildly atypical nevus      Flesh colored to evenly pigmented papule c/w intradermal nevus       Pink pearly papule/plaque c/w basal cell carcinoma      Erythematous hyperkeratotic cursted plaque c/w SCC      Surgical scar with no sign of skin cancer recurrence      Open and closed comedones      Inflammatory papules and pustules      Verrucoid papule consistent consistent with wart     Erythematous eczematous patches and plaques     Dystrophic onycholytic nail with subungual debris c/w onychomycosis     Umbilicated papule    Erythematous-base heme-crusted tan verrucoid plaque consistent with inflamed seborrheic keratosis     Erythematous Silvery Scaling Plaque c/w Psoriasis     See annotation      Assessment / Plan:        Lichen simplex chronicus  Encouraged patient to stop scratching/rubbing as this can exacerbate the condition  Ice packs to help alleviate itch sensation  -     betamethasone dipropionate (DIPROLENE) 0.05 % ointment; Apply topically 2 (two) times daily.  Dispense: 45 g; Refill: 0  -     hydrOXYzine HCl  (ATARAX) 25 MG tablet; Take 1 tablet (25 mg total) by mouth every evening.  Dispense: 30 tablet; Refill: 1  Discussed drowsiness as potential SE of hydroxyzine    Acne vulgaris-comedonal  Differin OTC qhs    Cicatricial alopecia  -     ketoconazole (NIZORAL) 2 % shampoo; Apply topically every 7 days.  Dispense: 120 mL; Refill: 6           No Follow-up on file.

## 2018-11-16 ENCOUNTER — OFFICE VISIT (OUTPATIENT)
Dept: FAMILY MEDICINE | Facility: CLINIC | Age: 53
End: 2018-11-16
Payer: COMMERCIAL

## 2018-11-16 ENCOUNTER — LAB VISIT (OUTPATIENT)
Dept: LAB | Facility: HOSPITAL | Age: 53
End: 2018-11-16
Attending: FAMILY MEDICINE
Payer: COMMERCIAL

## 2018-11-16 VITALS
RESPIRATION RATE: 18 BRPM | OXYGEN SATURATION: 99 % | HEIGHT: 56 IN | HEART RATE: 90 BPM | SYSTOLIC BLOOD PRESSURE: 122 MMHG | DIASTOLIC BLOOD PRESSURE: 72 MMHG | WEIGHT: 224.63 LBS | BODY MASS INDEX: 50.53 KG/M2

## 2018-11-16 DIAGNOSIS — Z01.818 PREOP EXAMINATION: Primary | ICD-10-CM

## 2018-11-16 DIAGNOSIS — Z01.818 PREOP EXAMINATION: ICD-10-CM

## 2018-11-16 DIAGNOSIS — N88.9 CERVIX ABNORMALITY: ICD-10-CM

## 2018-11-16 DIAGNOSIS — I10 HYPERTENSION, UNSPECIFIED TYPE: ICD-10-CM

## 2018-11-16 LAB
ANION GAP SERPL CALC-SCNC: 8 MMOL/L
BASOPHILS # BLD AUTO: 0.04 K/UL
BASOPHILS NFR BLD: 0.4 %
BUN SERPL-MCNC: 26 MG/DL
CALCIUM SERPL-MCNC: 9.7 MG/DL
CHLORIDE SERPL-SCNC: 100 MMOL/L
CO2 SERPL-SCNC: 30 MMOL/L
CREAT SERPL-MCNC: 1.5 MG/DL
DIFFERENTIAL METHOD: ABNORMAL
EOSINOPHIL # BLD AUTO: 0.3 K/UL
EOSINOPHIL NFR BLD: 3 %
ERYTHROCYTE [DISTWIDTH] IN BLOOD BY AUTOMATED COUNT: 13.7 %
EST. GFR  (AFRICAN AMERICAN): 45.5 ML/MIN/1.73 M^2
EST. GFR  (NON AFRICAN AMERICAN): 39.5 ML/MIN/1.73 M^2
GLUCOSE SERPL-MCNC: 132 MG/DL
HCT VFR BLD AUTO: 41.6 %
HGB BLD-MCNC: 13.1 G/DL
IMM GRANULOCYTES # BLD AUTO: 0.03 K/UL
IMM GRANULOCYTES NFR BLD AUTO: 0.3 %
LYMPHOCYTES # BLD AUTO: 3.1 K/UL
LYMPHOCYTES NFR BLD: 33.1 %
MCH RBC QN AUTO: 30 PG
MCHC RBC AUTO-ENTMCNC: 31.5 G/DL
MCV RBC AUTO: 95 FL
MONOCYTES # BLD AUTO: 0.7 K/UL
MONOCYTES NFR BLD: 7.4 %
NEUTROPHILS # BLD AUTO: 5.3 K/UL
NEUTROPHILS NFR BLD: 55.8 %
NRBC BLD-RTO: 0 /100 WBC
PLATELET # BLD AUTO: 363 K/UL
PMV BLD AUTO: 9.8 FL
POTASSIUM SERPL-SCNC: 3.7 MMOL/L
RBC # BLD AUTO: 4.37 M/UL
SODIUM SERPL-SCNC: 138 MMOL/L
WBC # BLD AUTO: 9.44 K/UL

## 2018-11-16 PROCEDURE — 93010 ELECTROCARDIOGRAM REPORT: CPT | Mod: S$GLB,,, | Performed by: INTERNAL MEDICINE

## 2018-11-16 PROCEDURE — 36415 COLL VENOUS BLD VENIPUNCTURE: CPT | Mod: PO

## 2018-11-16 PROCEDURE — 85025 COMPLETE CBC W/AUTO DIFF WBC: CPT

## 2018-11-16 PROCEDURE — 93005 ELECTROCARDIOGRAM TRACING: CPT | Mod: S$GLB,,, | Performed by: FAMILY MEDICINE

## 2018-11-16 PROCEDURE — 99244 OFF/OP CNSLTJ NEW/EST MOD 40: CPT | Mod: S$GLB,,, | Performed by: FAMILY MEDICINE

## 2018-11-16 PROCEDURE — 80048 BASIC METABOLIC PNL TOTAL CA: CPT

## 2018-11-16 PROCEDURE — 99999 PR PBB SHADOW E&M-EST. PATIENT-LVL III: CPT | Mod: PBBFAC,,, | Performed by: FAMILY MEDICINE

## 2018-11-16 NOTE — PROGRESS NOTES
Subjective:       Patient ID: Mine Quiros is a 53 y.o. female.    Chief Complaint: Pre-op Exam    HPI     Referred by Dr. Aurora Reid, gynecologist, for a preop exam prior to cervical surgery.     htn controlled.      dm2 with neuropathy:  a1c 7.7% on 10/8/2018  Sees endocrinology  On insulin pump  Not utd with eye dr     on medical senior living disability since April 2017.      Seeing pain management re: chronic lumbago.      Review of Systems      Review of Systems   Constitutional: Negative for fever and chills.   HENT: Negative for hearing loss and neck stiffness.    Eyes: Negative for redness and itching.   Respiratory: Negative for cough and choking.    Cardiovascular: Negative for chest pain and leg swelling.  Abdomen: Negative for abdominal pain and blood in stool.   Genitourinary: Negative for dysuria and flank pain.   Musculoskeletal: Negative for  gait problem.   Neurological: Negative for light-headedness and headaches.   Hematological: Negative for adenopathy.   Psychiatric/Behavioral: Negative for behavioral problems.     Objective:      Physical Exam   Constitutional: She appears well-developed.   HENT:   Head: Normocephalic and atraumatic.   Eyes: Conjunctivae are normal. Pupils are equal, round, and reactive to light.   Neck: Normal range of motion.   Cardiovascular: Normal rate and regular rhythm.   No murmur heard.  Pulmonary/Chest: Effort normal and breath sounds normal.   Lymphadenopathy:     She has no cervical adenopathy.       Assessment:       1. Preop examination    2. Cervix abnormality    3. Hypertension, unspecified type    4. Diabetes mellitus with neurological manifestations, uncontrolled        Plan:       Preop examination  -     CBC auto differential; Future; Expected date: 11/16/2018  -     Basic metabolic panel; Future; Expected date: 11/16/2018  -     EKG 12-lead; Future    Cervix abnormality    Hypertension, unspecified type    Diabetes mellitus with neurological  "manifestations, uncontrolled              Plan:  See orders  ekg-[id number] at 91 bpm  Pending results, will clear pt for surgery  Cont current meds         Medication List           Accurate as of 11/16/18  8:43 AM. If you have any questions, ask your nurse or doctor.               CONTINUE taking these medications    amLODIPine 10 MG tablet  Commonly known as:  NORVASC  Take 1 tablet (10 mg total) by mouth once daily.     BD ULTRA-FINE MINI PEN NEEDLE 31 gauge x 3/16" Ndle  Generic drug:  pen needle, diabetic  USE AS DIRECTED     * betamethasone dipropionate 0.05 % cream  Commonly known as:  DIPROLENE  Apply topically 2 (two) times daily.     * betamethasone dipropionate 0.05 % ointment  Commonly known as:  DIPROLENE  Apply topically 2 (two) times daily.     chlorthalidone 25 MG Tab  Commonly known as:  HYGROTEN  TAKE 1 TABLET BY MOUTH DAILY     citalopram 40 MG tablet  Commonly known as:  CELEXA     clobetasol 0.05 % external solution  Commonly known as:  TEMOVATE  Use on scalp one - two times daily as needed for scaling or itching     diphenhydrAMINE 25 mg capsule  Commonly known as:  BENADRYL     fluticasone 50 mcg/actuation nasal spray  Commonly known as:  FLONASE  2 sprays (100 mcg total) by Each Nare route once daily.     gabapentin 300 MG capsule  Commonly known as:  NEURONTIN  Take 1 capsule (300 mg total) by mouth 3 (three) times daily.     hydrOXYzine HCl 25 MG tablet  Commonly known as:  ATARAX  Take 1 tablet (25 mg total) by mouth every evening.     insulin lispro 100 unit/mL injection  Commonly known as:  HumaLOG U-100 Insulin  USE AS DIRECTED via insulin pump approx 150 UNITS EVERY DAY     ketoconazole 2 % shampoo  Commonly known as:  NIZORAL  Apply topically every 7 days.     losartan 100 MG tablet  Commonly known as:  COZAAR  TAKE 1 TABLET BY MOUTH DAILY     metoprolol tartrate 25 MG tablet  Commonly known as:  LOPRESSOR  TAKE 1 TABLET BY MOUTH TWICE DAILY     MICROLET 2 LANCING DEVICE Kit  Generic drug:  " lancing device with lancets  Use 4x/daily to check glucose.     MICROLET LANCET Misc  Generic drug:  lancets     pioglitazone 15 MG tablet  Commonly known as:  ACTOS  Take 1 tablet (15 mg total) by mouth once daily.     timolol maleate 0.5% 0.5 % Drop  Commonly known as:  TIMOPTIC  Place 1 drop into both eyes 2 (two) times daily.         * This list has 2 medication(s) that are the same as other medications prescribed for you. Read the directions carefully, and ask your doctor or other care provider to review them with you.

## 2018-11-18 DIAGNOSIS — N28.9 RENAL INSUFFICIENCY: Primary | ICD-10-CM

## 2018-11-19 NOTE — PROGRESS NOTES
inform pt via phone that I reviewed the test results and note the following:    Your cbc, including your white and red blood cell count, were all reviewed.  All results within acceptable range.     Your basic Metabolic Panel which includes Sodium, Potassium, Chloride, Calcium, Kidney Test, and blood sugar was reviewed. Shows worsening kidney fcn. Recommend that she drink 6-8 glasses of water per day. Recheck bmp in 1 week.

## 2018-12-05 NOTE — PROGRESS NOTES
Refill Authorization Note     is requesting a refill authorization.    Brief assessment and rationale for refill: DEFER: Citalopram (not discussed since 2016); APPROVE: Chlorthalidone  Amount/Quantity of medication ordered: 90d        Refills Authorized: Yes  If authorized number of refills: 0           Medication Therapy Plan: SCr slightly elevated, other labs WNL; BP stable; defer citalopram to you, depression/anxiety last discussed 10/16; approve 6 more on chlorthalidone  Name and strength of medication: CITALOPRAM HBR 40 MG TABLET/CHLORTHALIDONE 25 MG TABLET  How patient will take medication: utd  Medication reconciliation completed: No        Comments:   BP Readings from Last 3 Encounters:   11/16/18 122/72   10/19/18 122/70   08/30/18 130/68     Lab Results   Component Value Date    CREATININE 1.5 (H) 11/16/2018    BUN 26 (H) 11/16/2018     11/16/2018    K 3.7 11/16/2018     11/16/2018    CO2 30 (H) 11/16/2018

## 2018-12-06 NOTE — PROGRESS NOTES
Refill Authorization Note     is requesting a refill authorization.    Brief assessment and rationale for refill: DEFER: Citalopram (not discussed since 2016) Chlorthalidone (potential LEE, repeat labs ordered)  Amount/Quantity of medication ordered: 90d        Refills Authorized: Yes  If authorized number of refills: 0        Medication-related problems identified: Non-adherence - intentional  Medication Therapy Plan: SCr elevated, other labs WNL; BP stable; appears to be non-adherant ; defer citalopram to you, depression/anxiety last discussed 3/17, buspar not on med list; chlorthalidone last filled on 8/17 - recent lab with elevated Scr, caution for exacerbation, repeat lab ordered ; defer to you.   Name and strength of medication: CITALOPRAM HBR 40 MG TABLET/CHLORTHALIDONE 25 MG TABLET  How patient will take medication: utd  Medication reconciliation completed: No        Comments: See labs below

## 2018-12-06 NOTE — TELEPHONE ENCOUNTER
Please advise in the absence of Dr. Pitt    DEFER: Citalopram - depression/anxiety last discussed 3/17, pt complained it was not working and buspar was added for anxiety/panic attack but is no longer on med list (not taken) - has not been followed-up;   DEFER: Chlorthalidone last filled on 8/17 - non-adherence; recent lab with elevated Scr, caution for exacerbation, repeat lab ordered; defer to you on both.

## 2018-12-07 RX ORDER — CHLORTHALIDONE 25 MG/1
TABLET ORAL
Qty: 90 TABLET | Refills: 0 | OUTPATIENT
Start: 2018-12-07

## 2018-12-07 RX ORDER — CITALOPRAM 40 MG/1
TABLET, FILM COATED ORAL
Qty: 90 TABLET | Refills: 0 | OUTPATIENT
Start: 2018-12-07

## 2018-12-07 NOTE — TELEPHONE ENCOUNTER
Placed call to pt, no answer.  Left detailed message to call clinic and schedule appt with PAOLO Griffith. Call back number provided.

## 2019-01-02 RX ORDER — MOXIFLOXACIN 5 MG/ML
SOLUTION/ DROPS OPHTHALMIC
Qty: 3 ML | OUTPATIENT
Start: 2019-01-02

## 2019-01-04 NOTE — TELEPHONE ENCOUNTER
----- Message from Estella Pinto sent at 2019 10:49 AM CST -----  Contact: Patient  Patient stated that she went to have her citalopram (CELEXA) 40 MG tablet refilled and it is .  And she also thinks that she has a sinus infection and wanted to see if the doctor would call her something in for it.  Call Back#[phone]  Thanks     [facility] - [facility]  [address] [address]  [address] [address]  [place] [address]  Phone: [phone] Fax: [phone]

## 2019-01-06 RX ORDER — CITALOPRAM 40 MG/1
40 TABLET, FILM COATED ORAL DAILY
Qty: 90 TABLET | Refills: 1 | Status: SHIPPED | OUTPATIENT
Start: 2019-01-06 | End: 2020-03-09 | Stop reason: SDUPTHER

## 2019-01-08 ENCOUNTER — OFFICE VISIT (OUTPATIENT)
Dept: FAMILY MEDICINE | Facility: CLINIC | Age: 54
End: 2019-01-08
Payer: COMMERCIAL

## 2019-01-08 VITALS
HEART RATE: 98 BPM | WEIGHT: 223.75 LBS | HEIGHT: 56 IN | SYSTOLIC BLOOD PRESSURE: 144 MMHG | OXYGEN SATURATION: 97 % | BODY MASS INDEX: 50.33 KG/M2 | TEMPERATURE: 98 F | DIASTOLIC BLOOD PRESSURE: 88 MMHG

## 2019-01-08 DIAGNOSIS — Z79.4 TYPE 2 DIABETES MELLITUS WITH DIABETIC POLYNEUROPATHY, WITH LONG-TERM CURRENT USE OF INSULIN: ICD-10-CM

## 2019-01-08 DIAGNOSIS — J32.9 SINUSITIS, UNSPECIFIED CHRONICITY, UNSPECIFIED LOCATION: Primary | ICD-10-CM

## 2019-01-08 DIAGNOSIS — N18.30 CKD (CHRONIC KIDNEY DISEASE), STAGE III: ICD-10-CM

## 2019-01-08 DIAGNOSIS — E66.01 MORBID OBESITY: ICD-10-CM

## 2019-01-08 DIAGNOSIS — E11.42 TYPE 2 DIABETES MELLITUS WITH DIABETIC POLYNEUROPATHY, WITH LONG-TERM CURRENT USE OF INSULIN: ICD-10-CM

## 2019-01-08 PROCEDURE — 99214 OFFICE O/P EST MOD 30 MIN: CPT | Mod: S$GLB,,, | Performed by: NURSE PRACTITIONER

## 2019-01-08 PROCEDURE — 3079F PR MOST RECENT DIASTOLIC BLOOD PRESSURE 80-89 MM HG: ICD-10-PCS | Mod: CPTII,S$GLB,, | Performed by: NURSE PRACTITIONER

## 2019-01-08 PROCEDURE — 99999 PR PBB SHADOW E&M-EST. PATIENT-LVL III: CPT | Mod: PBBFAC,,, | Performed by: NURSE PRACTITIONER

## 2019-01-08 PROCEDURE — 3077F PR MOST RECENT SYSTOLIC BLOOD PRESSURE >= 140 MM HG: ICD-10-PCS | Mod: CPTII,S$GLB,, | Performed by: NURSE PRACTITIONER

## 2019-01-08 PROCEDURE — 3077F SYST BP >= 140 MM HG: CPT | Mod: CPTII,S$GLB,, | Performed by: NURSE PRACTITIONER

## 2019-01-08 PROCEDURE — 3079F DIAST BP 80-89 MM HG: CPT | Mod: CPTII,S$GLB,, | Performed by: NURSE PRACTITIONER

## 2019-01-08 PROCEDURE — 3045F PR MOST RECENT HEMOGLOBIN A1C LEVEL 7.0-9.0%: CPT | Mod: CPTII,S$GLB,, | Performed by: NURSE PRACTITIONER

## 2019-01-08 PROCEDURE — 3008F PR BODY MASS INDEX (BMI) DOCUMENTED: ICD-10-PCS | Mod: CPTII,S$GLB,, | Performed by: NURSE PRACTITIONER

## 2019-01-08 PROCEDURE — 3008F BODY MASS INDEX DOCD: CPT | Mod: CPTII,S$GLB,, | Performed by: NURSE PRACTITIONER

## 2019-01-08 PROCEDURE — 99999 PR PBB SHADOW E&M-EST. PATIENT-LVL III: ICD-10-PCS | Mod: PBBFAC,,, | Performed by: NURSE PRACTITIONER

## 2019-01-08 PROCEDURE — 99214 PR OFFICE/OUTPT VISIT, EST, LEVL IV, 30-39 MIN: ICD-10-PCS | Mod: S$GLB,,, | Performed by: NURSE PRACTITIONER

## 2019-01-08 PROCEDURE — 3045F PR MOST RECENT HEMOGLOBIN A1C LEVEL 7.0-9.0%: ICD-10-PCS | Mod: CPTII,S$GLB,, | Performed by: NURSE PRACTITIONER

## 2019-01-08 RX ORDER — DOXYCYCLINE HYCLATE 100 MG
100 TABLET ORAL EVERY 12 HOURS
Qty: 14 TABLET | Refills: 0 | Status: SHIPPED | OUTPATIENT
Start: 2019-01-08 | End: 2019-01-15

## 2019-01-08 NOTE — PROGRESS NOTES
Subjective:       Patient ID: Mine Quiros is a 53 y.o. female.    Chief Complaint: Cough (x 5 days) and Sinus Problem    Ms. Quiros is a known patient to me. She presents today for sinus issues    Sinusitis   This is a new problem. The current episode started 1 to 4 weeks ago. The problem has been waxing and waning since onset. Associated symptoms include congestion, coughing, ear pain, headaches and sinus pressure. Pertinent negatives include no diaphoresis or shortness of breath. (Dark green mucus ) Treatments tried: kayli seltzer cold and sinus. The treatment provided no relief.     Vitals:    01/08/19 1240   BP: (!) 144/88   Pulse: 98   Temp: 98.3 °F (36.8 °C)     Review of Systems   Constitutional: Negative for diaphoresis and fever.   HENT: Positive for congestion, ear pain, postnasal drip, sinus pressure and sinus pain. Negative for facial swelling and trouble swallowing.    Eyes: Negative for discharge and redness.   Respiratory: Positive for cough. Negative for shortness of breath.    Cardiovascular: Negative for chest pain and palpitations.   Gastrointestinal: Negative for diarrhea.   Genitourinary: Negative for difficulty urinating.   Musculoskeletal: Negative for gait problem.   Skin: Negative for pallor and rash.   Neurological: Positive for headaches. Negative for facial asymmetry and speech difficulty.   Psychiatric/Behavioral: Negative for confusion. The patient is not nervous/anxious.        Past Medical History:   Diagnosis Date    Abnormal stress test 10/2016    Acne     Acute diastolic heart failure secondary to idiopathic cardiomyopathy 10/2016    Allergy     Anxiety     Arthritis     Carpal tunnel syndrome of right wrist     bilateral    Cataract     Done OU    Chest pain     Diabetes mellitus     Type 2 IDDM - Uncontrolled    Diabetic neuropathy     Difficult intubation     needed glidescope for cholecystectomy 2009    Dry scalp     Fatty liver     GERD (gastroesophageal  reflux disease)     on no meds    Glaucoma     Hyperlipidemia     Hypertension     Insomnia     Knee pain     Morbid obesity     Neuropathy due to type 2 diabetes mellitus     anaya feet    MARLON (obstructive sleep apnea)     CPAP, says she is compliant with use    Parotid mass 2014    had Biopsy    SOB (shortness of breath) 10/2016    Tachycardia     frequently,chronic for years per chart     Objective:      Physical Exam   Constitutional: She is oriented to person, place, and time. She does not have a sickly appearance. No distress.   HENT:   Head: Normocephalic.   Right Ear: Hearing normal. Tympanic membrane is bulging.   Left Ear: Hearing normal. Tympanic membrane is bulging.   Nose: Mucosal edema present. Right sinus exhibits maxillary sinus tenderness. Left sinus exhibits maxillary sinus tenderness.   Mouth/Throat: No oropharyngeal exudate.       Eyes: Conjunctivae and lids are normal.   Neck: No JVD present. No tracheal deviation present.   Cardiovascular: Normal rate and normal heart sounds.   Pulmonary/Chest: Effort normal and breath sounds normal.   Abdominal: She exhibits no distension.   Musculoskeletal: She exhibits no deformity.   Neurological: She is alert and oriented to person, place, and time.   Skin: She is not diaphoretic. No pallor.   Psychiatric: She has a normal mood and affect. Her speech is normal and behavior is normal. Judgment and thought content normal. Cognition and memory are normal.   Nursing note and vitals reviewed.      Assessment:       1. Sinusitis, unspecified chronicity, unspecified location    2. Morbid obesity    3. Type 2 diabetes mellitus with diabetic polyneuropathy, with long-term current use of insulin    4. CKD (chronic kidney disease), stage III        Plan:       Sinusitis, unspecified chronicity, unspecified location  -     doxycycline (VIBRA-TABS) 100 MG tablet; Take 1 tablet (100 mg total) by mouth every 12 (twelve) hours. for 7 days  Dispense: 14 tablet;  "Refill: 0    Morbid obesity    Type 2 diabetes mellitus with diabetic polyneuropathy, with long-term current use of insulin    CKD (chronic kidney disease), stage III    antihistamine with decongestant prn   Educated on supportive care/return precautions   Follow-up for further evaluation if s/s worsen, fail to improve, or new symptoms arise.       Medication List           Accurate as of 1/8/19 12:54 PM. If you have any questions, ask your nurse or doctor.               START taking these medications    doxycycline 100 MG tablet  Commonly known as:  VIBRA-TABS  Take 1 tablet (100 mg total) by mouth every 12 (twelve) hours. for 7 days        CONTINUE taking these medications    amLODIPine 10 MG tablet  Commonly known as:  NORVASC  Take 1 tablet (10 mg total) by mouth once daily.     BD ULTRA-FINE MINI PEN NEEDLE 31 gauge x 3/16" Ndle  Generic drug:  pen needle, diabetic  USE AS DIRECTED     betamethasone dipropionate 0.05 % ointment  Commonly known as:  DIPROLENE  Apply topically 2 (two) times daily.     chlorthalidone 25 MG Tab  Commonly known as:  HYGROTEN  TAKE 1 TABLET BY MOUTH DAILY     citalopram 40 MG tablet  Commonly known as:  CELEXA  Take 1 tablet (40 mg total) by mouth once daily.     clobetasol 0.05 % external solution  Commonly known as:  TEMOVATE  Use on scalp one - two times daily as needed for scaling or itching     diphenhydrAMINE 25 mg capsule  Commonly known as:  BENADRYL     fluticasone 50 mcg/actuation nasal spray  Commonly known as:  FLONASE  2 sprays (100 mcg total) by Each Nare route once daily.     gabapentin 300 MG capsule  Commonly known as:  NEURONTIN  Take 1 capsule (300 mg total) by mouth 3 (three) times daily.     hydrOXYzine HCl 25 MG tablet  Commonly known as:  ATARAX  Take 1 tablet (25 mg total) by mouth every evening.     insulin lispro 100 unit/mL injection  Commonly known as:  HumaLOG U-100 Insulin  USE AS DIRECTED via insulin pump approx 150 UNITS EVERY DAY     ketoconazole 2 % " shampoo  Commonly known as:  NIZORAL  Apply topically every 7 days.     losartan 100 MG tablet  Commonly known as:  COZAAR  TAKE 1 TABLET BY MOUTH DAILY     metoprolol tartrate 25 MG tablet  Commonly known as:  LOPRESSOR  TAKE 1 TABLET BY MOUTH TWICE DAILY     MICROLET 2 LANCING DEVICE Kit  Generic drug:  lancing device with lancets  Use 4x/daily to check glucose.     MICROLET LANCET Misc  Generic drug:  lancets     pioglitazone 15 MG tablet  Commonly known as:  ACTOS  Take 1 tablet (15 mg total) by mouth once daily.     timolol maleate 0.5% 0.5 % Drop  Commonly known as:  TIMOPTIC  Place 1 drop into both eyes 2 (two) times daily.           Where to Get Your Medications      These medications were sent to [facility] - [facility] - [id number] [id number] [address] [address]    Phone:  [phone]   · doxycycline 100 MG tablet

## 2019-01-10 ENCOUNTER — OFFICE VISIT (OUTPATIENT)
Dept: DERMATOLOGY | Facility: CLINIC | Age: 54
End: 2019-01-10
Payer: COMMERCIAL

## 2019-01-10 DIAGNOSIS — L91.8 INFLAMED SKIN TAG: ICD-10-CM

## 2019-01-10 DIAGNOSIS — L28.0 LICHEN SIMPLEX CHRONICUS: Primary | ICD-10-CM

## 2019-01-10 DIAGNOSIS — L70.0 ACNE VULGARIS: ICD-10-CM

## 2019-01-10 PROCEDURE — 99213 PR OFFICE/OUTPT VISIT, EST, LEVL III, 20-29 MIN: ICD-10-PCS | Mod: S$GLB,,, | Performed by: DERMATOLOGY

## 2019-01-10 PROCEDURE — 99213 OFFICE O/P EST LOW 20 MIN: CPT | Mod: S$GLB,,, | Performed by: DERMATOLOGY

## 2019-01-10 PROCEDURE — 99999 PR PBB SHADOW E&M-EST. PATIENT-LVL II: ICD-10-PCS | Mod: PBBFAC,,, | Performed by: DERMATOLOGY

## 2019-01-10 PROCEDURE — 99999 PR PBB SHADOW E&M-EST. PATIENT-LVL II: CPT | Mod: PBBFAC,,, | Performed by: DERMATOLOGY

## 2019-01-10 RX ORDER — CLINDAMYCIN PHOSPHATE 10 MG/G
GEL TOPICAL DAILY
Qty: 30 G | Refills: 1 | Status: SHIPPED | OUTPATIENT
Start: 2019-01-10 | End: 2020-05-14

## 2019-01-10 RX ORDER — BETAMETHASONE DIPROPIONATE 0.5 MG/G
OINTMENT TOPICAL 2 TIMES DAILY
Qty: 45 G | Refills: 0 | Status: SHIPPED | OUTPATIENT
Start: 2019-01-10 | End: 2020-05-14

## 2019-01-10 RX ORDER — HYDROXYZINE HYDROCHLORIDE 25 MG/1
25 TABLET, FILM COATED ORAL NIGHTLY
Qty: 30 TABLET | Refills: 1 | Status: SHIPPED | OUTPATIENT
Start: 2019-01-10 | End: 2019-10-31

## 2019-01-10 NOTE — PROGRESS NOTES
Last OV with me 11/14/18.  52 yo F presents today for follow up evaluation of a rash that started approx 1 year ago that may have started after a mosquito bite.  She noticed improvement since last OV since using topical steroid (betamethasone ointment).  No improvement in acne    No h/o eczema as a child  Denies personal or family h/o skin cancer    Past Medical History:   Diagnosis Date    Abnormal stress test 10/2016    Acne     Acute diastolic heart failure secondary to idiopathic cardiomyopathy 10/2016    Allergy     Anxiety     Arthritis     Carpal tunnel syndrome of right wrist     bilateral    Cataract     Done OU    Chest pain     Diabetes mellitus     Type 2 IDDM - Uncontrolled    Diabetic neuropathy     Difficult intubation     needed glidescope for cholecystectomy 2009    Dry scalp     Fatty liver     GERD (gastroesophageal reflux disease)     on no meds    Glaucoma     Hyperlipidemia     Hypertension     Insomnia     Knee pain     Morbid obesity     Neuropathy due to type 2 diabetes mellitus     anaya feet    MARLON (obstructive sleep apnea)     CPAP, says she is compliant with use    Parotid mass 2014    had Biopsy    SOB (shortness of breath) 10/2016    Tachycardia     frequently,chronic for years per chart     Review of Systems   Skin: Positive for itching, rash, dry skin and tendency to form keloidal scars.   Hematologic/Lymphatic: Does not bruise/bleed easily.        Objective:    Physical Exam   Constitutional: She appears well-developed and well-nourished.   Neurological: She is alert and oriented to person, place, and time.   Psychiatric: She has a normal mood and affect.   Skin:   Areas Examined (abnormalities noted in diagram):   Scalp / Hair Palpated and Inspected  Head / Face Inspection Performed  Neck Inspection Performed  RLE Inspected  LLE Inspection Performed                   Diagram Legend     Erythematous scaling macule/papule c/w actinic keratosis        Vascular papule c/w angioma      Pigmented verrucoid papule/plaque c/w seborrheic keratosis      Yellow umbilicated papule c/w sebaceous hyperplasia      Irregularly shaped tan macule c/w lentigo     1-2 mm smooth white papules consistent with Milia      Movable subcutaneous cyst with punctum c/w epidermal inclusion cyst      Subcutaneous movable cyst c/w pilar cyst      Firm pink to brown papule c/w dermatofibroma      Pedunculated fleshy papule(s) c/w skin tag(s)      Evenly pigmented macule c/w junctional nevus     Mildly variegated pigmented, slightly irregular-bordered macule c/w mildly atypical nevus      Flesh colored to evenly pigmented papule c/w intradermal nevus       Pink pearly papule/plaque c/w basal cell carcinoma      Erythematous hyperkeratotic cursted plaque c/w SCC      Surgical scar with no sign of skin cancer recurrence      Open and closed comedones      Inflammatory papules and pustules      Verrucoid papule consistent consistent with wart     Erythematous eczematous patches and plaques     Dystrophic onycholytic nail with subungual debris c/w onychomycosis     Umbilicated papule    Erythematous-base heme-crusted tan verrucoid plaque consistent with inflamed seborrheic keratosis     Erythematous Silvery Scaling Plaque c/w Psoriasis     See annotation      Assessment / Plan:          Lichen simplex chronicus  Encouraged patient to continue to avoid scratching/rubbing as this can exacerbate the condition  Ice packs to help alleviate itch sensation  Refills sent today  -     betamethasone dipropionate (DIPROLENE) 0.05 % ointment; Apply topically 2 (two) times daily.  Dispense: 45 g; Refill: 0  -     hydrOXYzine HCl (ATARAX) 25 MG tablet; Take 1 tablet (25 mg total) by mouth every evening.  Dispense: 30 tablet; Refill: 1  Discussed drowsiness as potential SE of hydroxyzine    Acne vulgaris-comedonal and inflammatory  Differin OTC qhs  Clindamycin gel qam         Follow-up in about 2 months (around  3/10/2019).

## 2019-01-14 ENCOUNTER — TELEPHONE (OUTPATIENT)
Dept: FAMILY MEDICINE | Facility: CLINIC | Age: 54
End: 2019-01-14

## 2019-01-14 NOTE — TELEPHONE ENCOUNTER
----- Message from Suzanna Hernandez sent at 1/14/2019  2:40 PM CST -----  Contact: Kajal with Dr Jelena Rdz with Dr Bowles office called, she need a copy of patient medical clearance. Please fax to [phone].  Please call back at [phone] if any questions

## 2019-01-16 NOTE — TELEPHONE ENCOUNTER
Attempted to contact Doris at Dr Reid' office. LMOR 2nd attempt to notify pt's last preop was in 11/18. Left [facility] #. CLC

## 2019-01-16 NOTE — TELEPHONE ENCOUNTER
----- Message from Cristal Corbett sent at 1/16/2019 12:33 PM CST -----  Contact: Doris/Dr Reid Office  ..Type: Needs Medical Advice    Who Called: Doris/Dr Reid Office  Best Call Back Number:   Additional Information: Doris stated pt is checking in tomorrow at 6:30 am to have procedure.  Doris stated she need medical clearance ASAP, been trying to get clearance since Monday.  Please call to advise  Thanks

## 2019-01-23 ENCOUNTER — LAB VISIT (OUTPATIENT)
Dept: LAB | Facility: HOSPITAL | Age: 54
End: 2019-01-23
Attending: NURSE PRACTITIONER
Payer: COMMERCIAL

## 2019-01-23 DIAGNOSIS — E11.42 TYPE 2 DIABETES MELLITUS WITH DIABETIC POLYNEUROPATHY, WITH LONG-TERM CURRENT USE OF INSULIN: ICD-10-CM

## 2019-01-23 DIAGNOSIS — Z79.4 TYPE 2 DIABETES MELLITUS WITH DIABETIC POLYNEUROPATHY, WITH LONG-TERM CURRENT USE OF INSULIN: ICD-10-CM

## 2019-01-23 LAB
ESTIMATED AVG GLUCOSE: 217 MG/DL
HBA1C MFR BLD HPLC: 9.2 %

## 2019-01-23 PROCEDURE — 36415 COLL VENOUS BLD VENIPUNCTURE: CPT | Mod: PO

## 2019-01-23 PROCEDURE — 83036 HEMOGLOBIN GLYCOSYLATED A1C: CPT

## 2019-01-25 ENCOUNTER — TELEPHONE (OUTPATIENT)
Dept: FAMILY MEDICINE | Facility: CLINIC | Age: 54
End: 2019-01-25

## 2019-01-25 NOTE — TELEPHONE ENCOUNTER
----- Message from Veronica Gibson sent at 1/25/2019  9:51 AM CST -----  Contact: Ghislaine from [facility]  Type: Needs Medical Advice    Who Called: Ghislaine from [facility]  Best Call Back Number: [phone] the phone number has a confidential VM  Additional Information: Ghislaine called to confirm that the Dr. Pitt's office received a fax for coordination of care for the patient

## 2019-01-29 ENCOUNTER — OFFICE VISIT (OUTPATIENT)
Dept: ENDOCRINOLOGY | Facility: CLINIC | Age: 54
End: 2019-01-29
Payer: COMMERCIAL

## 2019-01-29 VITALS
SYSTOLIC BLOOD PRESSURE: 156 MMHG | RESPIRATION RATE: 18 BRPM | HEART RATE: 108 BPM | DIASTOLIC BLOOD PRESSURE: 86 MMHG | BODY MASS INDEX: 50.39 KG/M2 | HEIGHT: 56 IN | WEIGHT: 224 LBS

## 2019-01-29 DIAGNOSIS — N18.30 CKD (CHRONIC KIDNEY DISEASE) STAGE 3, GFR 30-59 ML/MIN: ICD-10-CM

## 2019-01-29 DIAGNOSIS — E78.5 HYPERLIPIDEMIA, UNSPECIFIED HYPERLIPIDEMIA TYPE: ICD-10-CM

## 2019-01-29 DIAGNOSIS — I10 HTN (HYPERTENSION), BENIGN: ICD-10-CM

## 2019-01-29 DIAGNOSIS — Z46.81 FITTING OR ADJUSTMENT OF INSULIN PUMP: ICD-10-CM

## 2019-01-29 DIAGNOSIS — Z96.41 INSULIN PUMP STATUS: Chronic | ICD-10-CM

## 2019-01-29 DIAGNOSIS — E11.42 TYPE 2 DIABETES MELLITUS WITH DIABETIC POLYNEUROPATHY, WITH LONG-TERM CURRENT USE OF INSULIN: Primary | ICD-10-CM

## 2019-01-29 DIAGNOSIS — Z79.4 TYPE 2 DIABETES MELLITUS WITH DIABETIC POLYNEUROPATHY, WITH LONG-TERM CURRENT USE OF INSULIN: Primary | ICD-10-CM

## 2019-01-29 DIAGNOSIS — F10.10 ETOH ABUSE: ICD-10-CM

## 2019-01-29 DIAGNOSIS — E66.01 MORBID OBESITY WITH BMI OF 45.0-49.9, ADULT: ICD-10-CM

## 2019-01-29 PROCEDURE — 99999 PR PBB SHADOW E&M-EST. PATIENT-LVL V: ICD-10-PCS | Mod: PBBFAC,,, | Performed by: NURSE PRACTITIONER

## 2019-01-29 PROCEDURE — 99215 OFFICE O/P EST HI 40 MIN: CPT | Mod: S$GLB,,, | Performed by: NURSE PRACTITIONER

## 2019-01-29 PROCEDURE — 99999 PR PBB SHADOW E&M-EST. PATIENT-LVL V: CPT | Mod: PBBFAC,,, | Performed by: NURSE PRACTITIONER

## 2019-01-29 PROCEDURE — 99215 PR OFFICE/OUTPT VISIT, EST, LEVL V, 40-54 MIN: ICD-10-PCS | Mod: S$GLB,,, | Performed by: NURSE PRACTITIONER

## 2019-01-29 NOTE — PROGRESS NOTES
ubjective:       Patient ID: Mine Quiros is a 53 y.o. female.    Chief Complaint: routine DM f/u     HPI   Pt is a 53 y.o. AAF with a long hx of very uncontrolled Type 2 DM-- as well as chronic conditions pending review including HTN, peripheral neuropathy, diastolic dysfunciton, morbid obesity- now on insulin pump.She has had multiple difficulties being able to navigate pump and having freq hypoglycemia. Medically disbabled she states due to neuropathy.     Interim Events: .  No acute events.  Continues to drink--Carmelina--1/2 gallon every 2-3 days.T  Pump download overall with dearth of data.  She has been counseled on need for Serial SBGM for both improved DM status and safety.  I have advised her once pump supplies need to renewed I will not sign off as she is not meeting criteria.  AGAIN--EXACT SAME NOTE FOR LAST SEVERAL VISITS. Do note her time is 12 hrs off, so she has been getting much more basal overnight than during the day.  No c/o hypoglycemia.    See todays' pump download. --media.                Review of Systems   Constitutional: Positive for fatigue. Negative for activity change.   HENT: Negative for hearing loss and trouble swallowing.    Eyes: Positive for visual disturbance. Negative for photophobia.        Last Eye Exam: recent--cataracts.  Nov 2018   Respiratory: Negative for cough and shortness of breath.    Cardiovascular: Negative for chest pain and palpitations.   Gastrointestinal: Negative for constipation and diarrhea.   Genitourinary: Negative for frequency and urgency.             Musculoskeletal: Positive for back pain. Negative for arthralgias and myalgias.   Skin: Negative for rash and wound.   Allergic/Immunologic: Negative for food allergies.   Neurological: Positive for numbness (hands and feet. ). Negative for weakness.   Psychiatric/Behavioral: Negative for sleep disturbance. The patient is not nervous/anxious.         Sleeps well with gabapentin.         Objective:       Physical Exam   Constitutional: She is oriented to person, place, and time. She appears well-developed and well-nourished.   Appears older than age, morbidly obese   HENT:   Head: Normocephalic and atraumatic.   Nose: Nose normal.   Mouth/Throat: Oropharynx is clear and moist.   Eyes: Conjunctivae and EOM are normal. Pupils are equal, round, and reactive to light.   Neck: Normal range of motion. Neck supple. No tracheal deviation present. No thyromegaly present.   Cardiovascular: Normal rate, regular rhythm, normal heart sounds and intact distal pulses.   Pulmonary/Chest: Effort normal and breath sounds normal.   Musculoskeletal: Normal range of motion. She exhibits no deformity.     Deferred:      Feet: no open wounds or calluses. Good pedal care. vibraorty sensation slightly decreased left  Virtually absent on R.        Neurological: She is alert and oriented to person, place, and time.   Skin: Skin is warm and dry.   Psychiatric: She has a normal mood and affect. Her behavior is normal. Judgment and thought content normal.       Hemoglobin A1C   Date Value Ref Range Status   01/23/2019 9.2 (H) 4.0 - 5.6 % Final     Comment:     ADA Screening Guidelines:  5.7-6.4%  Consistent with prediabetes  >or=6.5%  Consistent with diabetes  High levels of fetal hemoglobin interfere with the HbA1C  assay. Heterozygous hemoglobin variants (HbS, HgC, etc)do  not significantly interfere with this assay.   However, presence of multiple variants may affect accuracy.     10/08/2018 7.7 (H) 4.0 - 5.6 % Final     Comment:     ADA Screening Guidelines:  5.7-6.4%  Consistent with prediabetes  >or=6.5%  Consistent with diabetes  High levels of fetal hemoglobin interfere with the HbA1C  assay. Heterozygous hemoglobin variants (HbS, HgC, etc)do  not significantly interfere with this assay.   However, presence of multiple variants may affect accuracy.     07/10/2018 7.7 (H) 4.0 - 5.6 % Final     Comment:     ADA Screening  Guidelines:  5.7-6.4%  Consistent with prediabetes  >or=6.5%  Consistent with diabetes  High levels of fetal hemoglobin interfere with the HbA1C  assay. Heterozygous hemoglobin variants (HbS, HgC, etc)do  not significantly interfere with this assay.   However, presence of multiple variants may affect accuracy.         Chemistry        Component Value Date/Time     11/16/2018 0858    K 3.7 11/16/2018 0858     11/16/2018 0858    CO2 30 (H) 11/16/2018 0858    BUN 26 (H) 11/16/2018 0858    CREATININE 1.5 (H) 11/16/2018 0858     (H) 11/16/2018 0858        Component Value Date/Time    CALCIUM 9.7 11/16/2018 0858    ALKPHOS 86 03/27/2018 1019    AST 73 (H) 03/27/2018 1019    ALT 33 03/27/2018 1019    BILITOT 0.8 03/27/2018 1019    ESTGFRAFRICA 45.5 (A) 11/16/2018 0858    EGFRNONAA 39.5 (A) 11/16/2018 0858        Lab Results   Component Value Date    LDLCALC 131.0 07/10/2018     Lab Results   Component Value Date    TSH 1.484 03/27/2018     Component      Latest Ref Rng & Units 3/27/2018   Microalbum.,U,Random      ug/mL 43.0       Assessment:       1. Type 2 diabetes mellitus with diabetic polyneuropathy, with long-term current use of insulin  HChronic-uncontrolled -see plan     Hemoglobin A1c   2. Insulin pump status     3. Insulin long-term use     4. Fitting or adjustment of insulin pump     5. HTN (hypertension), benign  -chronic-stable-cont losartan 100 mg    6. Hyperlipidemia, unspecified hyperlipidemia type  -chronic-above goal--no statin--and with ETOH intake hesitant to add    7. Peripheral polyneuropathy  -crhonic-stable-monitor foot care    8. Morbid obesity with BMI of 50.0-59.9, adult  -psitvif-kdxpvu-zzifdyqsm.    9. Alcohol abuse  -crhronic-counseled on hypoglycemia risks-as well as empty calories.    10.  CKD stage 3--new dx--worsened--pt counseled on acute worsening--needs to improve both bp and glucoses.           Alcohol cessation.       Plan:         Pt counseled--ia has 150 christina  per oz--1/2 gal over 2-3 days =9600 calories!!! Also calculates to $220 t $300 month.   Not too mention other health concerns.   Pt also counseled her insurance may deny future pump supplies if she is not entering at least 4 glucose readings a day, and bolusing for meals.    AGAIN, warned. Verbalizes understanding.    Counseled on sudden worsening of renal function and eventual dialysis if no improvement.     Time adjusted on pump     ORDERS 01/29/2019     3 mo with  Bmp, pth vit d,  a1c urine/protein, ua

## 2019-01-31 NOTE — PROGRESS NOTES
Refill Authorization Note     is requesting a refill authorization.    Brief assessment and rationale for refill: DEFER: Non-adherent/ previous denied to f/u but not addressed yet  Amount/Quantity of medication ordered: 90d        Refills Authorized: No           Medication-related problems identified: Non-adherence - intentional  Medication Therapy Plan: HTN lco controlled by chuy OV, however BP elevated readings recently; Labs - Scr elevated 11/18, NTBS (BMP); previously denied by you as pt needs f/u appt but HTN not addressed with NP Nacho OV; pt last filled 8/18 per fill history  Name and strength of medication: CHLORTHALIDONE 25 MG TABLET  How patient will take medication: 1 po daily  Medication reconciliation completed: No        Comments:   BP Readings from Last 3 Encounters:   01/29/19 (!) 156/86   01/08/19 (!) 144/88   11/16/18 122/72     Lab Results   Component Value Date    CREATININE 1.5 (H) 11/16/2018    BUN 26 (H) 11/16/2018     11/16/2018    K 3.7 11/16/2018     11/16/2018    CO2 30 (H) 11/16/2018   Scr trend    1.5 Abnormally high   1.0  0.61 R 0.74 R 0.9  0.7  0.8

## 2019-01-31 NOTE — TELEPHONE ENCOUNTER
DEFER: Chlorthalidone 25mg     HTN lco controlled by chuy OV, however BP elevated readings recently; Labs - Scr elevated 11/18; previously denied by you as pt needs f/u appt but HTN not addressed with PAOLO WYNN; may be non-adherent as pt last fill history on 8/18.

## 2019-02-01 ENCOUNTER — TELEPHONE (OUTPATIENT)
Dept: FAMILY MEDICINE | Facility: CLINIC | Age: 54
End: 2019-02-01

## 2019-02-01 RX ORDER — CHLORTHALIDONE 25 MG/1
TABLET ORAL
Qty: 90 TABLET | Refills: 0 | Status: SHIPPED | OUTPATIENT
Start: 2019-02-01 | End: 2020-06-17

## 2019-02-01 NOTE — TELEPHONE ENCOUNTER
----- Message from Dwayne Lee sent at 2/1/2019  3:25 PM CST -----  Contact: Patient  Type:  RX Refill Request    Who Called:  Pateint  Refill or New Rx:  Refill  RX Name and Strength:  chlorthalidone (HYGROTEN) 25 MG Tab  How is the patient currently taking it? (ex. 1XDay):  x1 daily  Is this a 30 day or 90 day RX:  30  Preferred Pharmacy with phone number:    [facility] - [place] [address] [address]  Phone: [phone] Fax: [phone]  Local or Mail Order:  Local  Ordering Provider:  Yoandy Zavala Call Back Number:  [phone]

## 2019-03-01 RX ORDER — CHLORTHALIDONE 25 MG/1
TABLET ORAL
Qty: 90 TABLET | Refills: 0 | OUTPATIENT
Start: 2019-03-01

## 2019-03-08 ENCOUNTER — LAB VISIT (OUTPATIENT)
Dept: LAB | Facility: HOSPITAL | Age: 54
End: 2019-03-08
Attending: FAMILY MEDICINE
Payer: COMMERCIAL

## 2019-03-08 ENCOUNTER — OFFICE VISIT (OUTPATIENT)
Dept: FAMILY MEDICINE | Facility: CLINIC | Age: 54
End: 2019-03-08
Payer: COMMERCIAL

## 2019-03-08 VITALS
SYSTOLIC BLOOD PRESSURE: 132 MMHG | RESPIRATION RATE: 18 BRPM | HEIGHT: 56 IN | BODY MASS INDEX: 50.19 KG/M2 | HEART RATE: 89 BPM | OXYGEN SATURATION: 98 % | WEIGHT: 223.13 LBS | DIASTOLIC BLOOD PRESSURE: 88 MMHG

## 2019-03-08 DIAGNOSIS — E66.01 MORBID OBESITY: ICD-10-CM

## 2019-03-08 DIAGNOSIS — E11.42 TYPE 2 DIABETES MELLITUS WITH DIABETIC POLYNEUROPATHY, WITH LONG-TERM CURRENT USE OF INSULIN: Primary | ICD-10-CM

## 2019-03-08 DIAGNOSIS — N28.9 RENAL INSUFFICIENCY: ICD-10-CM

## 2019-03-08 DIAGNOSIS — Z79.4 TYPE 2 DIABETES MELLITUS WITH DIABETIC POLYNEUROPATHY, WITH LONG-TERM CURRENT USE OF INSULIN: Primary | ICD-10-CM

## 2019-03-08 DIAGNOSIS — N18.30 CKD (CHRONIC KIDNEY DISEASE), STAGE III: ICD-10-CM

## 2019-03-08 DIAGNOSIS — G62.9 PERIPHERAL POLYNEUROPATHY: ICD-10-CM

## 2019-03-08 LAB
ANION GAP SERPL CALC-SCNC: 10 MMOL/L
BUN SERPL-MCNC: 13 MG/DL
CALCIUM SERPL-MCNC: 10.2 MG/DL
CHLORIDE SERPL-SCNC: 100 MMOL/L
CO2 SERPL-SCNC: 26 MMOL/L
CREAT SERPL-MCNC: 0.8 MG/DL
EST. GFR  (AFRICAN AMERICAN): >60 ML/MIN/1.73 M^2
EST. GFR  (NON AFRICAN AMERICAN): >60 ML/MIN/1.73 M^2
GLUCOSE SERPL-MCNC: 137 MG/DL
POTASSIUM SERPL-SCNC: 3.6 MMOL/L
SODIUM SERPL-SCNC: 136 MMOL/L

## 2019-03-08 PROCEDURE — 3046F HEMOGLOBIN A1C LEVEL >9.0%: CPT | Mod: CPTII,S$GLB,, | Performed by: FAMILY MEDICINE

## 2019-03-08 PROCEDURE — 3079F DIAST BP 80-89 MM HG: CPT | Mod: CPTII,S$GLB,, | Performed by: FAMILY MEDICINE

## 2019-03-08 PROCEDURE — 3075F SYST BP GE 130 - 139MM HG: CPT | Mod: CPTII,S$GLB,, | Performed by: FAMILY MEDICINE

## 2019-03-08 PROCEDURE — 3008F BODY MASS INDEX DOCD: CPT | Mod: CPTII,S$GLB,, | Performed by: FAMILY MEDICINE

## 2019-03-08 PROCEDURE — 99214 PR OFFICE/OUTPT VISIT, EST, LEVL IV, 30-39 MIN: ICD-10-PCS | Mod: S$GLB,,, | Performed by: FAMILY MEDICINE

## 2019-03-08 PROCEDURE — 99999 PR PBB SHADOW E&M-EST. PATIENT-LVL III: CPT | Mod: PBBFAC,,, | Performed by: FAMILY MEDICINE

## 2019-03-08 PROCEDURE — 3075F PR MOST RECENT SYSTOLIC BLOOD PRESS GE 130-139MM HG: ICD-10-PCS | Mod: CPTII,S$GLB,, | Performed by: FAMILY MEDICINE

## 2019-03-08 PROCEDURE — 3046F PR MOST RECENT HEMOGLOBIN A1C LEVEL > 9.0%: ICD-10-PCS | Mod: CPTII,S$GLB,, | Performed by: FAMILY MEDICINE

## 2019-03-08 PROCEDURE — 36415 COLL VENOUS BLD VENIPUNCTURE: CPT | Mod: PO

## 2019-03-08 PROCEDURE — 80048 BASIC METABOLIC PNL TOTAL CA: CPT

## 2019-03-08 PROCEDURE — 99999 PR PBB SHADOW E&M-EST. PATIENT-LVL III: ICD-10-PCS | Mod: PBBFAC,,, | Performed by: FAMILY MEDICINE

## 2019-03-08 PROCEDURE — 99214 OFFICE O/P EST MOD 30 MIN: CPT | Mod: S$GLB,,, | Performed by: FAMILY MEDICINE

## 2019-03-08 PROCEDURE — 3008F PR BODY MASS INDEX (BMI) DOCUMENTED: ICD-10-PCS | Mod: CPTII,S$GLB,, | Performed by: FAMILY MEDICINE

## 2019-03-08 PROCEDURE — 3079F PR MOST RECENT DIASTOLIC BLOOD PRESSURE 80-89 MM HG: ICD-10-PCS | Mod: CPTII,S$GLB,, | Performed by: FAMILY MEDICINE

## 2019-03-13 ENCOUNTER — DOCUMENTATION ONLY (OUTPATIENT)
Dept: FAMILY MEDICINE | Facility: CLINIC | Age: 54
End: 2019-03-13

## 2019-03-18 ENCOUNTER — OFFICE VISIT (OUTPATIENT)
Dept: OPHTHALMOLOGY | Facility: CLINIC | Age: 54
End: 2019-03-18
Payer: COMMERCIAL

## 2019-03-18 DIAGNOSIS — H52.7 REFRACTIVE ERROR: ICD-10-CM

## 2019-03-18 DIAGNOSIS — E11.3392 MODERATE NONPROLIFERATIVE DIABETIC RETINOPATHY OF LEFT EYE WITHOUT MACULAR EDEMA ASSOCIATED WITH TYPE 2 DIABETES MELLITUS: ICD-10-CM

## 2019-03-18 DIAGNOSIS — H40.053 OCULAR HYPERTENSION, BILATERAL: Primary | ICD-10-CM

## 2019-03-18 PROCEDURE — 92014 COMPRE OPH EXAM EST PT 1/>: CPT | Mod: S$GLB,,, | Performed by: OPHTHALMOLOGY

## 2019-03-18 PROCEDURE — 99999 PR PBB SHADOW E&M-EST. PATIENT-LVL III: CPT | Mod: PBBFAC,,, | Performed by: OPHTHALMOLOGY

## 2019-03-18 PROCEDURE — 92014 PR EYE EXAM, EST PATIENT,COMPREHESV: ICD-10-PCS | Mod: S$GLB,,, | Performed by: OPHTHALMOLOGY

## 2019-03-18 PROCEDURE — 99999 PR PBB SHADOW E&M-EST. PATIENT-LVL III: ICD-10-PCS | Mod: PBBFAC,,, | Performed by: OPHTHALMOLOGY

## 2019-03-18 RX ORDER — TIMOLOL MALEATE 5 MG/ML
1 SOLUTION/ DROPS OPHTHALMIC 2 TIMES DAILY
Qty: 10 ML | Refills: 6 | Status: SHIPPED | OUTPATIENT
Start: 2019-03-18 | End: 2021-04-19 | Stop reason: SDUPTHER

## 2019-03-18 NOTE — PROGRESS NOTES
HPI     Diabetic Eye Exam      Additional comments: DM last A1C was 9.2 on 1/23/2019//              Comments     DM Hemoglobin A1C       Date                     Value               Ref Range             Status                01/23/2019               9.2 (H)             4.0 - 5.6 %           Final                      10/08/2018               7.7 (H)             4.0 - 5.6 %           Final                      07/10/2018               7.7 (H)             4.0 - 5.6 %           Final                  ----------  No blurred va// still ahs flash in OS//  No floaters in either eye//          Last edited by Marlena Crowe on 3/18/2019 10:51 AM. (History)        ROS     Negative for: Constitutional, Gastrointestinal, Neurological, Skin,   Genitourinary, Musculoskeletal, HENT, Endocrine, Cardiovascular, Eyes,   Respiratory, Psychiatric, Allergic/Imm, Heme/Lymph    Last edited by Isaac Boothe Jr., MD on 3/18/2019 11:48 AM. (History)        Assessment /Plan     For exam results, see Encounter Report.    Ocular hypertension, bilateral- patient admitted to stopping drops, IOP mildly elevated today  -     timolol maleate 0.5% (TIMOPTIC) 0.5 % Drop; Place 1 drop into both eyes 2 (two) times daily.  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  Refractive error- patient satisfied with OTC readers

## 2019-04-24 ENCOUNTER — LAB VISIT (OUTPATIENT)
Dept: LAB | Facility: HOSPITAL | Age: 54
End: 2019-04-24
Attending: NURSE PRACTITIONER
Payer: COMMERCIAL

## 2019-04-24 DIAGNOSIS — N18.30 CKD (CHRONIC KIDNEY DISEASE) STAGE 3, GFR 30-59 ML/MIN: ICD-10-CM

## 2019-04-24 LAB
BACTERIA #/AREA URNS HPF: ABNORMAL /HPF
BILIRUB UR QL STRIP: NEGATIVE
CLARITY UR: CLEAR
COLOR UR: YELLOW
GLUCOSE UR QL STRIP: NEGATIVE
HGB UR QL STRIP: ABNORMAL
KETONES UR QL STRIP: NEGATIVE
LEUKOCYTE ESTERASE UR QL STRIP: NEGATIVE
MICROSCOPIC COMMENT: ABNORMAL
NITRITE UR QL STRIP: NEGATIVE
PH UR STRIP: 6 [PH] (ref 5–8)
PROT UR QL STRIP: ABNORMAL
RBC #/AREA URNS HPF: 6 /HPF (ref 0–4)
SP GR UR STRIP: 1.02 (ref 1–1.03)
SQUAMOUS #/AREA URNS HPF: 4 /HPF
URN SPEC COLLECT METH UR: ABNORMAL
WBC #/AREA URNS HPF: 4 /HPF (ref 0–5)

## 2019-04-24 PROCEDURE — 81000 URINALYSIS NONAUTO W/SCOPE: CPT | Mod: PO

## 2019-05-01 ENCOUNTER — LAB VISIT (OUTPATIENT)
Dept: LAB | Facility: HOSPITAL | Age: 54
End: 2019-05-01
Attending: NURSE PRACTITIONER
Payer: COMMERCIAL

## 2019-05-01 ENCOUNTER — OFFICE VISIT (OUTPATIENT)
Dept: FAMILY MEDICINE | Facility: CLINIC | Age: 54
End: 2019-05-01
Payer: COMMERCIAL

## 2019-05-01 ENCOUNTER — OFFICE VISIT (OUTPATIENT)
Dept: ENDOCRINOLOGY | Facility: CLINIC | Age: 54
End: 2019-05-01
Payer: COMMERCIAL

## 2019-05-01 VITALS
WEIGHT: 221.31 LBS | SYSTOLIC BLOOD PRESSURE: 132 MMHG | BODY MASS INDEX: 49.78 KG/M2 | DIASTOLIC BLOOD PRESSURE: 74 MMHG | HEART RATE: 84 BPM | HEIGHT: 56 IN

## 2019-05-01 VITALS
SYSTOLIC BLOOD PRESSURE: 134 MMHG | HEIGHT: 56 IN | RESPIRATION RATE: 14 BRPM | BODY MASS INDEX: 49.78 KG/M2 | TEMPERATURE: 98 F | WEIGHT: 221.31 LBS | OXYGEN SATURATION: 97 % | DIASTOLIC BLOOD PRESSURE: 84 MMHG | HEART RATE: 91 BPM

## 2019-05-01 DIAGNOSIS — E66.01 MORBID OBESITY WITH BMI OF 45.0-49.9, ADULT: ICD-10-CM

## 2019-05-01 DIAGNOSIS — N30.01 ACUTE CYSTITIS WITH HEMATURIA: ICD-10-CM

## 2019-05-01 DIAGNOSIS — N18.30 CKD (CHRONIC KIDNEY DISEASE), STAGE III: ICD-10-CM

## 2019-05-01 DIAGNOSIS — E11.42 TYPE 2 DIABETES MELLITUS WITH DIABETIC POLYNEUROPATHY, WITH LONG-TERM CURRENT USE OF INSULIN: ICD-10-CM

## 2019-05-01 DIAGNOSIS — E55.9 VITAMIN D DEFICIENCY: ICD-10-CM

## 2019-05-01 DIAGNOSIS — Z79.4 TYPE 2 DIABETES MELLITUS WITH DIABETIC POLYNEUROPATHY, WITH LONG-TERM CURRENT USE OF INSULIN: ICD-10-CM

## 2019-05-01 DIAGNOSIS — E11.42 TYPE 2 DIABETES MELLITUS WITH DIABETIC POLYNEUROPATHY, WITH LONG-TERM CURRENT USE OF INSULIN: Primary | ICD-10-CM

## 2019-05-01 DIAGNOSIS — F10.10 ALCOHOL ABUSE: ICD-10-CM

## 2019-05-01 DIAGNOSIS — E78.5 HYPERLIPIDEMIA, UNSPECIFIED HYPERLIPIDEMIA TYPE: ICD-10-CM

## 2019-05-01 DIAGNOSIS — J32.9 SINUSITIS, UNSPECIFIED CHRONICITY, UNSPECIFIED LOCATION: Primary | ICD-10-CM

## 2019-05-01 DIAGNOSIS — I10 ESSENTIAL HYPERTENSION: ICD-10-CM

## 2019-05-01 DIAGNOSIS — I10 HYPERTENSION, UNSPECIFIED TYPE: ICD-10-CM

## 2019-05-01 DIAGNOSIS — E11.42 DIABETIC POLYNEUROPATHY ASSOCIATED WITH TYPE 2 DIABETES MELLITUS: Primary | ICD-10-CM

## 2019-05-01 DIAGNOSIS — Z79.4 TYPE 2 DIABETES MELLITUS WITH DIABETIC POLYNEUROPATHY, WITH LONG-TERM CURRENT USE OF INSULIN: Primary | ICD-10-CM

## 2019-05-01 LAB
ESTIMATED AVG GLUCOSE: 200 MG/DL (ref 68–131)
HBA1C MFR BLD HPLC: 8.6 % (ref 4–5.6)

## 2019-05-01 PROCEDURE — 3008F PR BODY MASS INDEX (BMI) DOCUMENTED: ICD-10-PCS | Mod: CPTII,S$GLB,, | Performed by: PHYSICIAN ASSISTANT

## 2019-05-01 PROCEDURE — 99999 PR PBB SHADOW E&M-EST. PATIENT-LVL III: CPT | Mod: PBBFAC,,, | Performed by: PHYSICIAN ASSISTANT

## 2019-05-01 PROCEDURE — 99214 PR OFFICE/OUTPT VISIT, EST, LEVL IV, 30-39 MIN: ICD-10-PCS | Mod: S$GLB,,, | Performed by: PHYSICIAN ASSISTANT

## 2019-05-01 PROCEDURE — 99999 PR PBB SHADOW E&M-EST. PATIENT-LVL III: ICD-10-PCS | Mod: PBBFAC,,, | Performed by: PHYSICIAN ASSISTANT

## 2019-05-01 PROCEDURE — 3079F DIAST BP 80-89 MM HG: CPT | Mod: CPTII,S$GLB,, | Performed by: PHYSICIAN ASSISTANT

## 2019-05-01 PROCEDURE — 3008F BODY MASS INDEX DOCD: CPT | Mod: CPTII,S$GLB,, | Performed by: PHYSICIAN ASSISTANT

## 2019-05-01 PROCEDURE — 3075F SYST BP GE 130 - 139MM HG: CPT | Mod: CPTII,S$GLB,, | Performed by: PHYSICIAN ASSISTANT

## 2019-05-01 PROCEDURE — 3045F PR MOST RECENT HEMOGLOBIN A1C LEVEL 7.0-9.0%: ICD-10-PCS | Mod: CPTII,S$GLB,, | Performed by: PHYSICIAN ASSISTANT

## 2019-05-01 PROCEDURE — 3045F PR MOST RECENT HEMOGLOBIN A1C LEVEL 7.0-9.0%: CPT | Mod: CPTII,S$GLB,, | Performed by: PHYSICIAN ASSISTANT

## 2019-05-01 PROCEDURE — 3079F PR MOST RECENT DIASTOLIC BLOOD PRESSURE 80-89 MM HG: ICD-10-PCS | Mod: CPTII,S$GLB,, | Performed by: PHYSICIAN ASSISTANT

## 2019-05-01 PROCEDURE — 36415 COLL VENOUS BLD VENIPUNCTURE: CPT | Mod: PO

## 2019-05-01 PROCEDURE — 99999 PR PBB SHADOW E&M-EST. PATIENT-LVL IV: CPT | Mod: PBBFAC,,, | Performed by: NURSE PRACTITIONER

## 2019-05-01 PROCEDURE — 99215 OFFICE O/P EST HI 40 MIN: CPT | Mod: S$GLB,,, | Performed by: NURSE PRACTITIONER

## 2019-05-01 PROCEDURE — 99999 PR PBB SHADOW E&M-EST. PATIENT-LVL IV: ICD-10-PCS | Mod: PBBFAC,,, | Performed by: NURSE PRACTITIONER

## 2019-05-01 PROCEDURE — 3075F PR MOST RECENT SYSTOLIC BLOOD PRESS GE 130-139MM HG: ICD-10-PCS | Mod: CPTII,S$GLB,, | Performed by: PHYSICIAN ASSISTANT

## 2019-05-01 PROCEDURE — 99215 PR OFFICE/OUTPT VISIT, EST, LEVL V, 40-54 MIN: ICD-10-PCS | Mod: S$GLB,,, | Performed by: NURSE PRACTITIONER

## 2019-05-01 PROCEDURE — 83036 HEMOGLOBIN GLYCOSYLATED A1C: CPT

## 2019-05-01 PROCEDURE — 99214 OFFICE O/P EST MOD 30 MIN: CPT | Mod: S$GLB,,, | Performed by: PHYSICIAN ASSISTANT

## 2019-05-01 RX ORDER — ERGOCALCIFEROL 1.25 MG/1
50000 CAPSULE ORAL
Qty: 12 CAPSULE | Refills: 0 | Status: SHIPPED | OUTPATIENT
Start: 2019-05-01 | End: 2019-10-31

## 2019-05-01 RX ORDER — FLUTICASONE PROPIONATE 50 MCG
2 SPRAY, SUSPENSION (ML) NASAL DAILY
Qty: 16 G | Refills: 1 | Status: SHIPPED | OUTPATIENT
Start: 2019-05-01 | End: 2021-10-07 | Stop reason: SDUPTHER

## 2019-05-01 RX ORDER — AMOXICILLIN AND CLAVULANATE POTASSIUM 875; 125 MG/1; MG/1
1 TABLET, FILM COATED ORAL EVERY 12 HOURS
Qty: 14 TABLET | Refills: 0 | Status: SHIPPED | OUTPATIENT
Start: 2019-05-01 | End: 2019-05-08

## 2019-05-01 NOTE — PROGRESS NOTES
ubjective:       Patient ID: Mine Quiros is a 53 y.o. female.    Chief Complaint: routine DM f/u     HPI   Pt is a 53 y.o. AAF with a long hx of very uncontrolled Type 2 DM-- as well as chronic conditions pending review including HTN, peripheral neuropathy, diastolic dysfunciton, morbid obesity- now on insulin pump.She has had multiple difficulties being able to navigate pump and having freq hypoglycemia. Medically disbabled she states due to neuropathy.     Interim Events: .  No acute events.  Continues to drink--Carmelina--1/2 gallon every 2-3 days.---States hasn't drank last week or so r/t cold and sinuses. Insulin   Pump download overall with dearth of data.  She has been counseled on need for Serial SBGM for both improved DM status and safety.  I have advised her once pump supplies need to renewed I will not sign off as she is not meeting criteria.  AGAIN--EXACT SAME NOTE FOR LAST SEVERAL VISITS.  No c/o hypoglycemia. Seeing Family medicine for cold/allergy symptoms     See todays' pump download. --media.          Review of Systems   Constitutional: Positive for fatigue. Negative for activity change.   HENT: Negative for hearing loss and trouble swallowing.    Eyes: Negative for photophobia and visual disturbance.        Last Eye Exam: recent--cataracts.  Nov 2018   Respiratory: Negative for cough and shortness of breath.    Cardiovascular: Negative for chest pain and palpitations.   Gastrointestinal: Negative for constipation and diarrhea.   Genitourinary: Negative for frequency and urgency.        Urinary discomfort-suspects yeast   Musculoskeletal: Positive for back pain. Negative for arthralgias and myalgias.   Skin: Negative for rash and wound.   Allergic/Immunologic: Negative for food allergies.   Neurological: Positive for numbness (hands and feet. ). Negative for weakness.   Psychiatric/Behavioral: Negative for sleep disturbance. The patient is not nervous/anxious.         Sleeps well with  gabapentin.         Objective:      Physical Exam   Constitutional: She is oriented to person, place, and time. She appears well-developed and well-nourished.   Appears older than age, morbidly obese   HENT:   Head: Normocephalic and atraumatic.   Nose: Nose normal.   Mouth/Throat: Oropharynx is clear and moist.   Eyes: Pupils are equal, round, and reactive to light. Conjunctivae and EOM are normal.   Neck: Normal range of motion. Neck supple. No tracheal deviation present. No thyromegaly present.   Cardiovascular: Normal rate, regular rhythm, normal heart sounds and intact distal pulses.   Pulmonary/Chest: Effort normal and breath sounds normal.   Musculoskeletal: Normal range of motion. She exhibits no deformity.     Deferred:      Feet: no open wounds or calluses. Good pedal care. vibraorty sensation slightly decreased left  Virtually absent on R.        Neurological: She is alert and oriented to person, place, and time.   Skin: Skin is warm and dry.   Psychiatric: She has a normal mood and affect. Her behavior is normal. Judgment and thought content normal.       Hemoglobin A1C   Date Value Ref Range Status   01/23/2019 9.2 (H) 4.0 - 5.6 % Final     Comment:     ADA Screening Guidelines:  5.7-6.4%  Consistent with prediabetes  >or=6.5%  Consistent with diabetes  High levels of fetal hemoglobin interfere with the HbA1C  assay. Heterozygous hemoglobin variants (HbS, HgC, etc)do  not significantly interfere with this assay.   However, presence of multiple variants may affect accuracy.     10/08/2018 7.7 (H) 4.0 - 5.6 % Final     Comment:     ADA Screening Guidelines:  5.7-6.4%  Consistent with prediabetes  >or=6.5%  Consistent with diabetes  High levels of fetal hemoglobin interfere with the HbA1C  assay. Heterozygous hemoglobin variants (HbS, HgC, etc)do  not significantly interfere with this assay.   However, presence of multiple variants may affect accuracy.     07/10/2018 7.7 (H) 4.0 - 5.6 % Final     Comment:      ADA Screening Guidelines:  5.7-6.4%  Consistent with prediabetes  >or=6.5%  Consistent with diabetes  High levels of fetal hemoglobin interfere with the HbA1C  assay. Heterozygous hemoglobin variants (HbS, HgC, etc)do  not significantly interfere with this assay.   However, presence of multiple variants may affect accuracy.         Chemistry        Component Value Date/Time     04/24/2019 1158    K 3.6 04/24/2019 1158    CL 99 04/24/2019 1158    CO2 28 04/24/2019 1158    BUN 13 04/24/2019 1158    CREATININE 0.9 04/24/2019 1158     (H) 04/24/2019 1158        Component Value Date/Time    CALCIUM 9.9 04/24/2019 1158    ALKPHOS 86 03/27/2018 1019    AST 73 (H) 03/27/2018 1019    ALT 33 03/27/2018 1019    BILITOT 0.8 03/27/2018 1019    ESTGFRAFRICA >60.0 04/24/2019 1158    EGFRNONAA >60.0 04/24/2019 1158        Lab Results   Component Value Date    LDLCALC 131.0 07/10/2018     Lab Results   Component Value Date    TSH 1.484 03/27/2018     Component      Latest Ref Rng & Units 3/27/2018   Microalbum.,U,Random      ug/mL 43.0       Assessment:       1. Type 2 diabetes mellitus with diabetic polyneuropathy, with long-term current use of insulin  HChronic-uncontrolled -see plan     Hemoglobin A1c   2. Insulin pump status     3. Insulin long-term use     4. Fitting or adjustment of insulin pump     5. HTN (hypertension), benign  -chronic-stable-cont losartan 100 mg    6. Hyperlipidemia, unspecified hyperlipidemia type  -chronic-above goal--no statin--and with ETOH intake hesitant to add    7. Peripheral polyneuropathy  -crhonic-stable-monitor foot care    8. Morbid obesity with BMI of 50.0-59.9, adult  -nvxfxsj-mgunwh-xzbupwqbs.    9. Alcohol abuse  -crhronic-counseled on hypoglycemia risks-as well as empty calories.    10.  CKD stage 3--new dx--worsened--pt counseled on acute worsening--needs to improve both bp and glucoses.           Alcohol cessation.       1. Type 2 diabetes mellitus with diabetic  polyneuropathy, with long-term current use of insulin  Hemoglobin A1c  Chronic-uncontrolled-see plan    2. Morbid obesity with BMI of 45.0-49.9, adult  Chronic-needs to stop ETOH    3. CKD (chronic kidney disease), stage III  -chronic-stable-monitor    4. Hypertension, unspecified type  chronic-stable-cont losartan 100  Mg    5. Hyperlipidemia, unspecified hyperlipidemia type  Chronic-no statin and hesitant to add with ETOH    6. Acute cystitis with hematuria  Urinalysis    Urine culture  New dx-will recheck since week old,--denies allergies to abx   7. Vitamin D deficiency  ergocalciferol (ERGOCALCIFEROL) 50,000 unit Cap  New dx.    8. Alcohol abuse           Plan:         Pt counseled--ai has 150 christina per oz--1/2 gal over 2-3 days =9600 calories!!! Also calculates to $220 t $300 month.   Not too mention other health concerns.   Pt also counseled her insurance may deny future pump supplies if she is not entering at least 4 glucose readings a day, and bolusing for meals.    AGAIN, warned. Verbalizes understanding.    Counseled on sudden worsening of renal function and eventual dialysis if no improvement.     Time adjusted on pump --1 hr off.     ORDERS 05/01/2019     Today--a1c,  UA, CS     3 mo with a1c. Vit d.

## 2019-05-01 NOTE — PROGRESS NOTES
"Subjective:      Patient ID: Mine Quiros is a 53 y.o. female.    Chief Complaint: Cough; Sore Throat; and Sinus Problem  Patient is new to me.    HPI   Patient has had the above symptoms for a week, but feels a little improved today.  Coughing at night and all day.  Taking cough drops and kayli-seltzer with little relief.  Used Flonase with relief.  Patient mowed yard on Saturday.    Not taking blood sugars currently.  Patient states she is taking the insulin.  Lab Results   Component Value Date    HGBA1C 8.6 (H) 05/01/2019     Review of Systems   Constitutional: Positive for appetite change and fatigue. Negative for chills and fever.   HENT: Positive for congestion, rhinorrhea, sinus pressure, sinus pain and sore throat (itchy). Negative for ear pain.    Eyes: Negative for pain.   Respiratory: Positive for cough (dry). Negative for shortness of breath.    Cardiovascular: Negative for chest pain.   Gastrointestinal: Negative for abdominal pain, constipation, diarrhea, nausea and vomiting.   Musculoskeletal: Positive for back pain (baseline). Negative for myalgias.   Skin: Negative for rash.   Neurological: Positive for dizziness and headaches. Negative for light-headedness.       Objective:   /84   Pulse 91   Temp 97.9 °F (36.6 °C)   Resp 14   Ht 4' 8" (1.422 m)   Wt 100.4 kg (221 lb 5.5 oz)   LMP 08/31/2007   SpO2 97%   BMI 49.62 kg/m²      Physical Exam   Constitutional: She is oriented to person, place, and time. Vital signs are normal. She appears well-developed and well-nourished. She is active and cooperative. No distress.   HENT:   Head: Normocephalic and atraumatic.   Right Ear: Hearing, tympanic membrane, external ear and ear canal normal.   Left Ear: Hearing, tympanic membrane, external ear and ear canal normal.   Nose: Right sinus exhibits frontal sinus tenderness. Left sinus exhibits frontal sinus tenderness.   Mouth/Throat: Uvula is midline, oropharynx is clear and moist and mucous " membranes are normal. No tonsillar exudate.   Eyes: Conjunctivae and lids are normal.   Neck: Normal range of motion and phonation normal. Neck supple.   Cardiovascular: Normal rate, regular rhythm and normal heart sounds. Exam reveals no gallop and no friction rub.   No murmur heard.  Pulmonary/Chest: Effort normal and breath sounds normal. No stridor. No respiratory distress. She has no decreased breath sounds. She has no wheezes. She has no rhonchi. She has no rales.   Musculoskeletal: Normal range of motion.   Lymphadenopathy:        Head (right side): No submental, no submandibular, no tonsillar, no preauricular, no posterior auricular and no occipital adenopathy present.        Head (left side): No submental, no submandibular, no tonsillar, no preauricular, no posterior auricular and no occipital adenopathy present.     She has no cervical adenopathy.   Neurological: She is alert and oriented to person, place, and time.   Skin: Skin is warm, dry and intact. No rash noted.   Psychiatric: She has a normal mood and affect. Her speech is normal and behavior is normal. Judgment and thought content normal. Cognition and memory are normal.   Vitals reviewed.     Assessment:      1. Sinusitis, unspecified chronicity, unspecified location    2. Essential hypertension    3. CKD (chronic kidney disease), stage III    4. Type 2 diabetes mellitus with diabetic polyneuropathy, with long-term current use of insulin    5. Morbid obesity with BMI of 45.0-49.9, adult       Plan:   1. Sinusitis, unspecified chronicity, unspecified location  Rest, increase fluids, nasal saline rinse, and warm saltwater gargles for throat.  - fluticasone propionate (FLONASE) 50 mcg/actuation nasal spray; 2 sprays (100 mcg total) by Each Nare route once daily.  Dispense: 16 g; Refill: 1  - amoxicillin-clavulanate 875-125mg (AUGMENTIN) 875-125 mg per tablet; Take 1 tablet by mouth every 12 (twelve) hours. for 7 days  Dispense: 14 tablet; Refill:  0    2. Essential hypertension  Controlled on current medications.    3. CKD (chronic kidney disease), stage III  Stable.    4. Type 2 diabetes mellitus with diabetic polyneuropathy, with long-term current use of insulin  Monitor blood sugar daily and limit carbs and sugar in diet.    5. Morbid obesity with BMI of 45.0-49.9, adult  Discussed diet and lifestyle modifications.    Follow up in 8 months with Dr. Pitt.  Patient agreed with plan and expressed understanding.

## 2019-05-01 NOTE — PATIENT INSTRUCTIONS
Continue Flonase daily.    Start Augmentin today twice a day for 7 days.    Rest, increase fluids, nasal saline rinse, and warm saltwater gargles for throat.    Thanks for seeing me,  Laura Goins PA-C

## 2019-05-03 ENCOUNTER — PATIENT MESSAGE (OUTPATIENT)
Dept: ENDOCRINOLOGY | Facility: CLINIC | Age: 54
End: 2019-05-03

## 2019-06-25 DIAGNOSIS — Z12.11 COLON CANCER SCREENING: ICD-10-CM

## 2019-07-12 DIAGNOSIS — E11.9 TYPE 2 DIABETES MELLITUS WITHOUT COMPLICATION: ICD-10-CM

## 2019-07-18 ENCOUNTER — PATIENT OUTREACH (OUTPATIENT)
Dept: ADMINISTRATIVE | Facility: HOSPITAL | Age: 54
End: 2019-07-18

## 2019-07-18 NOTE — LETTER
July 24, 2019    Mine Quiros  [address]  [place] [address]             [facility]  [address]  [facility] [address]  Phone: [phone] Dear Mr. Quiros:    We have tried to reach you by mychart unsuccessfully.    [facility] is committed to your overall health and would like to ensure that you are up to date on your recommended test and/or procedures.   Hai Pitt MD has found that your chart shows you may be due for the following:     Colonoscopy   Diabetic lab testing   Foot Exam     Future Appointments   7/26/2019  Fasting  LAB, [facility]               8/1/2019   10:00 AM   XOCHILT Baumann,AN* [facility] ENDOCRN   [facility]     If you have had any of the above done at another facility, please let us know so that we may obtain copies from that facility.  If you have a copy of these records, please provide a copy for us to scan into your chart.  You are welcome to request that the report be faxed to us at  ([phone]).       If you have an upcoming scheduled appointment for the above test and/or procedures, please disregard this letter. Otherwise, please schedule these appointments at your earliest convenience by calling [phone] or going to [email or web address].       Thank you for letting us care for you,         Katerina Yates, Care Coordinator   [facility]   Phone: [phone]   Fax: [phone]    If you have any questions or concerns, please don't hesitate to call.

## 2019-07-18 NOTE — PROGRESS NOTES
Health Maintenance Due   Topic Date Due    Low Dose Statin  09/18/1986    Shingles Vaccine (1 of 2) 09/18/2015    Colonoscopy  09/18/2015    Lipid Panel  07/10/2019    Foot Exam  07/17/2019     Chart review completed 07/18/2019  Future Appointments   Date Time Provider Department Center   7/26/2019 10:10 AM LAB, [facility]   8/1/2019 10:00 AM Regina Solorio APRN,ANP-C [facility] ENDOCRN [facility]

## 2019-07-26 ENCOUNTER — LAB VISIT (OUTPATIENT)
Dept: LAB | Facility: HOSPITAL | Age: 54
End: 2019-07-26
Attending: NURSE PRACTITIONER
Payer: COMMERCIAL

## 2019-07-26 DIAGNOSIS — E11.9 TYPE 2 DIABETES MELLITUS WITHOUT COMPLICATION: ICD-10-CM

## 2019-07-26 DIAGNOSIS — E11.42 DIABETIC POLYNEUROPATHY ASSOCIATED WITH TYPE 2 DIABETES MELLITUS: ICD-10-CM

## 2019-07-26 LAB
CHOLEST SERPL-MCNC: 206 MG/DL (ref 120–199)
CHOLEST/HDLC SERPL: 4.2 {RATIO} (ref 2–5)
ESTIMATED AVG GLUCOSE: 203 MG/DL (ref 68–131)
HBA1C MFR BLD HPLC: 8.7 % (ref 4–5.6)
HDLC SERPL-MCNC: 49 MG/DL (ref 40–75)
HDLC SERPL: 23.8 % (ref 20–50)
LDLC SERPL CALC-MCNC: 135.8 MG/DL (ref 63–159)
NONHDLC SERPL-MCNC: 157 MG/DL
TRIGL SERPL-MCNC: 106 MG/DL (ref 30–150)

## 2019-07-26 PROCEDURE — 36415 COLL VENOUS BLD VENIPUNCTURE: CPT | Mod: PO

## 2019-07-26 PROCEDURE — 80061 LIPID PANEL: CPT

## 2019-07-26 PROCEDURE — 83036 HEMOGLOBIN GLYCOSYLATED A1C: CPT

## 2019-07-31 ENCOUNTER — TELEPHONE (OUTPATIENT)
Dept: ENDOCRINOLOGY | Facility: CLINIC | Age: 54
End: 2019-07-31

## 2019-07-31 NOTE — TELEPHONE ENCOUNTER
"Pt advised no later appts tomorrow  "I will try to make it for 10:00"  Advised to call an hour before appt if she cannot make it  "

## 2019-07-31 NOTE — TELEPHONE ENCOUNTER
----- Message from Mary Deshpande sent at 7/31/2019  3:19 PM CDT -----  Contact: pt  Pt is calling would like a later appointment tomorrow 10:30 or later,please..[phone] or [phone]

## 2019-08-01 ENCOUNTER — OFFICE VISIT (OUTPATIENT)
Dept: ENDOCRINOLOGY | Facility: CLINIC | Age: 54
End: 2019-08-01
Payer: COMMERCIAL

## 2019-08-01 VITALS
BODY MASS INDEX: 50.16 KG/M2 | SYSTOLIC BLOOD PRESSURE: 144 MMHG | HEIGHT: 56 IN | WEIGHT: 223 LBS | DIASTOLIC BLOOD PRESSURE: 90 MMHG | HEART RATE: 98 BPM

## 2019-08-01 DIAGNOSIS — I10 HTN (HYPERTENSION), BENIGN: ICD-10-CM

## 2019-08-01 DIAGNOSIS — Z96.41 INSULIN PUMP STATUS: ICD-10-CM

## 2019-08-01 DIAGNOSIS — F10.10 ETOH ABUSE: ICD-10-CM

## 2019-08-01 DIAGNOSIS — E11.42 TYPE 2 DIABETES MELLITUS WITH DIABETIC POLYNEUROPATHY, WITH LONG-TERM CURRENT USE OF INSULIN: Primary | ICD-10-CM

## 2019-08-01 DIAGNOSIS — Z79.4 TYPE 2 DIABETES MELLITUS WITH DIABETIC POLYNEUROPATHY, WITH LONG-TERM CURRENT USE OF INSULIN: Primary | ICD-10-CM

## 2019-08-01 DIAGNOSIS — E55.9 VITAMIN D DEFICIENCY: ICD-10-CM

## 2019-08-01 DIAGNOSIS — Z91.148 RECOMMENDED MEDICATION SCHEDULE NOT FOLLOWED: ICD-10-CM

## 2019-08-01 DIAGNOSIS — E78.5 HYPERLIPIDEMIA, UNSPECIFIED HYPERLIPIDEMIA TYPE: ICD-10-CM

## 2019-08-01 DIAGNOSIS — N18.30 CKD (CHRONIC KIDNEY DISEASE), STAGE III: ICD-10-CM

## 2019-08-01 PROCEDURE — 99214 OFFICE O/P EST MOD 30 MIN: CPT | Mod: S$GLB,,, | Performed by: NURSE PRACTITIONER

## 2019-08-01 PROCEDURE — 99214 PR OFFICE/OUTPT VISIT, EST, LEVL IV, 30-39 MIN: ICD-10-PCS | Mod: S$GLB,,, | Performed by: NURSE PRACTITIONER

## 2019-08-01 PROCEDURE — 99999 PR PBB SHADOW E&M-EST. PATIENT-LVL V: ICD-10-PCS | Mod: PBBFAC,,, | Performed by: NURSE PRACTITIONER

## 2019-08-01 PROCEDURE — 99999 PR PBB SHADOW E&M-EST. PATIENT-LVL V: CPT | Mod: PBBFAC,,, | Performed by: NURSE PRACTITIONER

## 2019-08-01 NOTE — PROGRESS NOTES
ubjective:       Patient ID: Mine Quiros is a 53 y.o. female.    Chief Complaint: routine DM f/u     HPI   Pt is a 53 y.o. AAF with a long hx of very uncontrolled Type 2 DM-- as well as chronic conditions pending review including HTN, peripheral neuropathy, diastolic dysfunciton, morbid obesity- now on insulin pump.She has had multiple difficulties being able to navigate pump and having freq hypoglycemia. Medically disbabled she states due to neuropathy.     Interim Events: .  Same story as last several visits.  Drinks too much, doesn't check glucoses nor boluses for them.  No acute events.  Continues to drink--Carmelina--1/2 gallon every 2-3 days.--- Pump download overall with dearth of data. However,  Date was for February so this may have skewed download.  She has messed up time and date in pump before.   She has been counseled on need for Serial SBGM for both improved DM status and safety.  I have advised her once pump supplies need to renewed I will not sign off as she is not meeting criteria.  AGAIN--EXACT SAME NOTE FOR LAST SEVERAL VISITS.  No c/o hypoglycemia. Seeing Family medicine for cold/allergy symptoms     See todays' pump download. --media.          Review of Systems   Constitutional: Positive for fatigue. Negative for activity change.   HENT: Negative for hearing loss and trouble swallowing.    Eyes: Negative for photophobia and visual disturbance.        Last Eye Exam: recent--cataracts.  Nov 2018   Respiratory: Negative for cough and shortness of breath.    Cardiovascular: Negative for chest pain and palpitations.   Gastrointestinal: Negative for constipation and diarrhea.   Genitourinary: Negative for frequency and urgency.   Musculoskeletal: Positive for back pain. Negative for arthralgias and myalgias.   Skin: Negative for rash and wound.   Allergic/Immunologic: Negative for food allergies.   Neurological: Positive for numbness (hands and feet. ). Negative for weakness.    Psychiatric/Behavioral: Negative for sleep disturbance. The patient is not nervous/anxious.         Sleeps well with gabapentin.         Objective:      Physical Exam   Constitutional: She is oriented to person, place, and time. She appears well-developed and well-nourished.   Appears older than age, morbidly obese   HENT:   Head: Normocephalic and atraumatic.   Nose: Nose normal.   Mouth/Throat: Oropharynx is clear and moist.   Eyes: Pupils are equal, round, and reactive to light. Conjunctivae and EOM are normal.   Neck: Normal range of motion. Neck supple. No tracheal deviation present. No thyromegaly present.   Cardiovascular: Normal rate, regular rhythm, normal heart sounds and intact distal pulses.   Pulmonary/Chest: Effort normal and breath sounds normal.   Musculoskeletal: Normal range of motion. She exhibits no deformity.     Deferred:      Feet: no open wounds or calluses. Good pedal care. vibraorty sensation slightly decreased left  Virtually absent on R.        Neurological: She is alert and oriented to person, place, and time.   Skin: Skin is warm and dry.   Psychiatric: She has a normal mood and affect. Her behavior is normal. Judgment and thought content normal.       Hemoglobin A1C   Date Value Ref Range Status   07/26/2019 8.7 (H) 4.0 - 5.6 % Final     Comment:     ADA Screening Guidelines:  5.7-6.4%  Consistent with prediabetes  >or=6.5%  Consistent with diabetes  High levels of fetal hemoglobin interfere with the HbA1C  assay. Heterozygous hemoglobin variants (HbS, HgC, etc)do  not significantly interfere with this assay.   However, presence of multiple variants may affect accuracy.     05/01/2019 8.6 (H) 4.0 - 5.6 % Final     Comment:     ADA Screening Guidelines:  5.7-6.4%  Consistent with prediabetes  >or=6.5%  Consistent with diabetes  High levels of fetal hemoglobin interfere with the HbA1C  assay. Heterozygous hemoglobin variants (HbS, HgC, etc)do  not significantly interfere with this  assay.   However, presence of multiple variants may affect accuracy.     01/23/2019 9.2 (H) 4.0 - 5.6 % Final     Comment:     ADA Screening Guidelines:  5.7-6.4%  Consistent with prediabetes  >or=6.5%  Consistent with diabetes  High levels of fetal hemoglobin interfere with the HbA1C  assay. Heterozygous hemoglobin variants (HbS, HgC, etc)do  not significantly interfere with this assay.   However, presence of multiple variants may affect accuracy.         Chemistry        Component Value Date/Time     04/24/2019 1158    K 3.6 04/24/2019 1158    CL 99 04/24/2019 1158    CO2 28 04/24/2019 1158    BUN 13 04/24/2019 1158    CREATININE 0.9 04/24/2019 1158     (H) 04/24/2019 1158        Component Value Date/Time    CALCIUM 9.9 04/24/2019 1158    ALKPHOS 86 03/27/2018 1019    AST 73 (H) 03/27/2018 1019    ALT 33 03/27/2018 1019    BILITOT 0.8 03/27/2018 1019    ESTGFRAFRICA >60.0 04/24/2019 1158    EGFRNONAA >60.0 04/24/2019 1158        Lab Results   Component Value Date    LDLCALC 135.8 07/26/2019     Lab Results   Component Value Date    TSH 1.484 03/27/2018     Component      Latest Ref Rng & Units 3/27/2018   Microalbum.,U,Random      ug/mL 43.0       Assessment:         1. Type 2 diabetes mellitus with diabetic polyneuropathy, with long-term current use of insulin  Hemoglobin A1c  Chronic-uncontrolled- see plan    2. Vitamin D deficiency  Vitamin D--chronic-pt not taking ergo   3. CKD (chronic kidney disease), stage III  Chronic-stable-cont arb   4. HTN (hypertension), benign  Chronic-uncontrolled-cont arb   5. Hyperlipidemia, unspecified hyperlipidemia type  Chronic-hesitant to add statin with ETOH   6. ETOH abuse  See note   7. Insulin pump status     8. Recommended medication schedule not followed  See note          Plan:         Pt counseled--ai has 150 christina per oz--1/2 gal over 2-3 days =9600 calories!!! Also calculates to $220 t $300 month.   Not too mention other health concerns.   Pt also  counseled her insurance may deny future pump supplies if she is not entering at least 4 glucose readings a day, and bolusing for meals.    AGAIN, warned. Verbalizes understanding.    Counseled on sudden worsening of renal function and eventual dialysis if no improvement.     Time adjusted on pump -8 months off    Will not authorize anymore pump supply refills. Pt will need 55 -60 units a day of basal insulin, and since she doesn't bolus with a pump, it is doubtful she will bolus for meals or corrections.     ORDERS 08/01/2019     3 mo with a1c, vit d prior

## 2019-08-12 DIAGNOSIS — E11.8 TYPE 2 DIABETES MELLITUS WITH COMPLICATION, WITH LONG-TERM CURRENT USE OF INSULIN: ICD-10-CM

## 2019-08-12 DIAGNOSIS — Z79.4 TYPE 2 DIABETES MELLITUS WITH COMPLICATION, WITH LONG-TERM CURRENT USE OF INSULIN: ICD-10-CM

## 2019-08-12 RX ORDER — GABAPENTIN 300 MG/1
CAPSULE ORAL
Qty: 90 CAPSULE | Refills: 3 | Status: SHIPPED | OUTPATIENT
Start: 2019-08-12 | End: 2020-01-24

## 2019-10-25 ENCOUNTER — OFFICE VISIT (OUTPATIENT)
Dept: FAMILY MEDICINE | Facility: CLINIC | Age: 54
End: 2019-10-25
Payer: COMMERCIAL

## 2019-10-25 ENCOUNTER — LAB VISIT (OUTPATIENT)
Dept: LAB | Facility: HOSPITAL | Age: 54
End: 2019-10-25
Attending: NURSE PRACTITIONER
Payer: COMMERCIAL

## 2019-10-25 VITALS
DIASTOLIC BLOOD PRESSURE: 88 MMHG | OXYGEN SATURATION: 98 % | BODY MASS INDEX: 50.98 KG/M2 | HEIGHT: 56 IN | HEART RATE: 87 BPM | WEIGHT: 226.63 LBS | SYSTOLIC BLOOD PRESSURE: 132 MMHG

## 2019-10-25 DIAGNOSIS — N18.30 CKD (CHRONIC KIDNEY DISEASE), STAGE III: ICD-10-CM

## 2019-10-25 DIAGNOSIS — E55.9 VITAMIN D DEFICIENCY: ICD-10-CM

## 2019-10-25 DIAGNOSIS — I10 ESSENTIAL HYPERTENSION: ICD-10-CM

## 2019-10-25 DIAGNOSIS — E11.42 TYPE 2 DIABETES MELLITUS WITH DIABETIC POLYNEUROPATHY, WITH LONG-TERM CURRENT USE OF INSULIN: ICD-10-CM

## 2019-10-25 DIAGNOSIS — Z01.818 PREOP EXAMINATION: Primary | ICD-10-CM

## 2019-10-25 DIAGNOSIS — Z79.4 TYPE 2 DIABETES MELLITUS WITH DIABETIC POLYNEUROPATHY, WITH LONG-TERM CURRENT USE OF INSULIN: ICD-10-CM

## 2019-10-25 LAB — 25(OH)D3+25(OH)D2 SERPL-MCNC: 10 NG/ML (ref 30–96)

## 2019-10-25 PROCEDURE — 99213 PR OFFICE/OUTPT VISIT, EST, LEVL III, 20-29 MIN: ICD-10-PCS | Mod: S$GLB,,, | Performed by: FAMILY MEDICINE

## 2019-10-25 PROCEDURE — 99999 PR PBB SHADOW E&M-EST. PATIENT-LVL III: ICD-10-PCS | Mod: PBBFAC,,, | Performed by: FAMILY MEDICINE

## 2019-10-25 PROCEDURE — 93010 ELECTROCARDIOGRAM REPORT: CPT | Mod: S$GLB,,, | Performed by: INTERNAL MEDICINE

## 2019-10-25 PROCEDURE — 93005 EKG 12-LEAD: ICD-10-PCS | Mod: S$GLB,,, | Performed by: FAMILY MEDICINE

## 2019-10-25 PROCEDURE — 99213 OFFICE O/P EST LOW 20 MIN: CPT | Mod: S$GLB,,, | Performed by: FAMILY MEDICINE

## 2019-10-25 PROCEDURE — 93010 EKG 12-LEAD: ICD-10-PCS | Mod: S$GLB,,, | Performed by: INTERNAL MEDICINE

## 2019-10-25 PROCEDURE — 93005 ELECTROCARDIOGRAM TRACING: CPT | Mod: S$GLB,,, | Performed by: FAMILY MEDICINE

## 2019-10-25 PROCEDURE — 99999 PR PBB SHADOW E&M-EST. PATIENT-LVL III: CPT | Mod: PBBFAC,,, | Performed by: FAMILY MEDICINE

## 2019-10-25 PROCEDURE — 82306 VITAMIN D 25 HYDROXY: CPT

## 2019-10-25 PROCEDURE — 36415 COLL VENOUS BLD VENIPUNCTURE: CPT | Mod: PO

## 2019-10-25 RX ORDER — MISOPROSTOL 200 UG/1
200 TABLET ORAL ONCE
Refills: 0 | Status: ON HOLD | COMMUNITY
Start: 2019-09-09 | End: 2019-11-06 | Stop reason: HOSPADM

## 2019-10-25 NOTE — PROGRESS NOTES
Subjective:       Patient ID: Mine Quiros is a 54 y.o. female.    Chief Complaint: Pre-op Exam    HPI       Here for a f/u.    Referred by Dr. Reid, gynecologist, for a preop exam prior to hysteroscopy with dilatation and curettage of uterus on 11/6/2019.      dm2 with neuropathy:  Uncontrolled.   Sees endocrinology  On insulin pump  Checks sugars daily: 114-180     On medical nursing home disability since April 2017.      ckd stage 3 stable.            Review of Systems      Review of Systems   Constitutional: Negative for fever and chills.   HENT: Negative for hearing loss and neck stiffness.    Eyes: Negative for redness and itching.   Respiratory: Negative for cough and choking.    Cardiovascular: Negative for chest pain and leg swelling.  Abdomen: Negative for abdominal pain and blood in stool.   Genitourinary: Negative for dysuria and flank pain.   Musculoskeletal: Negative for back pain and gait problem.   Neurological: Negative for light-headedness and headaches.   Hematological: Negative for adenopathy.   Psychiatric/Behavioral: Negative for behavioral problems.       Objective:      Physical Exam   Constitutional: She appears well-developed.   HENT:   Head: Normocephalic and atraumatic.   Eyes: Pupils are equal, round, and reactive to light. Conjunctivae are normal.   Neck: Normal range of motion.   Cardiovascular: Normal rate and regular rhythm.   No murmur heard.  Pulmonary/Chest: Effort normal and breath sounds normal.   Lymphadenopathy:     She has no cervical adenopathy.         Hemoglobin A1C   Date Value Ref Range Status   07/26/2019 8.7 (H) 4.0 - 5.6 % Final     Comment:     ADA Screening Guidelines:  5.7-6.4%  Consistent with prediabetes  >or=6.5%  Consistent with diabetes  High levels of fetal hemoglobin interfere with the HbA1C  assay. Heterozygous hemoglobin variants (HbS, HgC, etc)do  not significantly interfere with this assay.   However, presence of multiple variants may affect  accuracy.     05/01/2019 8.6 (H) 4.0 - 5.6 % Final     Comment:     ADA Screening Guidelines:  5.7-6.4%  Consistent with prediabetes  >or=6.5%  Consistent with diabetes  High levels of fetal hemoglobin interfere with the HbA1C  assay. Heterozygous hemoglobin variants (HbS, HgC, etc)do  not significantly interfere with this assay.   However, presence of multiple variants may affect accuracy.     01/23/2019 9.2 (H) 4.0 - 5.6 % Final     Comment:     ADA Screening Guidelines:  5.7-6.4%  Consistent with prediabetes  >or=6.5%  Consistent with diabetes  High levels of fetal hemoglobin interfere with the HbA1C  assay. Heterozygous hemoglobin variants (HbS, HgC, etc)do  not significantly interfere with this assay.   However, presence of multiple variants may affect accuracy.       Protective Sensation (w/ 10 gram monofilament):  Right: Decreased  Left: Decreased    Visual Inspection:  Normal -  Bilateral    Pedal Pulses:   Right: Present  Left: Present    Posterior tibialis:   Right:Present  Left: Present        Assessment:       1. Preop examination    2. Essential hypertension    3. CKD (chronic kidney disease), stage III    4. Diabetes mellitus with neurological manifestations, uncontrolled        Plan:       Preop examination  -     EKG 12-lead; Future    Essential hypertension  -     Comprehensive metabolic panel; Future; Expected date: 10/25/2019  -     Hemoglobin A1c; Future; Expected date: 10/25/2019  -     CBC auto differential; Future; Expected date: 10/25/2019  -     Microalbumin/creatinine urine ratio; Future; Expected date: 10/25/2019    CKD (chronic kidney disease), stage III    Diabetes mellitus with neurological manifestations, uncontrolled  -     Comprehensive metabolic panel; Future; Expected date: 10/25/2019  -     Hemoglobin A1c; Future; Expected date: 10/25/2019  -     CBC auto differential; Future; Expected date: 10/25/2019  -     Microalbumin/creatinine urine ratio; Future; Expected date:  "10/25/2019              Plan:  See orders  ekg reviewed: nsr    Pending labs, will clear patient for surgery      Medication List with Changes/Refills   Current Medications    AMLODIPINE (NORVASC) 10 MG TABLET    Take 1 tablet (10 mg total) by mouth once daily.    BD INSULIN PEN NEEDLE UF MINI 31 GAUGE X 3/16" NDLE    USE AS DIRECTED    BETAMETHASONE DIPROPIONATE (DIPROLENE) 0.05 % OINTMENT    Apply topically 2 (two) times daily.    CHLORTHALIDONE (HYGROTEN) 25 MG TAB    TAKE 1 TABLET BY MOUTH EVERY DAY    CITALOPRAM (CELEXA) 40 MG TABLET    Take 1 tablet (40 mg total) by mouth once daily.    CLINDAMYCIN PHOSPHATE 1% (CLINDAGEL) 1 % GEL    Apply topically once daily. To face in the morning    CLOBETASOL (TEMOVATE) 0.05 % EXTERNAL SOLUTION    Use on scalp one - two times daily as needed for scaling or itching    DIPHENHYDRAMINE (BENADRYL) 25 MG CAPSULE    Take 25 mg by mouth nightly.      ERGOCALCIFEROL (ERGOCALCIFEROL) 50,000 UNIT CAP    Take 1 capsule (50,000 Units total) by mouth every 7 days.    FLUTICASONE PROPIONATE (FLONASE) 50 MCG/ACTUATION NASAL SPRAY    2 sprays (100 mcg total) by Each Nare route once daily.    GABAPENTIN (NEURONTIN) 300 MG CAPSULE    TAKE 1 CAPSULE BY MOUTH THREE TIMES DAILY    HYDROXYZINE HCL (ATARAX) 25 MG TABLET    Take 1 tablet (25 mg total) by mouth every evening.    INSULIN LISPRO (HUMALOG U-100 INSULIN) 100 UNIT/ML INJECTION    USE AS DIRECTED via insulin pump approx 150 UNITS EVERY DAY    KETOCONAZOLE (NIZORAL) 2 % SHAMPOO    Apply topically every 7 days.    LOSARTAN (COZAAR) 100 MG TABLET    TAKE 1 TABLET BY MOUTH DAILY    METOPROLOL TARTRATE (LOPRESSOR) 25 MG TABLET    TAKE 1 TABLET BY MOUTH TWICE DAILY    MICROLET 2 LANCING DEVICE KIT    Use 4x/daily to check glucose.    MICROLET LANCET MISC    use FOUR TIMES DAILY    MISOPROSTOL (CYTOTEC) 200 MCG TAB    INSERT 2 TABLETS into vagina 4 hours PRIOR TO PROCEDURE    PIOGLITAZONE (ACTOS) 15 MG TABLET    Take 1 tablet (15 mg total) by " mouth once daily.    TIMOLOL MALEATE 0.5% (TIMOPTIC) 0.5 % DROP    Place 1 drop into both eyes 2 (two) times daily.

## 2019-10-26 DIAGNOSIS — Z79.4 TYPE 2 DIABETES MELLITUS WITH DIABETIC POLYNEUROPATHY, WITH LONG-TERM CURRENT USE OF INSULIN: ICD-10-CM

## 2019-10-26 DIAGNOSIS — E11.42 TYPE 2 DIABETES MELLITUS WITH DIABETIC POLYNEUROPATHY, WITH LONG-TERM CURRENT USE OF INSULIN: ICD-10-CM

## 2019-10-28 RX ORDER — INSULIN LISPRO 100 [IU]/ML
INJECTION, SOLUTION INTRAVENOUS; SUBCUTANEOUS
Qty: 50 ML | Refills: 12 | Status: SHIPPED | OUTPATIENT
Start: 2019-10-28 | End: 2020-02-04 | Stop reason: SDUPTHER

## 2019-11-01 ENCOUNTER — OFFICE VISIT (OUTPATIENT)
Dept: ENDOCRINOLOGY | Facility: CLINIC | Age: 54
End: 2019-11-01
Payer: COMMERCIAL

## 2019-11-01 VITALS
BODY MASS INDEX: 50.72 KG/M2 | SYSTOLIC BLOOD PRESSURE: 154 MMHG | HEART RATE: 103 BPM | HEIGHT: 56 IN | WEIGHT: 225.5 LBS | DIASTOLIC BLOOD PRESSURE: 80 MMHG

## 2019-11-01 DIAGNOSIS — E66.01 MORBID OBESITY WITH BMI OF 45.0-49.9, ADULT: ICD-10-CM

## 2019-11-01 DIAGNOSIS — Z91.148 RECOMMENDED MEDICATION SCHEDULE NOT FOLLOWED: ICD-10-CM

## 2019-11-01 DIAGNOSIS — I10 ESSENTIAL HYPERTENSION: ICD-10-CM

## 2019-11-01 DIAGNOSIS — Z79.4 TYPE 2 DIABETES MELLITUS WITH DIABETIC POLYNEUROPATHY, WITH LONG-TERM CURRENT USE OF INSULIN: Primary | ICD-10-CM

## 2019-11-01 DIAGNOSIS — N18.30 CKD (CHRONIC KIDNEY DISEASE), STAGE III: ICD-10-CM

## 2019-11-01 DIAGNOSIS — E78.5 HYPERLIPIDEMIA, UNSPECIFIED HYPERLIPIDEMIA TYPE: ICD-10-CM

## 2019-11-01 DIAGNOSIS — F10.10 ETOH ABUSE: ICD-10-CM

## 2019-11-01 DIAGNOSIS — E55.9 VITAMIN D DEFICIENCY DISEASE: ICD-10-CM

## 2019-11-01 DIAGNOSIS — E11.42 TYPE 2 DIABETES MELLITUS WITH DIABETIC POLYNEUROPATHY, WITH LONG-TERM CURRENT USE OF INSULIN: Primary | ICD-10-CM

## 2019-11-01 PROCEDURE — 99999 PR PBB SHADOW E&M-EST. PATIENT-LVL V: ICD-10-PCS | Mod: PBBFAC,,, | Performed by: NURSE PRACTITIONER

## 2019-11-01 PROCEDURE — 99999 PR PBB SHADOW E&M-EST. PATIENT-LVL V: CPT | Mod: PBBFAC,,, | Performed by: NURSE PRACTITIONER

## 2019-11-01 PROCEDURE — 99214 PR OFFICE/OUTPT VISIT, EST, LEVL IV, 30-39 MIN: ICD-10-PCS | Mod: S$GLB,,, | Performed by: NURSE PRACTITIONER

## 2019-11-01 PROCEDURE — 99214 OFFICE O/P EST MOD 30 MIN: CPT | Mod: S$GLB,,, | Performed by: NURSE PRACTITIONER

## 2019-11-01 RX ORDER — ERGOCALCIFEROL 1.25 MG/1
50000 CAPSULE ORAL
Qty: 12 CAPSULE | Refills: 1 | Status: SHIPPED | OUTPATIENT
Start: 2019-11-01 | End: 2020-05-14 | Stop reason: SDUPTHER

## 2019-11-01 NOTE — PROGRESS NOTES
ubjective:       Patient ID: Mine Quiros is a 54 y.o. female.    Chief Complaint: routine DM f/u     HPI   Pt is a 54 y.o. AAF with a long hx of very uncontrolled Type 2 DM-- as well as chronic conditions pending review including HTN, peripheral neuropathy, diastolic dysfunciton, morbid obesity- now on insulin pump.She has had multiple difficulties being able to navigate pump and having freq hypoglycemia. Medically disbabled she states due to neuropathy.     Interim Events: .  Same story as last several visits.  Drinks too much, doesn't check glucoses nor boluses for them. This is improved--but still not acceptable.  No acute events.  Continues to drink--Carmelina--was 1/2 gallon every 2-3 days.-----this is  Improved as she reports the bottle is now lasting up to 4 days.   Pump download overall with dearth of data.--again improved.     She has been counseled on need for Serial SBGM for both improved DM status and safety.  I have advised her once pump supplies need to renewed I will not sign off as she is not meeting criteria.  AGAIN--EXACT SAME NOTE FOR LAST SEVERAL VISITS.  No c/o hypoglycemia. Pending D &C, likely high grade dysplasia.  See todays' pump download. --media.          Review of Systems   Constitutional: Positive for fatigue. Negative for activity change.   HENT: Negative for hearing loss and trouble swallowing.    Eyes: Negative for photophobia and visual disturbance.        Last Eye Exam: recent--cataracts.  Nov 2018   Respiratory: Negative for cough and shortness of breath.    Cardiovascular: Negative for chest pain and palpitations.   Gastrointestinal: Negative for constipation and diarrhea.   Genitourinary: Negative for frequency and urgency.   Musculoskeletal: Positive for back pain. Negative for arthralgias and myalgias.   Skin: Negative for rash and wound.   Allergic/Immunologic: Negative for food allergies.   Neurological: Positive for numbness (hands and feet. ). Negative for weakness.    Psychiatric/Behavioral: Negative for sleep disturbance. The patient is not nervous/anxious.         Sleeps well with gabapentin.         Objective:      Physical Exam   Constitutional: She is oriented to person, place, and time. She appears well-developed and well-nourished.   Appears older than age, morbidly obese   HENT:   Head: Normocephalic and atraumatic.   Nose: Nose normal.   Mouth/Throat: Oropharynx is clear and moist.   Eyes: Pupils are equal, round, and reactive to light. Conjunctivae and EOM are normal.   Neck: Normal range of motion. Neck supple. No tracheal deviation present. No thyromegaly present.   Cardiovascular: Normal rate, regular rhythm, normal heart sounds and intact distal pulses.   Pulmonary/Chest: Effort normal and breath sounds normal.   Musculoskeletal: Normal range of motion. She exhibits no deformity.     Deferred:      Feet: no open wounds or calluses. Good pedal care. vibraorty sensation slightly decreased left  Virtually absent on R.        Neurological: She is alert and oriented to person, place, and time.   Skin: Skin is warm and dry.   Psychiatric: She has a normal mood and affect. Her behavior is normal. Judgment and thought content normal.       Hemoglobin A1C   Date Value Ref Range Status   10/25/2019 8.2 (H) 4.0 - 5.6 % Final     Comment:     ADA Screening Guidelines:  5.7-6.4%  Consistent with prediabetes  >or=6.5%  Consistent with diabetes  High levels of fetal hemoglobin interfere with the HbA1C  assay. Heterozygous hemoglobin variants (HbS, HgC, etc)do  not significantly interfere with this assay.   However, presence of multiple variants may affect accuracy.     07/26/2019 8.7 (H) 4.0 - 5.6 % Final     Comment:     ADA Screening Guidelines:  5.7-6.4%  Consistent with prediabetes  >or=6.5%  Consistent with diabetes  High levels of fetal hemoglobin interfere with the HbA1C  assay. Heterozygous hemoglobin variants (HbS, HgC, etc)do  not significantly interfere with this  assay.   However, presence of multiple variants may affect accuracy.     05/01/2019 8.6 (H) 4.0 - 5.6 % Final     Comment:     ADA Screening Guidelines:  5.7-6.4%  Consistent with prediabetes  >or=6.5%  Consistent with diabetes  High levels of fetal hemoglobin interfere with the HbA1C  assay. Heterozygous hemoglobin variants (HbS, HgC, etc)do  not significantly interfere with this assay.   However, presence of multiple variants may affect accuracy.         Chemistry        Component Value Date/Time     10/31/2019 1330    K 3.9 10/31/2019 1330     10/31/2019 1330    CO2 28 10/31/2019 1330    BUN 23 (H) 10/31/2019 1330    CREATININE 0.90 10/31/2019 1330     (H) 10/31/2019 1330        Component Value Date/Time    CALCIUM 9.3 10/31/2019 1330    ALKPHOS 89 10/25/2019 1238    AST 58 (H) 10/25/2019 1238    ALT 31 10/25/2019 1238    BILITOT 0.6 10/25/2019 1238    ESTGFRAFRICA >60 10/31/2019 1330    EGFRNONAA >60 10/31/2019 1330        Lab Results   Component Value Date    LDLCALC 135.8 07/26/2019     Lab Results   Component Value Date    TSH 1.484 03/27/2018     Component      Latest Ref Rng & Units 3/27/2018   Microalbum.,U,Random      ug/mL 43.0       Assessment:         1. Type 2 diabetes mellitus with diabetic polyneuropathy, with long-term current use of insulin  Hemoglobin A1c  Chronic-uncontrolled- see plan    2. Vitamin D deficiency  Vitamin D--chronic-pt not taking ergo   3. CKD (chronic kidney disease), stage III  Chronic-stable-cont arb   4. HTN (hypertension), benign  Chronic-uncontrolled-cont arb   5. Hyperlipidemia, unspecified hyperlipidemia type  Chronic-hesitant to add statin with ETOH   6. ETOH abuse  See note   7. Insulin pump status     8. Recommended medication schedule not followed  See note            Plan:         Pt counseled--ai has 150 christina per oz--1/2 gal over 2-3 days =9600 calories!!! Also calculates to $220 t $300 month.   Not too mention other health concerns.   Pt also  counseled her insurance may deny future pump supplies if she is not entering at least 4 glucose readings a day, and bolusing for meals.    AGAIN, warned. Verbalizes understanding.    Counseled on sudden worsening of renal function and eventual dialysis if no improvement.       Eventually will not be able to  authorize anymore pump supply refills. Pt will need 55 -60 units a day of basal insulin, and since she doesn't bolus with a pump, it is doubtful she will bolus for meals or corrections.     Counseled on use of Temp basal preop----80% night before once NPO and to continue until awake and eating a regular meal.   ORDERS 11/01/2019     3 mo with a1c, vit d prior

## 2019-11-06 PROBLEM — N84.0 ENDOMETRIAL POLYP: Status: ACTIVE | Noted: 2019-11-06

## 2019-11-14 ENCOUNTER — PATIENT OUTREACH (OUTPATIENT)
Dept: ADMINISTRATIVE | Facility: HOSPITAL | Age: 54
End: 2019-11-14

## 2020-01-14 ENCOUNTER — PATIENT OUTREACH (OUTPATIENT)
Dept: ADMINISTRATIVE | Facility: HOSPITAL | Age: 55
End: 2020-01-14

## 2020-01-14 NOTE — PROGRESS NOTES
Health Maintenance Due   Topic Date Due    Low Dose Statin  09/18/1986    Shingles Vaccine (1 of 2) 09/18/2015    Colonoscopy  09/18/2015    Pneumococcal Vaccine (Highest Risk) (2 of 3 - PCV13) 07/28/2018    Influenza Vaccine (1) 09/01/2019     Future Appointments   Date Time Provider Department Center   1/27/2020 10:40 AM Hai Pitt MD [facility] MED [place]   1/28/2020 10:55 AM LAB, [facility] LAB [place]   2/4/2020 10:30 AM Regina Solorio APRN,ANP-C [facility] ENDOCR[facility] [place]

## 2020-01-24 DIAGNOSIS — E11.8 TYPE 2 DIABETES MELLITUS WITH COMPLICATION, WITH LONG-TERM CURRENT USE OF INSULIN: ICD-10-CM

## 2020-01-24 DIAGNOSIS — Z79.4 TYPE 2 DIABETES MELLITUS WITH COMPLICATION, WITH LONG-TERM CURRENT USE OF INSULIN: ICD-10-CM

## 2020-01-24 RX ORDER — GABAPENTIN 300 MG/1
CAPSULE ORAL
Qty: 90 CAPSULE | Refills: 3 | Status: SHIPPED | OUTPATIENT
Start: 2020-01-24 | End: 2020-05-14 | Stop reason: SDUPTHER

## 2020-01-27 ENCOUNTER — LAB VISIT (OUTPATIENT)
Dept: LAB | Facility: HOSPITAL | Age: 55
End: 2020-01-27
Attending: FAMILY MEDICINE
Payer: COMMERCIAL

## 2020-01-27 ENCOUNTER — OFFICE VISIT (OUTPATIENT)
Dept: FAMILY MEDICINE | Facility: CLINIC | Age: 55
End: 2020-01-27
Payer: COMMERCIAL

## 2020-01-27 VITALS
SYSTOLIC BLOOD PRESSURE: 138 MMHG | WEIGHT: 226.19 LBS | DIASTOLIC BLOOD PRESSURE: 88 MMHG | OXYGEN SATURATION: 95 % | HEIGHT: 56 IN | HEART RATE: 93 BPM | BODY MASS INDEX: 50.88 KG/M2

## 2020-01-27 DIAGNOSIS — K76.0 FATTY LIVER: ICD-10-CM

## 2020-01-27 DIAGNOSIS — I10 ESSENTIAL HYPERTENSION: Primary | ICD-10-CM

## 2020-01-27 DIAGNOSIS — Z12.11 COLON CANCER SCREENING: ICD-10-CM

## 2020-01-27 DIAGNOSIS — Z79.4 TYPE 2 DIABETES MELLITUS WITH DIABETIC POLYNEUROPATHY, WITH LONG-TERM CURRENT USE OF INSULIN: ICD-10-CM

## 2020-01-27 DIAGNOSIS — E11.42 TYPE 2 DIABETES MELLITUS WITH DIABETIC POLYNEUROPATHY, WITH LONG-TERM CURRENT USE OF INSULIN: ICD-10-CM

## 2020-01-27 DIAGNOSIS — J32.9 SINUSITIS, UNSPECIFIED CHRONICITY, UNSPECIFIED LOCATION: ICD-10-CM

## 2020-01-27 DIAGNOSIS — E55.9 VITAMIN D DEFICIENCY DISEASE: ICD-10-CM

## 2020-01-27 LAB
25(OH)D3+25(OH)D2 SERPL-MCNC: 21 NG/ML (ref 30–96)
ESTIMATED AVG GLUCOSE: 203 MG/DL (ref 68–131)
HBA1C MFR BLD HPLC: 8.7 % (ref 4–5.6)

## 2020-01-27 PROCEDURE — 3079F DIAST BP 80-89 MM HG: CPT | Mod: CPTII,S$GLB,, | Performed by: FAMILY MEDICINE

## 2020-01-27 PROCEDURE — 3075F PR MOST RECENT SYSTOLIC BLOOD PRESS GE 130-139MM HG: ICD-10-PCS | Mod: CPTII,S$GLB,, | Performed by: FAMILY MEDICINE

## 2020-01-27 PROCEDURE — 83036 HEMOGLOBIN GLYCOSYLATED A1C: CPT

## 2020-01-27 PROCEDURE — 36415 COLL VENOUS BLD VENIPUNCTURE: CPT | Mod: PO

## 2020-01-27 PROCEDURE — 3008F PR BODY MASS INDEX (BMI) DOCUMENTED: ICD-10-PCS | Mod: CPTII,S$GLB,, | Performed by: FAMILY MEDICINE

## 2020-01-27 PROCEDURE — 3075F SYST BP GE 130 - 139MM HG: CPT | Mod: CPTII,S$GLB,, | Performed by: FAMILY MEDICINE

## 2020-01-27 PROCEDURE — 3008F BODY MASS INDEX DOCD: CPT | Mod: CPTII,S$GLB,, | Performed by: FAMILY MEDICINE

## 2020-01-27 PROCEDURE — 99999 PR PBB SHADOW E&M-EST. PATIENT-LVL III: ICD-10-PCS | Mod: PBBFAC,,, | Performed by: FAMILY MEDICINE

## 2020-01-27 PROCEDURE — 82306 VITAMIN D 25 HYDROXY: CPT

## 2020-01-27 PROCEDURE — 99214 OFFICE O/P EST MOD 30 MIN: CPT | Mod: S$GLB,,, | Performed by: FAMILY MEDICINE

## 2020-01-27 PROCEDURE — 99999 PR PBB SHADOW E&M-EST. PATIENT-LVL III: CPT | Mod: PBBFAC,,, | Performed by: FAMILY MEDICINE

## 2020-01-27 PROCEDURE — 99214 PR OFFICE/OUTPT VISIT, EST, LEVL IV, 30-39 MIN: ICD-10-PCS | Mod: S$GLB,,, | Performed by: FAMILY MEDICINE

## 2020-01-27 PROCEDURE — 3079F PR MOST RECENT DIASTOLIC BLOOD PRESSURE 80-89 MM HG: ICD-10-PCS | Mod: CPTII,S$GLB,, | Performed by: FAMILY MEDICINE

## 2020-01-27 RX ORDER — DOXYCYCLINE 100 MG/1
100 CAPSULE ORAL 2 TIMES DAILY
Qty: 20 CAPSULE | Refills: 0 | Status: SHIPPED | OUTPATIENT
Start: 2020-01-27 | End: 2020-05-14

## 2020-01-27 NOTE — PROGRESS NOTES
Subjective:       Patient ID: Mine Quiros is a 54 y.o. female.    Chief Complaint: Diabetes    HPI     Here for a f/u.     Here with sister, Sujatha.      dm2 with neuropathy:  Uncontrolled.   Sees endocrinology  On insulin pump  Checks sugars daily: 114-180     On medical long term disability since April 2017.      ckd stage 3 stable.      Drinks 2 bottles of 750 ml ai per week. Has yet to attend AA.     C/o dry cough, postnasal drip and nasal congestion x 2 weeks.  Now, c/o frontal sinus pressure x 1 week.      Review of Systems      Review of Systems   Constitutional: Negative for fever and chills.   HENT: Negative for hearing loss and neck stiffness.    Eyes: Negative for redness and itching.   Respiratory: Negative for choking.    Cardiovascular: Negative for chest pain and leg swelling.  Abdomen: Negative for abdominal pain and blood in stool.   Genitourinary: Negative for dysuria and flank pain.   Musculoskeletal: Negative for back pain and gait problem.   Neurological: Negative for light-headedness and headaches.   Hematological: Negative for adenopathy.   Psychiatric/Behavioral: Negative for behavioral problems.     Objective:      Physical Exam   Constitutional: She appears well-developed.   HENT:   Head: Normocephalic and atraumatic.   Tenderness of bilat frontal sinuses   Eyes: Pupils are equal, round, and reactive to light. Conjunctivae are normal.   Neck: Normal range of motion.   Cardiovascular: Normal rate and regular rhythm.   No murmur heard.  Pulmonary/Chest: Effort normal and breath sounds normal.   Lymphadenopathy:     She has no cervical adenopathy.       Assessment:       1. Essential hypertension    2. Type 2 diabetes mellitus with diabetic polyneuropathy, with long-term current use of insulin    3. Fatty liver    4. Colon cancer screening    5. Sinusitis, unspecified chronicity, unspecified location        Plan:       Essential hypertension  -     Comprehensive metabolic panel;  "Future; Expected date: 01/27/2020  -     Lipid panel; Future; Expected date: 01/27/2020    Type 2 diabetes mellitus with diabetic polyneuropathy, with long-term current use of insulin    Fatty liver  -     US Abdomen Complete; Future; Expected date: 01/27/2020    Colon cancer screening  -     Case request GI: COLONOSCOPY    Sinusitis, unspecified chronicity, unspecified location    Other orders  -     doxycycline (MONODOX) 100 MG capsule; Take 1 capsule (100 mg total) by mouth 2 (two) times daily.  Dispense: 20 capsule; Refill: 0            Plan:  Start doxy  See orders   Cont all other meds  Recommend that she attend AA        Medication List with Changes/Refills   New Medications    DOXYCYCLINE (MONODOX) 100 MG CAPSULE    Take 1 capsule (100 mg total) by mouth 2 (two) times daily.   Current Medications    AMLODIPINE (NORVASC) 10 MG TABLET    Take 1 tablet (10 mg total) by mouth once daily.    BD INSULIN PEN NEEDLE UF MINI 31 GAUGE X 3/16" NDLE    USE AS DIRECTED    BETAMETHASONE DIPROPIONATE (DIPROLENE) 0.05 % OINTMENT    Apply topically 2 (two) times daily.    CHLORTHALIDONE (HYGROTEN) 25 MG TAB    TAKE 1 TABLET BY MOUTH EVERY DAY    CITALOPRAM (CELEXA) 40 MG TABLET    Take 1 tablet (40 mg total) by mouth once daily.    CLINDAMYCIN PHOSPHATE 1% (CLINDAGEL) 1 % GEL    Apply topically once daily. To face in the morning    CLOBETASOL (TEMOVATE) 0.05 % EXTERNAL SOLUTION    Use on scalp one - two times daily as needed for scaling or itching    DIPHENHYDRAMINE (BENADRYL) 25 MG CAPSULE    Take 25 mg by mouth every evening. USE PM BEFORE SURGERY    ERGOCALCIFEROL (ERGOCALCIFEROL) 50,000 UNIT CAP    Take 1 capsule (50,000 Units total) by mouth every 7 days.    FLUTICASONE PROPIONATE (FLONASE) 50 MCG/ACTUATION NASAL SPRAY    2 sprays (100 mcg total) by Each Nare route once daily.    GABAPENTIN (NEURONTIN) 300 MG CAPSULE    TAKE 1 CAPSULE BY MOUTH THREE TIMES DAILY    INSULIN LISPRO (HUMALOG U-100 INSULIN) 100 UNIT/ML " INJECTION    USE AS DIRECTED via insulin pump approx 150 UNITS EVERY DAY    KETOCONAZOLE (NIZORAL) 2 % SHAMPOO    Apply topically every 7 days.    LOSARTAN (COZAAR) 100 MG TABLET    TAKE 1 TABLET BY MOUTH DAILY    METOPROLOL TARTRATE (LOPRESSOR) 25 MG TABLET    TAKE 1 TABLET BY MOUTH TWICE DAILY    MICROLET 2 LANCING DEVICE KIT    Use 4x/daily to check glucose.    MICROLET LANCET MISC    use FOUR TIMES DAILY    PIOGLITAZONE (ACTOS) 15 MG TABLET    Take 1 tablet (15 mg total) by mouth once daily.    TIMOLOL MALEATE 0.5% (TIMOPTIC) 0.5 % DROP    Place 1 drop into both eyes 2 (two) times daily.

## 2020-02-03 ENCOUNTER — PATIENT OUTREACH (OUTPATIENT)
Dept: ADMINISTRATIVE | Facility: OTHER | Age: 55
End: 2020-02-03

## 2020-02-04 ENCOUNTER — HOSPITAL ENCOUNTER (OUTPATIENT)
Dept: RADIOLOGY | Facility: HOSPITAL | Age: 55
Discharge: HOME | End: 2020-02-04
Attending: FAMILY MEDICINE
Payer: COMMERCIAL

## 2020-02-04 ENCOUNTER — OFFICE VISIT (OUTPATIENT)
Dept: ENDOCRINOLOGY | Facility: CLINIC | Age: 55
End: 2020-02-04
Payer: COMMERCIAL

## 2020-02-04 VITALS
HEART RATE: 97 BPM | SYSTOLIC BLOOD PRESSURE: 156 MMHG | WEIGHT: 227.75 LBS | DIASTOLIC BLOOD PRESSURE: 102 MMHG | BODY MASS INDEX: 51.23 KG/M2 | HEIGHT: 56 IN

## 2020-02-04 DIAGNOSIS — Z91.148 RECOMMENDED MEDICATION SCHEDULE NOT FOLLOWED: ICD-10-CM

## 2020-02-04 DIAGNOSIS — Z79.4 TYPE 2 DIABETES MELLITUS WITH DIABETIC POLYNEUROPATHY, WITH LONG-TERM CURRENT USE OF INSULIN: ICD-10-CM

## 2020-02-04 DIAGNOSIS — Z91.89 POOR SELF MONITORING: ICD-10-CM

## 2020-02-04 DIAGNOSIS — F10.10 ALCOHOL ABUSE: ICD-10-CM

## 2020-02-04 DIAGNOSIS — E11.42 TYPE 2 DIABETES MELLITUS WITH DIABETIC POLYNEUROPATHY, WITH LONG-TERM CURRENT USE OF INSULIN: ICD-10-CM

## 2020-02-04 DIAGNOSIS — N18.30 CKD (CHRONIC KIDNEY DISEASE), STAGE III: ICD-10-CM

## 2020-02-04 DIAGNOSIS — G62.9 PERIPHERAL POLYNEUROPATHY: ICD-10-CM

## 2020-02-04 DIAGNOSIS — K76.0 FATTY LIVER: ICD-10-CM

## 2020-02-04 DIAGNOSIS — E66.01 MORBID OBESITY WITH BMI OF 45.0-49.9, ADULT: ICD-10-CM

## 2020-02-04 DIAGNOSIS — I10 ESSENTIAL HYPERTENSION: ICD-10-CM

## 2020-02-04 DIAGNOSIS — E11.42 TYPE 2 DIABETES MELLITUS WITH DIABETIC POLYNEUROPATHY, WITH LONG-TERM CURRENT USE OF INSULIN: Primary | ICD-10-CM

## 2020-02-04 DIAGNOSIS — E78.5 HYPERLIPIDEMIA, UNSPECIFIED HYPERLIPIDEMIA TYPE: ICD-10-CM

## 2020-02-04 DIAGNOSIS — Z79.4 TYPE 2 DIABETES MELLITUS WITH DIABETIC POLYNEUROPATHY, WITH LONG-TERM CURRENT USE OF INSULIN: Primary | ICD-10-CM

## 2020-02-04 PROCEDURE — 76700 US EXAM ABDOM COMPLETE: CPT | Mod: 26,,, | Performed by: RADIOLOGY

## 2020-02-04 PROCEDURE — 76700 US EXAM ABDOM COMPLETE: CPT | Mod: TC,PO

## 2020-02-04 PROCEDURE — 99214 PR OFFICE/OUTPT VISIT, EST, LEVL IV, 30-39 MIN: ICD-10-PCS | Mod: S$GLB,,, | Performed by: NURSE PRACTITIONER

## 2020-02-04 PROCEDURE — 99999 PR PBB SHADOW E&M-EST. PATIENT-LVL V: ICD-10-PCS | Mod: PBBFAC,,, | Performed by: NURSE PRACTITIONER

## 2020-02-04 PROCEDURE — 99999 PR PBB SHADOW E&M-EST. PATIENT-LVL V: CPT | Mod: PBBFAC,,, | Performed by: NURSE PRACTITIONER

## 2020-02-04 PROCEDURE — 76700 US ABDOMEN COMPLETE: ICD-10-PCS | Mod: 26,,, | Performed by: RADIOLOGY

## 2020-02-04 PROCEDURE — 99214 OFFICE O/P EST MOD 30 MIN: CPT | Mod: S$GLB,,, | Performed by: NURSE PRACTITIONER

## 2020-02-04 RX ORDER — INSULIN GLARGINE 100 [IU]/ML
INJECTION, SOLUTION SUBCUTANEOUS
Qty: 18 ML | Refills: 11 | Status: SHIPPED | OUTPATIENT
Start: 2020-02-04 | End: 2021-03-09

## 2020-02-04 RX ORDER — INSULIN ASPART 100 [IU]/ML
INJECTION, SOLUTION INTRAVENOUS; SUBCUTANEOUS
Qty: 1 BOX | Refills: 12 | Status: SHIPPED | OUTPATIENT
Start: 2020-02-04 | End: 2021-03-09

## 2020-02-04 RX ORDER — PEN NEEDLE, DIABETIC 30 GX3/16"
NEEDLE, DISPOSABLE MISCELLANEOUS
Qty: 100 EACH | Refills: 12 | Status: SHIPPED | OUTPATIENT
Start: 2020-02-04

## 2020-02-04 RX ORDER — PEN NEEDLE, DIABETIC 30 GX3/16"
1 NEEDLE, DISPOSABLE MISCELLANEOUS
Qty: 100 EACH | Refills: 12 | Status: SHIPPED | OUTPATIENT
Start: 2020-02-04 | End: 2020-02-04

## 2020-02-04 RX ORDER — INSULIN LISPRO 100 [IU]/ML
INJECTION, SOLUTION INTRAVENOUS; SUBCUTANEOUS
Qty: 50 ML | Refills: 12 | Status: SHIPPED | OUTPATIENT
Start: 2020-02-04 | End: 2021-02-05

## 2020-02-04 NOTE — PROGRESS NOTES
ubjective:       Patient ID: Mine Quiros is a 54 y.o. female.    Chief Complaint: routine DM f/u     HPI   Pt is a 54 y.o. AAF with a long hx of very uncontrolled Type 2 DM-- as well as chronic conditions pending review including HTN, peripheral neuropathy, diastolic dysfunciton, morbid obesity- now on insulin pump.She has had multiple difficulties being able to navigate pump and having freq hypoglycemia. Medically disbabled she states due to neuropathy.     Interim Events: .  Same story as last several visits.  Drinks too much, doesn't check glucoses nor boluses for them.  No acute events.  Continues to drink--Carmelina--was 1/2 gallon every 2-3 days.-----this is  Improved as she reports the bottle is now lasting up to 4 days.   Pump download overall with dearth of data.-     She has been counseled on need for Serial SBGM for both improved DM status and safety.  I have advised her once pump supplies need to renewed I will not sign off as she is not meeting criteria.  AGAIN--EXACT SAME NOTE FOR LAST SEVERAL VISITS.  No c/o hypoglycemia. Did not take BP meds today.    See todays' pump download. --media.          Review of Systems   Constitutional: Positive for fatigue. Negative for activity change.   HENT: Negative for hearing loss and trouble swallowing.    Eyes: Negative for photophobia and visual disturbance.        Last Eye Exam: recent--cataracts.  Nov 2018   Respiratory: Negative for cough and shortness of breath.    Cardiovascular: Negative for chest pain and palpitations.   Gastrointestinal: Negative for constipation and diarrhea.   Genitourinary: Negative for frequency and urgency.   Musculoskeletal: Positive for back pain. Negative for arthralgias and myalgias.   Skin: Negative for rash and wound.   Allergic/Immunologic: Negative for food allergies.   Neurological: Positive for numbness (hands and feet. ). Negative for weakness.   Psychiatric/Behavioral: Negative for sleep disturbance. The patient is  not nervous/anxious.         Sleeps well with gabapentin.         Objective:      Physical Exam   Constitutional: She is oriented to person, place, and time. She appears well-developed and well-nourished.   Appears older than age, morbidly obese   HENT:   Head: Normocephalic and atraumatic.   Nose: Nose normal.   Mouth/Throat: Oropharynx is clear and moist.   Eyes: Pupils are equal, round, and reactive to light. Conjunctivae and EOM are normal.   Neck: Normal range of motion. Neck supple. No tracheal deviation present. No thyromegaly present.   Cardiovascular: Normal rate, regular rhythm, normal heart sounds and intact distal pulses.   Pulmonary/Chest: Effort normal and breath sounds normal.   Musculoskeletal: Normal range of motion. She exhibits no deformity.     Deferred:      Feet: no open wounds or calluses. Good pedal care. vibraorty sensation slightly decreased left  Virtually absent on R.        Neurological: She is alert and oriented to person, place, and time.   Skin: Skin is warm and dry.   Psychiatric: She has a normal mood and affect. Her behavior is normal. Judgment and thought content normal.       Hemoglobin A1C   Date Value Ref Range Status   01/27/2020 8.7 (H) 4.0 - 5.6 % Final     Comment:     ADA Screening Guidelines:  5.7-6.4%  Consistent with prediabetes  >or=6.5%  Consistent with diabetes  High levels of fetal hemoglobin interfere with the HbA1C  assay. Heterozygous hemoglobin variants (HbS, HgC, etc)do  not significantly interfere with this assay.   However, presence of multiple variants may affect accuracy.     10/25/2019 8.2 (H) 4.0 - 5.6 % Final     Comment:     ADA Screening Guidelines:  5.7-6.4%  Consistent with prediabetes  >or=6.5%  Consistent with diabetes  High levels of fetal hemoglobin interfere with the HbA1C  assay. Heterozygous hemoglobin variants (HbS, HgC, etc)do  not significantly interfere with this assay.   However, presence of multiple variants may affect accuracy.      07/26/2019 8.7 (H) 4.0 - 5.6 % Final     Comment:     ADA Screening Guidelines:  5.7-6.4%  Consistent with prediabetes  >or=6.5%  Consistent with diabetes  High levels of fetal hemoglobin interfere with the HbA1C  assay. Heterozygous hemoglobin variants (HbS, HgC, etc)do  not significantly interfere with this assay.   However, presence of multiple variants may affect accuracy.         Chemistry        Component Value Date/Time     01/27/2020 1148    K 4.0 01/27/2020 1148     01/27/2020 1148    CO2 26 01/27/2020 1148    BUN 21 (H) 01/27/2020 1148    CREATININE 1.4 01/27/2020 1148     (H) 01/27/2020 1148        Component Value Date/Time    CALCIUM 10.0 01/27/2020 1148    ALKPHOS 96 01/27/2020 1148    AST 31 01/27/2020 1148    ALT 29 01/27/2020 1148    BILITOT 0.4 01/27/2020 1148    ESTGFRAFRICA 49.1 (A) 01/27/2020 1148    EGFRNONAA 42.6 (A) 01/27/2020 1148        Lab Results   Component Value Date    LDLCALC 145.2 01/27/2020     Lab Results   Component Value Date    TSH 1.484 03/27/2018     Component      Latest Ref Rng & Units 3/27/2018   Microalbum.,U,Random      ug/mL 43.0       Assessment:         1. Type 2 diabetes mellitus with diabetic polyneuropathy, with long-term current use of insulin  Hemoglobin A1c  Chronic-uncontrolled- see plan    2. Vitamin D deficiency  Vitamin D--chronic-improved   3. CKD (chronic kidney disease), stage III  Chronic-stable-cont arb   4. HTN (hypertension), benign  Chronic-uncontrolled-cont arb   5. Hyperlipidemia, unspecified hyperlipidemia type  Chronic-hesitant to add statin with ETOH   6. ETOH abuse  See note   7. Insulin pump status     8. Recommended medication schedule not followed  See note            Plan:         Pt counseled--ai has 150 christina per oz--1/2 gal over 2-3 days =9600 calories!!! Also calculates to $220 t $300 month.   Not too mention other health concerns.   Pt also counseled her insurance may deny future pump supplies if she is not  entering at least 4 glucose readings a day, and bolusing for meals.    AGAIN, warned. Verbalizes understanding.    Counseled on sudden worsening of renal function and eventual dialysis if no improvement.       Eventually will not be able to  authorize anymore pump supply refills. Pt will need 55 -60 units a day of basal insulin, and since she doesn't bolus with a pump, it is doubtful she will bolus for meals or corrections.     RX for basal/bolus regimen.     ORDERS 02/04/2020     3 mo with a1c,

## 2020-02-04 NOTE — PATIENT INSTRUCTIONS
If you have to stop pump:      Lantus 50 units every evening--DO NOT FORGET    Novolog 10 with meals plus ss        Dose is based on level of glucose before meals only (meaning no food or drink for 4 hours). This dose may be added to a standard meal dose if ordered    150-200=+2  201-250=+4  251-300=+6  301-350=+8  351-400=+10

## 2020-02-24 ENCOUNTER — PATIENT OUTREACH (OUTPATIENT)
Dept: ADMINISTRATIVE | Facility: HOSPITAL | Age: 55
End: 2020-02-24

## 2020-02-24 NOTE — PROGRESS NOTES
Chart review completed 02/24/2020.  Care Everywhere updates requested and reviewed.  Immunizations reconciled. Media reviewed.     Health Maintenance Due   Topic Date Due    Low Dose Statin  09/18/1986    Shingles Vaccine (1 of 2) 09/18/2015    Colonoscopy  09/18/2015    Pneumococcal Vaccine (Highest Risk) (2 of 3 - PCV13) 07/28/2018    Influenza Vaccine (1) 09/01/2019    Eye Exam  03/18/2020

## 2020-03-09 ENCOUNTER — OFFICE VISIT (OUTPATIENT)
Dept: FAMILY MEDICINE | Facility: CLINIC | Age: 55
End: 2020-03-09
Payer: COMMERCIAL

## 2020-03-09 VITALS
SYSTOLIC BLOOD PRESSURE: 126 MMHG | HEART RATE: 96 BPM | HEIGHT: 56 IN | DIASTOLIC BLOOD PRESSURE: 82 MMHG | BODY MASS INDEX: 51.83 KG/M2 | OXYGEN SATURATION: 97 % | WEIGHT: 230.38 LBS

## 2020-03-09 DIAGNOSIS — K76.0 FATTY LIVER: ICD-10-CM

## 2020-03-09 DIAGNOSIS — I10 ESSENTIAL HYPERTENSION: Primary | ICD-10-CM

## 2020-03-09 DIAGNOSIS — E66.01 MORBID OBESITY WITH BMI OF 45.0-49.9, ADULT: ICD-10-CM

## 2020-03-09 DIAGNOSIS — E11.42 TYPE 2 DIABETES MELLITUS WITH DIABETIC POLYNEUROPATHY, WITH LONG-TERM CURRENT USE OF INSULIN: ICD-10-CM

## 2020-03-09 DIAGNOSIS — N18.30 CKD (CHRONIC KIDNEY DISEASE), STAGE III: ICD-10-CM

## 2020-03-09 DIAGNOSIS — Z79.4 TYPE 2 DIABETES MELLITUS WITH DIABETIC POLYNEUROPATHY, WITH LONG-TERM CURRENT USE OF INSULIN: ICD-10-CM

## 2020-03-09 DIAGNOSIS — F10.10 ALCOHOL ABUSE: ICD-10-CM

## 2020-03-09 PROCEDURE — 99214 OFFICE O/P EST MOD 30 MIN: CPT | Mod: S$GLB,,, | Performed by: FAMILY MEDICINE

## 2020-03-09 PROCEDURE — 3008F PR BODY MASS INDEX (BMI) DOCUMENTED: ICD-10-PCS | Mod: CPTII,S$GLB,, | Performed by: FAMILY MEDICINE

## 2020-03-09 PROCEDURE — 3052F HG A1C>EQUAL 8.0%<EQUAL 9.0%: CPT | Mod: CPTII,S$GLB,, | Performed by: FAMILY MEDICINE

## 2020-03-09 PROCEDURE — 99999 PR PBB SHADOW E&M-EST. PATIENT-LVL III: ICD-10-PCS | Mod: PBBFAC,,, | Performed by: FAMILY MEDICINE

## 2020-03-09 PROCEDURE — 3008F BODY MASS INDEX DOCD: CPT | Mod: CPTII,S$GLB,, | Performed by: FAMILY MEDICINE

## 2020-03-09 PROCEDURE — 99999 PR PBB SHADOW E&M-EST. PATIENT-LVL III: CPT | Mod: PBBFAC,,, | Performed by: FAMILY MEDICINE

## 2020-03-09 PROCEDURE — 3079F PR MOST RECENT DIASTOLIC BLOOD PRESSURE 80-89 MM HG: ICD-10-PCS | Mod: CPTII,S$GLB,, | Performed by: FAMILY MEDICINE

## 2020-03-09 PROCEDURE — 3079F DIAST BP 80-89 MM HG: CPT | Mod: CPTII,S$GLB,, | Performed by: FAMILY MEDICINE

## 2020-03-09 PROCEDURE — 3074F SYST BP LT 130 MM HG: CPT | Mod: CPTII,S$GLB,, | Performed by: FAMILY MEDICINE

## 2020-03-09 PROCEDURE — 3074F PR MOST RECENT SYSTOLIC BLOOD PRESSURE < 130 MM HG: ICD-10-PCS | Mod: CPTII,S$GLB,, | Performed by: FAMILY MEDICINE

## 2020-03-09 PROCEDURE — 3052F PR MOST RECENT HEMOGLOBIN A1C LEVEL 8.0 - < 9.0%: ICD-10-PCS | Mod: CPTII,S$GLB,, | Performed by: FAMILY MEDICINE

## 2020-03-09 PROCEDURE — 99214 PR OFFICE/OUTPT VISIT, EST, LEVL IV, 30-39 MIN: ICD-10-PCS | Mod: S$GLB,,, | Performed by: FAMILY MEDICINE

## 2020-03-09 RX ORDER — CITALOPRAM 40 MG/1
40 TABLET, FILM COATED ORAL DAILY
Qty: 90 TABLET | Refills: 3 | Status: SHIPPED | OUTPATIENT
Start: 2020-03-09 | End: 2021-03-04 | Stop reason: SDUPTHER

## 2020-03-09 NOTE — PROGRESS NOTES
Subjective:       Patient ID: Mine Quiros is a 54 y.o. female.    Chief Complaint: Hypertension    HPI     Here for a f/u.     Here with sister, Sujatha.     htn controlled.      dm2 with neuropathy:  Uncontrolled.   Sees endocrinology  On insulin pump  Checks sugars daily: 114-180     On medical shelter disability since April 2017.      ckd stage 3 stable.     Anxiety stable while on celexa    Reports that she has cut down on her drinking I.e drinking from 2 bottles of ai to 1 bottle per week. Planning to attend AA in near future.       Review of Systems      Review of Systems   Constitutional: Negative for fever and chills.   HENT: Negative for hearing loss and neck stiffness.    Eyes: Negative for redness and itching.   Respiratory: Negative for cough and choking.    Cardiovascular: Negative for chest pain and leg swelling.  Abdomen: Negative for abdominal pain and blood in stool.   Genitourinary: Negative for dysuria and flank pain.   Musculoskeletal: Negative for back pain and gait problem.   Neurological: Negative for light-headedness and headaches.   Hematological: Negative for adenopathy.       Objective:      Physical Exam   Constitutional: She appears well-developed.   HENT:   Head: Normocephalic and atraumatic.   Eyes: Pupils are equal, round, and reactive to light. Conjunctivae are normal.   Neck: Normal range of motion.   Cardiovascular: Normal rate and regular rhythm.   No murmur heard.  Pulmonary/Chest: Effort normal and breath sounds normal.   Lymphadenopathy:     She has no cervical adenopathy.         Hemoglobin A1C   Date Value Ref Range Status   01/27/2020 8.7 (H) 4.0 - 5.6 % Final     Comment:     ADA Screening Guidelines:  5.7-6.4%  Consistent with prediabetes  >or=6.5%  Consistent with diabetes  High levels of fetal hemoglobin interfere with the HbA1C  assay. Heterozygous hemoglobin variants (HbS, HgC, etc)do  not significantly interfere with this assay.   However, presence of  "multiple variants may affect accuracy.     10/25/2019 8.2 (H) 4.0 - 5.6 % Final     Comment:     ADA Screening Guidelines:  5.7-6.4%  Consistent with prediabetes  >or=6.5%  Consistent with diabetes  High levels of fetal hemoglobin interfere with the HbA1C  assay. Heterozygous hemoglobin variants (HbS, HgC, etc)do  not significantly interfere with this assay.   However, presence of multiple variants may affect accuracy.     07/26/2019 8.7 (H) 4.0 - 5.6 % Final     Comment:     ADA Screening Guidelines:  5.7-6.4%  Consistent with prediabetes  >or=6.5%  Consistent with diabetes  High levels of fetal hemoglobin interfere with the HbA1C  assay. Heterozygous hemoglobin variants (HbS, HgC, etc)do  not significantly interfere with this assay.   However, presence of multiple variants may affect accuracy.         Assessment:       1. Essential hypertension    2. Type 2 diabetes mellitus with diabetic polyneuropathy, with long-term current use of insulin    3. Fatty liver    4. CKD (chronic kidney disease), stage III    5. Morbid obesity with BMI of 45.0-49.9, adult    6. Alcohol abuse        Plan:       Essential hypertension    Type 2 diabetes mellitus with diabetic polyneuropathy, with long-term current use of insulin    Fatty liver    CKD (chronic kidney disease), stage III    Morbid obesity with BMI of 45.0-49.9, adult    Alcohol abuse    Other orders  -     citalopram (CELEXA) 40 MG tablet; Take 1 tablet (40 mg total) by mouth once daily.  Dispense: 90 tablet; Refill: 3            Plan:  See orders  Cont current meds  Recommend that pt attend AA      Medication List with Changes/Refills   Current Medications    AMLODIPINE (NORVASC) 10 MG TABLET    Take 1 tablet (10 mg total) by mouth once daily.    BD INSULIN PEN NEEDLE UF MINI 31 GAUGE X 3/16" NDLE    USE AS DIRECTED    BETAMETHASONE DIPROPIONATE (DIPROLENE) 0.05 % OINTMENT    Apply topically 2 (two) times daily.    CHLORTHALIDONE (HYGROTEN) 25 MG TAB    TAKE 1 TABLET " "BY MOUTH EVERY DAY    CLINDAMYCIN PHOSPHATE 1% (CLINDAGEL) 1 % GEL    Apply topically once daily. To face in the morning    CLOBETASOL (TEMOVATE) 0.05 % EXTERNAL SOLUTION    Use on scalp one - two times daily as needed for scaling or itching    DIPHENHYDRAMINE (BENADRYL) 25 MG CAPSULE    Take 25 mg by mouth every evening. USE PM BEFORE SURGERY    DOXYCYCLINE (MONODOX) 100 MG CAPSULE    Take 1 capsule (100 mg total) by mouth 2 (two) times daily.    ERGOCALCIFEROL (ERGOCALCIFEROL) 50,000 UNIT CAP    Take 1 capsule (50,000 Units total) by mouth every 7 days.    FLUTICASONE PROPIONATE (FLONASE) 50 MCG/ACTUATION NASAL SPRAY    2 sprays (100 mcg total) by Each Nare route once daily.    GABAPENTIN (NEURONTIN) 300 MG CAPSULE    TAKE 1 CAPSULE BY MOUTH THREE TIMES DAILY    INSULIN (LANTUS SOLOSTAR U-100 INSULIN) GLARGINE 100 UNITS/ML (3ML) SUBQ PEN    60 units a day--for use in event of pump cessation.    INSULIN ASPART U-100 (NOVOLOG FLEXPEN U-100 INSULIN) 100 UNIT/ML (3 ML) INPN PEN    10 units with meals plus ss--or 50 units a day max dose--for use with pump failure    INSULIN LISPRO (HUMALOG U-100 INSULIN) 100 UNIT/ML INJECTION    USE AS DIRECTED via insulin pump approx 150 UNITS EVERY DAY. GET INSTRUCTIONS FROM ENDOCRINOLOGIST FOR DOSING, FOR SURGERY    KETOCONAZOLE (NIZORAL) 2 % SHAMPOO    Apply topically every 7 days.    LOSARTAN (COZAAR) 100 MG TABLET    TAKE 1 TABLET BY MOUTH DAILY    METOPROLOL TARTRATE (LOPRESSOR) 25 MG TABLET    TAKE 1 TABLET BY MOUTH TWICE DAILY    MICROLET 2 LANCING DEVICE KIT    Use 4x/daily to check glucose.    MICROLET LANCET MISC    use FOUR TIMES DAILY    PEN NEEDLE, DIABETIC 31 GAUGE X 5/16" NDLE    To use with insulin pens if needed    PIOGLITAZONE (ACTOS) 15 MG TABLET    Take 1 tablet (15 mg total) by mouth once daily.    TIMOLOL MALEATE 0.5% (TIMOPTIC) 0.5 % DROP    Place 1 drop into both eyes 2 (two) times daily.   Changed and/or Refilled Medications    Modified Medication Previous " Medication    CITALOPRAM (CELEXA) 40 MG TABLET citalopram (CELEXA) 40 MG tablet       Take 1 tablet (40 mg total) by mouth once daily.    Take 1 tablet (40 mg total) by mouth once daily.

## 2020-03-31 ENCOUNTER — PATIENT OUTREACH (OUTPATIENT)
Dept: ADMINISTRATIVE | Facility: HOSPITAL | Age: 55
End: 2020-03-31

## 2020-03-31 NOTE — PROGRESS NOTES
Chart review completed 03/31/2020.  Care Everywhere updates requested and reviewed.  Immunizations reconciled. Media reviewed.     Last A1C 1/27/20 (8.7) Next on is scheduled for 4/28/20.    Health Maintenance Due   Topic Date Due    Low Dose Statin  09/18/1986    Shingles Vaccine (1 of 2) 09/18/2015    Colonoscopy  09/18/2015    Pneumococcal Vaccine (Highest Risk) (2 of 3 - PCV13) 07/28/2018    Influenza Vaccine (1) 09/01/2019    Eye Exam  03/18/2020

## 2020-04-08 ENCOUNTER — TELEPHONE (OUTPATIENT)
Dept: ENDOCRINOLOGY | Facility: CLINIC | Age: 55
End: 2020-04-08

## 2020-04-08 NOTE — TELEPHONE ENCOUNTER
----- Message from Princess LISSET Gotti sent at 4/8/2020  3:29 PM CDT -----  Contact: Destinee nielsen/ [employer] [employer]  Type: Needs Medical Advice  Who Called:  Destinee nielsen/ [employer]  Best Call Back Number:  [phone]  Additional Information: Requesting a call in regards to the provider not writing off on the patient insulin

## 2020-04-08 NOTE — TELEPHONE ENCOUNTER
S/w [employer] rep. They are asking if you want to move forward regarding patient's pump. It seems they sent paperwork to fill out but it was denied by saying she didn't meet criteria. Please advise

## 2020-04-20 RX ORDER — LOSARTAN POTASSIUM 100 MG/1
TABLET ORAL
Qty: 90 TABLET | Refills: 0 | Status: SHIPPED | OUTPATIENT
Start: 2020-04-20 | End: 2020-07-15

## 2020-04-20 NOTE — PROGRESS NOTES
Refill Routing Note   Medication(s) are not appropriate for processing by [facility]:       Required laboratory values are abnormal, Patient displays non-adherence to medications (has run out of medication supply over 6 months ago) and Medication is a new start (<3 months)     Medication-related problems identified: Non-adherence (knowledge deficit) non-intentional    Medication Therapy Plan: Last ordered 4/17, new start; Per EPIC data 0% adherence; Cr-elevated at last reading, defer to you    Medication reconciliation completed: No        Appointments [id number] or future 3m with PCP    Date Provider   Last Visit   3/9/2020 Hai Pitt MD   Next Visit   Visit date not found Hai Pitt MD     Automatic Epic Protocol Generated Data:    Requested Prescriptions   Pending Prescriptions Disp Refills    losartan (COZAAR) 100 MG tablet [Pharmacy Med Name: losartan 100 mg tablet] 90 tablet 0     Sig: TAKE 1 TABLET BY MOUTH DAILY       Cardiovascular:  Angiotensin Receptor Blockers Failed - 4/20/2020 10:27 AM        Failed - Cr is 1.3 or below and within 360 days     Creatinine   Date Value Ref Range Status   01/27/2020 1.4 0.5 - 1.4 mg/dL Final   10/31/2019 0.90 0.50 - 1.40 mg/dL Final   10/25/2019 0.9 0.5 - 1.4 mg/dL Final              Passed - Patient is at least 18 years old        Passed - Last BP in normal range within 360 days.     BP Readings from Last 3 Encounters:   03/09/20 126/82   02/04/20 (!) 156/102   01/27/20 138/88              Passed - Office visit in past 12 months or future 90 days.     Recent Outpatient Visits            1 month ago Essential hypertension    [facility] Hai Pitt MD    2 months ago Type 2 diabetes mellitus with diabetic polyneuropathy, with long-term current use of insulin    [facility] Regina Solorio, APRN,ANP-C    2 months ago Essential hypertension    [facility] Hai [facility] MD    5 months  ago Type 2 diabetes mellitus with diabetic polyneuropathy, with long-term current use of insulin    [place] - End[facility]in[facility] XOCHILT Baumann,ANP-C    5 months ago Preop examination    [facility] Hai Pitt MD          Future Appointments              In 1 week LAB, [place] [facility] [place]    In 2 weeks XOCHILT Baumann,ANP-C [facility], [place]                Passed - K in normal range and within 360 days     Potassium   Date Value Ref Range Status   01/27/2020 4.0 3.5 - 5.1 mmol/L Final   10/31/2019 3.9 3.5 - 5.1 mmol/L Final   10/25/2019 3.7 3.5 - 5.1 mmol/L Final              Passed - eGFR within 360 days     eGFR if non    Date Value Ref Range Status   01/27/2020 42.6 (A) >60 mL/min/1.73 m^2 Final     Comment:     Calculation used to obtain the estimated glomerular filtration  rate (eGFR) is the CKD-EPI equation.      10/31/2019 >60 >60 mL/min/1.73 m^2 Final     Comment:     Calculation used to obtain the estimated glomerular filtration  rate (eGFR) is the CKD-EPI equation.      10/25/2019 >60.0 >60 mL/min/1.73 m^2 Final     Comment:     Calculation used to obtain the estimated glomerular filtration  rate (eGFR) is the CKD-EPI equation.        eGFR if    Date Value Ref Range Status   01/27/2020 49.1 (A) >60 mL/min/1.73 m^2 Final   10/31/2019 >60 >60 mL/min/1.73 m^2 Final   10/25/2019 >60.0 >60 mL/min/1.73 m^2 Final              Powered by [employer] - 4/20/2020 10:27 AM        The requested medication is not on the active medication list.           Note composed:10:32 AM 04/20/2020

## 2020-04-28 ENCOUNTER — LAB VISIT (OUTPATIENT)
Dept: LAB | Facility: HOSPITAL | Age: 55
End: 2020-04-28
Attending: NURSE PRACTITIONER
Payer: COMMERCIAL

## 2020-04-28 DIAGNOSIS — Z79.4 TYPE 2 DIABETES MELLITUS WITH DIABETIC POLYNEUROPATHY, WITH LONG-TERM CURRENT USE OF INSULIN: ICD-10-CM

## 2020-04-28 DIAGNOSIS — E11.42 TYPE 2 DIABETES MELLITUS WITH DIABETIC POLYNEUROPATHY, WITH LONG-TERM CURRENT USE OF INSULIN: ICD-10-CM

## 2020-04-28 LAB
ESTIMATED AVG GLUCOSE: 194 MG/DL (ref 68–131)
HBA1C MFR BLD HPLC: 8.4 % (ref 4–5.6)

## 2020-04-28 PROCEDURE — 83036 HEMOGLOBIN GLYCOSYLATED A1C: CPT

## 2020-04-28 PROCEDURE — 36415 COLL VENOUS BLD VENIPUNCTURE: CPT | Mod: PO

## 2020-05-05 ENCOUNTER — PATIENT MESSAGE (OUTPATIENT)
Dept: ADMINISTRATIVE | Facility: HOSPITAL | Age: 55
End: 2020-05-05

## 2020-05-13 ENCOUNTER — PATIENT OUTREACH (OUTPATIENT)
Dept: ADMINISTRATIVE | Facility: OTHER | Age: 55
End: 2020-05-13

## 2020-05-13 NOTE — PROGRESS NOTES
Patient's chart was reviewed.   Requested updates within Care Everywhere.  GI case request found in chart

## 2020-05-14 ENCOUNTER — OFFICE VISIT (OUTPATIENT)
Dept: ENDOCRINOLOGY | Facility: CLINIC | Age: 55
End: 2020-05-14
Payer: COMMERCIAL

## 2020-05-14 DIAGNOSIS — E55.9 VITAMIN D DEFICIENCY DISEASE: ICD-10-CM

## 2020-05-14 DIAGNOSIS — G62.9 PERIPHERAL POLYNEUROPATHY: ICD-10-CM

## 2020-05-14 DIAGNOSIS — I10 ESSENTIAL HYPERTENSION: ICD-10-CM

## 2020-05-14 DIAGNOSIS — Z46.81 FITTING OR ADJUSTMENT OF INSULIN PUMP: ICD-10-CM

## 2020-05-14 DIAGNOSIS — F10.10 ETOH ABUSE: ICD-10-CM

## 2020-05-14 DIAGNOSIS — Z79.4 TYPE 2 DIABETES MELLITUS WITH DIABETIC POLYNEUROPATHY, WITH LONG-TERM CURRENT USE OF INSULIN: Primary | ICD-10-CM

## 2020-05-14 DIAGNOSIS — N18.30 CKD (CHRONIC KIDNEY DISEASE), STAGE III: ICD-10-CM

## 2020-05-14 DIAGNOSIS — E11.42 TYPE 2 DIABETES MELLITUS WITH DIABETIC POLYNEUROPATHY, WITH LONG-TERM CURRENT USE OF INSULIN: Primary | ICD-10-CM

## 2020-05-14 DIAGNOSIS — R25.2 MUSCLE CRAMPS: ICD-10-CM

## 2020-05-14 DIAGNOSIS — E11.8 TYPE 2 DIABETES MELLITUS WITH COMPLICATION, WITH LONG-TERM CURRENT USE OF INSULIN: ICD-10-CM

## 2020-05-14 DIAGNOSIS — E66.01 MORBID OBESITY WITH BMI OF 45.0-49.9, ADULT: ICD-10-CM

## 2020-05-14 DIAGNOSIS — Z79.4 TYPE 2 DIABETES MELLITUS WITH COMPLICATION, WITH LONG-TERM CURRENT USE OF INSULIN: ICD-10-CM

## 2020-05-14 DIAGNOSIS — L66.9 CICATRICIAL ALOPECIA: ICD-10-CM

## 2020-05-14 PROCEDURE — 3052F PR MOST RECENT HEMOGLOBIN A1C LEVEL 8.0 - < 9.0%: ICD-10-PCS | Mod: CPTII,,, | Performed by: NURSE PRACTITIONER

## 2020-05-14 PROCEDURE — 99214 PR OFFICE/OUTPT VISIT, EST, LEVL IV, 30-39 MIN: ICD-10-PCS | Mod: 95,,, | Performed by: NURSE PRACTITIONER

## 2020-05-14 PROCEDURE — 3052F HG A1C>EQUAL 8.0%<EQUAL 9.0%: CPT | Mod: CPTII,,, | Performed by: NURSE PRACTITIONER

## 2020-05-14 PROCEDURE — 99214 OFFICE O/P EST MOD 30 MIN: CPT | Mod: 95,,, | Performed by: NURSE PRACTITIONER

## 2020-05-14 RX ORDER — ERGOCALCIFEROL 1.25 MG/1
50000 CAPSULE ORAL
Qty: 12 CAPSULE | Refills: 1 | Status: SHIPPED | OUTPATIENT
Start: 2020-05-14 | End: 2020-08-14 | Stop reason: SDUPTHER

## 2020-05-14 RX ORDER — CLOBETASOL PROPIONATE 0.46 MG/ML
SOLUTION TOPICAL
Qty: 60 ML | Refills: 3 | Status: SHIPPED | OUTPATIENT
Start: 2020-05-14 | End: 2022-10-20

## 2020-05-14 RX ORDER — GABAPENTIN 300 MG/1
300 CAPSULE ORAL 3 TIMES DAILY
Qty: 90 CAPSULE | Refills: 3 | Status: SHIPPED | OUTPATIENT
Start: 2020-05-14 | End: 2021-01-19

## 2020-05-14 NOTE — PROGRESS NOTES
ubjective:       Patient ID: Mine Quiros is a 54 y.o. female.    Chief Complaint: routine DM f/u     The patient location is:home   The chief complaint leading to consultation is: routine DM f.u  Visit type: audiovisual  Total time spent with patient: 10:01-10:23 Each patient to whom he or she provides medical services by telemedicine is:  (1) informed of the relationship between the physician and patient and the respective role of any other health care provider with respect to management of the patient; and (2) notified that he or she may decline to receive medical services by telemedicine and may withdraw from such care at any time.    Notes: r/t COVID   HPI   Pt is a 54 y.o. AAF with a long hx of very uncontrolled Type 2 DM-- as well as chronic conditions pending review including HTN, peripheral neuropathy, diastolic dysfunciton, morbid obesity- now on insulin pump.She has had multiple difficulties being able to navigate pump and having freq hypoglycemia. Medically disbabled she states due to neuropathy.     Interim Events: .  Same story as last several visits.  Drinks too much, doesn't check glucoses nor boluses for them.  No acute events.  Continues to drink--Carmelina--was 1/2 gallon every 2-3 days.---she states this is improved every time I see her.  Wants a new pump and now a sensor.  Reemphasized requireements which are well documented from prior visits    (Pump download overall with dearth of data.-     She has been counseled on need for Serial SBGM fShe  both improved DM status and safety.  I have advised her once pump supplies need to renewed I will not sign off as she is not meeting criteria.  AGAIN--EXACT SAME NOTE FOR LAST SEVERAL VISITS.  No c/o hypoglycemia.) She is complaining of worsened Peripheral Neuropathy.However no burning or stinging.  States muscle cramps.  However feet do fluctuate b/w markedly hot or cold sensation, though no correlating temperature on touch.        Review of Systems    Constitutional: Positive for fatigue. Negative for activity change.   HENT: Negative for hearing loss and trouble swallowing.    Eyes: Negative for photophobia and visual disturbance.        Last Eye Exam: recent--cataracts.  Nov 2018   Respiratory: Negative for cough and shortness of breath.    Cardiovascular: Negative for chest pain and palpitations.   Gastrointestinal: Negative for constipation and diarrhea.   Genitourinary: Negative for frequency and urgency.   Musculoskeletal: Positive for back pain. Negative for arthralgias and myalgias.        Muscle cramps in feet   Skin: Negative for rash and wound.   Allergic/Immunologic: Negative for food allergies.   Neurological: Positive for numbness (hands and feet. ). Negative for weakness.   Psychiatric/Behavioral: Negative for sleep disturbance. The patient is not nervous/anxious.         Sleeps well with gabapentin.         Objective:      Physical Exam   Constitutional: She appears well-developed and well-nourished.   Skin:   Dark spotty splotches on back of Left thigh--states itches,  Saw derm--out of betamethasone.         Hemoglobin A1C   Date Value Ref Range Status   04/28/2020 8.4 (H) 4.0 - 5.6 % Final     Comment:     ADA Screening Guidelines:  5.7-6.4%  Consistent with prediabetes  >or=6.5%  Consistent with diabetes  High levels of fetal hemoglobin interfere with the HbA1C  assay. Heterozygous hemoglobin variants (HbS, HgC, etc)do  not significantly interfere with this assay.   However, presence of multiple variants may affect accuracy.     01/27/2020 8.7 (H) 4.0 - 5.6 % Final     Comment:     ADA Screening Guidelines:  5.7-6.4%  Consistent with prediabetes  >or=6.5%  Consistent with diabetes  High levels of fetal hemoglobin interfere with the HbA1C  assay. Heterozygous hemoglobin variants (HbS, HgC, etc)do  not significantly interfere with this assay.   However, presence of multiple variants may affect accuracy.     10/25/2019 8.2 (H) 4.0 - 5.6 % Final      Comment:     ADA Screening Guidelines:  5.7-6.4%  Consistent with prediabetes  >or=6.5%  Consistent with diabetes  High levels of fetal hemoglobin interfere with the HbA1C  assay. Heterozygous hemoglobin variants (HbS, HgC, etc)do  not significantly interfere with this assay.   However, presence of multiple variants may affect accuracy.         Chemistry        Component Value Date/Time     01/27/2020 1148    K 4.0 01/27/2020 1148     01/27/2020 1148    CO2 26 01/27/2020 1148    BUN 21 (H) 01/27/2020 1148    CREATININE 1.4 01/27/2020 1148     (H) 01/27/2020 1148        Component Value Date/Time    CALCIUM 10.0 01/27/2020 1148    ALKPHOS 96 01/27/2020 1148    AST 31 01/27/2020 1148    ALT 29 01/27/2020 1148    BILITOT 0.4 01/27/2020 1148    ESTGFRAFRICA 49.1 (A) 01/27/2020 1148    EGFRNONAA 42.6 (A) 01/27/2020 1148        Lab Results   Component Value Date    LDLCALC 145.2 01/27/2020     Lab Results   Component Value Date    TSH 1.484 03/27/2018     Component      Latest Ref Rng & Units 3/27/2018   Microalbum.,U,Random      ug/mL 43.0       Assessment:         1. Type 2 diabetes mellitus with diabetic polyneuropathy, with long-term current use of insulin  Hemoglobin A1c  Chronic-uncontrolled- see plan    2. Vitamin D deficiency  Vitamin D--chronic-improved   3. CKD (chronic kidney disease), stage III  Chronic-stable-cont arb   4. HTN (hypertension), benign  Chronic-uncontrolled-cont arb   5. Hyperlipidemia, unspecified hyperlipidemia type  Chronic-hesitant to add statin with ETOH   6. ETOH abuse  See note   7. Insulin pump status     8. Recommended medication schedule not followed  See note      1. Type 2 diabetes mellitus with diabetic polyneuropathy, with long-term current use of insulin  Hemoglobin A1C  Chronic-uncontrolled-see plan     Comprehensive metabolic panel    Hemoglobin A1C   2. Cicatricial alopecia  clobetasoL (TEMOVATE) 0.05 % external solution--per derm--refill as courtesy    3.  Vitamin D deficiency disease  ergocalciferol (ERGOCALCIFEROL) 50,000 unit Cap  Restart ergo--rechek,  Chronic-not at goal     Vitamin D   4. Morbid obesity with BMI of 45.0-49.9, adult  Chronic-again counseled on diet, ETOH cessation    5. Fitting or adjustment of insulin pump     6. CKD (chronic kidney disease), stage III  Chronic-stable-avoid hypoglycemia    7. Essential hypertension     8. ETOH abuse  Chronic-counseled-can also contribute to neuropathy    9. Peripheral polyneuropathy  Chronic-monitor foot care    10. Muscle cramps  Magnesium   11. Type 2 diabetes mellitus with complication, with long-term current use of insulin  gabapentin (NEURONTIN) 300 MG capsule  Will refill, but will not increase dose r/t ETOH            Plan:       Again reemphasized need for QID SBGM.  Pt has been given basal/bolus recs with rx at last vist    ORDERS 05/14/2020     3 mo with  fasting   A1c,, cmp, mag,   vit d. --10 am socorro.

## 2020-05-22 ENCOUNTER — TELEPHONE (OUTPATIENT)
Dept: ENDOCRINOLOGY | Facility: CLINIC | Age: 55
End: 2020-05-22

## 2020-05-22 ENCOUNTER — TELEPHONE (OUTPATIENT)
Dept: GASTROENTEROLOGY | Facility: CLINIC | Age: 55
End: 2020-05-22

## 2020-05-22 NOTE — TELEPHONE ENCOUNTER
----- Message from Renetta Curran sent at 5/22/2020  4:17 PM CDT -----  Contact: Mine pt  Type: Needs Medical Advice  Who Called:  Mine  Symptoms (please be specific):  She went to the nail salon and cut her foot  How long has patient had these symptoms:  today  Pharmacy name and phone #:  n/a  Best Call Back Number: [phone]  Additional Information: Pt is concerned due to her being a diabetic as to what she should do

## 2020-05-22 NOTE — TELEPHONE ENCOUNTER
Left voicemail for pt regarding foot care as pt reports cutting foot at nail salon  Informed pt per XOCHILT Bullard if cut is larg and deep go to urgent care  If small can clean with hydrogen peroxide and apply triple antibiotic ointment - do this twice a day and monitor for signs of infection (pus, increased and worsending redness tenderness or pain, drainage)

## 2020-06-05 ENCOUNTER — TELEPHONE (OUTPATIENT)
Dept: GASTROENTEROLOGY | Facility: CLINIC | Age: 55
End: 2020-06-05

## 2020-06-08 ENCOUNTER — TELEPHONE (OUTPATIENT)
Dept: GASTROENTEROLOGY | Facility: CLINIC | Age: 55
End: 2020-06-08

## 2020-06-17 RX ORDER — CHLORTHALIDONE 25 MG/1
TABLET ORAL
Qty: 90 TABLET | Refills: 0 | Status: SHIPPED | OUTPATIENT
Start: 2020-06-17 | End: 2021-03-04 | Stop reason: SDUPTHER

## 2020-06-17 NOTE — PROGRESS NOTES
Refill Routing Note    Medication(s) are not appropriate for processing by [facility]:       Required laboratory values are abnormal and Drug-Disease Interaction (chlorthalidone and chronic kidney disease)        Medication Therapy Plan: Drug-Disease: chlorthalidone and CKD (chronic kidney disease), stage III; Severe Warning for kidney disease with reduction in GFR; SCr>1.3; FLOS; Defer to you  Medication reconciliation completed: No      Automatic Epic Protocol Generated Data:    Requested Prescriptions   Pending Prescriptions Disp Refills    chlorthalidone (HYGROTEN) 25 MG Tab [Pharmacy Med Name: chlorthalidone 25 mg tablet] 90 tablet 0     Sig: TAKE 1 TABLET BY MOUTH DAILY       Cardiovascular: Diuretics - Thiazide Failed - 6/16/2020 12:16 PM        Failed - Cr is 1.3 or below and within 180 days     Creatinine   Date Value Ref Range Status   01/27/2020 1.4 0.5 - 1.4 mg/dL Final   10/31/2019 0.90 0.50 - 1.40 mg/dL Final   10/25/2019 0.9 0.5 - 1.4 mg/dL Final              Passed - Patient is at least 18 years old        Passed - Last BP in normal range within 360 days.     BP Readings from Last 3 Encounters:   03/09/20 126/82   02/04/20 (!) 156/102   01/27/20 138/88              Passed - Office visit in past 12 months or future 90 days.     Recent Outpatient Visits            1 month ago Type 2 diabetes mellitus with diabetic polyneuropathy, with long-term current use of insulin    [place]  [facility] XOCHILT Baumann,ANP-C    3 months ago Essential hypertension    [facility] Hai Pitt MD    4 months ago Type 2 diabetes mellitus with diabetic polyneuropathy, with long-term current use of insulin    [place] [facility] XOCHILT Baumann,ANP-C    4 months ago Essential hypertension    [facility] Hai Pitt MD    7 months ago Type 2 diabetes mellitus with diabetic polyneuropathy, with long-term current use of insulin    [place] -  Endocrinology XOCHILT Baumann,ANP-C          Future Appointments              In 1 month [facility] [place] [facility]    In 1 month [facility]CHIL[facility] Baumann[facility]ANP-[facility] [place] [facility]                Passed - Ca in normal range and within 360 days     Calcium   Date Value Ref Range Status   01/27/2020 10.0 8.7 - 10.5 mg/dL Final   10/31/2019 9.3 8.4 - 10.2 mg/dL Final   10/25/2019 10.0 8.7 - 10.5 mg/dL Final              Passed - K in normal range and within 180 days     Potassium   Date Value Ref Range Status   01/27/2020 4.0 3.5 - 5.1 mmol/L Final   10/31/2019 3.9 3.5 - 5.1 mmol/L Final   10/25/2019 3.7 3.5 - 5.1 mmol/L Final              Passed - Na is between 130 and 148 and within 180 days     Sodium   Date Value Ref Range Status   01/27/2020 139 136 - 145 mmol/L Final   10/31/2019 138 136 - 145 mmol/L Final   10/25/2019 137 136 - 145 mmol/L Final              Passed - eGFR within 180 days     eGFR if non    Date Value Ref Range Status   01/27/2020 42.6 (A) >60 mL/min/1.73 m^2 Final     Comment:     Calculation used to obtain the estimated glomerular filtration  rate (eGFR) is the CKD-EPI equation.      10/31/2019 >60 >60 mL/min/1.73 m^2 Final     Comment:     Calculation used to obtain the estimated glomerular filtration  rate (eGFR) is the CKD-EPI equation.      10/25/2019 >60.0 >60 mL/min/1.73 m^2 Final     Comment:     Calculation used to obtain the estimated glomerular filtration  rate (eGFR) is the CKD-EPI equation.        eGFR if    Date Value Ref Range Status   01/27/2020 49.1 (A) >60 mL/min/1.73 m^2 Final   10/31/2019 >60 >60 mL/min/1.73 m^2 Final   10/25/2019 >60.0 >60 mL/min/1.73 m^2 Final                    Appointments  past 12m or future 3m with PCP    Date Provider   Last Visit   3/9/2020 Hai Pitt MD   Next Visit   Visit date not found Hai Pitt MD   ED visits in past 90 days: 0     Note  composed:7:42 AM 06/17/2020

## 2020-07-15 RX ORDER — LOSARTAN POTASSIUM 100 MG/1
TABLET ORAL
Qty: 90 TABLET | Refills: 1 | Status: SHIPPED | OUTPATIENT
Start: 2020-07-15 | End: 2021-03-04 | Stop reason: SDUPTHER

## 2020-07-15 NOTE — PROGRESS NOTES
Refill Authorization Note    is requesting a refill authorization.    Brief assessment and rationale for refill: APPROVE: prr          Medication Therapy Plan: CKD LCO by PCP as stable in 3/20; Approve 6 more losartan    Medication reconciliation completed: No                         Comments:      Requested Prescriptions   Signed Prescriptions Disp Refills    losartan (COZAAR) 100 MG tablet 90 tablet 1     Sig: TAKE 1 TABLET BY MOUTH DAILY       Cardiovascular:  Angiotensin Receptor Blockers Failed - 7/15/2020  8:01 AM        Failed - Cr is 1.3 or below and within 360 days     Creatinine   Date Value Ref Range Status   01/27/2020 1.4 0.5 - 1.4 mg/dL Final   10/31/2019 0.90 0.50 - 1.40 mg/dL Final   10/25/2019 0.9 0.5 - 1.4 mg/dL Final              Passed - Patient is at least 18 years old        Passed - Last BP in normal range within 360 days.     BP Readings from Last 3 Encounters:   03/09/20 126/82   02/04/20 (!) 156/102   01/27/20 138/88              Passed - Office visit in past 12 months or future 90 days.     Recent Outpatient Visits            2 months ago Type 2 diabetes mellitus with diabetic polyneuropathy, with long-term current use of insulin    [facility] XOCHILT Baumann,ANP-C    4 months ago Essential hypertension    [facility] Hai Pitt MD    5 months ago Type 2 diabetes mellitus with diabetic polyneuropathy, with long-term current use of insulin    [place] - End[facility]in[facility] XOCHILT Baumann,ANP-C    5 months ago Essential hypertension    [facility] Hai Pitt MD    8 months ago Type 2 diabetes mellitus with diabetic polyneuropathy, with long-term current use of insulin    [facility] XOCHILT Baumann,ANP-C          Future Appointments              In 3 weeks [facility] [place] [facility]    In 3 weeks LAB, [place] [facility] [place]    In 1  month Regina Solorio, APRN,ANP-C [facility] - [facility], [place]                Passed - K in normal range and within 360 days     Potassium   Date Value Ref Range Status   01/27/2020 4.0 3.5 - 5.1 mmol/L Final   10/31/2019 3.9 3.5 - 5.1 mmol/L Final   10/25/2019 3.7 3.5 - 5.1 mmol/L Final              Passed - eGFR within 360 days     eGFR if non    Date Value Ref Range Status   01/27/2020 42.6 (A) >60 mL/min/1.73 m^2 Final     Comment:     Calculation used to obtain the estimated glomerular filtration  rate (eGFR) is the CKD-EPI equation.      10/31/2019 >60 >60 mL/min/1.73 m^2 Final     Comment:     Calculation used to obtain the estimated glomerular filtration  rate (eGFR) is the CKD-EPI equation.      10/25/2019 >60.0 >60 mL/min/1.73 m^2 Final     Comment:     Calculation used to obtain the estimated glomerular filtration  rate (eGFR) is the CKD-EPI equation.        eGFR if    Date Value Ref Range Status   01/27/2020 49.1 (A) >60 mL/min/1.73 m^2 Final   10/31/2019 >60 >60 mL/min/1.73 m^2 Final   10/25/2019 >60.0 >60 mL/min/1.73 m^2 Final                  Appointments  past 12m or future 3m with PCP    Date Provider   Last Visit   3/9/2020 Hai Pitt MD   Next Visit   Visit date not found Hai Pitt MD   ED visits in past 90 days: 0     Note composed:12:33 PM 07/15/2020

## 2020-08-03 ENCOUNTER — PATIENT OUTREACH (OUTPATIENT)
Dept: ADMINISTRATIVE | Facility: OTHER | Age: 55
End: 2020-08-03

## 2020-08-03 DIAGNOSIS — Z12.31 ENCOUNTER FOR SCREENING MAMMOGRAM FOR MALIGNANT NEOPLASM OF BREAST: Primary | ICD-10-CM

## 2020-08-03 NOTE — PROGRESS NOTES
Requested updates within Care Everywhere.  Patient's chart was reviewed for overdue BARAK topics.  Immunizations reconciled.    Mammogram tasked to patient.

## 2020-08-04 ENCOUNTER — OFFICE VISIT (OUTPATIENT)
Dept: DERMATOLOGY | Facility: CLINIC | Age: 55
End: 2020-08-04
Payer: COMMERCIAL

## 2020-08-04 VITALS — BODY MASS INDEX: 51.65 KG/M2 | HEIGHT: 56 IN | RESPIRATION RATE: 18 BRPM

## 2020-08-04 DIAGNOSIS — E55.9 HYPOVITAMINOSIS D: ICD-10-CM

## 2020-08-04 DIAGNOSIS — L30.0 NUMMULAR ECZEMA: Primary | ICD-10-CM

## 2020-08-04 DIAGNOSIS — L82.1 SEBORRHEIC KERATOSES: ICD-10-CM

## 2020-08-04 PROCEDURE — 99214 OFFICE O/P EST MOD 30 MIN: CPT | Mod: S$GLB,,, | Performed by: DERMATOLOGY

## 2020-08-04 PROCEDURE — 3008F PR BODY MASS INDEX (BMI) DOCUMENTED: ICD-10-PCS | Mod: CPTII,S$GLB,, | Performed by: DERMATOLOGY

## 2020-08-04 PROCEDURE — 99999 PR PBB SHADOW E&M-EST. PATIENT-LVL III: ICD-10-PCS | Mod: PBBFAC,,, | Performed by: DERMATOLOGY

## 2020-08-04 PROCEDURE — 99214 PR OFFICE/OUTPT VISIT, EST, LEVL IV, 30-39 MIN: ICD-10-PCS | Mod: S$GLB,,, | Performed by: DERMATOLOGY

## 2020-08-04 PROCEDURE — 99999 PR PBB SHADOW E&M-EST. PATIENT-LVL III: CPT | Mod: PBBFAC,,, | Performed by: DERMATOLOGY

## 2020-08-04 PROCEDURE — 3008F BODY MASS INDEX DOCD: CPT | Mod: CPTII,S$GLB,, | Performed by: DERMATOLOGY

## 2020-08-04 RX ORDER — MOMETASONE FUROATE 1 MG/G
OINTMENT TOPICAL
Qty: 45 G | Refills: 2 | Status: SHIPPED | OUTPATIENT
Start: 2020-08-04 | End: 2022-10-20

## 2020-08-04 NOTE — PROGRESS NOTES
"  Subjective:       Patient ID:  Mine Quiros is a 54 y.o. female who presents for   Chief Complaint   Patient presents with    Skin Check     Patient present for skin check (legs and back), last seen by ERIK on 1/10/2019.  LSC, acne, history cicatricial alopecia  tx betamethasone and hydroxyzine. [facility] pharmacy wont refill it.   C/o lesions to back and bilateral legs. The patient denies any change, including change in color, increase in size, or spontaneous bleeding, associated with this lesion. Not treating  Bumps on back , years, asx, no tx  Accompanied by sister today  History "sensitive skin as a child"  no Phx of NMSC.  no Fhx of melanoma.    Past Medical History:  10/2016: Abnormal stress test  No date: Acne  10/2016: Acute diastolic heart failure secondary to idiopathic   cardiomyopathy  No date: Alcohol abuse  No date: Allergy  No date: Anxiety  No date: Arthritis  No date: Carpal tunnel syndrome of right wrist      Comment:  bilateral  No date: Cataract      Comment:  Done OU  No date: Chest pain  No date: Diabetes mellitus      Comment:  Type 2 IDDM - Uncontrolled  No date: Diabetic neuropathy  No date: Difficult intubation      Comment:  needed glidescope for cholecystectomy 2009  No date: Dry scalp  No date: Fatty liver  No date: GERD (gastroesophageal reflux disease)      Comment:  on no meds  No date: Glaucoma  No date: Hyperlipidemia  No date: Hypertension  No date: Insomnia  No date: Knee pain  No date: Morbid obesity  No date: Neuropathy due to type 2 diabetes mellitus      Comment:  anaya feet  No date: MARLON (obstructive sleep apnea)      Comment:  CPAP, says she is compliant with use  2014: Parotid mass      Comment:  had Biopsy  10/2016: SOB (shortness of breath)  No date: Tachycardia      Comment:  frequently,chronic for years per chart        Review of Systems   Constitutional: Negative for fever, chills, weight loss, fatigue, night sweats and malaise.   HENT: Negative for headaches.  "   Respiratory: Negative for cough and shortness of breath.    Gastrointestinal: Negative for indigestion.   Skin: Positive for activity-related sunscreen use. Negative for daily sunscreen use, recent sunburn and wears hat.   Neurological: Negative for headaches.   Hematologic/Lymphatic: Negative for adenopathy. Does not bruise/bleed easily.        Objective:    Physical Exam   Constitutional: She appears well-developed and well-nourished. She is obese.  No distress.   Eyes: Lids are normal.  No conjunctival no injection.   Cardiovascular: There is no local extremity swelling and no dependent edema.     Neurological: She is alert and oriented to person, place, and time. She is not disoriented.   Psychiatric: She has a normal mood and affect.   Skin:   Areas Examined (abnormalities noted in diagram):   Scalp / Hair Palpated and Inspected  Head / Face Inspection Performed  Neck Inspection Performed  Chest / Axilla Inspection Performed  Abdomen Inspection Performed  Back Inspection Performed  RUE Inspected  LUE Inspection Performed  RLE Inspected  LLE Inspection Performed                   Diagram Legend     Erythematous scaling macule/papule c/w actinic keratosis       Vascular papule c/w angioma      Pigmented verrucoid papule/plaque c/w seborrheic keratosis      Yellow umbilicated papule c/w sebaceous hyperplasia      Irregularly shaped tan macule c/w lentigo     1-2 mm smooth white papules consistent with Milia      Movable subcutaneous cyst with punctum c/w epidermal inclusion cyst      Subcutaneous movable cyst c/w pilar cyst      Firm pink to brown papule c/w dermatofibroma      Pedunculated fleshy papule(s) c/w skin tag(s)      Evenly pigmented macule c/w junctional nevus     Mildly variegated pigmented, slightly irregular-bordered macule c/w mildly atypical nevus      Flesh colored to evenly pigmented papule c/w intradermal nevus       Pink pearly papule/plaque c/w basal cell carcinoma      Erythematous  hyperkeratotic cursted plaque c/w SCC      Surgical scar with no sign of skin cancer recurrence      Open and closed comedones      Inflammatory papules and pustules      Verrucoid papule consistent consistent with wart     Erythematous eczematous patches and plaques     Dystrophic onycholytic nail with subungual debris c/w onychomycosis     Umbilicated papule    Erythematous-base heme-crusted tan verrucoid plaque consistent with inflamed seborrheic keratosis     Erythematous Silvery Scaling Plaque c/w Psoriasis     See annotation      Assessment / Plan:        Nummular eczema  Discussed the following dry skin tips:    Avoid hot baths and showers, keep showers or baths brief (10-15 min), use a mild soap or cleanser, pat skin dry after showering and apply a moisturizer within 3 minutes of getting out of bath (cetaphil cream, ceraVe, aquaphor) and reapply moisturizers 2-4 times/day.   Use products on safe list only. Stop harsh soaps    -     TSH; Future; Expected date: 08/04/2020  -     mometasone (ELOCON) 0.1 % ointment; aaa bid  Dispense: 45 g; Refill: 2  cerave cream bid    Hypovitaminosis D  Recommend daily supplementation at least 1000 IU daily , has repeat lab this Friday    Seborrheic keratoses, back  These are benign inherited growths without a malignant potential. Reassurance given to patient. No treatment is necessary.              Follow up in about 4 weeks (around 9/1/2020).

## 2020-08-05 ENCOUNTER — LAB VISIT (OUTPATIENT)
Dept: LAB | Facility: HOSPITAL | Age: 55
End: 2020-08-05
Attending: FAMILY MEDICINE
Payer: COMMERCIAL

## 2020-08-05 DIAGNOSIS — Z12.11 SCREENING FOR MALIGNANT NEOPLASM OF COLON: ICD-10-CM

## 2020-08-05 PROCEDURE — 82274 ASSAY TEST FOR BLOOD FECAL: CPT

## 2020-08-07 ENCOUNTER — LAB VISIT (OUTPATIENT)
Dept: LAB | Facility: HOSPITAL | Age: 55
End: 2020-08-07
Attending: NURSE PRACTITIONER
Payer: COMMERCIAL

## 2020-08-07 DIAGNOSIS — R25.2 MUSCLE CRAMPS: ICD-10-CM

## 2020-08-07 DIAGNOSIS — E55.9 VITAMIN D DEFICIENCY DISEASE: ICD-10-CM

## 2020-08-07 DIAGNOSIS — E11.42 TYPE 2 DIABETES MELLITUS WITH DIABETIC POLYNEUROPATHY, WITH LONG-TERM CURRENT USE OF INSULIN: ICD-10-CM

## 2020-08-07 DIAGNOSIS — Z79.4 TYPE 2 DIABETES MELLITUS WITH DIABETIC POLYNEUROPATHY, WITH LONG-TERM CURRENT USE OF INSULIN: ICD-10-CM

## 2020-08-07 DIAGNOSIS — L30.0 NUMMULAR ECZEMA: ICD-10-CM

## 2020-08-07 LAB
25(OH)D3+25(OH)D2 SERPL-MCNC: 16 NG/ML (ref 30–96)
ALBUMIN SERPL BCP-MCNC: 3.5 G/DL (ref 3.5–5.2)
ALP SERPL-CCNC: 105 U/L (ref 55–135)
ALT SERPL W/O P-5'-P-CCNC: 38 U/L (ref 10–44)
ANION GAP SERPL CALC-SCNC: 11 MMOL/L (ref 8–16)
AST SERPL-CCNC: 64 U/L (ref 10–40)
BILIRUB SERPL-MCNC: 0.3 MG/DL (ref 0.1–1)
BUN SERPL-MCNC: 16 MG/DL (ref 6–20)
CALCIUM SERPL-MCNC: 10 MG/DL (ref 8.7–10.5)
CHLORIDE SERPL-SCNC: 100 MMOL/L (ref 95–110)
CO2 SERPL-SCNC: 27 MMOL/L (ref 23–29)
CREAT SERPL-MCNC: 1.4 MG/DL (ref 0.5–1.4)
EST. GFR  (AFRICAN AMERICAN): 49.1 ML/MIN/1.73 M^2
EST. GFR  (NON AFRICAN AMERICAN): 42.6 ML/MIN/1.73 M^2
ESTIMATED AVG GLUCOSE: 217 MG/DL (ref 68–131)
GLUCOSE SERPL-MCNC: 236 MG/DL (ref 70–110)
HBA1C MFR BLD HPLC: 9.2 % (ref 4–5.6)
MAGNESIUM SERPL-MCNC: 1.9 MG/DL (ref 1.6–2.6)
POTASSIUM SERPL-SCNC: 4.4 MMOL/L (ref 3.5–5.1)
PROT SERPL-MCNC: 8.3 G/DL (ref 6–8.4)
SODIUM SERPL-SCNC: 138 MMOL/L (ref 136–145)
TSH SERPL DL<=0.005 MIU/L-ACNC: 2.27 UIU/ML (ref 0.4–4)

## 2020-08-07 PROCEDURE — 83036 HEMOGLOBIN GLYCOSYLATED A1C: CPT

## 2020-08-07 PROCEDURE — 82306 VITAMIN D 25 HYDROXY: CPT

## 2020-08-07 PROCEDURE — 36415 COLL VENOUS BLD VENIPUNCTURE: CPT | Mod: PO

## 2020-08-07 PROCEDURE — 83735 ASSAY OF MAGNESIUM: CPT

## 2020-08-07 PROCEDURE — 80053 COMPREHEN METABOLIC PANEL: CPT

## 2020-08-07 PROCEDURE — 84443 ASSAY THYROID STIM HORMONE: CPT

## 2020-08-13 ENCOUNTER — PATIENT OUTREACH (OUTPATIENT)
Dept: ADMINISTRATIVE | Facility: HOSPITAL | Age: 55
End: 2020-08-13

## 2020-08-13 DIAGNOSIS — Z12.11 SCREENING FOR MALIGNANT NEOPLASM OF COLON: Primary | ICD-10-CM

## 2020-08-13 NOTE — PROGRESS NOTES
Patient came on the FOBT workbook needing an order entered for a fit kit. Last order     Message sent to staff to reenter order.   Yes

## 2020-08-14 ENCOUNTER — OFFICE VISIT (OUTPATIENT)
Dept: ENDOCRINOLOGY | Facility: CLINIC | Age: 55
End: 2020-08-14
Payer: COMMERCIAL

## 2020-08-14 ENCOUNTER — PATIENT OUTREACH (OUTPATIENT)
Dept: ADMINISTRATIVE | Facility: OTHER | Age: 55
End: 2020-08-14

## 2020-08-14 VITALS
WEIGHT: 233.38 LBS | DIASTOLIC BLOOD PRESSURE: 98 MMHG | HEART RATE: 95 BPM | SYSTOLIC BLOOD PRESSURE: 158 MMHG | BODY MASS INDEX: 52.5 KG/M2 | HEIGHT: 56 IN

## 2020-08-14 DIAGNOSIS — G62.9 PERIPHERAL POLYNEUROPATHY: ICD-10-CM

## 2020-08-14 DIAGNOSIS — Z96.41 INSULIN PUMP STATUS: Chronic | ICD-10-CM

## 2020-08-14 DIAGNOSIS — E66.01 MORBID OBESITY WITH BMI OF 45.0-49.9, ADULT: ICD-10-CM

## 2020-08-14 DIAGNOSIS — E11.8 TYPE 2 DIABETES MELLITUS WITH COMPLICATION, WITH LONG-TERM CURRENT USE OF INSULIN: Primary | ICD-10-CM

## 2020-08-14 DIAGNOSIS — E66.01 MORBID OBESITY WITH BMI OF 50.0-59.9, ADULT: ICD-10-CM

## 2020-08-14 DIAGNOSIS — E78.5 HYPERLIPIDEMIA, UNSPECIFIED HYPERLIPIDEMIA TYPE: ICD-10-CM

## 2020-08-14 DIAGNOSIS — F10.10 ALCOHOL ABUSE: ICD-10-CM

## 2020-08-14 DIAGNOSIS — Z46.81 FITTING OR ADJUSTMENT OF INSULIN PUMP: ICD-10-CM

## 2020-08-14 DIAGNOSIS — N18.30 CKD (CHRONIC KIDNEY DISEASE), STAGE III: ICD-10-CM

## 2020-08-14 DIAGNOSIS — E55.9 VITAMIN D DEFICIENCY DISEASE: ICD-10-CM

## 2020-08-14 DIAGNOSIS — Z79.4 TYPE 2 DIABETES MELLITUS WITH COMPLICATION, WITH LONG-TERM CURRENT USE OF INSULIN: Primary | ICD-10-CM

## 2020-08-14 LAB — HEMOCCULT STL QL IA: POSITIVE

## 2020-08-14 PROCEDURE — 99214 OFFICE O/P EST MOD 30 MIN: CPT | Mod: S$GLB,,, | Performed by: NURSE PRACTITIONER

## 2020-08-14 PROCEDURE — 99999 PR PBB SHADOW E&M-EST. PATIENT-LVL V: CPT | Mod: PBBFAC,,, | Performed by: NURSE PRACTITIONER

## 2020-08-14 PROCEDURE — 99999 PR PBB SHADOW E&M-EST. PATIENT-LVL V: ICD-10-PCS | Mod: PBBFAC,,, | Performed by: NURSE PRACTITIONER

## 2020-08-14 PROCEDURE — 99214 PR OFFICE/OUTPT VISIT, EST, LEVL IV, 30-39 MIN: ICD-10-PCS | Mod: S$GLB,,, | Performed by: NURSE PRACTITIONER

## 2020-08-14 RX ORDER — ERGOCALCIFEROL 1.25 MG/1
50000 CAPSULE ORAL
Qty: 12 CAPSULE | Refills: 1 | Status: SHIPPED | OUTPATIENT
Start: 2020-08-14 | End: 2022-05-13 | Stop reason: SDUPTHER

## 2020-08-14 NOTE — PROGRESS NOTES
ubjective:       Patient ID: Mine Quiros is a 54 y.o. female.    Chief Complaint: routine DM f/u     HPI   Pt is a 54 y.o. AAF with a long hx of very uncontrolled Type 2 DM-- as well as chronic conditions pending review including HTN, peripheral neuropathy, diastolic dysfunciton, morbid obesity- now on insulin pump.She has had multiple difficulties being able to navigate pump and having freq hypoglycemia. Medically disbabled she states due to neuropathy.     Interim Events: .  Same story as last several visits.  Drinks too much, doesn't check glucoses nor boluses for them.  No acute events.  Continues to drink--[place]--was 1/2 gallon every 2-3 days.---she states this is improved every time I see her.  Wants a new pump and now a sensor.  Reemphasized requireements which are well documented from prior visits  This visit she actually started checking glucoses and bolusing for meals 2-3 times a day--which is still not enough for sensor and pump criteria.   She has been counseled on need for Serial SBGM fShe  both improved DM status and safety.  I have advised her once pump supplies need to renewed I will not sign off as she is not meeting criteria.  AGAIN--EXACT SAME NOTE FOR LAST SEVERAL VISITS.  No c/o hypoglycemia.) She is complaining of worsened Peripheral Neuropathy.However no burning or stinging.  States muscle cramps.  However feet do fluctuate b/w markedly hot or cold sensation, though no correlating temperature on touch.        Review of Systems   Constitutional: Positive for fatigue. Negative for activity change.   HENT: Negative for hearing loss and trouble swallowing.    Eyes: Negative for photophobia and visual disturbance.        Last Eye Exam: recent--cataracts.  Nov 2018   Respiratory: Positive for shortness of breath (states r/t deconditioning. ). Negative for cough.    Cardiovascular: Negative for chest pain and palpitations.   Gastrointestinal: Negative for constipation and diarrhea.    Genitourinary: Negative for frequency and urgency.   Musculoskeletal: Positive for back pain. Negative for arthralgias and myalgias.        Muscle cramps in feet   Skin: Negative for rash and wound.   Allergic/Immunologic: Negative for food allergies.   Neurological: Positive for numbness (hands and feet. ). Negative for weakness.   Psychiatric/Behavioral: Negative for sleep disturbance. The patient is not nervous/anxious.         Sleeps well with gabapentin.         Objective:      Physical Exam  Constitutional:       Appearance: Normal appearance. She is well-developed. She is obese.   HENT:      Head: Normocephalic and atraumatic.      Nose: Nose normal.   Eyes:      Extraocular Movements: Extraocular movements intact.      Conjunctiva/sclera: Conjunctivae normal.      Pupils: Pupils are equal, round, and reactive to light.   Neck:      Musculoskeletal: Normal range of motion and neck supple.      Vascular: No carotid bruit.   Cardiovascular:      Rate and Rhythm: Normal rate and regular rhythm.      Pulses: Normal pulses.      Heart sounds: Normal heart sounds.   Pulmonary:      Effort: Pulmonary effort is normal.      Breath sounds: Normal breath sounds.   Musculoskeletal: Normal range of motion.      Comments: Feet: no open wounds or calluses.  Good pedal care with exception of flip  Flops. Pedal pulses +2 bilaterally.   Vibratory sensation slightly decreased bilaterally.    Lymphadenopathy:      Cervical: No cervical adenopathy.   Skin:     General: Skin is warm and dry.      Comments: Acanthosis nigricans   Neurological:      General: No focal deficit present.      Mental Status: She is alert and oriented to person, place, and time.   Psychiatric:         Mood and Affect: Mood normal.         Behavior: Behavior normal.         Thought Content: Thought content normal.         Judgment: Judgment normal.         Component      Latest Ref Rng & Units 8/14/2020 8/7/2020 1/27/2020   Sodium      136 - 145 mmol/L   138 139   Potassium      3.5 - 5.1 mmol/L  4.4 4.0   Chloride      95 - 110 mmol/L  100 103   CO2      23 - 29 mmol/L  27 26   Glucose      70 - 110 mg/dL  236 (H) 139 (H)   BUN, Bld      6 - 20 mg/dL  16 21 (H)   Creatinine      0.5 - 1.4 mg/dL  1.4 1.4   Calcium      8.7 - 10.5 mg/dL  10.0 10.0   PROTEIN TOTAL      6.0 - 8.4 g/dL  8.3 8.6 (H)   Albumin      3.5 - 5.2 g/dL  3.5 3.5   BILIRUBIN TOTAL      0.1 - 1.0 mg/dL  0.3 0.4   Alkaline Phosphatase      55 - 135 U/L  105 96   AST      10 - 40 U/L  64 (H) 31   ALT      10 - 44 U/L  38 29   Anion Gap      8 - 16 mmol/L  11 10   eGFR if African American      >60 mL/min/1.73 m:2  49.1 (A) 49.1 (A)   eGFR if non African American      >60 mL/min/1.73 m:2  42.6 (A) 42.6 (A)   Cholesterol      120 - 199 mg/dL   218 (H)   Triglycerides      30 - 150 mg/dL   99   HDL      40 - 75 mg/dL   53   LDL Cholesterol External      63.0 - 159.0 mg/dL   145.2   Hdl/Cholesterol Ratio      20.0 - 50.0 %   24.3   Total Cholesterol/HDL Ratio      2.0 - 5.0   4.1   Non-HDL Cholesterol      mg/dL   165   Hemoglobin A1C External      4.0 - 5.6 %  9.2 (H) 8.7 (H)   Estimated Avg Glucose      68 - 131 mg/dL  217 (H) 203 (H)   POCT Glucose      70 - 110 mg/dL      Vit D, 25-Hydroxy      30 - 96 ng/mL  16 (L) 21 (L)   Magnesium      1.6 - 2.6 mg/dL  1.9    TSH      0.400 - 4.000 uIU/mL  2.269    Fecal Immunochemical Test (iFOBT)      Negative Positive (A)       Component      Latest Ref Rng & Units 11/6/2019   Sodium      136 - 145 mmol/L    Potassium      3.5 - 5.1 mmol/L    Chloride      95 - 110 mmol/L    CO2      23 - 29 mmol/L    Glucose      70 - 110 mg/dL    BUN, Bld      6 - 20 mg/dL    Creatinine      0.5 - 1.4 mg/dL    Calcium      8.7 - 10.5 mg/dL    PROTEIN TOTAL      6.0 - 8.4 g/dL    Albumin      3.5 - 5.2 g/dL    BILIRUBIN TOTAL      0.1 - 1.0 mg/dL    Alkaline Phosphatase      55 - 135 U/L    AST      10 - 40 U/L    ALT      10 - 44 U/L    Anion Gap      8 - 16 mmol/L    eGFR  if African American      >60 mL/min/1.73 m:2    eGFR if non African American      >60 mL/min/1.73 m:2    Cholesterol      120 - 199 mg/dL    Triglycerides      30 - 150 mg/dL    HDL      40 - 75 mg/dL    LDL Cholesterol External      63.0 - 159.0 mg/dL    Hdl/Cholesterol Ratio      20.0 - 50.0 %    Total Cholesterol/HDL Ratio      2.0 - 5.0    Non-HDL Cholesterol      mg/dL    Hemoglobin A1C External      4.0 - 5.6 %    Estimated Avg Glucose      68 - 131 mg/dL    POCT Glucose      70 - 110 mg/dL 127 (H)   Vit D, 25-Hydroxy      30 - 96 ng/mL    Magnesium      1.6 - 2.6 mg/dL    TSH      0.400 - 4.000 uIU/mL    Fecal Immunochemical Test (iFOBT)      Negative        Assessment:           1. Type 2 diabetes mellitus with complication, with long-term current use of insulin  HM DIABETES FOOT EXAM  Chronic-uncontrolled-see plan     Hemoglobin A1C   2. Hyperlipidemia, unspecified hyperlipidemia type  Chronic--no statin   3. Alcohol abuse  Chronic-complicates DM management, increase risk of hypoglycemia    4. CKD (chronic kidney disease), stage III          6. Peripheral polyneuropathy  Chronic-monitor foot care    7. Vitamin D deficiency disease  ergocalciferol (ERGOCALCIFEROL) 50,000 unit Cap--chronic-worsened--not on OTC    8. Morbid obesity with BMI of 50.0-59.9, adult     9. Fitting or adjustment of insulin pump     10. Insulin pump status     11.    HTN--uncontrolled-pt did not take BP meds this am--reinforced taking before medical visits.         Plan:       Again reemphasized need for QID SBGM.  Pt has been given basal/bolus recs with  Prior visits  Encourage, diet,exercise, ETOH cessation, minimization.   ORDERS 08/14/2020     3 mo with  fasting   A1c,

## 2020-08-14 NOTE — PROGRESS NOTES
Health Maintenance Due   Topic Date Due    Shingles Vaccine (1 of 2) 09/18/2015    Colorectal Cancer Screening  06/14/2018    Eye Exam  03/18/2020    Mammogram  07/30/2020   Updates were requested from care everywhere.  Chart was reviewed for overdue Proactive [facility] Encounters (BARAK) topics (CRS, Breast Cancer Screening, Eye exam)  Health Maintenance has been updated.  LINKS immunization registry triggered.  Immunizations were reconciled.  Mammogram previously tasked to patient.

## 2020-08-19 ENCOUNTER — TELEPHONE (OUTPATIENT)
Dept: FAMILY MEDICINE | Facility: CLINIC | Age: 55
End: 2020-08-19

## 2020-08-19 NOTE — TELEPHONE ENCOUNTER
----- Message from Marga Darnell MA sent at 8/13/2020  8:15 AM CDT -----  Please reenter fit kit order so that the lab can process specimen

## 2020-08-30 DIAGNOSIS — R19.5 FECAL OCCULT BLOOD TEST POSITIVE: Primary | ICD-10-CM

## 2020-08-31 ENCOUNTER — TELEPHONE (OUTPATIENT)
Dept: FAMILY MEDICINE | Facility: CLINIC | Age: 55
End: 2020-08-31

## 2020-08-31 NOTE — TELEPHONE ENCOUNTER
Spoke with pt, verbalized understanding I did let her know that they should be contacting her to be scheduled for a colonoscopy.

## 2020-08-31 NOTE — PROGRESS NOTES
inform pt via phone that I reviewed the test results and note the following:    Fit kit test was positive. I ordered colonoscopy for patient.

## 2020-08-31 NOTE — TELEPHONE ENCOUNTER
----- Message from Nikki Heaton sent at 8/31/2020  1:08 PM CDT -----  Regarding: Results and Return call  Contact: Patient @ [phone]  Good Afternoon  Patient is returning a phone call.  Who left a message for the patient: Dr Pitt Office  Does patient know what this is regarding:  Test results  Comments:

## 2020-09-17 LAB
HUMAN PAPILLOMAVIRUS (HPV): POSITIVE
HUMAN PAPILLOMAVIRUS (HPV): POSITIVE
PAP SMEAR: ABNORMAL
PAP SMEAR: ABNORMAL

## 2020-09-20 ENCOUNTER — PATIENT OUTREACH (OUTPATIENT)
Dept: ADMINISTRATIVE | Facility: OTHER | Age: 55
End: 2020-09-20

## 2020-09-21 NOTE — PROGRESS NOTES
Health Maintenance Due   Topic Date Due    Low Dose Statin  09/18/1986    Shingles Vaccine (1 of 2) 09/18/2015    Eye Exam  03/18/2020    Mammogram  07/30/2020    Influenza Vaccine (1) 08/01/2020     Updates were requested from care everywhere.  Chart was reviewed for overdue [facility] Encounters (BARAK) topics (CRS, Breast Cancer Screening, Eye exam)  Health Maintenance has been updated.  LINKS immunization registry triggered.  Immunizations were reconciled.

## 2020-09-22 ENCOUNTER — OFFICE VISIT (OUTPATIENT)
Dept: DERMATOLOGY | Facility: CLINIC | Age: 55
End: 2020-09-22
Payer: COMMERCIAL

## 2020-09-22 VITALS — WEIGHT: 233 LBS | HEIGHT: 56 IN | BODY MASS INDEX: 52.42 KG/M2

## 2020-09-22 DIAGNOSIS — L28.0 LSC (LICHEN SIMPLEX CHRONICUS): Primary | ICD-10-CM

## 2020-09-22 DIAGNOSIS — L30.0 NUMMULAR ECZEMA: ICD-10-CM

## 2020-09-22 PROCEDURE — 99213 OFFICE O/P EST LOW 20 MIN: CPT | Mod: S$GLB,,, | Performed by: DERMATOLOGY

## 2020-09-22 PROCEDURE — 3008F PR BODY MASS INDEX (BMI) DOCUMENTED: ICD-10-PCS | Mod: CPTII,S$GLB,, | Performed by: DERMATOLOGY

## 2020-09-22 PROCEDURE — 99999 PR PBB SHADOW E&M-EST. PATIENT-LVL III: CPT | Mod: PBBFAC,,, | Performed by: DERMATOLOGY

## 2020-09-22 PROCEDURE — 99999 PR PBB SHADOW E&M-EST. PATIENT-LVL III: ICD-10-PCS | Mod: PBBFAC,,, | Performed by: DERMATOLOGY

## 2020-09-22 PROCEDURE — 3008F BODY MASS INDEX DOCD: CPT | Mod: CPTII,S$GLB,, | Performed by: DERMATOLOGY

## 2020-09-22 PROCEDURE — 99213 PR OFFICE/OUTPT VISIT, EST, LEVL III, 20-29 MIN: ICD-10-PCS | Mod: S$GLB,,, | Performed by: DERMATOLOGY

## 2020-09-22 RX ORDER — CLOBETASOL PROPIONATE 0.5 MG/G
OINTMENT TOPICAL
Qty: 45 G | Refills: 1 | Status: SHIPPED | OUTPATIENT
Start: 2020-09-22 | End: 2021-10-20

## 2020-09-22 NOTE — PROGRESS NOTES
Subjective:       Patient ID:  Mine Quiros is a 55 y.o. female who presents for   Chief Complaint   Patient presents with    Eczema     Past seen 8/4/20  56 y/o f present for follow up visit for eczema, present flare on both legs, Using Mometasone 0.0 1 % , some improvement, no completely healed  Right leg improved. Left worse  Enjoys hot showers  Picks at left knee/thigh lesions    Denies history of NMSC  Denies family history of melanoma        Past Medical History:   Diagnosis Date    Abnormal stress test 10/2016    Acne     Acute diastolic heart failure secondary to idiopathic cardiomyopathy 10/2016    Alcohol abuse     Allergy     Anxiety     Arthritis     Carpal tunnel syndrome of right wrist     bilateral    Cataract     Done OU    Chest pain     Diabetes mellitus     Type 2 IDDM - Uncontrolled    Diabetic neuropathy     Difficult intubation     needed glidescope for cholecystectomy 2009    Dry scalp     Fatty liver     GERD (gastroesophageal reflux disease)     on no meds    Glaucoma     Hyperlipidemia     Hypertension     Insomnia     Knee pain     Morbid obesity     Neuropathy due to type 2 diabetes mellitus     anaya feet    MARLON (obstructive sleep apnea)     CPAP, says she is compliant with use    Parotid mass 2014    had Biopsy    SOB (shortness of breath) 10/2016    Tachycardia     frequently,chronic for years per chart       Review of Systems   Constitutional: Negative for fever, chills, weight loss, fatigue, night sweats and malaise.   HENT: Negative for headaches.    Respiratory: Negative for cough and shortness of breath.    Gastrointestinal: Negative for indigestion.   Skin: Positive for activity-related sunscreen use. Negative for daily sunscreen use, recent sunburn and wears hat.   Neurological: Negative for headaches.   Hematologic/Lymphatic: Negative for adenopathy. Does not bruise/bleed easily.        Objective:    Physical Exam   Constitutional: She appears  well-developed and well-nourished. She is obese.  No distress.   Eyes: Lids are normal.  No conjunctival no injection.   Cardiovascular: There is no local extremity swelling and no dependent edema.     Neurological: She is alert and oriented to person, place, and time. She is not disoriented.   Psychiatric: She has a normal mood and affect.   Skin:   Areas Examined (abnormalities noted in diagram):   Head / Face Inspection Performed  Neck Inspection Performed  RUE Inspected  LUE Inspection Performed  RLE Inspected  LLE Inspection Performed              Diagram Legend     Erythematous scaling macule/papule c/w actinic keratosis       Vascular papule c/w angioma      Pigmented verrucoid papule/plaque c/w seborrheic keratosis      Yellow umbilicated papule c/w sebaceous hyperplasia      Irregularly shaped tan macule c/w lentigo     1-2 mm smooth white papules consistent with Milia      Movable subcutaneous cyst with punctum c/w epidermal inclusion cyst      Subcutaneous movable cyst c/w pilar cyst      Firm pink to brown papule c/w dermatofibroma      Pedunculated fleshy papule(s) c/w skin tag(s)      Evenly pigmented macule c/w junctional nevus     Mildly variegated pigmented, slightly irregular-bordered macule c/w mildly atypical nevus      Flesh colored to evenly pigmented papule c/w intradermal nevus       Pink pearly papule/plaque c/w basal cell carcinoma      Erythematous hyperkeratotic cursted plaque c/w SCC      Surgical scar with no sign of skin cancer recurrence      Open and closed comedones      Inflammatory papules and pustules      Verrucoid papule consistent consistent with wart     Erythematous eczematous patches and plaques     Dystrophic onycholytic nail with subungual debris c/w onychomycosis     Umbilicated papule    Erythematous-base heme-crusted tan verrucoid plaque consistent with inflamed seborrheic keratosis     Erythematous Silvery Scaling Plaque c/w Psoriasis     See  annotation      Assessment / Plan:        LSC (lichen simplex chronicus)  Mometasone with saran qhs x 2-3 days    -     clobetasol 0.05% (TEMOVATE) 0.05 % Oint; AAA bid x 2-3 days, reserve for flares, Avoid chronic use  Dispense: 45 g; Refill: 1  Discussed with patient the importance of not manipulating skin lesions. Trauma often exacerbates condition. Trimming nails is recommended to avoid puncturing skin.     discussed avoiding chronic use and to use only on affected areas- risk atrophy, striae, ecchymoses, hypopigmentation  Declines ILK, risk dyspigmentation    Nummular eczema  Overall improved with change in products. Using cerave cream bid  Encouraged cessation of hot showers           Follow up if symptoms worsen or fail to improve.

## 2020-09-24 ENCOUNTER — TELEPHONE (OUTPATIENT)
Dept: GASTROENTEROLOGY | Facility: CLINIC | Age: 55
End: 2020-09-24

## 2020-09-27 ENCOUNTER — TELEPHONE (OUTPATIENT)
Dept: FAMILY MEDICINE | Facility: CLINIC | Age: 55
End: 2020-09-27

## 2020-09-27 NOTE — TELEPHONE ENCOUNTER
Patient on QBPC for HTN.  Attempted to contact patient to verify if patient is able to assess BP at home with BP monitor, or if not - schedule BP follow up.   LMOM for return call.   Portal message sent to patient               JM

## 2020-09-28 ENCOUNTER — TELEPHONE (OUTPATIENT)
Dept: GASTROENTEROLOGY | Facility: CLINIC | Age: 55
End: 2020-09-28

## 2020-09-28 NOTE — TELEPHONE ENCOUNTER
Pt does not wish to schedule cscope until next year. She will call us back later this year to schedule. Phone number provided, order canceled, PCP notified.

## 2020-10-05 ENCOUNTER — PATIENT MESSAGE (OUTPATIENT)
Dept: ADMINISTRATIVE | Facility: HOSPITAL | Age: 55
End: 2020-10-05

## 2020-10-23 ENCOUNTER — PATIENT OUTREACH (OUTPATIENT)
Dept: ADMINISTRATIVE | Facility: HOSPITAL | Age: 55
End: 2020-10-23

## 2020-10-23 NOTE — LETTER
November 2, 2020    Mine Quiros  [address]  [place] [address]             [facility]  [address]  [facility] [address]  Phone: [phone] Dear Mine Quiros     Your [facility] primary care team is dedicated to assisting you achieve your health goal.  In order to maintain your goal, scheduling your diabetic health maintenance requirements are guallpa to successful disease management.     Hai Pitt MD has reviewed your records and found that you are overdue for your diabetic eye exam.  Yearly eye exams are especially important, as this can identify early eye complications and disease caused by your diabetes.  Hai Pitt MD has requested you schedule your diabetic eye exam and encourages you to schedule at any of the [facility] locations.  You can be seen conveniently at the [facility] location by calling [phone].  If convenient, I will be happy to assist you in scheduling the appointment via the patient portal.     If you have already completed this exam at an outside facility, please notify us so we may request a copy of the exam and update your chart.     Sincerely,     Katerina Yates, Care Coordinator   [facility]   Phone: [phone]   Fax: [phone]

## 2020-11-09 ENCOUNTER — PATIENT OUTREACH (OUTPATIENT)
Dept: ADMINISTRATIVE | Facility: HOSPITAL | Age: 55
End: 2020-11-09

## 2020-11-10 ENCOUNTER — PATIENT OUTREACH (OUTPATIENT)
Dept: ADMINISTRATIVE | Facility: HOSPITAL | Age: 55
End: 2020-11-10

## 2020-11-10 NOTE — LETTER
November 21, 2020    Mine Quiros  [address]  [place] [address]             [facility]  [address]  [facility] [address]  Phone: [phone] [facility] and would like to ensure that you are up to date on your recommended test and/or procedures.   Hai Pitt MD has found that your chart shows you may be due for the following:       Mammogram   Annual Dilated Eye Exam   Influenza Vaccine   Diabetic lab testing     If you have had any of the above done at another facility, please let us know so that we may obtain copies from that facility.  If you have a copy of these records, please provide a copy for us to scan into your chart.  You are welcome to request that the report be faxed to us at  ([phone]).       If you have an upcoming scheduled appointment for the above test and/or procedures, please disregard this letter.  Otherwise, please contact us by your [facility] portal or by calling [phone] and we will schedule these appointments for you.  We are currently scheduling patients for test and/or procedures needed in June through 2020.       Thank you for letting us care for you,       Katerina Yates, Care Coordinator   [facility]   Phone: [phone]

## 2020-11-10 NOTE — PROGRESS NOTES
Non-compliant GAP report chart review - Chart review completed for the following  test if overdue  (Mammogram, Colonoscopy, Cervical Cancer Screening,  Diabetic lab testing, and/or Dilated EYE EXAM)  11/10/2020    Care Everywhere and Media reports - updates requested and reviewed.        [facility] and [facility] reviewed.  Pap Smear and/or  LABS       DIS reviewed for test needed.  Mammogram            HM updated with external   Pap smear    report.             Health Maintenance Due   Topic Date Due    Low Dose Statin  09/18/1986    Shingles Vaccine (1 of 2) 09/18/2015    Eye Exam  03/18/2020    Mammogram  07/30/2020    Influenza Vaccine (1) 08/01/2020    Hemoglobin A1c  11/07/2020

## 2020-11-11 ENCOUNTER — LAB VISIT (OUTPATIENT)
Dept: LAB | Facility: HOSPITAL | Age: 55
End: 2020-11-11
Attending: NEUROLOGICAL SURGERY
Payer: COMMERCIAL

## 2020-11-11 DIAGNOSIS — E11.8 TYPE 2 DIABETES MELLITUS WITH COMPLICATION, WITH LONG-TERM CURRENT USE OF INSULIN: ICD-10-CM

## 2020-11-11 DIAGNOSIS — Z79.4 TYPE 2 DIABETES MELLITUS WITH COMPLICATION, WITH LONG-TERM CURRENT USE OF INSULIN: ICD-10-CM

## 2020-11-11 PROCEDURE — 36415 COLL VENOUS BLD VENIPUNCTURE: CPT | Mod: PO

## 2020-11-11 PROCEDURE — 83036 HEMOGLOBIN GLYCOSYLATED A1C: CPT

## 2020-11-12 LAB
ESTIMATED AVG GLUCOSE: 249 MG/DL (ref 68–131)
HBA1C MFR BLD HPLC: 10.3 % (ref 4–5.6)

## 2020-11-15 ENCOUNTER — PATIENT OUTREACH (OUTPATIENT)
Dept: ADMINISTRATIVE | Facility: OTHER | Age: 55
End: 2020-11-15

## 2020-11-15 NOTE — PROGRESS NOTES
Health Maintenance Due   Topic Date Due    Shingles Vaccine (1 of 2) 09/18/2015    Eye Exam  03/18/2020    Mammogram  07/30/2020    Influenza Vaccine (1) 08/01/2020     Updates were requested from care everywhere.  Chart was reviewed for overdue Proactive [facility] Encounters (BAR[facility]) topics (CRS, Breast Cancer Screening, Eye exam)  Health Maintenance has been updated.  LINKS immunization registry triggered.  Immunizations were reconciled.

## 2020-11-17 ENCOUNTER — OFFICE VISIT (OUTPATIENT)
Dept: ENDOCRINOLOGY | Facility: CLINIC | Age: 55
End: 2020-11-17
Payer: COMMERCIAL

## 2020-11-17 VITALS
HEART RATE: 91 BPM | WEIGHT: 225.63 LBS | HEIGHT: 56 IN | BODY MASS INDEX: 50.75 KG/M2 | DIASTOLIC BLOOD PRESSURE: 84 MMHG | SYSTOLIC BLOOD PRESSURE: 138 MMHG

## 2020-11-17 DIAGNOSIS — E11.8 TYPE 2 DIABETES MELLITUS WITH COMPLICATION, WITH LONG-TERM CURRENT USE OF INSULIN: Primary | ICD-10-CM

## 2020-11-17 DIAGNOSIS — I10 HTN (HYPERTENSION), BENIGN: ICD-10-CM

## 2020-11-17 DIAGNOSIS — Z96.41 INSULIN PUMP STATUS: ICD-10-CM

## 2020-11-17 DIAGNOSIS — N18.31 STAGE 3A CHRONIC KIDNEY DISEASE: ICD-10-CM

## 2020-11-17 DIAGNOSIS — Z79.4 TYPE 2 DIABETES MELLITUS WITH COMPLICATION, WITH LONG-TERM CURRENT USE OF INSULIN: Primary | ICD-10-CM

## 2020-11-17 DIAGNOSIS — E55.9 VITAMIN D DEFICIENCY DISEASE: ICD-10-CM

## 2020-11-17 DIAGNOSIS — E78.5 HYPERLIPIDEMIA, UNSPECIFIED HYPERLIPIDEMIA TYPE: ICD-10-CM

## 2020-11-17 DIAGNOSIS — E66.01 MORBID OBESITY WITH BMI OF 50.0-59.9, ADULT: ICD-10-CM

## 2020-11-17 PROCEDURE — 3008F PR BODY MASS INDEX (BMI) DOCUMENTED: ICD-10-PCS | Mod: CPTII,S$GLB,, | Performed by: NURSE PRACTITIONER

## 2020-11-17 PROCEDURE — 1126F AMNT PAIN NOTED NONE PRSNT: CPT | Mod: S$GLB,,, | Performed by: NURSE PRACTITIONER

## 2020-11-17 PROCEDURE — 3008F BODY MASS INDEX DOCD: CPT | Mod: CPTII,S$GLB,, | Performed by: NURSE PRACTITIONER

## 2020-11-17 PROCEDURE — 99999 PR PBB SHADOW E&M-EST. PATIENT-LVL V: ICD-10-PCS | Mod: PBBFAC,,, | Performed by: NURSE PRACTITIONER

## 2020-11-17 PROCEDURE — 99214 OFFICE O/P EST MOD 30 MIN: CPT | Mod: S$GLB,,, | Performed by: NURSE PRACTITIONER

## 2020-11-17 PROCEDURE — 99999 PR PBB SHADOW E&M-EST. PATIENT-LVL V: CPT | Mod: PBBFAC,,, | Performed by: NURSE PRACTITIONER

## 2020-11-17 PROCEDURE — 1126F PR PAIN SEVERITY QUANTIFIED, NO PAIN PRESENT: ICD-10-PCS | Mod: S$GLB,,, | Performed by: NURSE PRACTITIONER

## 2020-11-17 PROCEDURE — 99214 PR OFFICE/OUTPT VISIT, EST, LEVL IV, 30-39 MIN: ICD-10-PCS | Mod: S$GLB,,, | Performed by: NURSE PRACTITIONER

## 2020-11-17 NOTE — PROGRESS NOTES
ubjective:       Patient ID: Mine Quiros is a 55 y.o. female.    Chief Complaint: routine DM f/u     HPI   Pt is a 55 y.o. AAF with a long hx of very uncontrolled Type 2 DM-- as well as chronic conditions pending review including HTN, peripheral neuropathy, diastolic dysfunciton, morbid obesity- now on insulin pump.She has had multiple difficulties being able to navigate pump and having freq hypoglycemia. Medically disbabled she states due to neuropathy.     Interim Events: .  Same story as last several visits.  Drinks too much, doesn't check glucoses nor boluses for them.  No acute events.  Continues to drink--Carmelina--was 1/2 gallon every 2-3 days.---she states this is improved every time I see her. Now a bottle is lasting 3-4 dys.    Wants a new pump and now a sensor.  Reemphasized requireements which are well documented from prior visits  She has been counseled on need for Serial SBGM fShe  both improved DM status and safety.  I have advised her once pump supplies need to renewed I will not sign off as she is not meeting criteria.  AGAIN--EXACT SAME NOTE FOR LAST SEVERAL VISITS.  No c/o hypoglycemia.) She is complaining of worsened Peripheral Neuropathy.with increased  burning and stinging.  Sister is stating she is eating ice again which is symptomatic for low iron.   Review of Systems   Constitutional: Positive for fatigue. Negative for activity change.   HENT: Negative for hearing loss and trouble swallowing.    Eyes: Negative for photophobia and visual disturbance.        Last Eye Exam: recent--cataracts.  Nov 2018   Respiratory: Positive for shortness of breath (states r/t deconditioning. ). Negative for cough.    Cardiovascular: Negative for chest pain and palpitations.   Gastrointestinal: Negative for constipation and diarrhea.   Endocrine: Positive for polydipsia and polyuria.        Craving ice   Genitourinary: Negative for frequency and urgency.   Musculoskeletal: Positive for back pain. Negative  for arthralgias and myalgias.        Muscle cramps in feet   Skin: Negative for rash and wound.   Allergic/Immunologic: Negative for food allergies.   Neurological: Positive for numbness (hands and feet. ). Negative for weakness.   Psychiatric/Behavioral: Negative for sleep disturbance. The patient is not nervous/anxious.         Sleeps well with gabapentin.         Objective:      Physical Exam  Constitutional:       Appearance: Normal appearance. She is well-developed. She is obese.   HENT:      Head: Normocephalic and atraumatic.      Nose: Nose normal.   Eyes:      Extraocular Movements: Extraocular movements intact.      Conjunctiva/sclera: Conjunctivae normal.      Pupils: Pupils are equal, round, and reactive to light.   Neck:      Musculoskeletal: Normal range of motion and neck supple.      Vascular: No carotid bruit.   Cardiovascular:      Rate and Rhythm: Normal rate and regular rhythm.      Pulses: Normal pulses.      Heart sounds: Normal heart sounds.   Pulmonary:      Effort: Pulmonary effort is normal.      Breath sounds: Normal breath sounds.   Musculoskeletal: Normal range of motion.      Comments: Deferred  Feet: no open wounds or calluses.  Good pedal care with exception of flip  Flops. Pedal pulses +2 bilaterally.   Vibratory sensation slightly decreased bilaterally.    Lymphadenopathy:      Cervical: No cervical adenopathy.   Skin:     General: Skin is warm and dry.      Comments: Acanthosis nigricans   Neurological:      General: No focal deficit present.      Mental Status: She is alert and oriented to person, place, and time.   Psychiatric:         Mood and Affect: Mood normal.         Behavior: Behavior normal.         Thought Content: Thought content normal.         Judgment: Judgment normal.         Hemoglobin A1C   Date Value Ref Range Status   11/11/2020 10.3 (H) 4.0 - 5.6 % Final     Comment:     ADA Screening Guidelines:  5.7-6.4%  Consistent with prediabetes  >or=6.5%  Consistent with  diabetes  High levels of fetal hemoglobin interfere with the HbA1C  assay. Heterozygous hemoglobin variants (HbS, HgC, etc)do  not significantly interfere with this assay.   However, presence of multiple variants may affect accuracy.     08/07/2020 9.2 (H) 4.0 - 5.6 % Final     Comment:     ADA Screening Guidelines:  5.7-6.4%  Consistent with prediabetes  >or=6.5%  Consistent with diabetes  High levels of fetal hemoglobin interfere with the HbA1C  assay. Heterozygous hemoglobin variants (HbS, HgC, etc)do  not significantly interfere with this assay.   However, presence of multiple variants may affect accuracy.     04/28/2020 8.4 (H) 4.0 - 5.6 % Final     Comment:     ADA Screening Guidelines:  5.7-6.4%  Consistent with prediabetes  >or=6.5%  Consistent with diabetes  High levels of fetal hemoglobin interfere with the HbA1C  assay. Heterozygous hemoglobin variants (HbS, HgC, etc)do  not significantly interfere with this assay.   However, presence of multiple variants may affect accuracy.         Chemistry        Component Value Date/Time     08/07/2020 0911    K 4.4 08/07/2020 0911     08/07/2020 0911    CO2 27 08/07/2020 0911    BUN 16 08/07/2020 0911    CREATININE 1.4 08/07/2020 0911     (H) 08/07/2020 0911        Component Value Date/Time    CALCIUM 10.0 08/07/2020 0911    ALKPHOS 105 08/07/2020 0911    AST 64 (H) 08/07/2020 0911    ALT 38 08/07/2020 0911    BILITOT 0.3 08/07/2020 0911    ESTGFRAFRICA 49.1 (A) 08/07/2020 0911    EGFRNONAA 42.6 (A) 08/07/2020 0911        Lab Results   Component Value Date    LDLCALC 145.2 01/27/2020     Lab Results   Component Value Date    TSH 2.269 08/07/2020           Assessment:           1. Type 2 diabetes mellitus with complication, with long-term current use of insulin   DIABETES FOOT EXAM  Chronic-uncontrolled-see plan     Hemoglobin A1C   2. Hyperlipidemia, unspecified hyperlipidemia type  Chronic--no statin   3. Alcohol abuse  Chronic-complicates DM  management, increase risk of hypoglycemia    4. CKD (chronic kidney disease), stage III  chrnonic-stable-optimize DM         6. Peripheral polyneuropathy  Chronic-monitor foot care    7. Vitamin D deficiency disease  ergocalciferol (ERGOCALCIFEROL) 50,000 unit Cap--chronic-worsened--not on OTC    8. Morbid obesity with BMI of 50.0-59.9, adult     9. Fitting or adjustment of insulin pump     10. Insulin pump status     11.    HTN--stable-on losartan 100 mg          Plan:       Again reemphasized need for QID SBGM.  Pt has been given basal/bolus recs with  Prior visits  Encourage, diet,exercise, ETOH cessation, minimization.       ORDERS 11/17/2020    4   mo with    A1c, prior    Cons optometry

## 2020-11-17 NOTE — PATIENT INSTRUCTIONS
Tonight decrease pump to 80 % temp basal.  Can wear thru surgery.  ONce out, conscious, and ready to eat,   Cancel basal.  And BOLUS with meals.

## 2020-12-14 ENCOUNTER — PATIENT OUTREACH (OUTPATIENT)
Dept: ADMINISTRATIVE | Facility: HOSPITAL | Age: 55
End: 2020-12-14

## 2020-12-15 ENCOUNTER — PATIENT OUTREACH (OUTPATIENT)
Dept: ADMINISTRATIVE | Facility: HOSPITAL | Age: 55
End: 2020-12-15

## 2021-02-04 DIAGNOSIS — E11.9 TYPE 2 DIABETES MELLITUS WITHOUT COMPLICATION: ICD-10-CM

## 2021-02-08 ENCOUNTER — PATIENT OUTREACH (OUTPATIENT)
Dept: ADMINISTRATIVE | Facility: HOSPITAL | Age: 56
End: 2021-02-08

## 2021-02-08 RX ORDER — METOPROLOL TARTRATE 25 MG/1
TABLET, FILM COATED ORAL
Qty: 180 TABLET | Refills: 0 | Status: SHIPPED | OUTPATIENT
Start: 2021-02-08 | End: 2021-03-04 | Stop reason: SDUPTHER

## 2021-03-02 ENCOUNTER — LAB VISIT (OUTPATIENT)
Dept: LAB | Facility: HOSPITAL | Age: 56
End: 2021-03-02
Attending: NURSE PRACTITIONER
Payer: COMMERCIAL

## 2021-03-02 DIAGNOSIS — E11.8 TYPE 2 DIABETES MELLITUS WITH COMPLICATION, WITH LONG-TERM CURRENT USE OF INSULIN: ICD-10-CM

## 2021-03-02 DIAGNOSIS — Z79.4 TYPE 2 DIABETES MELLITUS WITH COMPLICATION, WITH LONG-TERM CURRENT USE OF INSULIN: ICD-10-CM

## 2021-03-02 DIAGNOSIS — E11.9 TYPE 2 DIABETES MELLITUS WITHOUT COMPLICATION: ICD-10-CM

## 2021-03-02 PROCEDURE — 83036 HEMOGLOBIN GLYCOSYLATED A1C: CPT

## 2021-03-02 PROCEDURE — 36415 COLL VENOUS BLD VENIPUNCTURE: CPT | Mod: PO

## 2021-03-02 PROCEDURE — 80061 LIPID PANEL: CPT

## 2021-03-03 LAB
CHOLEST SERPL-MCNC: 217 MG/DL (ref 120–199)
CHOLEST/HDLC SERPL: 5.2 {RATIO} (ref 2–5)
ESTIMATED AVG GLUCOSE: 194 MG/DL (ref 68–131)
HBA1C MFR BLD: 8.4 % (ref 4–5.6)
HDLC SERPL-MCNC: 42 MG/DL (ref 40–75)
HDLC SERPL: 19.4 % (ref 20–50)
LDLC SERPL CALC-MCNC: 147.2 MG/DL (ref 63–159)
NONHDLC SERPL-MCNC: 175 MG/DL
TRIGL SERPL-MCNC: 139 MG/DL (ref 30–150)

## 2021-03-04 ENCOUNTER — OFFICE VISIT (OUTPATIENT)
Dept: FAMILY MEDICINE | Facility: CLINIC | Age: 56
End: 2021-03-04
Payer: COMMERCIAL

## 2021-03-04 VITALS
HEIGHT: 56 IN | HEART RATE: 100 BPM | BODY MASS INDEX: 50.61 KG/M2 | OXYGEN SATURATION: 97 % | SYSTOLIC BLOOD PRESSURE: 138 MMHG | DIASTOLIC BLOOD PRESSURE: 92 MMHG | WEIGHT: 225 LBS

## 2021-03-04 DIAGNOSIS — K76.0 FATTY LIVER: ICD-10-CM

## 2021-03-04 DIAGNOSIS — E11.42 TYPE 2 DIABETES MELLITUS WITH DIABETIC POLYNEUROPATHY, WITH LONG-TERM CURRENT USE OF INSULIN: Primary | ICD-10-CM

## 2021-03-04 DIAGNOSIS — E66.01 MORBID OBESITY: ICD-10-CM

## 2021-03-04 DIAGNOSIS — I10 ESSENTIAL HYPERTENSION: ICD-10-CM

## 2021-03-04 DIAGNOSIS — F41.9 ANXIETY: ICD-10-CM

## 2021-03-04 DIAGNOSIS — N18.30 STAGE 3 CHRONIC KIDNEY DISEASE, UNSPECIFIED WHETHER STAGE 3A OR 3B CKD: ICD-10-CM

## 2021-03-04 DIAGNOSIS — Z79.4 TYPE 2 DIABETES MELLITUS WITH DIABETIC POLYNEUROPATHY, WITH LONG-TERM CURRENT USE OF INSULIN: Primary | ICD-10-CM

## 2021-03-04 PROCEDURE — 1126F AMNT PAIN NOTED NONE PRSNT: CPT | Mod: S$GLB,,, | Performed by: FAMILY MEDICINE

## 2021-03-04 PROCEDURE — 3052F PR MOST RECENT HEMOGLOBIN A1C LEVEL 8.0 - < 9.0%: ICD-10-PCS | Mod: CPTII,S$GLB,, | Performed by: FAMILY MEDICINE

## 2021-03-04 PROCEDURE — 3080F PR MOST RECENT DIASTOLIC BLOOD PRESSURE >= 90 MM HG: ICD-10-PCS | Mod: CPTII,S$GLB,, | Performed by: FAMILY MEDICINE

## 2021-03-04 PROCEDURE — 3008F PR BODY MASS INDEX (BMI) DOCUMENTED: ICD-10-PCS | Mod: CPTII,S$GLB,, | Performed by: FAMILY MEDICINE

## 2021-03-04 PROCEDURE — 3075F PR MOST RECENT SYSTOLIC BLOOD PRESS GE 130-139MM HG: ICD-10-PCS | Mod: CPTII,S$GLB,, | Performed by: FAMILY MEDICINE

## 2021-03-04 PROCEDURE — 3080F DIAST BP >= 90 MM HG: CPT | Mod: CPTII,S$GLB,, | Performed by: FAMILY MEDICINE

## 2021-03-04 PROCEDURE — 1126F PR PAIN SEVERITY QUANTIFIED, NO PAIN PRESENT: ICD-10-PCS | Mod: S$GLB,,, | Performed by: FAMILY MEDICINE

## 2021-03-04 PROCEDURE — 3052F HG A1C>EQUAL 8.0%<EQUAL 9.0%: CPT | Mod: CPTII,S$GLB,, | Performed by: FAMILY MEDICINE

## 2021-03-04 PROCEDURE — 99214 OFFICE O/P EST MOD 30 MIN: CPT | Mod: S$GLB,,, | Performed by: FAMILY MEDICINE

## 2021-03-04 PROCEDURE — 3075F SYST BP GE 130 - 139MM HG: CPT | Mod: CPTII,S$GLB,, | Performed by: FAMILY MEDICINE

## 2021-03-04 PROCEDURE — 3008F BODY MASS INDEX DOCD: CPT | Mod: CPTII,S$GLB,, | Performed by: FAMILY MEDICINE

## 2021-03-04 PROCEDURE — 99214 PR OFFICE/OUTPT VISIT, EST, LEVL IV, 30-39 MIN: ICD-10-PCS | Mod: S$GLB,,, | Performed by: FAMILY MEDICINE

## 2021-03-04 PROCEDURE — 99999 PR PBB SHADOW E&M-EST. PATIENT-LVL V: CPT | Mod: PBBFAC,,, | Performed by: FAMILY MEDICINE

## 2021-03-04 PROCEDURE — 99999 PR PBB SHADOW E&M-EST. PATIENT-LVL V: ICD-10-PCS | Mod: PBBFAC,,, | Performed by: FAMILY MEDICINE

## 2021-03-04 RX ORDER — CHLORTHALIDONE 25 MG/1
25 TABLET ORAL DAILY
Qty: 90 TABLET | Refills: 3 | Status: SHIPPED | OUTPATIENT
Start: 2021-03-04 | End: 2023-08-31 | Stop reason: ALTCHOICE

## 2021-03-04 RX ORDER — AMLODIPINE BESYLATE 10 MG/1
10 TABLET ORAL DAILY
Qty: 90 TABLET | Refills: 3 | Status: SHIPPED | OUTPATIENT
Start: 2021-03-04 | End: 2022-10-20

## 2021-03-04 RX ORDER — LOSARTAN POTASSIUM 100 MG/1
100 TABLET ORAL DAILY
Qty: 90 TABLET | Refills: 3 | Status: SHIPPED | OUTPATIENT
Start: 2021-03-04 | End: 2023-08-31

## 2021-03-04 RX ORDER — METOPROLOL TARTRATE 25 MG/1
25 TABLET, FILM COATED ORAL 2 TIMES DAILY
Qty: 180 TABLET | Refills: 3 | Status: SHIPPED | OUTPATIENT
Start: 2021-03-04 | End: 2022-02-21

## 2021-03-04 RX ORDER — CITALOPRAM 40 MG/1
40 TABLET, FILM COATED ORAL DAILY
Qty: 90 TABLET | Refills: 3 | Status: SHIPPED | OUTPATIENT
Start: 2021-03-04 | End: 2022-09-05

## 2021-03-09 ENCOUNTER — OFFICE VISIT (OUTPATIENT)
Dept: ENDOCRINOLOGY | Facility: CLINIC | Age: 56
End: 2021-03-09
Payer: COMMERCIAL

## 2021-03-09 ENCOUNTER — PATIENT OUTREACH (OUTPATIENT)
Dept: ADMINISTRATIVE | Facility: OTHER | Age: 56
End: 2021-03-09

## 2021-03-09 VITALS
HEIGHT: 56 IN | DIASTOLIC BLOOD PRESSURE: 82 MMHG | BODY MASS INDEX: 51.3 KG/M2 | WEIGHT: 228.06 LBS | HEART RATE: 102 BPM | SYSTOLIC BLOOD PRESSURE: 136 MMHG

## 2021-03-09 DIAGNOSIS — E78.5 HYPERLIPIDEMIA, UNSPECIFIED HYPERLIPIDEMIA TYPE: ICD-10-CM

## 2021-03-09 DIAGNOSIS — I10 HTN (HYPERTENSION), BENIGN: ICD-10-CM

## 2021-03-09 DIAGNOSIS — N18.31 STAGE 3A CHRONIC KIDNEY DISEASE: ICD-10-CM

## 2021-03-09 DIAGNOSIS — E66.01 MORBID OBESITY WITH BMI OF 50.0-59.9, ADULT: ICD-10-CM

## 2021-03-09 DIAGNOSIS — E11.8 TYPE 2 DIABETES MELLITUS WITH COMPLICATION, WITH LONG-TERM CURRENT USE OF INSULIN: Primary | ICD-10-CM

## 2021-03-09 DIAGNOSIS — F10.10 ALCOHOL ABUSE: ICD-10-CM

## 2021-03-09 DIAGNOSIS — Z79.4 TYPE 2 DIABETES MELLITUS WITH COMPLICATION, WITH LONG-TERM CURRENT USE OF INSULIN: Primary | ICD-10-CM

## 2021-03-09 PROCEDURE — 1126F AMNT PAIN NOTED NONE PRSNT: CPT | Mod: S$GLB,,, | Performed by: NURSE PRACTITIONER

## 2021-03-09 PROCEDURE — 3052F PR MOST RECENT HEMOGLOBIN A1C LEVEL 8.0 - < 9.0%: ICD-10-PCS | Mod: CPTII,S$GLB,, | Performed by: NURSE PRACTITIONER

## 2021-03-09 PROCEDURE — 99999 PR PBB SHADOW E&M-EST. PATIENT-LVL V: ICD-10-PCS | Mod: PBBFAC,,, | Performed by: NURSE PRACTITIONER

## 2021-03-09 PROCEDURE — 99214 PR OFFICE/OUTPT VISIT, EST, LEVL IV, 30-39 MIN: ICD-10-PCS | Mod: S$GLB,,, | Performed by: NURSE PRACTITIONER

## 2021-03-09 PROCEDURE — 1126F PR PAIN SEVERITY QUANTIFIED, NO PAIN PRESENT: ICD-10-PCS | Mod: S$GLB,,, | Performed by: NURSE PRACTITIONER

## 2021-03-09 PROCEDURE — 3008F BODY MASS INDEX DOCD: CPT | Mod: CPTII,S$GLB,, | Performed by: NURSE PRACTITIONER

## 2021-03-09 PROCEDURE — 99214 OFFICE O/P EST MOD 30 MIN: CPT | Mod: S$GLB,,, | Performed by: NURSE PRACTITIONER

## 2021-03-09 PROCEDURE — 3052F HG A1C>EQUAL 8.0%<EQUAL 9.0%: CPT | Mod: CPTII,S$GLB,, | Performed by: NURSE PRACTITIONER

## 2021-03-09 PROCEDURE — 99999 PR PBB SHADOW E&M-EST. PATIENT-LVL V: CPT | Mod: PBBFAC,,, | Performed by: NURSE PRACTITIONER

## 2021-03-09 PROCEDURE — 3008F PR BODY MASS INDEX (BMI) DOCUMENTED: ICD-10-PCS | Mod: CPTII,S$GLB,, | Performed by: NURSE PRACTITIONER

## 2021-03-09 RX ORDER — BLOOD-GLUCOSE SENSOR
1 EACH MISCELLANEOUS DAILY PRN
Qty: 3 DEVICE | Refills: 12 | Status: SHIPPED | OUTPATIENT
Start: 2021-03-09 | End: 2021-03-09

## 2021-03-09 RX ORDER — BLOOD-GLUCOSE TRANSMITTER
1 EACH MISCELLANEOUS CONTINUOUS
Qty: 1 DEVICE | Refills: 3 | Status: SHIPPED | OUTPATIENT
Start: 2021-03-09 | End: 2021-03-09

## 2021-03-25 ENCOUNTER — TELEPHONE (OUTPATIENT)
Dept: ENDOCRINOLOGY | Facility: CLINIC | Age: 56
End: 2021-03-25

## 2021-03-31 ENCOUNTER — TELEPHONE (OUTPATIENT)
Dept: ENDOCRINOLOGY | Facility: CLINIC | Age: 56
End: 2021-03-31

## 2021-04-01 DIAGNOSIS — E11.8 TYPE 2 DIABETES MELLITUS WITH COMPLICATION: Primary | ICD-10-CM

## 2021-04-14 ENCOUNTER — PATIENT OUTREACH (OUTPATIENT)
Dept: ADMINISTRATIVE | Facility: OTHER | Age: 56
End: 2021-04-14

## 2021-04-15 ENCOUNTER — CLINICAL SUPPORT (OUTPATIENT)
Dept: DIABETES | Facility: CLINIC | Age: 56
End: 2021-04-15
Payer: COMMERCIAL

## 2021-04-15 VITALS — BODY MASS INDEX: 48.81 KG/M2 | WEIGHT: 217.69 LBS

## 2021-04-15 DIAGNOSIS — Z79.4 TYPE 2 DIABETES MELLITUS WITH DIABETIC POLYNEUROPATHY, WITH LONG-TERM CURRENT USE OF INSULIN: ICD-10-CM

## 2021-04-15 DIAGNOSIS — E11.8 TYPE 2 DIABETES MELLITUS WITH COMPLICATION: ICD-10-CM

## 2021-04-15 DIAGNOSIS — Z46.81 INSULIN PUMP TRAINING: ICD-10-CM

## 2021-04-15 DIAGNOSIS — E11.42 TYPE 2 DIABETES MELLITUS WITH DIABETIC POLYNEUROPATHY, WITH LONG-TERM CURRENT USE OF INSULIN: ICD-10-CM

## 2021-04-15 PROCEDURE — 99999 PR PBB SHADOW E&M-EST. PATIENT-LVL II: CPT | Mod: PBBFAC,,, | Performed by: DIETITIAN, REGISTERED

## 2021-04-15 PROCEDURE — G0108 DIAB MANAGE TRN  PER INDIV: HCPCS | Mod: S$GLB,,, | Performed by: DIETITIAN, REGISTERED

## 2021-04-15 PROCEDURE — G0108 PR DIAB MANAGE TRN  PER INDIV: ICD-10-PCS | Mod: S$GLB,,, | Performed by: DIETITIAN, REGISTERED

## 2021-04-15 PROCEDURE — 99999 PR PBB SHADOW E&M-EST. PATIENT-LVL II: ICD-10-PCS | Mod: PBBFAC,,, | Performed by: DIETITIAN, REGISTERED

## 2021-04-15 RX ORDER — INSULIN LISPRO 100 [IU]/ML
INJECTION, SOLUTION INTRAVENOUS; SUBCUTANEOUS
Qty: 10 ML | Refills: 12 | COMMUNITY
Start: 2021-04-15 | End: 2023-03-14 | Stop reason: SDUPTHER

## 2021-04-19 ENCOUNTER — OFFICE VISIT (OUTPATIENT)
Dept: OPHTHALMOLOGY | Facility: CLINIC | Age: 56
End: 2021-04-19
Payer: COMMERCIAL

## 2021-04-19 DIAGNOSIS — Z96.1 PSEUDOPHAKIA, BOTH EYES: ICD-10-CM

## 2021-04-19 DIAGNOSIS — H40.053 OCULAR HYPERTENSION, BILATERAL: Primary | ICD-10-CM

## 2021-04-19 PROCEDURE — 92020 GONIOSCOPY: CPT | Mod: S$GLB,,, | Performed by: OPHTHALMOLOGY

## 2021-04-19 PROCEDURE — 92015 DETERMINE REFRACTIVE STATE: CPT | Mod: S$GLB,,, | Performed by: OPHTHALMOLOGY

## 2021-04-19 PROCEDURE — 1126F PR PAIN SEVERITY QUANTIFIED, NO PAIN PRESENT: ICD-10-PCS | Mod: S$GLB,,, | Performed by: OPHTHALMOLOGY

## 2021-04-19 PROCEDURE — 99999 PR PBB SHADOW E&M-EST. PATIENT-LVL IV: CPT | Mod: PBBFAC,,, | Performed by: OPHTHALMOLOGY

## 2021-04-19 PROCEDURE — 99214 OFFICE O/P EST MOD 30 MIN: CPT | Mod: S$GLB,,, | Performed by: OPHTHALMOLOGY

## 2021-04-19 PROCEDURE — 99214 PR OFFICE/OUTPT VISIT, EST, LEVL IV, 30-39 MIN: ICD-10-PCS | Mod: S$GLB,,, | Performed by: OPHTHALMOLOGY

## 2021-04-19 PROCEDURE — 99999 PR PBB SHADOW E&M-EST. PATIENT-LVL IV: ICD-10-PCS | Mod: PBBFAC,,, | Performed by: OPHTHALMOLOGY

## 2021-04-19 PROCEDURE — 92015 PR REFRACTION: ICD-10-PCS | Mod: S$GLB,,, | Performed by: OPHTHALMOLOGY

## 2021-04-19 PROCEDURE — 1126F AMNT PAIN NOTED NONE PRSNT: CPT | Mod: S$GLB,,, | Performed by: OPHTHALMOLOGY

## 2021-04-19 PROCEDURE — 92020 PR SPECIAL EYE EVAL,GONIOSCOPY: ICD-10-PCS | Mod: S$GLB,,, | Performed by: OPHTHALMOLOGY

## 2021-04-19 RX ORDER — BLOOD SUGAR DIAGNOSTIC
STRIP MISCELLANEOUS
Qty: 100 STRIP | Refills: 11 | Status: SHIPPED | OUTPATIENT
Start: 2021-04-19 | End: 2022-02-01 | Stop reason: SDUPTHER

## 2021-04-19 RX ORDER — LATANOPROST 50 UG/ML
1 SOLUTION/ DROPS OPHTHALMIC NIGHTLY
Qty: 2.5 ML | Refills: 12 | Status: SHIPPED | OUTPATIENT
Start: 2021-04-19 | End: 2022-10-26 | Stop reason: SDUPTHER

## 2021-04-19 RX ORDER — LANCETS
EACH MISCELLANEOUS
Qty: 90 EACH | Refills: 11 | Status: SHIPPED | OUTPATIENT
Start: 2021-04-19 | End: 2022-02-01 | Stop reason: SDUPTHER

## 2021-04-19 RX ORDER — TIMOLOL MALEATE 5 MG/ML
1 SOLUTION/ DROPS OPHTHALMIC 2 TIMES DAILY
Qty: 10 ML | Refills: 6 | Status: SHIPPED | OUTPATIENT
Start: 2021-04-19 | End: 2022-10-26 | Stop reason: SDUPTHER

## 2021-04-22 ENCOUNTER — NUTRITION (OUTPATIENT)
Dept: DIABETES | Facility: CLINIC | Age: 56
End: 2021-04-22
Payer: COMMERCIAL

## 2021-04-22 DIAGNOSIS — Z79.4 TYPE 2 DIABETES MELLITUS WITH DIABETIC POLYNEUROPATHY, WITH LONG-TERM CURRENT USE OF INSULIN: Primary | ICD-10-CM

## 2021-04-22 DIAGNOSIS — E11.42 TYPE 2 DIABETES MELLITUS WITH DIABETIC POLYNEUROPATHY, WITH LONG-TERM CURRENT USE OF INSULIN: Primary | ICD-10-CM

## 2021-04-22 PROCEDURE — G0108 DIAB MANAGE TRN  PER INDIV: HCPCS | Mod: S$GLB,,, | Performed by: DIETITIAN, REGISTERED

## 2021-04-22 PROCEDURE — G0108 PR DIAB MANAGE TRN  PER INDIV: ICD-10-PCS | Mod: S$GLB,,, | Performed by: DIETITIAN, REGISTERED

## 2021-05-06 ENCOUNTER — NUTRITION (OUTPATIENT)
Dept: DIABETES | Facility: CLINIC | Age: 56
End: 2021-05-06
Payer: COMMERCIAL

## 2021-05-06 VITALS — BODY MASS INDEX: 49.65 KG/M2 | WEIGHT: 221.44 LBS

## 2021-05-06 DIAGNOSIS — E11.42 TYPE 2 DIABETES MELLITUS WITH DIABETIC POLYNEUROPATHY, WITH LONG-TERM CURRENT USE OF INSULIN: Primary | ICD-10-CM

## 2021-05-06 DIAGNOSIS — Z79.4 TYPE 2 DIABETES MELLITUS WITH DIABETIC POLYNEUROPATHY, WITH LONG-TERM CURRENT USE OF INSULIN: Primary | ICD-10-CM

## 2021-05-06 PROCEDURE — 95249 CONT GLUC MNTR PT PROV EQP: CPT | Mod: S$GLB,,, | Performed by: DIETITIAN, REGISTERED

## 2021-05-06 PROCEDURE — 99999 PR PBB SHADOW E&M-EST. PATIENT-LVL I: ICD-10-PCS | Mod: PBBFAC,,, | Performed by: DIETITIAN, REGISTERED

## 2021-05-06 PROCEDURE — 95249 PR GLUCOSE MONITORING, 72 HRS, SUB-Q SENSOR, PATIENT PROVIDED: ICD-10-PCS | Mod: S$GLB,,, | Performed by: DIETITIAN, REGISTERED

## 2021-05-06 PROCEDURE — 99999 PR PBB SHADOW E&M-EST. PATIENT-LVL I: CPT | Mod: PBBFAC,,, | Performed by: DIETITIAN, REGISTERED

## 2021-05-12 DIAGNOSIS — E11.9 TYPE 2 DIABETES MELLITUS WITHOUT COMPLICATION: ICD-10-CM

## 2021-05-27 ENCOUNTER — PATIENT OUTREACH (OUTPATIENT)
Dept: ADMINISTRATIVE | Facility: OTHER | Age: 56
End: 2021-05-27

## 2021-05-31 ENCOUNTER — OFFICE VISIT (OUTPATIENT)
Dept: OPHTHALMOLOGY | Facility: CLINIC | Age: 56
End: 2021-05-31
Payer: COMMERCIAL

## 2021-05-31 ENCOUNTER — LAB VISIT (OUTPATIENT)
Dept: LAB | Facility: HOSPITAL | Age: 56
End: 2021-05-31
Attending: NURSE PRACTITIONER
Payer: COMMERCIAL

## 2021-05-31 DIAGNOSIS — H40.053 OCULAR HYPERTENSION, BILATERAL: Primary | ICD-10-CM

## 2021-05-31 DIAGNOSIS — Z79.4 TYPE 2 DIABETES MELLITUS WITH COMPLICATION, WITH LONG-TERM CURRENT USE OF INSULIN: ICD-10-CM

## 2021-05-31 DIAGNOSIS — E11.42 TYPE 2 DIABETES MELLITUS WITH DIABETIC POLYNEUROPATHY, WITH LONG-TERM CURRENT USE OF INSULIN: ICD-10-CM

## 2021-05-31 DIAGNOSIS — Z79.4 TYPE 2 DIABETES MELLITUS WITH DIABETIC POLYNEUROPATHY, WITH LONG-TERM CURRENT USE OF INSULIN: ICD-10-CM

## 2021-05-31 DIAGNOSIS — E11.3292 MILD NONPROLIFERATIVE DIABETIC RETINOPATHY OF LEFT EYE WITHOUT MACULAR EDEMA ASSOCIATED WITH TYPE 2 DIABETES MELLITUS: ICD-10-CM

## 2021-05-31 DIAGNOSIS — E11.8 TYPE 2 DIABETES MELLITUS WITH COMPLICATION, WITH LONG-TERM CURRENT USE OF INSULIN: ICD-10-CM

## 2021-05-31 DIAGNOSIS — Z96.1 PSEUDOPHAKIA, BOTH EYES: ICD-10-CM

## 2021-05-31 LAB
ESTIMATED AVG GLUCOSE: 214 MG/DL (ref 68–131)
HBA1C MFR BLD: 9.1 % (ref 4–5.6)
LEFT EYE DM RETINOPATHY: POSITIVE
RIGHT EYE DM RETINOPATHY: POSITIVE

## 2021-05-31 PROCEDURE — 3052F PR MOST RECENT HEMOGLOBIN A1C LEVEL 8.0 - < 9.0%: ICD-10-PCS | Mod: CPTII,S$GLB,, | Performed by: OPHTHALMOLOGY

## 2021-05-31 PROCEDURE — 99214 OFFICE O/P EST MOD 30 MIN: CPT | Mod: S$GLB,,, | Performed by: OPHTHALMOLOGY

## 2021-05-31 PROCEDURE — 99214 PR OFFICE/OUTPT VISIT, EST, LEVL IV, 30-39 MIN: ICD-10-PCS | Mod: S$GLB,,, | Performed by: OPHTHALMOLOGY

## 2021-05-31 PROCEDURE — 99999 PR PBB SHADOW E&M-EST. PATIENT-LVL III: CPT | Mod: PBBFAC,,, | Performed by: OPHTHALMOLOGY

## 2021-05-31 PROCEDURE — 1126F AMNT PAIN NOTED NONE PRSNT: CPT | Mod: S$GLB,,, | Performed by: OPHTHALMOLOGY

## 2021-05-31 PROCEDURE — 1126F PR PAIN SEVERITY QUANTIFIED, NO PAIN PRESENT: ICD-10-PCS | Mod: S$GLB,,, | Performed by: OPHTHALMOLOGY

## 2021-05-31 PROCEDURE — 3052F HG A1C>EQUAL 8.0%<EQUAL 9.0%: CPT | Mod: CPTII,S$GLB,, | Performed by: OPHTHALMOLOGY

## 2021-05-31 PROCEDURE — 99999 PR PBB SHADOW E&M-EST. PATIENT-LVL III: ICD-10-PCS | Mod: PBBFAC,,, | Performed by: OPHTHALMOLOGY

## 2021-05-31 PROCEDURE — 36415 COLL VENOUS BLD VENIPUNCTURE: CPT | Mod: PO | Performed by: NURSE PRACTITIONER

## 2021-05-31 PROCEDURE — 83036 HEMOGLOBIN GLYCOSYLATED A1C: CPT | Performed by: NURSE PRACTITIONER

## 2021-06-07 ENCOUNTER — TELEPHONE (OUTPATIENT)
Dept: FAMILY MEDICINE | Facility: CLINIC | Age: 56
End: 2021-06-07

## 2021-06-09 ENCOUNTER — OFFICE VISIT (OUTPATIENT)
Dept: ENDOCRINOLOGY | Facility: CLINIC | Age: 56
End: 2021-06-09
Payer: COMMERCIAL

## 2021-06-09 VITALS
SYSTOLIC BLOOD PRESSURE: 144 MMHG | HEART RATE: 88 BPM | DIASTOLIC BLOOD PRESSURE: 82 MMHG | RESPIRATION RATE: 18 BRPM | HEIGHT: 56 IN | WEIGHT: 216.06 LBS | BODY MASS INDEX: 48.6 KG/M2

## 2021-06-09 DIAGNOSIS — E78.5 HYPERLIPIDEMIA, UNSPECIFIED HYPERLIPIDEMIA TYPE: ICD-10-CM

## 2021-06-09 DIAGNOSIS — I10 HTN (HYPERTENSION), BENIGN: ICD-10-CM

## 2021-06-09 DIAGNOSIS — E66.01 MORBID OBESITY WITH BMI OF 50.0-59.9, ADULT: ICD-10-CM

## 2021-06-09 DIAGNOSIS — Z79.4 TYPE 2 DIABETES MELLITUS WITH DIABETIC POLYNEUROPATHY, WITH LONG-TERM CURRENT USE OF INSULIN: Primary | ICD-10-CM

## 2021-06-09 DIAGNOSIS — F10.10 ALCOHOL ABUSE: ICD-10-CM

## 2021-06-09 DIAGNOSIS — E11.42 TYPE 2 DIABETES MELLITUS WITH DIABETIC POLYNEUROPATHY, WITH LONG-TERM CURRENT USE OF INSULIN: Primary | ICD-10-CM

## 2021-06-09 PROCEDURE — 99214 PR OFFICE/OUTPT VISIT, EST, LEVL IV, 30-39 MIN: ICD-10-PCS | Mod: S$GLB,,, | Performed by: NURSE PRACTITIONER

## 2021-06-09 PROCEDURE — 99214 OFFICE O/P EST MOD 30 MIN: CPT | Mod: S$GLB,,, | Performed by: NURSE PRACTITIONER

## 2021-06-09 PROCEDURE — 99999 PR PBB SHADOW E&M-EST. PATIENT-LVL IV: CPT | Mod: PBBFAC,,, | Performed by: NURSE PRACTITIONER

## 2021-06-09 PROCEDURE — 3046F PR MOST RECENT HEMOGLOBIN A1C LEVEL > 9.0%: ICD-10-PCS | Mod: CPTII,S$GLB,, | Performed by: NURSE PRACTITIONER

## 2021-06-09 PROCEDURE — 3046F HEMOGLOBIN A1C LEVEL >9.0%: CPT | Mod: CPTII,S$GLB,, | Performed by: NURSE PRACTITIONER

## 2021-06-09 PROCEDURE — 3008F PR BODY MASS INDEX (BMI) DOCUMENTED: ICD-10-PCS | Mod: CPTII,S$GLB,, | Performed by: NURSE PRACTITIONER

## 2021-06-09 PROCEDURE — 1126F PR PAIN SEVERITY QUANTIFIED, NO PAIN PRESENT: ICD-10-PCS | Mod: S$GLB,,, | Performed by: NURSE PRACTITIONER

## 2021-06-09 PROCEDURE — 1126F AMNT PAIN NOTED NONE PRSNT: CPT | Mod: S$GLB,,, | Performed by: NURSE PRACTITIONER

## 2021-06-09 PROCEDURE — 3008F BODY MASS INDEX DOCD: CPT | Mod: CPTII,S$GLB,, | Performed by: NURSE PRACTITIONER

## 2021-06-09 PROCEDURE — 99999 PR PBB SHADOW E&M-EST. PATIENT-LVL IV: ICD-10-PCS | Mod: PBBFAC,,, | Performed by: NURSE PRACTITIONER

## 2021-06-10 ENCOUNTER — PATIENT OUTREACH (OUTPATIENT)
Dept: ADMINISTRATIVE | Facility: HOSPITAL | Age: 56
End: 2021-06-10

## 2021-06-10 ENCOUNTER — PATIENT MESSAGE (OUTPATIENT)
Dept: ADMINISTRATIVE | Facility: HOSPITAL | Age: 56
End: 2021-06-10

## 2021-06-24 ENCOUNTER — OFFICE VISIT (OUTPATIENT)
Dept: FAMILY MEDICINE | Facility: CLINIC | Age: 56
End: 2021-06-24
Payer: COMMERCIAL

## 2021-06-24 VITALS
WEIGHT: 215.38 LBS | OXYGEN SATURATION: 96 % | SYSTOLIC BLOOD PRESSURE: 136 MMHG | HEART RATE: 101 BPM | HEIGHT: 56 IN | DIASTOLIC BLOOD PRESSURE: 82 MMHG | BODY MASS INDEX: 48.45 KG/M2

## 2021-06-24 DIAGNOSIS — I25.10 CORONARY ARTERY DISEASE, ANGINA PRESENCE UNSPECIFIED, UNSPECIFIED VESSEL OR LESION TYPE, UNSPECIFIED WHETHER NATIVE OR TRANSPLANTED HEART: Primary | ICD-10-CM

## 2021-06-24 DIAGNOSIS — F10.10 ETOH ABUSE: ICD-10-CM

## 2021-06-24 DIAGNOSIS — F41.9 ANXIETY: ICD-10-CM

## 2021-06-24 DIAGNOSIS — E66.01 MORBID OBESITY: ICD-10-CM

## 2021-06-24 DIAGNOSIS — Z79.4 TYPE 2 DIABETES MELLITUS WITH DIABETIC POLYNEUROPATHY, WITH LONG-TERM CURRENT USE OF INSULIN: ICD-10-CM

## 2021-06-24 DIAGNOSIS — E11.42 TYPE 2 DIABETES MELLITUS WITH DIABETIC POLYNEUROPATHY, WITH LONG-TERM CURRENT USE OF INSULIN: ICD-10-CM

## 2021-06-24 DIAGNOSIS — R09.A2 GLOBUS SENSATION: ICD-10-CM

## 2021-06-24 DIAGNOSIS — I10 ESSENTIAL HYPERTENSION: ICD-10-CM

## 2021-06-24 PROCEDURE — 99214 PR OFFICE/OUTPT VISIT, EST, LEVL IV, 30-39 MIN: ICD-10-PCS | Mod: S$GLB,,, | Performed by: FAMILY MEDICINE

## 2021-06-24 PROCEDURE — 1126F PR PAIN SEVERITY QUANTIFIED, NO PAIN PRESENT: ICD-10-PCS | Mod: S$GLB,,, | Performed by: FAMILY MEDICINE

## 2021-06-24 PROCEDURE — 3008F PR BODY MASS INDEX (BMI) DOCUMENTED: ICD-10-PCS | Mod: CPTII,S$GLB,, | Performed by: FAMILY MEDICINE

## 2021-06-24 PROCEDURE — 99999 PR PBB SHADOW E&M-EST. PATIENT-LVL V: CPT | Mod: PBBFAC,,, | Performed by: FAMILY MEDICINE

## 2021-06-24 PROCEDURE — 99999 PR PBB SHADOW E&M-EST. PATIENT-LVL V: ICD-10-PCS | Mod: PBBFAC,,, | Performed by: FAMILY MEDICINE

## 2021-06-24 PROCEDURE — 3008F BODY MASS INDEX DOCD: CPT | Mod: CPTII,S$GLB,, | Performed by: FAMILY MEDICINE

## 2021-06-24 PROCEDURE — 1126F AMNT PAIN NOTED NONE PRSNT: CPT | Mod: S$GLB,,, | Performed by: FAMILY MEDICINE

## 2021-06-24 PROCEDURE — 99214 OFFICE O/P EST MOD 30 MIN: CPT | Mod: S$GLB,,, | Performed by: FAMILY MEDICINE

## 2021-06-24 RX ORDER — PANTOPRAZOLE SODIUM 40 MG/1
40 TABLET, DELAYED RELEASE ORAL DAILY
Qty: 30 TABLET | Refills: 11 | Status: SHIPPED | OUTPATIENT
Start: 2021-06-24 | End: 2022-07-19 | Stop reason: SDUPTHER

## 2021-06-24 RX ORDER — BUSPIRONE HYDROCHLORIDE 5 MG/1
5 TABLET ORAL 2 TIMES DAILY
Qty: 60 TABLET | Refills: 11 | Status: SHIPPED | OUTPATIENT
Start: 2021-06-24 | End: 2021-10-07

## 2021-07-01 ENCOUNTER — TELEPHONE (OUTPATIENT)
Dept: DIABETES | Facility: CLINIC | Age: 56
End: 2021-07-01

## 2021-07-06 DIAGNOSIS — E11.42 TYPE 2 DIABETES MELLITUS WITH DIABETIC POLYNEUROPATHY, WITH LONG-TERM CURRENT USE OF INSULIN: Primary | ICD-10-CM

## 2021-07-06 DIAGNOSIS — Z79.4 TYPE 2 DIABETES MELLITUS WITH DIABETIC POLYNEUROPATHY, WITH LONG-TERM CURRENT USE OF INSULIN: Primary | ICD-10-CM

## 2021-07-07 ENCOUNTER — PATIENT OUTREACH (OUTPATIENT)
Dept: ADMINISTRATIVE | Facility: OTHER | Age: 56
End: 2021-07-07

## 2021-07-08 ENCOUNTER — NUTRITION (OUTPATIENT)
Dept: DIABETES | Facility: CLINIC | Age: 56
End: 2021-07-08
Payer: COMMERCIAL

## 2021-07-08 VITALS — HEIGHT: 56 IN | BODY MASS INDEX: 49.39 KG/M2 | WEIGHT: 219.56 LBS

## 2021-07-08 DIAGNOSIS — Z79.4 TYPE 2 DIABETES MELLITUS WITH DIABETIC POLYNEUROPATHY, WITH LONG-TERM CURRENT USE OF INSULIN: ICD-10-CM

## 2021-07-08 DIAGNOSIS — E11.42 TYPE 2 DIABETES MELLITUS WITH DIABETIC POLYNEUROPATHY, WITH LONG-TERM CURRENT USE OF INSULIN: ICD-10-CM

## 2021-07-08 PROCEDURE — 99999 PR PBB SHADOW E&M-EST. PATIENT-LVL II: ICD-10-PCS | Mod: PBBFAC,,, | Performed by: DIETITIAN, REGISTERED

## 2021-07-08 PROCEDURE — G0108 PR DIAB MANAGE TRN  PER INDIV: ICD-10-PCS | Mod: S$GLB,,, | Performed by: DIETITIAN, REGISTERED

## 2021-07-08 PROCEDURE — G0108 DIAB MANAGE TRN  PER INDIV: HCPCS | Mod: S$GLB,,, | Performed by: DIETITIAN, REGISTERED

## 2021-07-08 PROCEDURE — 99999 PR PBB SHADOW E&M-EST. PATIENT-LVL II: CPT | Mod: PBBFAC,,, | Performed by: DIETITIAN, REGISTERED

## 2021-07-09 ENCOUNTER — PATIENT OUTREACH (OUTPATIENT)
Dept: DIABETES | Facility: CLINIC | Age: 56
End: 2021-07-09

## 2021-09-20 ENCOUNTER — TELEPHONE (OUTPATIENT)
Dept: DERMATOLOGY | Facility: CLINIC | Age: 56
End: 2021-09-20

## 2021-10-07 ENCOUNTER — OFFICE VISIT (OUTPATIENT)
Dept: FAMILY MEDICINE | Facility: CLINIC | Age: 56
End: 2021-10-07
Payer: COMMERCIAL

## 2021-10-07 ENCOUNTER — LAB VISIT (OUTPATIENT)
Dept: LAB | Facility: HOSPITAL | Age: 56
End: 2021-10-07
Attending: FAMILY MEDICINE
Payer: COMMERCIAL

## 2021-10-07 VITALS
TEMPERATURE: 98 F | HEART RATE: 92 BPM | BODY MASS INDEX: 50.84 KG/M2 | OXYGEN SATURATION: 98 % | WEIGHT: 226 LBS | RESPIRATION RATE: 18 BRPM | SYSTOLIC BLOOD PRESSURE: 130 MMHG | DIASTOLIC BLOOD PRESSURE: 88 MMHG | HEIGHT: 56 IN

## 2021-10-07 DIAGNOSIS — E11.42 TYPE 2 DIABETES MELLITUS WITH DIABETIC POLYNEUROPATHY, WITH LONG-TERM CURRENT USE OF INSULIN: ICD-10-CM

## 2021-10-07 DIAGNOSIS — F41.9 ANXIETY: ICD-10-CM

## 2021-10-07 DIAGNOSIS — N18.30 STAGE 3 CHRONIC KIDNEY DISEASE, UNSPECIFIED WHETHER STAGE 3A OR 3B CKD: ICD-10-CM

## 2021-10-07 DIAGNOSIS — Z79.4 TYPE 2 DIABETES MELLITUS WITH DIABETIC POLYNEUROPATHY, WITH LONG-TERM CURRENT USE OF INSULIN: ICD-10-CM

## 2021-10-07 DIAGNOSIS — E66.01 MORBID OBESITY WITH BMI OF 50.0-59.9, ADULT: ICD-10-CM

## 2021-10-07 DIAGNOSIS — J31.0 CHRONIC RHINITIS: ICD-10-CM

## 2021-10-07 DIAGNOSIS — I10 ESSENTIAL HYPERTENSION: ICD-10-CM

## 2021-10-07 DIAGNOSIS — Z79.4 TYPE 2 DIABETES MELLITUS WITH DIABETIC POLYNEUROPATHY, WITH LONG-TERM CURRENT USE OF INSULIN: Primary | ICD-10-CM

## 2021-10-07 DIAGNOSIS — E11.42 TYPE 2 DIABETES MELLITUS WITH DIABETIC POLYNEUROPATHY, WITH LONG-TERM CURRENT USE OF INSULIN: Primary | ICD-10-CM

## 2021-10-07 LAB
ALBUMIN/CREAT UR: 3218.8 UG/MG (ref 0–30)
CREAT UR-MCNC: 32 MG/DL (ref 15–325)
MICROALBUMIN UR DL<=1MG/L-MCNC: 1030 UG/ML

## 2021-10-07 PROCEDURE — 3079F PR MOST RECENT DIASTOLIC BLOOD PRESSURE 80-89 MM HG: ICD-10-PCS | Mod: CPTII,S$GLB,, | Performed by: FAMILY MEDICINE

## 2021-10-07 PROCEDURE — 3046F PR MOST RECENT HEMOGLOBIN A1C LEVEL > 9.0%: ICD-10-PCS | Mod: CPTII,S$GLB,, | Performed by: FAMILY MEDICINE

## 2021-10-07 PROCEDURE — 4010F PR ACE/ARB THEARPY RXD/TAKEN: ICD-10-PCS | Mod: CPTII,S$GLB,, | Performed by: FAMILY MEDICINE

## 2021-10-07 PROCEDURE — 1159F PR MEDICATION LIST DOCUMENTED IN MEDICAL RECORD: ICD-10-PCS | Mod: CPTII,S$GLB,, | Performed by: FAMILY MEDICINE

## 2021-10-07 PROCEDURE — 3046F HEMOGLOBIN A1C LEVEL >9.0%: CPT | Mod: CPTII,S$GLB,, | Performed by: FAMILY MEDICINE

## 2021-10-07 PROCEDURE — 3008F PR BODY MASS INDEX (BMI) DOCUMENTED: ICD-10-PCS | Mod: CPTII,S$GLB,, | Performed by: FAMILY MEDICINE

## 2021-10-07 PROCEDURE — 99999 PR PBB SHADOW E&M-EST. PATIENT-LVL V: CPT | Mod: PBBFAC,,, | Performed by: FAMILY MEDICINE

## 2021-10-07 PROCEDURE — 82570 ASSAY OF URINE CREATININE: CPT | Performed by: FAMILY MEDICINE

## 2021-10-07 PROCEDURE — 1160F RVW MEDS BY RX/DR IN RCRD: CPT | Mod: CPTII,S$GLB,, | Performed by: FAMILY MEDICINE

## 2021-10-07 PROCEDURE — 4010F ACE/ARB THERAPY RXD/TAKEN: CPT | Mod: CPTII,S$GLB,, | Performed by: FAMILY MEDICINE

## 2021-10-07 PROCEDURE — 99214 OFFICE O/P EST MOD 30 MIN: CPT | Mod: S$GLB,,, | Performed by: FAMILY MEDICINE

## 2021-10-07 PROCEDURE — 3075F PR MOST RECENT SYSTOLIC BLOOD PRESS GE 130-139MM HG: ICD-10-PCS | Mod: CPTII,S$GLB,, | Performed by: FAMILY MEDICINE

## 2021-10-07 PROCEDURE — 1159F MED LIST DOCD IN RCRD: CPT | Mod: CPTII,S$GLB,, | Performed by: FAMILY MEDICINE

## 2021-10-07 PROCEDURE — 99999 PR PBB SHADOW E&M-EST. PATIENT-LVL V: ICD-10-PCS | Mod: PBBFAC,,, | Performed by: FAMILY MEDICINE

## 2021-10-07 PROCEDURE — 3008F BODY MASS INDEX DOCD: CPT | Mod: CPTII,S$GLB,, | Performed by: FAMILY MEDICINE

## 2021-10-07 PROCEDURE — 3079F DIAST BP 80-89 MM HG: CPT | Mod: CPTII,S$GLB,, | Performed by: FAMILY MEDICINE

## 2021-10-07 PROCEDURE — 3075F SYST BP GE 130 - 139MM HG: CPT | Mod: CPTII,S$GLB,, | Performed by: FAMILY MEDICINE

## 2021-10-07 PROCEDURE — 99214 PR OFFICE/OUTPT VISIT, EST, LEVL IV, 30-39 MIN: ICD-10-PCS | Mod: S$GLB,,, | Performed by: FAMILY MEDICINE

## 2021-10-07 PROCEDURE — 1160F PR REVIEW ALL MEDS BY PRESCRIBER/CLIN PHARMACIST DOCUMENTED: ICD-10-PCS | Mod: CPTII,S$GLB,, | Performed by: FAMILY MEDICINE

## 2021-10-07 RX ORDER — INSULIN LISPRO 100 [IU]/ML
150 INJECTION, SOLUTION INTRAVENOUS; SUBCUTANEOUS
COMMUNITY
End: 2022-05-13 | Stop reason: SDUPTHER

## 2021-10-07 RX ORDER — ERGOCALCIFEROL 1.25 MG/1
50000 CAPSULE ORAL
COMMUNITY
End: 2021-10-20

## 2021-10-07 RX ORDER — BUSPIRONE HYDROCHLORIDE 10 MG/1
10 TABLET ORAL 3 TIMES DAILY
Qty: 90 TABLET | Refills: 11 | Status: SHIPPED | OUTPATIENT
Start: 2021-10-07 | End: 2022-11-29

## 2021-10-07 RX ORDER — CLOBETASOL PROPIONATE 0.46 MG/ML
SOLUTION TOPICAL 2 TIMES DAILY
COMMUNITY
End: 2021-10-20

## 2021-10-07 RX ORDER — KETOCONAZOLE 20 MG/ML
SHAMPOO, SUSPENSION TOPICAL
COMMUNITY
End: 2021-10-20

## 2021-10-07 RX ORDER — MOMETASONE FUROATE 1 MG/G
CREAM TOPICAL 2 TIMES DAILY
COMMUNITY
End: 2022-05-13 | Stop reason: SDUPTHER

## 2021-10-07 RX ORDER — AMLODIPINE BESYLATE 10 MG/1
10 TABLET ORAL
COMMUNITY
End: 2023-09-27 | Stop reason: SDUPTHER

## 2021-10-07 RX ORDER — LATANOPROST 50 UG/ML
1 SOLUTION/ DROPS OPHTHALMIC
COMMUNITY
End: 2021-10-20

## 2021-10-07 RX ORDER — GABAPENTIN 300 MG/1
300 CAPSULE ORAL
COMMUNITY
End: 2021-10-20

## 2021-10-07 RX ORDER — CITALOPRAM 40 MG/1
40 TABLET, FILM COATED ORAL
COMMUNITY
End: 2022-05-13 | Stop reason: SDUPTHER

## 2021-10-07 RX ORDER — METOPROLOL TARTRATE 25 MG/1
25 TABLET, FILM COATED ORAL
COMMUNITY
End: 2021-10-20

## 2021-10-07 RX ORDER — FLUTICASONE PROPIONATE 50 MCG
2 SPRAY, SUSPENSION (ML) NASAL DAILY
Qty: 16 G | Refills: 5 | Status: SHIPPED | OUTPATIENT
Start: 2021-10-07 | End: 2021-10-20

## 2021-10-07 RX ORDER — OSELTAMIVIR PHOSPHATE 75 MG/1
75 CAPSULE ORAL 2 TIMES DAILY
COMMUNITY
Start: 2021-06-07 | End: 2021-10-20

## 2021-10-07 RX ORDER — FLUTICASONE PROPIONATE 50 MCG
1 SPRAY, SUSPENSION (ML) NASAL
COMMUNITY
End: 2023-01-23 | Stop reason: SDUPTHER

## 2021-10-07 RX ORDER — ALBUTEROL SULFATE 90 UG/1
AEROSOL, METERED RESPIRATORY (INHALATION)
COMMUNITY
Start: 2021-06-07 | End: 2022-10-20 | Stop reason: SDUPTHER

## 2021-10-12 ENCOUNTER — TELEPHONE (OUTPATIENT)
Dept: GASTROENTEROLOGY | Facility: CLINIC | Age: 56
End: 2021-10-12

## 2021-10-12 DIAGNOSIS — Z01.818 PRE-OP TESTING: ICD-10-CM

## 2021-10-13 ENCOUNTER — PATIENT MESSAGE (OUTPATIENT)
Dept: BARIATRICS | Facility: CLINIC | Age: 56
End: 2021-10-13
Payer: COMMERCIAL

## 2021-10-13 ENCOUNTER — TELEPHONE (OUTPATIENT)
Dept: DIABETES | Facility: CLINIC | Age: 56
End: 2021-10-13

## 2021-10-18 ENCOUNTER — LAB VISIT (OUTPATIENT)
Dept: FAMILY MEDICINE | Facility: CLINIC | Age: 56
End: 2021-10-18
Payer: COMMERCIAL

## 2021-10-18 DIAGNOSIS — Z01.818 PRE-OP TESTING: ICD-10-CM

## 2021-10-18 LAB
SARS-COV-2 RNA RESP QL NAA+PROBE: NOT DETECTED
SARS-COV-2- CYCLE NUMBER: NORMAL

## 2021-10-18 PROCEDURE — U0003 INFECTIOUS AGENT DETECTION BY NUCLEIC ACID (DNA OR RNA); SEVERE ACUTE RESPIRATORY SYNDROME CORONAVIRUS 2 (SARS-COV-2) (CORONAVIRUS DISEASE [COVID-19]), AMPLIFIED PROBE TECHNIQUE, MAKING USE OF HIGH THROUGHPUT TECHNOLOGIES AS DESCRIBED BY CMS-2020-01-R: HCPCS | Performed by: INTERNAL MEDICINE

## 2021-10-18 PROCEDURE — U0005 INFEC AGEN DETEC AMPLI PROBE: HCPCS | Performed by: INTERNAL MEDICINE

## 2021-10-21 PROBLEM — Z12.11 SCREEN FOR COLON CANCER: Status: ACTIVE | Noted: 2021-10-21

## 2021-10-23 ENCOUNTER — PATIENT OUTREACH (OUTPATIENT)
Dept: ADMINISTRATIVE | Facility: OTHER | Age: 56
End: 2021-10-23
Payer: COMMERCIAL

## 2021-10-25 ENCOUNTER — OFFICE VISIT (OUTPATIENT)
Dept: DERMATOLOGY | Facility: CLINIC | Age: 56
End: 2021-10-25
Payer: COMMERCIAL

## 2021-10-25 VITALS — WEIGHT: 226 LBS | RESPIRATION RATE: 18 BRPM | HEIGHT: 56 IN | BODY MASS INDEX: 50.84 KG/M2

## 2021-10-25 DIAGNOSIS — L70.0 ACNE VULGARIS: ICD-10-CM

## 2021-10-25 DIAGNOSIS — L21.9 SEBORRHEIC DERMATITIS: Primary | ICD-10-CM

## 2021-10-25 DIAGNOSIS — L82.1 SEBORRHEIC KERATOSES: ICD-10-CM

## 2021-10-25 DIAGNOSIS — L65.9 ALOPECIA: ICD-10-CM

## 2021-10-25 DIAGNOSIS — L28.0 LSC (LICHEN SIMPLEX CHRONICUS): ICD-10-CM

## 2021-10-25 PROCEDURE — 3008F BODY MASS INDEX DOCD: CPT | Mod: CPTII,S$GLB,, | Performed by: DERMATOLOGY

## 2021-10-25 PROCEDURE — 99999 PR PBB SHADOW E&M-EST. PATIENT-LVL II: CPT | Mod: PBBFAC,,, | Performed by: DERMATOLOGY

## 2021-10-25 PROCEDURE — 3008F PR BODY MASS INDEX (BMI) DOCUMENTED: ICD-10-PCS | Mod: CPTII,S$GLB,, | Performed by: DERMATOLOGY

## 2021-10-25 PROCEDURE — 99999 PR PBB SHADOW E&M-EST. PATIENT-LVL II: ICD-10-PCS | Mod: PBBFAC,,, | Performed by: DERMATOLOGY

## 2021-10-25 PROCEDURE — 3052F PR MOST RECENT HEMOGLOBIN A1C LEVEL 8.0 - < 9.0%: ICD-10-PCS | Mod: CPTII,S$GLB,, | Performed by: DERMATOLOGY

## 2021-10-25 PROCEDURE — 3062F PR POS MACROALBUMINURIA RESULT DOCUMENTED/REVIEW: ICD-10-PCS | Mod: CPTII,S$GLB,, | Performed by: DERMATOLOGY

## 2021-10-25 PROCEDURE — 4010F PR ACE/ARB THEARPY RXD/TAKEN: ICD-10-PCS | Mod: CPTII,S$GLB,, | Performed by: DERMATOLOGY

## 2021-10-25 PROCEDURE — 99214 PR OFFICE/OUTPT VISIT, EST, LEVL IV, 30-39 MIN: ICD-10-PCS | Mod: S$GLB,,, | Performed by: DERMATOLOGY

## 2021-10-25 PROCEDURE — 3062F POS MACROALBUMINURIA REV: CPT | Mod: CPTII,S$GLB,, | Performed by: DERMATOLOGY

## 2021-10-25 PROCEDURE — 99214 OFFICE O/P EST MOD 30 MIN: CPT | Mod: S$GLB,,, | Performed by: DERMATOLOGY

## 2021-10-25 PROCEDURE — 3052F HG A1C>EQUAL 8.0%<EQUAL 9.0%: CPT | Mod: CPTII,S$GLB,, | Performed by: DERMATOLOGY

## 2021-10-25 PROCEDURE — 4010F ACE/ARB THERAPY RXD/TAKEN: CPT | Mod: CPTII,S$GLB,, | Performed by: DERMATOLOGY

## 2021-10-25 PROCEDURE — 3066F NEPHROPATHY DOC TX: CPT | Mod: CPTII,S$GLB,, | Performed by: DERMATOLOGY

## 2021-10-25 PROCEDURE — 3066F PR DOCUMENTATION OF TREATMENT FOR NEPHROPATHY: ICD-10-PCS | Mod: CPTII,S$GLB,, | Performed by: DERMATOLOGY

## 2021-10-25 RX ORDER — KETOCONAZOLE 20 MG/ML
SHAMPOO, SUSPENSION TOPICAL WEEKLY
Qty: 120 ML | Refills: 11 | Status: SHIPPED | OUTPATIENT
Start: 2021-10-25 | End: 2022-10-20

## 2021-10-25 RX ORDER — HYDROCORTISONE 25 MG/G
CREAM TOPICAL 2 TIMES DAILY
Qty: 28 G | Refills: 1 | Status: SHIPPED | OUTPATIENT
Start: 2021-10-25 | End: 2022-10-20

## 2021-11-17 ENCOUNTER — TELEPHONE (OUTPATIENT)
Dept: BARIATRICS | Facility: CLINIC | Age: 56
End: 2021-11-17
Payer: COMMERCIAL

## 2021-12-01 ENCOUNTER — TELEPHONE (OUTPATIENT)
Dept: BARIATRICS | Facility: CLINIC | Age: 56
End: 2021-12-01
Payer: COMMERCIAL

## 2021-12-03 ENCOUNTER — TELEPHONE (OUTPATIENT)
Dept: ENDOCRINOLOGY | Facility: CLINIC | Age: 56
End: 2021-12-03
Payer: COMMERCIAL

## 2021-12-09 ENCOUNTER — TELEPHONE (OUTPATIENT)
Dept: FAMILY MEDICINE | Facility: CLINIC | Age: 56
End: 2021-12-09
Payer: COMMERCIAL

## 2021-12-10 ENCOUNTER — OFFICE VISIT (OUTPATIENT)
Dept: FAMILY MEDICINE | Facility: CLINIC | Age: 56
End: 2021-12-10
Payer: COMMERCIAL

## 2021-12-10 DIAGNOSIS — B34.9 VIRAL SYNDROME: Primary | ICD-10-CM

## 2021-12-10 PROCEDURE — 3066F PR DOCUMENTATION OF TREATMENT FOR NEPHROPATHY: ICD-10-PCS | Mod: CPTII,95,, | Performed by: PHYSICIAN ASSISTANT

## 2021-12-10 PROCEDURE — 99213 OFFICE O/P EST LOW 20 MIN: CPT | Mod: 95,,, | Performed by: PHYSICIAN ASSISTANT

## 2021-12-10 PROCEDURE — 3062F POS MACROALBUMINURIA REV: CPT | Mod: CPTII,95,, | Performed by: PHYSICIAN ASSISTANT

## 2021-12-10 PROCEDURE — 4010F ACE/ARB THERAPY RXD/TAKEN: CPT | Mod: CPTII,95,, | Performed by: PHYSICIAN ASSISTANT

## 2021-12-10 PROCEDURE — 99213 PR OFFICE/OUTPT VISIT, EST, LEVL III, 20-29 MIN: ICD-10-PCS | Mod: 95,,, | Performed by: PHYSICIAN ASSISTANT

## 2021-12-10 PROCEDURE — 4010F PR ACE/ARB THEARPY RXD/TAKEN: ICD-10-PCS | Mod: CPTII,95,, | Performed by: PHYSICIAN ASSISTANT

## 2021-12-10 PROCEDURE — 3062F PR POS MACROALBUMINURIA RESULT DOCUMENTED/REVIEW: ICD-10-PCS | Mod: CPTII,95,, | Performed by: PHYSICIAN ASSISTANT

## 2021-12-10 PROCEDURE — 3066F NEPHROPATHY DOC TX: CPT | Mod: CPTII,95,, | Performed by: PHYSICIAN ASSISTANT

## 2021-12-11 ENCOUNTER — CLINICAL SUPPORT (OUTPATIENT)
Dept: FAMILY MEDICINE | Facility: CLINIC | Age: 56
End: 2021-12-11
Payer: COMMERCIAL

## 2021-12-11 DIAGNOSIS — B34.9 VIRAL SYNDROME: ICD-10-CM

## 2021-12-11 LAB
CTP QC/QA: YES
CTP QC/QA: YES
POC MOLECULAR INFLUENZA A AGN: NEGATIVE
POC MOLECULAR INFLUENZA B AGN: NEGATIVE
SARS-COV-2 RDRP RESP QL NAA+PROBE: NEGATIVE

## 2021-12-11 PROCEDURE — U0002: ICD-10-PCS | Mod: QW,S$GLB,, | Performed by: PHYSICIAN ASSISTANT

## 2021-12-11 PROCEDURE — 87502 POCT INFLUENZA A/B MOLECULAR: ICD-10-PCS | Mod: QW,,, | Performed by: PHYSICIAN ASSISTANT

## 2021-12-11 PROCEDURE — 87502 INFLUENZA DNA AMP PROBE: CPT | Mod: QW,,, | Performed by: PHYSICIAN ASSISTANT

## 2021-12-11 PROCEDURE — U0002 COVID-19 LAB TEST NON-CDC: HCPCS | Mod: QW,S$GLB,, | Performed by: PHYSICIAN ASSISTANT

## 2021-12-13 ENCOUNTER — TELEPHONE (OUTPATIENT)
Dept: FAMILY MEDICINE | Facility: CLINIC | Age: 56
End: 2021-12-13
Payer: COMMERCIAL

## 2021-12-16 ENCOUNTER — TELEPHONE (OUTPATIENT)
Dept: FAMILY MEDICINE | Facility: CLINIC | Age: 56
End: 2021-12-16
Payer: COMMERCIAL

## 2021-12-16 RX ORDER — DOXYCYCLINE 100 MG/1
100 CAPSULE ORAL 2 TIMES DAILY
Qty: 20 CAPSULE | Refills: 0 | Status: SHIPPED | OUTPATIENT
Start: 2021-12-16 | End: 2022-10-20

## 2022-01-04 ENCOUNTER — LAB VISIT (OUTPATIENT)
Dept: LAB | Facility: HOSPITAL | Age: 57
End: 2022-01-04
Attending: NURSE PRACTITIONER
Payer: COMMERCIAL

## 2022-01-04 DIAGNOSIS — E11.42 TYPE 2 DIABETES MELLITUS WITH DIABETIC POLYNEUROPATHY, WITH LONG-TERM CURRENT USE OF INSULIN: ICD-10-CM

## 2022-01-04 DIAGNOSIS — Z79.4 TYPE 2 DIABETES MELLITUS WITH DIABETIC POLYNEUROPATHY, WITH LONG-TERM CURRENT USE OF INSULIN: ICD-10-CM

## 2022-01-04 LAB
ESTIMATED AVG GLUCOSE: 177 MG/DL (ref 68–131)
HBA1C MFR BLD: 7.8 % (ref 4–5.6)

## 2022-01-04 PROCEDURE — 83036 HEMOGLOBIN GLYCOSYLATED A1C: CPT | Performed by: NURSE PRACTITIONER

## 2022-01-04 PROCEDURE — 36415 COLL VENOUS BLD VENIPUNCTURE: CPT | Mod: PO | Performed by: NURSE PRACTITIONER

## 2022-01-05 ENCOUNTER — PATIENT OUTREACH (OUTPATIENT)
Dept: ADMINISTRATIVE | Facility: OTHER | Age: 57
End: 2022-01-05
Payer: COMMERCIAL

## 2022-01-05 DIAGNOSIS — Z12.31 ENCOUNTER FOR SCREENING MAMMOGRAM FOR MALIGNANT NEOPLASM OF BREAST: Primary | ICD-10-CM

## 2022-01-05 NOTE — PROGRESS NOTES
Health Maintenance Due   Topic Date Due    Shingles Vaccine (1 of 2) Never done    Influenza Vaccine (1) 09/01/2021    Mammogram  10/08/2021    COVID-19 Vaccine (3 - Booster for Moderna series) 10/13/2021     Updates were requested from care everywhere.  Chart was reviewed for overdue Proactive [facility] Encounters (BAR[facility]) topics (CRS, Breast Cancer Screening, Eye exam)  Health Maintenance has been updated.  LINKS immunization registry triggered.  Immunizations were reconciled.

## 2022-01-06 ENCOUNTER — OFFICE VISIT (OUTPATIENT)
Dept: ENDOCRINOLOGY | Facility: CLINIC | Age: 57
End: 2022-01-06
Payer: COMMERCIAL

## 2022-01-06 VITALS
DIASTOLIC BLOOD PRESSURE: 90 MMHG | BODY MASS INDEX: 49.87 KG/M2 | WEIGHT: 221.69 LBS | HEART RATE: 101 BPM | SYSTOLIC BLOOD PRESSURE: 154 MMHG | HEIGHT: 56 IN

## 2022-01-06 DIAGNOSIS — I10 HTN (HYPERTENSION), BENIGN: ICD-10-CM

## 2022-01-06 DIAGNOSIS — Z79.4 TYPE 2 DIABETES MELLITUS WITH DIABETIC POLYNEUROPATHY, WITH LONG-TERM CURRENT USE OF INSULIN: Primary | ICD-10-CM

## 2022-01-06 DIAGNOSIS — E78.5 HYPERLIPIDEMIA, UNSPECIFIED HYPERLIPIDEMIA TYPE: ICD-10-CM

## 2022-01-06 DIAGNOSIS — E11.42 TYPE 2 DIABETES MELLITUS WITH DIABETIC POLYNEUROPATHY, WITH LONG-TERM CURRENT USE OF INSULIN: Primary | ICD-10-CM

## 2022-01-06 DIAGNOSIS — Z96.41 INSULIN PUMP STATUS: ICD-10-CM

## 2022-01-06 DIAGNOSIS — Z46.81 FITTING OR ADJUSTMENT OF INSULIN PUMP: ICD-10-CM

## 2022-01-06 DIAGNOSIS — F10.10 ALCOHOL ABUSE: ICD-10-CM

## 2022-01-06 DIAGNOSIS — E66.01 MORBID OBESITY WITH BMI OF 50.0-59.9, ADULT: ICD-10-CM

## 2022-01-06 PROCEDURE — 3008F BODY MASS INDEX DOCD: CPT | Mod: CPTII,S$GLB,, | Performed by: NURSE PRACTITIONER

## 2022-01-06 PROCEDURE — 3080F DIAST BP >= 90 MM HG: CPT | Mod: CPTII,S$GLB,, | Performed by: NURSE PRACTITIONER

## 2022-01-06 PROCEDURE — 1159F MED LIST DOCD IN RCRD: CPT | Mod: CPTII,S$GLB,, | Performed by: NURSE PRACTITIONER

## 2022-01-06 PROCEDURE — 1159F PR MEDICATION LIST DOCUMENTED IN MEDICAL RECORD: ICD-10-PCS | Mod: CPTII,S$GLB,, | Performed by: NURSE PRACTITIONER

## 2022-01-06 PROCEDURE — 99214 OFFICE O/P EST MOD 30 MIN: CPT | Mod: 25,S$GLB,, | Performed by: NURSE PRACTITIONER

## 2022-01-06 PROCEDURE — 99999 PR PBB SHADOW E&M-EST. PATIENT-LVL V: ICD-10-PCS | Mod: PBBFAC,,, | Performed by: NURSE PRACTITIONER

## 2022-01-06 PROCEDURE — 99214 PR OFFICE/OUTPT VISIT, EST, LEVL IV, 30-39 MIN: ICD-10-PCS | Mod: 25,S$GLB,, | Performed by: NURSE PRACTITIONER

## 2022-01-06 PROCEDURE — 3051F HG A1C>EQUAL 7.0%<8.0%: CPT | Mod: CPTII,S$GLB,, | Performed by: NURSE PRACTITIONER

## 2022-01-06 PROCEDURE — 95251 CONT GLUC MNTR ANALYSIS I&R: CPT | Mod: S$GLB,,, | Performed by: NURSE PRACTITIONER

## 2022-01-06 PROCEDURE — 3077F PR MOST RECENT SYSTOLIC BLOOD PRESSURE >= 140 MM HG: ICD-10-PCS | Mod: CPTII,S$GLB,, | Performed by: NURSE PRACTITIONER

## 2022-01-06 PROCEDURE — 1160F PR REVIEW ALL MEDS BY PRESCRIBER/CLIN PHARMACIST DOCUMENTED: ICD-10-PCS | Mod: CPTII,S$GLB,, | Performed by: NURSE PRACTITIONER

## 2022-01-06 PROCEDURE — 95251 PR GLUCOSE MONITOR, 72 HOUR, PHYS INTERP: ICD-10-PCS | Mod: S$GLB,,, | Performed by: NURSE PRACTITIONER

## 2022-01-06 PROCEDURE — 3080F PR MOST RECENT DIASTOLIC BLOOD PRESSURE >= 90 MM HG: ICD-10-PCS | Mod: CPTII,S$GLB,, | Performed by: NURSE PRACTITIONER

## 2022-01-06 PROCEDURE — 3008F PR BODY MASS INDEX (BMI) DOCUMENTED: ICD-10-PCS | Mod: CPTII,S$GLB,, | Performed by: NURSE PRACTITIONER

## 2022-01-06 PROCEDURE — 99999 PR PBB SHADOW E&M-EST. PATIENT-LVL V: CPT | Mod: PBBFAC,,, | Performed by: NURSE PRACTITIONER

## 2022-01-06 PROCEDURE — 1160F RVW MEDS BY RX/DR IN RCRD: CPT | Mod: CPTII,S$GLB,, | Performed by: NURSE PRACTITIONER

## 2022-01-06 PROCEDURE — 3077F SYST BP >= 140 MM HG: CPT | Mod: CPTII,S$GLB,, | Performed by: NURSE PRACTITIONER

## 2022-01-06 PROCEDURE — 3051F PR MOST RECENT HEMOGLOBIN A1C LEVEL 7.0 - < 8.0%: ICD-10-PCS | Mod: CPTII,S$GLB,, | Performed by: NURSE PRACTITIONER

## 2022-01-06 NOTE — PROGRESS NOTES
ubjective:       Patient ID: Mine Quiros is a 56 y.o. female.    Chief Complaint: routine DM f/u     HPI   Pt is a 56 y.o. AAF with a long hx of very uncontrolled Type 2 DM-- as well as chronic conditions pending review including HTN, peripheral neuropathy, diastolic dysfunciton, morbid obesity- now on insulin pump.She has had multiple difficulties being able to navigate pump and having freq hypoglycemia. Medically disbabled she states due to neuropathy.     Interim Events: NO acute events.  C/o pump waking her up with alarms every night. Pump does need to be about q 12, but since the patient sleeps sometimes 12 hrs it wakes her up.  Howver, DM has never been better controlled, and with out hypoglycemia.  Sensor downloaded and reviewed.     Sensor Interpretation   Dates of review: 12/26-1/6/2022   Estimated A1C;7.1%   Percent of sensor utilization during review period:84%    1% Very high  22% High  77% Time in range  0% Low  0 % Very Low    Time in range parameters:   mg/dL     Prominent Theme/Finding:  No marked hyperglycemia. NO hypoglycemia noted.  Overall, most glucoses in goal.               Review of Systems   Constitutional: Positive for fatigue. Negative for activity change.   HENT: Negative for hearing loss and trouble swallowing.    Eyes: Negative for photophobia and visual disturbance.        Last Eye Exam: June 2021   Respiratory: Positive for shortness of breath (states r/t deconditioning. ). Negative for cough.    Cardiovascular: Negative for chest pain and palpitations.   Gastrointestinal: Negative for constipation and diarrhea.   Endocrine: Negative for polydipsia and polyuria.        Craving ice   Genitourinary: Negative for frequency and urgency.   Musculoskeletal: Positive for back pain. Negative for arthralgias and myalgias.   Skin: Negative for rash and wound.   Allergic/Immunologic: Negative for food allergies.   Neurological: Positive for numbness (hands and feet. ). Negative for  weakness.   Psychiatric/Behavioral: Negative for sleep disturbance. The patient is not nervous/anxious.         Sleeps well with gabapentin.         Objective:      Physical Exam  Constitutional:       Appearance: Normal appearance. She is well-developed. She is obese.   HENT:      Head: Normocephalic and atraumatic.      Nose: Nose normal.   Eyes:      Extraocular Movements: Extraocular movements intact.      Conjunctiva/sclera: Conjunctivae normal.      Pupils: Pupils are equal, round, and reactive to light.   Neck:      Vascular: No carotid bruit.   Cardiovascular:      Rate and Rhythm: Normal rate and regular rhythm.      Pulses: Normal pulses.      Heart sounds: Normal heart sounds.   Pulmonary:      Effort: Pulmonary effort is normal.      Breath sounds: Normal breath sounds.   Musculoskeletal:         General: Normal range of motion.      Cervical back: Normal range of motion and neck supple.      Comments: Feet: no open wounds or calluses.  Good pedal care Pedal pulses +2 bilaterally.   Vibratory sensation slightly decreased bilaterally.    Lymphadenopathy:      Cervical: No cervical adenopathy.   Skin:     General: Skin is warm and dry.      Comments: Acanthosis nigricans   Neurological:      General: No focal deficit present.      Mental Status: She is alert and oriented to person, place, and time.   Psychiatric:         Mood and Affect: Mood normal.         Behavior: Behavior normal.         Thought Content: Thought content normal.         Judgment: Judgment normal.         Hemoglobin A1C   Date Value Ref Range Status   01/04/2022 7.8 (H) 4.0 - 5.6 % Final     Comment:     ADA Screening Guidelines:  5.7-6.4%  Consistent with prediabetes  >or=6.5%  Consistent with diabetes    High levels of fetal hemoglobin interfere with the HbA1C  assay. Heterozygous hemoglobin variants (HbS, HgC, etc)do  not significantly interfere with this assay.   However, presence of multiple variants may affect accuracy.      10/07/2021 8.2 (H) 4.0 - 5.6 % Final     Comment:     ADA Screening Guidelines:  5.7-6.4%  Consistent with prediabetes  >or=6.5%  Consistent with diabetes    High levels of fetal hemoglobin interfere with the HbA1C  assay. Heterozygous hemoglobin variants (HbS, HgC, etc)do  not significantly interfere with this assay.   However, presence of multiple variants may affect accuracy.     05/31/2021 9.1 (H) 4.0 - 5.6 % Final     Comment:     ADA Screening Guidelines:  5.7-6.4%  Consistent with prediabetes  >or=6.5%  Consistent with diabetes    High levels of fetal hemoglobin interfere with the HbA1C  assay. Heterozygous hemoglobin variants (HbS, HgC, etc)do  not significantly interfere with this assay.   However, presence of multiple variants may affect accuracy.         Chemistry        Component Value Date/Time     10/07/2021 1029    K 4.1 10/07/2021 1029     10/07/2021 1029    CO2 21 (L) 10/07/2021 1029    BUN 12 10/07/2021 1029    CREATININE 1.0 10/07/2021 1029     (H) 10/07/2021 1029        Component Value Date/Time    CALCIUM 9.8 10/07/2021 1029    ALKPHOS 95 10/07/2021 1029    AST 47 (H) 10/07/2021 1029    ALT 28 10/07/2021 1029    BILITOT 0.6 10/07/2021 1029    ESTGFRAFRICA >60.0 10/07/2021 1029    EGFRNONAA >60.0 10/07/2021 1029        Lab Results   Component Value Date    LDLCALC 160.8 (H) 10/07/2021     Lab Results   Component Value Date    TSH 2.269 08/07/2020           Assessment:       1. Type 2 diabetes mellitus with diabetic polyneuropathy, with long-term current use of insulin  Hemoglobin A1C   2. HTN (hypertension), benign     3. Hyperlipidemia, unspecified hyperlipidemia type     4. Alcohol abuse     5. Morbid obesity with BMI of 50.0-59.9, adult     6. Insulin pump status     7. Fitting or adjustment of insulin pump           Plan:       A  Encourage, diet,exercise, ETOH cessation, minimization.     Again, if pt is not going to bolus I will stop refilling pump supplies.   Pt needs  to f/u with Dr. Pitt.                          ORDERS 01/06/2022   4   mo with    A1c, prior

## 2022-01-24 NOTE — PATIENT INSTRUCTIONS
Start drops as soon as you get home.  Follow instructions   no joint swelling/no joint erythema/no joint warmth detailed exam

## 2022-01-27 ENCOUNTER — TELEPHONE (OUTPATIENT)
Dept: ENDOCRINOLOGY | Facility: CLINIC | Age: 57
End: 2022-01-27
Payer: COMMERCIAL

## 2022-01-27 NOTE — TELEPHONE ENCOUNTER
----- Message from Nubia Garcia sent at 1/27/2022 10:23 AM CST -----  Type: Needs Medical Advice  Who Called:  Patient  Best Call Back Number:   Additional Information: Calling to speak with the nurse, she says the sensor on her device does not seem to be working.

## 2022-02-01 RX ORDER — LANCETS
EACH MISCELLANEOUS
Qty: 90 EACH | Refills: 11 | Status: SHIPPED | OUTPATIENT
Start: 2022-02-01 | End: 2022-05-13 | Stop reason: CLARIF

## 2022-02-01 NOTE — TELEPHONE ENCOUNTER
----- Message from Nubia Garcia sent at 2/1/2022 11:42 AM CST -----  Type: Needs Medical Advice  Who Called:  Patient  Pharmacy name and phone #:    [facility] - [place] - [address]  [address]  [place] [address]  Phone: [phone] Fax: [phone]  Best Call Back Number:   Additional Information: Calling to request a refill on her accucheck lancets and guide.

## 2022-02-02 ENCOUNTER — TELEPHONE (OUTPATIENT)
Dept: DIABETES | Facility: CLINIC | Age: 57
End: 2022-02-02
Payer: COMMERCIAL

## 2022-02-02 NOTE — TELEPHONE ENCOUNTER
Returned patient's call regarding Medtronic sensor--patient states she restarted her Medtronic sensor and it is in warm up now.  Discussed that patient will need to turn ON Auto Mode feature of pump once her sensor completes warm up.  Patient verbalizes understanding.    Patient also calling to say that [facility] states they are out of Accu Chek Guide test strips and lancets and this is the device that links to her Medtronic pump.  Notified patient that Endocrine provider sent in Accu Check Guide test strips and supplies to her local pharmacy yesterday.

## 2022-02-02 NOTE — TELEPHONE ENCOUNTER
----- Message from Pepper De Los Santos sent at 2/2/2022 11:51 AM CST -----  Type:  Patient Returning Call    Who Called:  PT  Does the patient know what this is regarding?:  Asking for Kori to call   Best Call Back Number:  [phone]  Additional Information:  Please call and advise

## 2022-02-14 ENCOUNTER — PATIENT OUTREACH (OUTPATIENT)
Dept: ADMINISTRATIVE | Facility: HOSPITAL | Age: 57
End: 2022-02-14
Payer: COMMERCIAL

## 2022-02-14 ENCOUNTER — PATIENT MESSAGE (OUTPATIENT)
Dept: ADMINISTRATIVE | Facility: HOSPITAL | Age: 57
End: 2022-02-14
Payer: COMMERCIAL

## 2022-02-14 NOTE — PROGRESS NOTES
2022 Care Everywhere updates requested and reviewed.  Immunizations reconciled. Media reports reviewed.  Duplicate HM overrides and  orders removed.  Overdue HM topic chart audit and/or requested.  Overdue lab testing linked to upcoming lab appointments if applies.    Uncontrolled BP >140/90  BP Readings from Last 3 Encounters:   22 (!) 154/90   10/21/21 (!) 107/52   10/07/21 130/88            Health Maintenance Due   Topic Date Due    Shingles Vaccine (1 of 2) Never done    Influenza Vaccine (1) 2021    Mammogram  10/08/2021    Foot Exam  2022

## 2022-02-14 NOTE — LETTER
February 22, 2022    Mine Quiros  [address]  [place] [address]             [facility]  [address]  [facility] [address]  Phone: [phone] Dear Mine,    We have tried to reach you by [facility] email unsuccess[facility].    Your [facility] primary care team is dedicated to assisting you achieve your health goals.  In order to do so, scheduling your routine screenings and tests are key to your good health.  Our records indicate you may be overdue for your mammogram screening.  Mammography screening can help find breast cancer at an early stage, when it is most likely to be successfully treated.    Hai Pitt MD has entered your screening mammogram order.  We encourage you to schedule your appointment at any of our [facility] imaging locations.  You can schedule this Mammogram exam via [email or web address] or call [phone] and we will be more than happy to assist you in scheduling your mammogram.     If you recently had your mammogram screening outside of [facility], please let your primary care team know so that they can update your health record.  Please send a message to your primary care physician via [email or web address] or contact his/her office at [phone].     Thank you for letting us care for you,        Sincerely,        Your Women's Health Care Team

## 2022-02-21 ENCOUNTER — OFFICE VISIT (OUTPATIENT)
Dept: FAMILY MEDICINE | Facility: CLINIC | Age: 57
End: 2022-02-21
Payer: COMMERCIAL

## 2022-02-21 VITALS
HEART RATE: 118 BPM | BODY MASS INDEX: 50.02 KG/M2 | OXYGEN SATURATION: 97 % | DIASTOLIC BLOOD PRESSURE: 92 MMHG | WEIGHT: 223.13 LBS | SYSTOLIC BLOOD PRESSURE: 172 MMHG

## 2022-02-21 DIAGNOSIS — N18.30 STAGE 3 CHRONIC KIDNEY DISEASE, UNSPECIFIED WHETHER STAGE 3A OR 3B CKD: ICD-10-CM

## 2022-02-21 DIAGNOSIS — F41.9 ANXIETY: ICD-10-CM

## 2022-02-21 DIAGNOSIS — R00.0 TACHYCARDIA: Primary | ICD-10-CM

## 2022-02-21 DIAGNOSIS — E66.01 MORBID OBESITY: ICD-10-CM

## 2022-02-21 DIAGNOSIS — I10 ESSENTIAL HYPERTENSION: ICD-10-CM

## 2022-02-21 DIAGNOSIS — F10.10 ETOH ABUSE: ICD-10-CM

## 2022-02-21 PROCEDURE — 93005 EKG 12-LEAD: ICD-10-PCS | Mod: S$GLB,,, | Performed by: FAMILY MEDICINE

## 2022-02-21 PROCEDURE — 1160F PR REVIEW ALL MEDS BY PRESCRIBER/CLIN PHARMACIST DOCUMENTED: ICD-10-PCS | Mod: CPTII,S$GLB,, | Performed by: FAMILY MEDICINE

## 2022-02-21 PROCEDURE — 1160F RVW MEDS BY RX/DR IN RCRD: CPT | Mod: CPTII,S$GLB,, | Performed by: FAMILY MEDICINE

## 2022-02-21 PROCEDURE — 1159F MED LIST DOCD IN RCRD: CPT | Mod: CPTII,S$GLB,, | Performed by: FAMILY MEDICINE

## 2022-02-21 PROCEDURE — 3077F PR MOST RECENT SYSTOLIC BLOOD PRESSURE >= 140 MM HG: ICD-10-PCS | Mod: CPTII,S$GLB,, | Performed by: FAMILY MEDICINE

## 2022-02-21 PROCEDURE — 3080F DIAST BP >= 90 MM HG: CPT | Mod: CPTII,S$GLB,, | Performed by: FAMILY MEDICINE

## 2022-02-21 PROCEDURE — 99214 OFFICE O/P EST MOD 30 MIN: CPT | Mod: S$GLB,,, | Performed by: FAMILY MEDICINE

## 2022-02-21 PROCEDURE — 3008F BODY MASS INDEX DOCD: CPT | Mod: CPTII,S$GLB,, | Performed by: FAMILY MEDICINE

## 2022-02-21 PROCEDURE — 3051F HG A1C>EQUAL 7.0%<8.0%: CPT | Mod: CPTII,S$GLB,, | Performed by: FAMILY MEDICINE

## 2022-02-21 PROCEDURE — 93005 ELECTROCARDIOGRAM TRACING: CPT | Mod: S$GLB,,, | Performed by: FAMILY MEDICINE

## 2022-02-21 PROCEDURE — 3080F PR MOST RECENT DIASTOLIC BLOOD PRESSURE >= 90 MM HG: ICD-10-PCS | Mod: CPTII,S$GLB,, | Performed by: FAMILY MEDICINE

## 2022-02-21 PROCEDURE — 3077F SYST BP >= 140 MM HG: CPT | Mod: CPTII,S$GLB,, | Performed by: FAMILY MEDICINE

## 2022-02-21 PROCEDURE — 99214 PR OFFICE/OUTPT VISIT, EST, LEVL IV, 30-39 MIN: ICD-10-PCS | Mod: S$GLB,,, | Performed by: FAMILY MEDICINE

## 2022-02-21 PROCEDURE — 99999 PR PBB SHADOW E&M-EST. PATIENT-LVL V: ICD-10-PCS | Mod: PBBFAC,,, | Performed by: FAMILY MEDICINE

## 2022-02-21 PROCEDURE — 3008F PR BODY MASS INDEX (BMI) DOCUMENTED: ICD-10-PCS | Mod: CPTII,S$GLB,, | Performed by: FAMILY MEDICINE

## 2022-02-21 PROCEDURE — 1159F PR MEDICATION LIST DOCUMENTED IN MEDICAL RECORD: ICD-10-PCS | Mod: CPTII,S$GLB,, | Performed by: FAMILY MEDICINE

## 2022-02-21 PROCEDURE — 93010 EKG 12-LEAD: ICD-10-PCS | Mod: S$GLB,,, | Performed by: INTERNAL MEDICINE

## 2022-02-21 PROCEDURE — 99999 PR PBB SHADOW E&M-EST. PATIENT-LVL V: CPT | Mod: PBBFAC,,, | Performed by: FAMILY MEDICINE

## 2022-02-21 PROCEDURE — 3051F PR MOST RECENT HEMOGLOBIN A1C LEVEL 7.0 - < 8.0%: ICD-10-PCS | Mod: CPTII,S$GLB,, | Performed by: FAMILY MEDICINE

## 2022-02-21 PROCEDURE — 93010 ELECTROCARDIOGRAM REPORT: CPT | Mod: S$GLB,,, | Performed by: INTERNAL MEDICINE

## 2022-02-21 RX ORDER — METOPROLOL SUCCINATE 50 MG/1
50 TABLET, EXTENDED RELEASE ORAL 2 TIMES DAILY
Qty: 180 TABLET | Refills: 3 | Status: SHIPPED | OUTPATIENT
Start: 2022-02-21 | End: 2022-04-19

## 2022-02-21 NOTE — PROGRESS NOTES
Subjective:       Patient ID: Mine Quiros is a 56 y.o. female.    Chief Complaint: disability paperwork    HPI     Here for a f/u     bp and hr elevated today    Went to [place] in 9/2021.       dm2 with neuropathy:  Uncontrolled.   Sees endocrinology  On insulin pump  Checks sugars daily: 114-180     On medical jail disability since April 2017 due to neuropathy.  Needs medical form completed.      ckd stage 3 stable.      On celexa and buspar for anxiety. stable.     Still drinking 750 mg henrique every 3 days.        Review of Systems      Review of Systems   Constitutional: Negative for fever and chills.   HENT: Negative for hearing loss and neck stiffness.    Eyes: Negative for redness and itching.   Respiratory: Negative for cough and choking.    Cardiovascular: Negative for chest pain and leg swelling.  Abdomen: Negative for abdominal pain and blood in stool.   Genitourinary: Negative for dysuria and flank pain.   Musculoskeletal: Negative for back pain   Neurological: Negative for light-headedness and headaches.   Hematological: Negative for adenopathy.   Psychiatric/Behavioral: Negative for behavioral problems.     Objective:      Physical Exam  Constitutional:       Appearance: She is well-developed.   HENT:      Head: Normocephalic and atraumatic.   Eyes:      Conjunctiva/sclera: Conjunctivae normal.      Pupils: Pupils are equal, round, and reactive to light.   Cardiovascular:      Rate and Rhythm: Normal rate and regular rhythm.      Heart sounds: No murmur heard.  Pulmonary:      Effort: Pulmonary effort is normal.      Breath sounds: Normal breath sounds.   Musculoskeletal:      Cervical back: Normal range of motion.   Lymphadenopathy:      Cervical: No cervical adenopathy.           Hemoglobin A1C   Date Value Ref Range Status   01/04/2022 7.8 (H) 4.0 - 5.6 % Final     Comment:     ADA Screening Guidelines:  5.7-6.4%  Consistent with prediabetes  >or=6.5%  Consistent with diabetes    High  levels of fetal hemoglobin interfere with the HbA1C  assay. Heterozygous hemoglobin variants (HbS, HgC, etc)do  not significantly interfere with this assay.   However, presence of multiple variants may affect accuracy.     10/07/2021 8.2 (H) 4.0 - 5.6 % Final     Comment:     ADA Screening Guidelines:  5.7-6.4%  Consistent with prediabetes  >or=6.5%  Consistent with diabetes    High levels of fetal hemoglobin interfere with the HbA1C  assay. Heterozygous hemoglobin variants (HbS, HgC, etc)do  not significantly interfere with this assay.   However, presence of multiple variants may affect accuracy.     05/31/2021 9.1 (H) 4.0 - 5.6 % Final     Comment:     ADA Screening Guidelines:  5.7-6.4%  Consistent with prediabetes  >or=6.5%  Consistent with diabetes    High levels of fetal hemoglobin interfere with the HbA1C  assay. Heterozygous hemoglobin variants (HbS, HgC, etc)do  not significantly interfere with this assay.   However, presence of multiple variants may affect accuracy.         Assessment:       1. Tachycardia    2. Diabetes mellitus with neurological manifestations, uncontrolled    3. Essential hypertension    4. Stage 3 chronic kidney disease, unspecified whether stage 3a or 3b CKD    5. Anxiety    6. ETOH abuse    7. Morbid obesity        Plan:       Tachycardia  -     EKG 12-lead; Future; Expected date: 02/21/2022    Diabetes mellitus with neurological manifestations, uncontrolled    Essential hypertension    Stage 3 chronic kidney disease, unspecified whether stage 3a or 3b CKD    Anxiety    ETOH abuse    Morbid obesity    Other orders  -     metoprolol succinate (TOPROL-XL) 50 MG 24 hr tablet; Take 1 tablet (50 mg total) by mouth 2 (two) times daily.  Dispense: 180 tablet; Refill: 3                Plan:  ekg-sinus tachy 118 bpm  Change lopressor to toprol xl 50 mg po bid. Serial bp and hr checks.  Cont all other meds  Nurse bp visit in 2 weeks  F/u with me in 6 weeks  Refrain from drinking  etoh    Medication List with Changes/Refills   New Medications    METOPROLOL SUCCINATE (TOPROL-XL) 50 MG 24 HR TABLET    Take 1 tablet (50 mg total) by mouth 2 (two) times daily.   Current Medications    ALBUTEROL (PROVENTIL/VENTOLIN HFA) 90 MCG/ACTUATION INHALER    INHALE 1 PUFF BY MOUTH EVERY 4 HOURS AS NEEDED for SHORTNESS OF BREATH    AMLODIPINE (NORVASC) 10 MG TABLET    Take 1 tablet (10 mg total) by mouth once daily.    AMLODIPINE (NORVASC) 10 MG TABLET    Take 10 mg by mouth.    BLOOD SUGAR DIAGNOSTIC (ACCU-CHEK GUIDE TEST STRIPS) STRP    Use to check glucoses 3 times daily. Pt uses accu chek guide link strips to communicate with pump.    BUSPIRONE (BUSPAR) 10 MG TABLET    Take 1 tablet (10 mg total) by mouth 3 (three) times daily.    CHLORTHALIDONE (HYGROTEN) 25 MG TAB    Take 1 tablet (25 mg total) by mouth once daily.    CITALOPRAM (CELEXA) 40 MG TABLET    Take 1 tablet (40 mg total) by mouth once daily.    CITALOPRAM (CELEXA) 40 MG TABLET    Take 40 mg by mouth.    CLOBETASOL (TEMOVATE) 0.05 % EXTERNAL SOLUTION    Use on scalp one - two times daily as needed for scaling or itching    DIPHENHYDRAMINE (BENADRYL) 25 MG CAPSULE    Take 25 mg by mouth every evening. USE PM BEFORE SURGERY    DOXYCYCLINE (MONODOX) 100 MG CAPSULE    Take 1 capsule (100 mg total) by mouth 2 (two) times daily.    ERGOCALCIFEROL (ERGOCALCIFEROL) 50,000 UNIT CAP    Take 1 capsule (50,000 Units total) by mouth every 7 days.    FLUTICASONE PROPIONATE (FLONASE) 50 MCG/ACTUATION NASAL SPRAY    1 spray by Nasal route.    GABAPENTIN (NEURONTIN) 300 MG CAPSULE    TAKE 1 CAPSULE BY MOUTH BY MOUTH THREE TIMES DAILY    HYDROCORTISONE 2.5 % CREAM    Apply topically 2 (two) times daily.    INSULIN LISPRO (HUMALOG U-100 INSULIN) 100 UNIT/ML INJECTION    USE AS DIRECTED via insulin pump approx 150 UNITS EVERY DAY.    INSULIN LISPRO 100 UNIT/ML INJECTION    Inject 150 Units into the skin.    KETOCONAZOLE (NIZORAL) 2 % SHAMPOO    Apply topically  "every 7 days.    KETOCONAZOLE (NIZORAL) 2 % SHAMPOO    Apply topically once a week.    LANCETS (ACCU-CHEK FASTCLIX LANCET DRUM) MISC    Use to check glucoses 3 times daily. Pt uses accu chek guide link strips to communicate with pump.    LATANOPROST 0.005 % OPHTHALMIC SOLUTION    Place 1 drop into both eyes every evening.    LOSARTAN (COZAAR) 100 MG TABLET    Take 1 tablet (100 mg total) by mouth once daily.    MICROLET 2 LANCING DEVICE KIT    Use 4x/daily to check glucose.    MICROLET LANCET MISC    use FOUR TIMES DAILY    MOMETASONE (ELOCON) 0.1 % OINTMENT    aaa bid    MOMETASONE 0.1% (ELOCON) 0.1 % CREAM    Apply topically 2 (two) times daily.    PANTOPRAZOLE (PROTONIX) 40 MG TABLET    Take 1 tablet (40 mg total) by mouth once daily.    PEN NEEDLE, DIABETIC 31 GAUGE X 5/16" NDLE    To use with insulin pens if needed    PIOGLITAZONE (ACTOS) 15 MG TABLET    Take 1 tablet (15 mg total) by mouth once daily.    TIMOLOL MALEATE 0.5% (TIMOPTIC) 0.5 % DROP    Place 1 drop into both eyes 2 (two) times daily.   Discontinued Medications    METOPROLOL TARTRATE (LOPRESSOR) 25 MG TABLET    Take 1 tablet (25 mg total) by mouth 2 (two) times daily.         "

## 2022-02-23 LAB — BCS RECOMMENDATION EXT: NORMAL

## 2022-02-24 ENCOUNTER — TELEPHONE (OUTPATIENT)
Dept: FAMILY MEDICINE | Facility: CLINIC | Age: 57
End: 2022-02-24
Payer: COMMERCIAL

## 2022-02-24 NOTE — TELEPHONE ENCOUNTER
Spoke with Katelynn from [facility]. She wanted us to have Mine bring in all her medications so we can verify all the medications she is taking.

## 2022-02-24 NOTE — TELEPHONE ENCOUNTER
----- Message from Magdalena Ko sent at 2/24/2022  3:11 PM CST -----  Contact: Katelynn  Type: Needs Medical Advice    Who: Called:  Katelynn with [facility] Call Back Number: [phone] ext [phone]     Inquiry/Question:  needs to talk to the nurse regarding pt medications please call to advise  Thank you~

## 2022-03-25 ENCOUNTER — PATIENT OUTREACH (OUTPATIENT)
Dept: ADMINISTRATIVE | Facility: HOSPITAL | Age: 57
End: 2022-03-25
Payer: COMMERCIAL

## 2022-03-25 NOTE — PROGRESS NOTES
2022 Care Everywhere updates requested and reviewed.  Immunizations reconciled. Media reports reviewed.  Duplicate HM overrides and  orders removed.  Overdue HM topic chart audit and/or requested.  Overdue lab testing linked to upcoming lab appointments if applies.      Uncontrolled BP >140/90  BP Readings from Last 3 Encounters:   22 (!) 172/92   22 (!) 154/90   10/21/21 (!) 107/52           Health Maintenance Due   Topic Date Due    Shingles Vaccine (1 of 2) Never done    Influenza Vaccine (1) 2021    Mammogram  10/08/2021    Foot Exam  2022

## 2022-04-08 ENCOUNTER — TELEPHONE (OUTPATIENT)
Dept: FAMILY MEDICINE | Facility: CLINIC | Age: 57
End: 2022-04-08
Payer: COMMERCIAL

## 2022-04-08 NOTE — TELEPHONE ENCOUNTER
----- Message from Elle Shea sent at 4/8/2022  3:28 PM CDT -----  Contact: Self  Type:  Sooner Appointment Request    Caller is requesting a sooner appointment.  Caller declined first available appointment listed below.  Caller will not accept being placed on the waitlist and is requesting a message be sent to doctor.    Name of Caller:  Patient  When is the first available appointment?  4/19  Symptoms:  Uncontrolled /90  Best Call Back Number:  [phone]  Additional Information:  Pt missed her appt she had scheduled eldon Michael yesterday 4/7 and she stated she totally forgot about it. Is it possible to get her in next week to be seen. Thank You.

## 2022-04-19 ENCOUNTER — PATIENT OUTREACH (OUTPATIENT)
Dept: ADMINISTRATIVE | Facility: OTHER | Age: 57
End: 2022-04-19
Payer: COMMERCIAL

## 2022-04-19 ENCOUNTER — HOSPITAL ENCOUNTER (OUTPATIENT)
Dept: RADIOLOGY | Facility: HOSPITAL | Age: 57
Discharge: HOME | End: 2022-04-19
Attending: FAMILY MEDICINE
Payer: COMMERCIAL

## 2022-04-19 ENCOUNTER — OFFICE VISIT (OUTPATIENT)
Dept: FAMILY MEDICINE | Facility: CLINIC | Age: 57
End: 2022-04-19
Payer: COMMERCIAL

## 2022-04-19 VITALS
HEART RATE: 110 BPM | SYSTOLIC BLOOD PRESSURE: 140 MMHG | DIASTOLIC BLOOD PRESSURE: 80 MMHG | OXYGEN SATURATION: 97 % | WEIGHT: 220.88 LBS | BODY MASS INDEX: 49.52 KG/M2 | TEMPERATURE: 98 F

## 2022-04-19 DIAGNOSIS — S90.812A ABRASION OF LEFT HEEL, INITIAL ENCOUNTER: ICD-10-CM

## 2022-04-19 DIAGNOSIS — N18.30 STAGE 3 CHRONIC KIDNEY DISEASE, UNSPECIFIED WHETHER STAGE 3A OR 3B CKD: ICD-10-CM

## 2022-04-19 DIAGNOSIS — E66.01 MORBID OBESITY: ICD-10-CM

## 2022-04-19 DIAGNOSIS — F10.10 ETOH ABUSE: ICD-10-CM

## 2022-04-19 DIAGNOSIS — I10 ESSENTIAL HYPERTENSION: ICD-10-CM

## 2022-04-19 DIAGNOSIS — K76.0 FATTY LIVER: ICD-10-CM

## 2022-04-19 DIAGNOSIS — F41.9 ANXIETY: ICD-10-CM

## 2022-04-19 PROCEDURE — 1160F PR REVIEW ALL MEDS BY PRESCRIBER/CLIN PHARMACIST DOCUMENTED: ICD-10-PCS | Mod: CPTII,S$GLB,, | Performed by: FAMILY MEDICINE

## 2022-04-19 PROCEDURE — 73650 X-RAY EXAM OF HEEL: CPT | Mod: 26,LT,, | Performed by: RADIOLOGY

## 2022-04-19 PROCEDURE — 99999 PR PBB SHADOW E&M-EST. PATIENT-LVL V: CPT | Mod: PBBFAC,,, | Performed by: FAMILY MEDICINE

## 2022-04-19 PROCEDURE — 3051F PR MOST RECENT HEMOGLOBIN A1C LEVEL 7.0 - < 8.0%: ICD-10-PCS | Mod: CPTII,S$GLB,, | Performed by: FAMILY MEDICINE

## 2022-04-19 PROCEDURE — 3079F DIAST BP 80-89 MM HG: CPT | Mod: CPTII,S$GLB,, | Performed by: FAMILY MEDICINE

## 2022-04-19 PROCEDURE — 3077F PR MOST RECENT SYSTOLIC BLOOD PRESSURE >= 140 MM HG: ICD-10-PCS | Mod: CPTII,S$GLB,, | Performed by: FAMILY MEDICINE

## 2022-04-19 PROCEDURE — 99214 PR OFFICE/OUTPT VISIT, EST, LEVL IV, 30-39 MIN: ICD-10-PCS | Mod: S$GLB,,, | Performed by: FAMILY MEDICINE

## 2022-04-19 PROCEDURE — 99214 OFFICE O/P EST MOD 30 MIN: CPT | Mod: S$GLB,,, | Performed by: FAMILY MEDICINE

## 2022-04-19 PROCEDURE — 73650 X-RAY EXAM OF HEEL: CPT | Mod: TC,FY,PO,LT

## 2022-04-19 PROCEDURE — 3051F HG A1C>EQUAL 7.0%<8.0%: CPT | Mod: CPTII,S$GLB,, | Performed by: FAMILY MEDICINE

## 2022-04-19 PROCEDURE — 3079F PR MOST RECENT DIASTOLIC BLOOD PRESSURE 80-89 MM HG: ICD-10-PCS | Mod: CPTII,S$GLB,, | Performed by: FAMILY MEDICINE

## 2022-04-19 PROCEDURE — 1160F RVW MEDS BY RX/DR IN RCRD: CPT | Mod: CPTII,S$GLB,, | Performed by: FAMILY MEDICINE

## 2022-04-19 PROCEDURE — 3008F BODY MASS INDEX DOCD: CPT | Mod: CPTII,S$GLB,, | Performed by: FAMILY MEDICINE

## 2022-04-19 PROCEDURE — 1159F PR MEDICATION LIST DOCUMENTED IN MEDICAL RECORD: ICD-10-PCS | Mod: CPTII,S$GLB,, | Performed by: FAMILY MEDICINE

## 2022-04-19 PROCEDURE — 99999 PR PBB SHADOW E&M-EST. PATIENT-LVL V: ICD-10-PCS | Mod: PBBFAC,,, | Performed by: FAMILY MEDICINE

## 2022-04-19 PROCEDURE — 3008F PR BODY MASS INDEX (BMI) DOCUMENTED: ICD-10-PCS | Mod: CPTII,S$GLB,, | Performed by: FAMILY MEDICINE

## 2022-04-19 PROCEDURE — 1159F MED LIST DOCD IN RCRD: CPT | Mod: CPTII,S$GLB,, | Performed by: FAMILY MEDICINE

## 2022-04-19 PROCEDURE — 73650 XR CALCANEUS 2 VIEW LEFT: ICD-10-PCS | Mod: 26,LT,, | Performed by: RADIOLOGY

## 2022-04-19 PROCEDURE — 3077F SYST BP >= 140 MM HG: CPT | Mod: CPTII,S$GLB,, | Performed by: FAMILY MEDICINE

## 2022-04-19 RX ORDER — MUPIROCIN 20 MG/G
OINTMENT TOPICAL 2 TIMES DAILY
Qty: 30 G | Refills: 1 | Status: SHIPPED | OUTPATIENT
Start: 2022-04-19 | End: 2022-04-20 | Stop reason: SDUPTHER

## 2022-04-19 RX ORDER — SULFAMETHOXAZOLE AND TRIMETHOPRIM 800; 160 MG/1; MG/1
1 TABLET ORAL 2 TIMES DAILY
Qty: 20 TABLET | Refills: 0 | Status: SHIPPED | OUTPATIENT
Start: 2022-04-19 | End: 2022-04-29

## 2022-04-19 RX ORDER — METOPROLOL SUCCINATE 100 MG/1
100 TABLET, EXTENDED RELEASE ORAL DAILY
Qty: 90 TABLET | Refills: 3 | Status: SHIPPED | OUTPATIENT
Start: 2022-04-19 | End: 2023-09-27 | Stop reason: SDUPTHER

## 2022-04-19 NOTE — PROGRESS NOTES
Subjective:       Patient ID: Mine Quiros is a 56 y.o. female.    Chief Complaint: Hypertension    HPI     Here for a f/u    bp mildly elevated today. Taking toprol xl 50 mg daily instead of bid. Compliant with taking norvasc and losartan.     Reports stepping on shard of glass which split skin of left heel. Reports pain more so with walking. No purulent drainage.      dm2 with neuropathy:  Uncontrolled.   Sees endocrinology  On insulin pump  Checks sugars daily: 114-180     On medical assisted disability since April 2017 due to neuropathy.       ckd stage 3 stable.      On celexa and buspar for anxiety. Stable.      Still drinking 750 mg henrique every 3 days.       Review of Systems      Review of Systems   Constitutional: Negative for fever and chills.   HENT: Negative for hearing loss and neck stiffness.    Eyes: Negative for redness and itching.   Respiratory: Negative for cough and choking.    Cardiovascular: Negative for chest pain and leg swelling.  Abdomen: Negative for abdominal pain and blood in stool.   Genitourinary: Negative for dysuria and flank pain.   Musculoskeletal: Negative for back pain and gait problem.   Neurological: Negative for light-headedness and headaches.   Hematological: Negative for adenopathy.   Psychiatric/Behavioral: Negative for behavioral problems.     Objective:      Physical Exam      Hemoglobin A1C   Date Value Ref Range Status   01/04/2022 7.8 (H) 4.0 - 5.6 % Final     Comment:     ADA Screening Guidelines:  5.7-6.4%  Consistent with prediabetes  >or=6.5%  Consistent with diabetes    High levels of fetal hemoglobin interfere with the HbA1C  assay. Heterozygous hemoglobin variants (HbS, HgC, etc)do  not significantly interfere with this assay.   However, presence of multiple variants may affect accuracy.     10/07/2021 8.2 (H) 4.0 - 5.6 % Final     Comment:     ADA Screening Guidelines:  5.7-6.4%  Consistent with prediabetes  >or=6.5%  Consistent with diabetes    High  levels of fetal hemoglobin interfere with the HbA1C  assay. Heterozygous hemoglobin variants (HbS, HgC, etc)do  not significantly interfere with this assay.   However, presence of multiple variants may affect accuracy.     05/31/2021 9.1 (H) 4.0 - 5.6 % Final     Comment:     ADA Screening Guidelines:  5.7-6.4%  Consistent with prediabetes  >or=6.5%  Consistent with diabetes    High levels of fetal hemoglobin interfere with the HbA1C  assay. Heterozygous hemoglobin variants (HbS, HgC, etc)do  not significantly interfere with this assay.   However, presence of multiple variants may affect accuracy.       Protective Sensation (w/ 10 gram monofilament):  Right: Decreased  Left: Decreased    Visual Inspection:  left heel: vertical shallow split shallow abrasion noted measuring 5 x 5 mm in size. tender. no redness or purulent discharge.     Pedal Pulses:   Right: Present  Left: Present    Posterior tibialis:   Right:Present  Left: Present        Assessment:       1. Diabetes mellitus with neurological manifestations, uncontrolled    2. Abrasion of left heel, initial encounter    3. Essential hypertension    4. Stage 3 chronic kidney disease, unspecified whether stage 3a or 3b CKD    5. Anxiety    6. ETOH abuse    7. Morbid obesity    8. Fatty liver        Plan:       Diabetes mellitus with neurological manifestations, uncontrolled  -     Comprehensive Metabolic Panel; Future  -     Lipid Panel; Future  -     Ambulatory referral/consult to Podiatry; Future; Expected date: 04/26/2022    Abrasion of left heel, initial encounter  -     X-Ray Calcaneus 2 View Left; Future; Expected date: 04/19/2022  -     Ambulatory referral/consult to Podiatry; Future; Expected date: 04/26/2022    Essential hypertension    Stage 3 chronic kidney disease, unspecified whether stage 3a or 3b CKD    Anxiety    ETOH abuse    Morbid obesity    Fatty liver    Other orders  -     metoprolol succinate (TOPROL-XL) 100 MG 24 hr tablet; Take 1 tablet  (100 mg total) by mouth once daily.  Dispense: 90 tablet; Refill: 3  -     mupirocin (BACTROBAN) 2 % ointment; Apply topically 2 (two) times daily.  Dispense: 30 g; Refill: 1  -     sulfamethoxazole-trimethoprim 800-160mg (BACTRIM DS) 800-160 mg Tab; Take 1 tablet by mouth 2 (two) times daily. for 10 days  Dispense: 20 tablet; Refill: 0              Plan:  Xray of left heel to r/o shard of glass in heel. Topical dressing with bactroban ointment bid for 10-14 days. Start bactrim ds. Refer to podiatry  Add cmp and lipids for lab draw on 4/28/2022  Change toprol xl 50 mg po bid to 100 mg daily. Serial bp checks. Nurse bp visit in 2 weeks  Advised patient to stop drinking etoh  Cont all other meds        Medication List with Changes/Refills   New Medications    METOPROLOL SUCCINATE (TOPROL-XL) 100 MG 24 HR TABLET    Take 1 tablet (100 mg total) by mouth once daily.    MUPIROCIN (BACTROBAN) 2 % OINTMENT    Apply topically 2 (two) times daily.    SULFAMETHOXAZOLE-TRIMETHOPRIM 800-160MG (BACTRIM DS) 800-160 MG TAB    Take 1 tablet by mouth 2 (two) times daily. for 10 days   Current Medications    ALBUTEROL (PROVENTIL/VENTOLIN HFA) 90 MCG/ACTUATION INHALER    INHALE 1 PUFF BY MOUTH EVERY 4 HOURS AS NEEDED for SHORTNESS OF BREATH    AMLODIPINE (NORVASC) 10 MG TABLET    Take 1 tablet (10 mg total) by mouth once daily.    AMLODIPINE (NORVASC) 10 MG TABLET    Take 10 mg by mouth.    BLOOD SUGAR DIAGNOSTIC (ACCU-CHEK GUIDE TEST STRIPS) STRP    Use to check glucoses 3 times daily. Pt uses accu chek guide link strips to communicate with pump.    BUSPIRONE (BUSPAR) 10 MG TABLET    Take 1 tablet (10 mg total) by mouth 3 (three) times daily.    CHLORTHALIDONE (HYGROTEN) 25 MG TAB    Take 1 tablet (25 mg total) by mouth once daily.    CITALOPRAM (CELEXA) 40 MG TABLET    Take 1 tablet (40 mg total) by mouth once daily.    CITALOPRAM (CELEXA) 40 MG TABLET    Take 40 mg by mouth.    CLOBETASOL (TEMOVATE) 0.05 % EXTERNAL SOLUTION    Use  "on scalp one - two times daily as needed for scaling or itching    CLOBETASOL 0.05% (TEMOVATE) 0.05 % OINT    APPLY TO AFFECTED AREA TWICE DAILY TWO OR THREE TIMES DAILY, reserve for flares    DIPHENHYDRAMINE (BENADRYL) 25 MG CAPSULE    Take 25 mg by mouth every evening. USE PM BEFORE SURGERY    DOXYCYCLINE (MONODOX) 100 MG CAPSULE    Take 1 capsule (100 mg total) by mouth 2 (two) times daily.    ERGOCALCIFEROL (ERGOCALCIFEROL) 50,000 UNIT CAP    Take 1 capsule (50,000 Units total) by mouth every 7 days.    FLUTICASONE PROPIONATE (FLONASE) 50 MCG/ACTUATION NASAL SPRAY    1 spray by Nasal route.    GABAPENTIN (NEURONTIN) 300 MG CAPSULE    TAKE 1 CAPSULE BY MOUTH BY MOUTH THREE TIMES DAILY    HYDROCORTISONE 2.5 % CREAM    Apply topically 2 (two) times daily.    INSULIN LISPRO (HUMALOG U-100 INSULIN) 100 UNIT/ML INJECTION    USE AS DIRECTED via insulin pump approx 150 UNITS EVERY DAY.    INSULIN LISPRO (HUMALOG U-100 INSULIN) 100 UNIT/ML INJECTION    USE AS DIRECTED via insulin pump approx 150 UNITS EVERY DAY.    INSULIN LISPRO 100 UNIT/ML INJECTION    Inject 150 Units into the skin.    KETOCONAZOLE (NIZORAL) 2 % SHAMPOO    Apply topically every 7 days.    KETOCONAZOLE (NIZORAL) 2 % SHAMPOO    Apply topically once a week.    LANCETS (ACCU-CHEK FASTCLIX LANCET DRUM) MISC    Use to check glucoses 3 times daily. Pt uses accu chek guide link strips to communicate with pump.    LATANOPROST 0.005 % OPHTHALMIC SOLUTION    Place 1 drop into both eyes every evening.    LOSARTAN (COZAAR) 100 MG TABLET    Take 1 tablet (100 mg total) by mouth once daily.    MICROLET 2 LANCING DEVICE KIT    Use 4x/daily to check glucose.    MICROLET LANCET MISC    use FOUR TIMES DAILY    MOMETASONE (ELOCON) 0.1 % OINTMENT    aaa bid    MOMETASONE 0.1% (ELOCON) 0.1 % CREAM    Apply topically 2 (two) times daily.    PANTOPRAZOLE (PROTONIX) 40 MG TABLET    Take 1 tablet (40 mg total) by mouth once daily.    PEN NEEDLE, DIABETIC 31 GAUGE X 5/16" NDLE  "   To use with insulin pens if needed    PIOGLITAZONE (ACTOS) 15 MG TABLET    Take 1 tablet (15 mg total) by mouth once daily.    TIMOLOL MALEATE 0.5% (TIMOPTIC) 0.5 % DROP    Place 1 drop into both eyes 2 (two) times daily.   Discontinued Medications    METOPROLOL SUCCINATE (TOPROL-XL) 50 MG 24 HR TABLET    Take 1 tablet (50 mg total) by mouth 2 (two) times daily.

## 2022-04-19 NOTE — PROGRESS NOTES
Health Maintenance Due   Topic Date Due    Shingles Vaccine (1 of 2) Never done    Influenza Vaccine (1) 09/01/2021    Mammogram  10/08/2021     Updates were requested from care everywhere.  Chart was reviewed for overdue Proactive [facility] Encounters (BARAK) topics (CRS, Breast Cancer Screening, Eye exam)  Health Maintenance has been updated.  [facility] immunization registry triggered.  Immunizations were reconciled.

## 2022-04-19 NOTE — PROGRESS NOTES
Applied triple antibiotic ointment to left heel of foot. Applied bandage to cover ointment and cut on left heel of foot.

## 2022-04-20 ENCOUNTER — OFFICE VISIT (OUTPATIENT)
Dept: PODIATRY | Facility: CLINIC | Age: 57
End: 2022-04-20
Payer: COMMERCIAL

## 2022-04-20 DIAGNOSIS — M79.672 PAIN OF LEFT HEEL: ICD-10-CM

## 2022-04-20 DIAGNOSIS — L89.621 PRESSURE ULCER OF LEFT HEEL, STAGE 1: ICD-10-CM

## 2022-04-20 DIAGNOSIS — R23.4 FISSURE IN SKIN OF FOOT: Primary | ICD-10-CM

## 2022-04-20 PROCEDURE — 1160F RVW MEDS BY RX/DR IN RCRD: CPT | Mod: CPTII,S$GLB,, | Performed by: PODIATRIST

## 2022-04-20 PROCEDURE — 1160F PR REVIEW ALL MEDS BY PRESCRIBER/CLIN PHARMACIST DOCUMENTED: ICD-10-PCS | Mod: CPTII,S$GLB,, | Performed by: PODIATRIST

## 2022-04-20 PROCEDURE — 3051F HG A1C>EQUAL 7.0%<8.0%: CPT | Mod: CPTII,S$GLB,, | Performed by: PODIATRIST

## 2022-04-20 PROCEDURE — 99999 PR PBB SHADOW E&M-EST. PATIENT-LVL IV: ICD-10-PCS | Mod: PBBFAC,,, | Performed by: PODIATRIST

## 2022-04-20 PROCEDURE — 99202 PR OFFICE/OUTPT VISIT, NEW, LEVL II, 15-29 MIN: ICD-10-PCS | Mod: S$GLB,,, | Performed by: PODIATRIST

## 2022-04-20 PROCEDURE — 1159F MED LIST DOCD IN RCRD: CPT | Mod: CPTII,S$GLB,, | Performed by: PODIATRIST

## 2022-04-20 PROCEDURE — 99999 PR PBB SHADOW E&M-EST. PATIENT-LVL IV: CPT | Mod: PBBFAC,,, | Performed by: PODIATRIST

## 2022-04-20 PROCEDURE — 1159F PR MEDICATION LIST DOCUMENTED IN MEDICAL RECORD: ICD-10-PCS | Mod: CPTII,S$GLB,, | Performed by: PODIATRIST

## 2022-04-20 PROCEDURE — 99202 OFFICE O/P NEW SF 15 MIN: CPT | Mod: S$GLB,,, | Performed by: PODIATRIST

## 2022-04-20 PROCEDURE — 3051F PR MOST RECENT HEMOGLOBIN A1C LEVEL 7.0 - < 8.0%: ICD-10-PCS | Mod: CPTII,S$GLB,, | Performed by: PODIATRIST

## 2022-04-20 RX ORDER — MUPIROCIN 20 MG/G
OINTMENT TOPICAL 2 TIMES DAILY
Qty: 30 G | Refills: 1 | Status: SHIPPED | OUTPATIENT
Start: 2022-04-20 | End: 2022-10-20

## 2022-04-21 ENCOUNTER — PATIENT OUTREACH (OUTPATIENT)
Dept: ADMINISTRATIVE | Facility: HOSPITAL | Age: 57
End: 2022-04-21
Payer: COMMERCIAL

## 2022-04-24 ENCOUNTER — TELEPHONE (OUTPATIENT)
Dept: FAMILY MEDICINE | Facility: CLINIC | Age: 57
End: 2022-04-24
Payer: COMMERCIAL

## 2022-04-24 NOTE — TELEPHONE ENCOUNTER
inform pt via phone that I reviewed the test results and note the following:    Xray of heel does not show glass fragment.  Complete course of antibiotic.

## 2022-04-26 ENCOUNTER — PATIENT OUTREACH (OUTPATIENT)
Dept: ADMINISTRATIVE | Facility: HOSPITAL | Age: 57
End: 2022-04-26
Payer: COMMERCIAL

## 2022-04-26 ENCOUNTER — PATIENT MESSAGE (OUTPATIENT)
Dept: ADMINISTRATIVE | Facility: HOSPITAL | Age: 57
End: 2022-04-26
Payer: COMMERCIAL

## 2022-04-26 NOTE — PROGRESS NOTES
Uncontrolled BP >140/90  BP Readings from Last 3 Encounters:   04/19/22 (!) 140/80   02/21/22 (!) 172/92   01/06/22 (!) 154/90           NEED REMOTE HOME BP READING DOCUMENTED   OR  BP FOLLOW UP WITH NURSE VISIT OR CARE TEAM MEMBER

## 2022-04-27 ENCOUNTER — TELEPHONE (OUTPATIENT)
Dept: FAMILY MEDICINE | Facility: CLINIC | Age: 57
End: 2022-04-27
Payer: COMMERCIAL

## 2022-04-28 ENCOUNTER — LAB VISIT (OUTPATIENT)
Dept: LAB | Facility: CLINIC | Age: 57
End: 2022-04-28
Payer: COMMERCIAL

## 2022-04-28 DIAGNOSIS — E11.42 TYPE 2 DIABETES MELLITUS WITH DIABETIC POLYNEUROPATHY, WITH LONG-TERM CURRENT USE OF INSULIN: ICD-10-CM

## 2022-04-28 DIAGNOSIS — Z79.4 TYPE 2 DIABETES MELLITUS WITH DIABETIC POLYNEUROPATHY, WITH LONG-TERM CURRENT USE OF INSULIN: ICD-10-CM

## 2022-04-28 PROCEDURE — 83036 HEMOGLOBIN GLYCOSYLATED A1C: CPT | Performed by: NURSE PRACTITIONER

## 2022-04-28 PROCEDURE — 36415 COLL VENOUS BLD VENIPUNCTURE: CPT | Mod: ,,, | Performed by: FAMILY MEDICINE

## 2022-04-28 PROCEDURE — 80053 COMPREHEN METABOLIC PANEL: CPT | Performed by: FAMILY MEDICINE

## 2022-04-28 PROCEDURE — 36415 PR COLLECTION VENOUS BLOOD,VENIPUNCTURE: ICD-10-PCS | Mod: ,,, | Performed by: FAMILY MEDICINE

## 2022-04-28 PROCEDURE — 80061 LIPID PANEL: CPT | Performed by: FAMILY MEDICINE

## 2022-04-29 LAB
ALBUMIN SERPL BCP-MCNC: 3 G/DL (ref 3.5–5.2)
ALP SERPL-CCNC: 106 U/L (ref 55–135)
ALT SERPL W/O P-5'-P-CCNC: 39 U/L (ref 10–44)
ANION GAP SERPL CALC-SCNC: 10 MMOL/L (ref 8–16)
AST SERPL-CCNC: 74 U/L (ref 10–40)
BILIRUB SERPL-MCNC: 0.5 MG/DL (ref 0.1–1)
BUN SERPL-MCNC: 14 MG/DL (ref 6–20)
CALCIUM SERPL-MCNC: 9.1 MG/DL (ref 8.7–10.5)
CHLORIDE SERPL-SCNC: 102 MMOL/L (ref 95–110)
CHOLEST SERPL-MCNC: 221 MG/DL (ref 120–199)
CHOLEST/HDLC SERPL: 4.8 {RATIO} (ref 2–5)
CO2 SERPL-SCNC: 27 MMOL/L (ref 23–29)
CREAT SERPL-MCNC: 1.2 MG/DL (ref 0.5–1.4)
EST. GFR  (AFRICAN AMERICAN): 58.4 ML/MIN/1.73 M^2
EST. GFR  (NON AFRICAN AMERICAN): 50.6 ML/MIN/1.73 M^2
ESTIMATED AVG GLUCOSE: 177 MG/DL (ref 68–131)
GLUCOSE SERPL-MCNC: 197 MG/DL (ref 70–110)
HBA1C MFR BLD: 7.8 % (ref 4–5.6)
HDLC SERPL-MCNC: 46 MG/DL (ref 40–75)
HDLC SERPL: 20.8 % (ref 20–50)
LDLC SERPL CALC-MCNC: 150.2 MG/DL (ref 63–159)
NONHDLC SERPL-MCNC: 175 MG/DL
POTASSIUM SERPL-SCNC: 4.1 MMOL/L (ref 3.5–5.1)
PROT SERPL-MCNC: 7.6 G/DL (ref 6–8.4)
SODIUM SERPL-SCNC: 139 MMOL/L (ref 136–145)
TRIGL SERPL-MCNC: 124 MG/DL (ref 30–150)

## 2022-05-02 NOTE — PROGRESS NOTES
Subjective:      Patient ID: Mine Quiros is a 56 y.o. female.    Chief Complaint: Wound Check    Mine is a 56 y.o. female who presents to the clinic for evaluation and treatment of high risk feet. Mine has a past medical history of Abnormal stress test (10/2016), Acne, Acute diastolic heart failure secondary to idiopathic cardiomyopathy (10/2016), Alcohol abuse, Allergy, Anxiety, Arthritis, Carpal tunnel syndrome of right wrist, Cataract, Chest pain, Diabetes mellitus, Diabetic neuropathy, Difficult intubation, Dry scalp, Fatty liver, GERD (gastroesophageal reflux disease), Glaucoma, Hyperlipidemia, Hypertension, Insomnia, Knee pain, Morbid obesity, Neuropathy due to type 2 diabetes mellitus, MARLON (obstructive sleep apnea), Parotid mass (2014), SOB (shortness of breath) (10/2016), and Tachycardia. The patient's chief complaint is diabetic foot ulcer, left heel. This patient has documented high risk feet requiring routine maintenance secondary to diabetes mellitis and those secondary complications of diabetes, as mentioned..    PCP: Hai Pitt MD        Current shoe gear:  Affected Foot: Casual shoes     Unaffected Foot: Casual shoes          Hemoglobin A1C   Date Value Ref Range Status   04/28/2022 7.8 (H) 4.0 - 5.6 % Final     Comment:     ADA Screening Guidelines:  5.7-6.4%  Consistent with prediabetes  >or=6.5%  Consistent with diabetes    High levels of fetal hemoglobin interfere with the HbA1C  assay. Heterozygous hemoglobin variants (HbS, HgC, etc)do  not significantly interfere with this assay.   However, presence of multiple variants may affect accuracy.     01/04/2022 7.8 (H) 4.0 - 5.6 % Final     Comment:     ADA Screening Guidelines:  5.7-6.4%  Consistent with prediabetes  >or=6.5%  Consistent with diabetes    High levels of fetal hemoglobin interfere with the HbA1C  assay. Heterozygous hemoglobin variants (HbS, HgC, etc)do  not significantly interfere with this assay.   However, presence of  multiple variants may affect accuracy.     10/07/2021 8.2 (H) 4.0 - 5.6 % Final     Comment:     ADA Screening Guidelines:  5.7-6.4%  Consistent with prediabetes  >or=6.5%  Consistent with diabetes    High levels of fetal hemoglobin interfere with the HbA1C  assay. Heterozygous hemoglobin variants (HbS, HgC, etc)do  not significantly interfere with this assay.   However, presence of multiple variants may affect accuracy.         Review of Systems   Constitutional: Negative for chills and fever.   Cardiovascular: Negative for claudication and leg swelling.   Respiratory: Negative for shortness of breath.    Skin: Negative for itching, nail changes and rash.   Musculoskeletal: Negative for muscle cramps, muscle weakness and myalgias.        Left heel pain   Gastrointestinal: Negative for nausea and vomiting.   Neurological: Negative for focal weakness, loss of balance, numbness and paresthesias.           Objective:      Physical Exam  Constitutional:       General: She is not in acute distress.     Appearance: She is well-developed. She is not diaphoretic.   Cardiovascular:      Pulses:           Dorsalis pedis pulses are 2+ on the right side and 2+ on the left side.        Posterior tibial pulses are 2+ on the right side and 2+ on the left side.      Comments: < 3 sec capillary refill time to toes 1-5 bilateral. Feet are warm to touch proximally with distal cooling b/l. There is some hair growth on the feet and toes b/l. There is no edema b/l. No spider veins or varicosities present b/l.     Musculoskeletal:      Comments: Equinus noted b/l ankles with < 10 deg DF noted. MMT 5/5 in DF/PF/Inv/Ev resistance with no reproduction of pain in any direction. Passive range of motion of ankle and pedal joints is painless b/l.     Skin:     General: Skin is warm and dry.      Coloration: Skin is not pale.      Findings: No abrasion, bruising, burn, ecchymosis, erythema, laceration, lesion, petechiae or rash.      Nails: There  is no clubbing.      Comments: Skin temperature, texture and turgor within normal limits.    Left posterior plantar heel discoloration no open skin drainage or erythema dry stable eschar   Neurological:      Mental Status: She is alert and oriented to person, place, and time.      Sensory: No sensory deficit.      Motor: No tremor, atrophy or abnormal muscle tone.      Comments: Negative tinel sign bilateral.   Psychiatric:         Behavior: Behavior normal.               Assessment:       Encounter Diagnoses   Name Primary?    Fissure in skin of foot Yes    Pain of left heel          Plan:       Mine was seen today for wound check.    Diagnoses and all orders for this visit:    Fissure in skin of foot    Pain of left heel    Other orders  -     mupirocin (BACTROBAN) 2 % ointment; Apply topically 2 (two) times daily.      I counseled the patient on her conditions, their implications and medical management.      Offload heel with prevalon boots or similar when at rest    Return PRN    Adam Duong DPM

## 2022-05-09 ENCOUNTER — PATIENT MESSAGE (OUTPATIENT)
Dept: SMOKING CESSATION | Facility: CLINIC | Age: 57
End: 2022-05-09
Payer: COMMERCIAL

## 2022-05-09 ENCOUNTER — OFFICE VISIT (OUTPATIENT)
Dept: PODIATRY | Facility: CLINIC | Age: 57
End: 2022-05-09
Payer: COMMERCIAL

## 2022-05-09 DIAGNOSIS — L84 CORN OR CALLUS: ICD-10-CM

## 2022-05-09 DIAGNOSIS — M79.672 PAIN OF LEFT HEEL: ICD-10-CM

## 2022-05-09 DIAGNOSIS — R23.4 FISSURE IN SKIN OF FOOT: Primary | ICD-10-CM

## 2022-05-09 PROCEDURE — 99999 PR PBB SHADOW E&M-EST. PATIENT-LVL II: ICD-10-PCS | Mod: PBBFAC,,, | Performed by: PODIATRIST

## 2022-05-09 PROCEDURE — 1159F PR MEDICATION LIST DOCUMENTED IN MEDICAL RECORD: ICD-10-PCS | Mod: CPTII,S$GLB,, | Performed by: PODIATRIST

## 2022-05-09 PROCEDURE — 1160F RVW MEDS BY RX/DR IN RCRD: CPT | Mod: CPTII,S$GLB,, | Performed by: PODIATRIST

## 2022-05-09 PROCEDURE — 3051F PR MOST RECENT HEMOGLOBIN A1C LEVEL 7.0 - < 8.0%: ICD-10-PCS | Mod: CPTII,S$GLB,, | Performed by: PODIATRIST

## 2022-05-09 PROCEDURE — 99213 PR OFFICE/OUTPT VISIT, EST, LEVL III, 20-29 MIN: ICD-10-PCS | Mod: S$GLB,,, | Performed by: PODIATRIST

## 2022-05-09 PROCEDURE — 1159F MED LIST DOCD IN RCRD: CPT | Mod: CPTII,S$GLB,, | Performed by: PODIATRIST

## 2022-05-09 PROCEDURE — 3051F HG A1C>EQUAL 7.0%<8.0%: CPT | Mod: CPTII,S$GLB,, | Performed by: PODIATRIST

## 2022-05-09 PROCEDURE — 1160F PR REVIEW ALL MEDS BY PRESCRIBER/CLIN PHARMACIST DOCUMENTED: ICD-10-PCS | Mod: CPTII,S$GLB,, | Performed by: PODIATRIST

## 2022-05-09 PROCEDURE — 99999 PR PBB SHADOW E&M-EST. PATIENT-LVL II: CPT | Mod: PBBFAC,,, | Performed by: PODIATRIST

## 2022-05-09 PROCEDURE — 99213 OFFICE O/P EST LOW 20 MIN: CPT | Mod: S$GLB,,, | Performed by: PODIATRIST

## 2022-05-09 RX ORDER — AMMONIUM LACTATE 12 G/100G
CREAM TOPICAL
Qty: 140 G | Refills: 11 | Status: SHIPPED | OUTPATIENT
Start: 2022-05-09 | End: 2022-10-20

## 2022-05-09 NOTE — PROGRESS NOTES
Subjective:      Patient ID: Mine Quiros is a 56 y.o. female.    Chief Complaint: Wound Check    Mine is a 56 y.o. female who presents to the clinic for evaluation and treatment of high risk feet. Mine has a past medical history of Abnormal stress test (10/2016), Acne, Acute diastolic heart failure secondary to idiopathic cardiomyopathy (10/2016), Alcohol abuse, Allergy, Anxiety, Arthritis, Carpal tunnel syndrome of right wrist, Cataract, Chest pain, Diabetes mellitus, Diabetic neuropathy, Difficult intubation, Dry scalp, Fatty liver, GERD (gastroesophageal reflux disease), Glaucoma, Hyperlipidemia, Hypertension, Insomnia, Knee pain, Morbid obesity, Neuropathy due to type 2 diabetes mellitus, MARLON (obstructive sleep apnea), Parotid mass (2014), SOB (shortness of breath) (10/2016), and Tachycardia. The patient's chief complaint is diabetic foot ulcer, left heel. This patient has documented high risk feet requiring routine maintenance secondary to diabetes mellitis and those secondary complications of diabetes, as mentioned..    5/9/22: Patient returns with pain that has returned to the left heel it felt better for a time after her last appt with the mupirocin but has returned same as before.    PCP: Hai Pitt MD        Current shoe gear:  Affected Foot: Casual shoes     Unaffected Foot: Casual shoes          Hemoglobin A1C   Date Value Ref Range Status   04/28/2022 7.8 (H) 4.0 - 5.6 % Final     Comment:     ADA Screening Guidelines:  5.7-6.4%  Consistent with prediabetes  >or=6.5%  Consistent with diabetes    High levels of fetal hemoglobin interfere with the HbA1C  assay. Heterozygous hemoglobin variants (HbS, HgC, etc)do  not significantly interfere with this assay.   However, presence of multiple variants may affect accuracy.     01/04/2022 7.8 (H) 4.0 - 5.6 % Final     Comment:     ADA Screening Guidelines:  5.7-6.4%  Consistent with prediabetes  >or=6.5%  Consistent with diabetes    High levels of  fetal hemoglobin interfere with the HbA1C  assay. Heterozygous hemoglobin variants (HbS, HgC, etc)do  not significantly interfere with this assay.   However, presence of multiple variants may affect accuracy.     10/07/2021 8.2 (H) 4.0 - 5.6 % Final     Comment:     ADA Screening Guidelines:  5.7-6.4%  Consistent with prediabetes  >or=6.5%  Consistent with diabetes    High levels of fetal hemoglobin interfere with the HbA1C  assay. Heterozygous hemoglobin variants (HbS, HgC, etc)do  not significantly interfere with this assay.   However, presence of multiple variants may affect accuracy.         Review of Systems   Constitutional: Negative for chills and fever.   Cardiovascular: Negative for claudication and leg swelling.   Respiratory: Negative for shortness of breath.    Skin: Negative for itching, nail changes and rash.   Musculoskeletal: Negative for muscle cramps, muscle weakness and myalgias.        Left heel pain   Gastrointestinal: Negative for nausea and vomiting.   Neurological: Negative for focal weakness, loss of balance, numbness and paresthesias.           Objective:      Physical Exam  Constitutional:       General: She is not in acute distress.     Appearance: She is well-developed. She is not diaphoretic.   Cardiovascular:      Pulses:           Dorsalis pedis pulses are 2+ on the right side and 2+ on the left side.        Posterior tibial pulses are 2+ on the right side and 2+ on the left side.      Comments: < 3 sec capillary refill time to toes 1-5 bilateral. Feet are warm to touch proximally with distal cooling b/l. There is some hair growth on the feet and toes b/l. There is no edema b/l. No spider veins or varicosities present b/l.     Musculoskeletal:      Comments: Equinus noted b/l ankles with < 10 deg DF noted. MMT 5/5 in DF/PF/Inv/Ev resistance with no reproduction of pain in any direction. Passive range of motion of ankle and pedal joints is painless b/l.     Skin:     General: Skin is  warm and dry.      Coloration: Skin is not pale.      Findings: No abrasion, bruising, burn, ecchymosis, erythema, laceration, lesion, petechiae or rash.      Nails: There is no clubbing.      Comments: Skin temperature, texture and turgor within normal limits.    Left posterior plantar heel hyperkeratotic skin with painful fissure but no open skin drainage or erythema.   Neurological:      Mental Status: She is alert and oriented to person, place, and time.      Sensory: No sensory deficit.      Motor: No tremor, atrophy or abnormal muscle tone.      Comments: Negative tinel sign bilateral.   Psychiatric:         Behavior: Behavior normal.               Assessment:       Encounter Diagnoses   Name Primary?    Fissure in skin of foot Yes    Pain of left heel     Corn or callus          Plan:       Mine was seen today for wound check.    Diagnoses and all orders for this visit:    Fissure in skin of foot  -     ammonium lactate 12 % Crea; Apply to the feet twice daily concentrating on areas of callus buildup    Pain of left heel  -     ammonium lactate 12 % Crea; Apply to the feet twice daily concentrating on areas of callus buildup    Corn or callus  -     ammonium lactate 12 % Crea; Apply to the feet twice daily concentrating on areas of callus buildup      I counseled the patient on her conditions, their implications and medical management.    Trimmed painful callus surrounding the fissure    She needs to use the amlactin 2 times daily to soften the skin and stop the area of fissuring    Return ALECIA Duong DPM

## 2022-05-12 ENCOUNTER — OFFICE VISIT (OUTPATIENT)
Dept: DERMATOLOGY | Facility: CLINIC | Age: 57
End: 2022-05-12
Payer: COMMERCIAL

## 2022-05-12 DIAGNOSIS — R21 RASH: Primary | ICD-10-CM

## 2022-05-12 DIAGNOSIS — L03.011 PARONYCHIA OF FINGER OF RIGHT HAND: ICD-10-CM

## 2022-05-12 PROCEDURE — 99213 PR OFFICE/OUTPT VISIT, EST, LEVL III, 20-29 MIN: ICD-10-PCS | Mod: 25,S$GLB,, | Performed by: STUDENT IN AN ORGANIZED HEALTH CARE EDUCATION/TRAINING PROGRAM

## 2022-05-12 PROCEDURE — 1160F RVW MEDS BY RX/DR IN RCRD: CPT | Mod: CPTII,S$GLB,, | Performed by: STUDENT IN AN ORGANIZED HEALTH CARE EDUCATION/TRAINING PROGRAM

## 2022-05-12 PROCEDURE — 88312 SPECIAL STAINS GROUP 1: CPT | Mod: 26,,, | Performed by: PATHOLOGY

## 2022-05-12 PROCEDURE — 99213 OFFICE O/P EST LOW 20 MIN: CPT | Mod: 25,S$GLB,, | Performed by: STUDENT IN AN ORGANIZED HEALTH CARE EDUCATION/TRAINING PROGRAM

## 2022-05-12 PROCEDURE — 1159F MED LIST DOCD IN RCRD: CPT | Mod: CPTII,S$GLB,, | Performed by: STUDENT IN AN ORGANIZED HEALTH CARE EDUCATION/TRAINING PROGRAM

## 2022-05-12 PROCEDURE — 88305 TISSUE EXAM BY PATHOLOGIST: CPT | Mod: 26,,, | Performed by: PATHOLOGY

## 2022-05-12 PROCEDURE — 99999 PR PBB SHADOW E&M-EST. PATIENT-LVL V: ICD-10-PCS | Mod: PBBFAC,,, | Performed by: STUDENT IN AN ORGANIZED HEALTH CARE EDUCATION/TRAINING PROGRAM

## 2022-05-12 PROCEDURE — 11104 PUNCH BX SKIN SINGLE LESION: CPT | Mod: S$GLB,,, | Performed by: STUDENT IN AN ORGANIZED HEALTH CARE EDUCATION/TRAINING PROGRAM

## 2022-05-12 PROCEDURE — 11104 PR PUNCH BIOPSY, SKIN, SINGLE LESION: ICD-10-PCS | Mod: S$GLB,,, | Performed by: STUDENT IN AN ORGANIZED HEALTH CARE EDUCATION/TRAINING PROGRAM

## 2022-05-12 PROCEDURE — 1159F PR MEDICATION LIST DOCUMENTED IN MEDICAL RECORD: ICD-10-PCS | Mod: CPTII,S$GLB,, | Performed by: STUDENT IN AN ORGANIZED HEALTH CARE EDUCATION/TRAINING PROGRAM

## 2022-05-12 PROCEDURE — 88312 PR  SPECIAL STAINS,GROUP I: ICD-10-PCS | Mod: 26,,, | Performed by: PATHOLOGY

## 2022-05-12 PROCEDURE — 3051F HG A1C>EQUAL 7.0%<8.0%: CPT | Mod: CPTII,S$GLB,, | Performed by: STUDENT IN AN ORGANIZED HEALTH CARE EDUCATION/TRAINING PROGRAM

## 2022-05-12 PROCEDURE — 99999 PR PBB SHADOW E&M-EST. PATIENT-LVL V: CPT | Mod: PBBFAC,,, | Performed by: STUDENT IN AN ORGANIZED HEALTH CARE EDUCATION/TRAINING PROGRAM

## 2022-05-12 PROCEDURE — 88312 SPECIAL STAINS GROUP 1: CPT | Performed by: PATHOLOGY

## 2022-05-12 PROCEDURE — 88305 TISSUE EXAM BY PATHOLOGIST: CPT | Performed by: PATHOLOGY

## 2022-05-12 PROCEDURE — 1160F PR REVIEW ALL MEDS BY PRESCRIBER/CLIN PHARMACIST DOCUMENTED: ICD-10-PCS | Mod: CPTII,S$GLB,, | Performed by: STUDENT IN AN ORGANIZED HEALTH CARE EDUCATION/TRAINING PROGRAM

## 2022-05-12 PROCEDURE — 88305 TISSUE EXAM BY PATHOLOGIST: ICD-10-PCS | Mod: 26,,, | Performed by: PATHOLOGY

## 2022-05-12 PROCEDURE — 3051F PR MOST RECENT HEMOGLOBIN A1C LEVEL 7.0 - < 8.0%: ICD-10-PCS | Mod: CPTII,S$GLB,, | Performed by: STUDENT IN AN ORGANIZED HEALTH CARE EDUCATION/TRAINING PROGRAM

## 2022-05-12 RX ORDER — GENTAMICIN SULFATE 1 MG/G
OINTMENT TOPICAL 2 TIMES DAILY
Qty: 30 G | Refills: 1 | Status: SHIPPED | OUTPATIENT
Start: 2022-05-12 | End: 2022-10-20

## 2022-05-12 NOTE — PROGRESS NOTES
Subjective:       Patient ID:  Mine Quiros is a 56 y.o. female who presents for   Chief Complaint   Patient presents with    Rash     Left leg     LOV: 10/25/21 Dr. Hankins    Patient here today for rash to left lower leg x 6 months. Dry, scaly, and itchy. Saw Dr. Hankins in the past who prescribed clobetasol 0.05% ointment under occlusion. It initially helped. However she has been out of the medication. She has been scratching frequently. Denies hx of eczema as a child. Denies other rashes.     Also complains of discoloration of multiple nails. Denies having hands in water frequently. Does not wash dishes. Denies use of artifical nails in the past few years.       Denies Phx NMSC              Review of Systems   Constitutional: Negative for fever and chills.   Respiratory: Negative for cough and shortness of breath.    Gastrointestinal: Negative for nausea and vomiting.   Skin: Positive for itching and rash.        Objective:    Physical Exam   Constitutional: She appears well-developed and well-nourished.   Neurological: She is alert and oriented to person, place, and time.   Psychiatric: She has a normal mood and affect.   Skin:   Areas Examined (abnormalities noted in diagram):   Nails and Digits Inspection Performed             Diagram Legend     Erythematous scaling macule/papule c/w actinic keratosis       Vascular papule c/w angioma      Pigmented verrucoid papule/plaque c/w seborrheic keratosis      Yellow umbilicated papule c/w sebaceous hyperplasia      Irregularly shaped tan macule c/w lentigo     1-2 mm smooth white papules consistent with Milia      Movable subcutaneous cyst with punctum c/w epidermal inclusion cyst      Subcutaneous movable cyst c/w pilar cyst      Firm pink to brown papule c/w dermatofibroma      Pedunculated fleshy papule(s) c/w skin tag(s)      Evenly pigmented macule c/w junctional nevus     Mildly variegated pigmented, slightly irregular-bordered macule c/w mildly  atypical nevus      Flesh colored to evenly pigmented papule c/w intradermal nevus       Pink pearly papule/plaque c/w basal cell carcinoma      Erythematous hyperkeratotic cursted plaque c/w SCC      Surgical scar with no sign of skin cancer recurrence      Open and closed comedones      Inflammatory papules and pustules      Verrucoid papule consistent consistent with wart     Erythematous eczematous patches and plaques     Dystrophic onycholytic nail with subungual debris c/w onychomycosis     Umbilicated papule    Erythematous-base heme-crusted tan verrucoid plaque consistent with inflamed seborrheic keratosis     Erythematous Silvery Scaling Plaque c/w Psoriasis     See annotation      Assessment / Plan:      Pathology Orders:     Normal Orders This Visit    Specimen to Pathology, Dermatology     Comments:    Number of Specimens:->1  ------------------------->-------------------------  Spec 1 Procedure:->Biopsy  Spec 1 Clinical Impression:->LSC/ eczema vs. LP  Spec 1 Source:->left shin    Questions:    Procedure Type: Dermatology and skin neoplasms    Number of Specimens: 1    ------------------------: -------------------------    Spec 1 Procedure: Biopsy    Spec 1 Clinical Impression: LSC/ eczema vs. LP    Spec 1 Source: left shin    Release to patient:         Rash  -     Specimen to Pathology, Dermatology  Punch biopsy procedure note:  Punch biopsy performed after verbal consent obtained. Area marked and prepped with alcohol. Approximately 1cc of 2% lidocaine with epinephrine injected. 3 mm disposable punch used to remove lesion. Hemostasis obtained and biopsy site closed with 1 - 2 Prolene sutures. Wound care instructions reviewed with patient and handout given.  Start clobetasol ointment under occlusion nightly avoiding biopsy site  Must stop picking and scratching  Dx: follicular eczema/ LSC vs. LP vs other    Paronychia of finger of right hand  -     gentamicin (GARAMYCIN) 0.1 % ointment; Apply topically  2 (two) times daily.  Dispense: 30 g; Refill: 1  - possible pseudomonas   - vinegar soaks with gentamicin BID until f/u           No follow-ups on file.

## 2022-05-12 NOTE — PATIENT INSTRUCTIONS
Punch Biopsy Wound Care    Your doctor has performed a punch biopsy today.  A band aid and antibiotic ointment has been placed over the site.  This should remain in place for 24 hours.  It is recommended that you keep the area dry for the first 24 hours.  After 24 hours, you may remove the band aid and wash the area with warm soap and water and apply Vaseline jelly.  Many patients prefer to use Neosporin or Bacitracin ointment.  This is acceptable; however know that you can develop an allergy to this medication even if you have used it safely for years.  It is important to keep the area moist.  Letting it dry out and get air slows healing time, will worsen the scar, and make it more difficult to remove the stitches if they were placed.  Band aid is optional after first 24 hours.      If you notice increasing redness, tenderness, pain, or yellow drainage at the biopsy or surgical site, please notify your doctor.  These are signs of an infection.    If your biopsy/surgical site is bleeding, apply firm pressure for 15 minutes straight.  Repeat for another 15 minutes, if it is still bleeding.   If the surgical site continues to bleed, then please contact your doctor.      [address] [place] [address]/ [phone] FAX/ [email or web address]

## 2022-05-13 ENCOUNTER — OFFICE VISIT (OUTPATIENT)
Dept: ENDOCRINOLOGY | Facility: CLINIC | Age: 57
End: 2022-05-13
Payer: COMMERCIAL

## 2022-05-13 VITALS
HEIGHT: 56 IN | WEIGHT: 222.69 LBS | DIASTOLIC BLOOD PRESSURE: 82 MMHG | SYSTOLIC BLOOD PRESSURE: 148 MMHG | HEART RATE: 110 BPM | BODY MASS INDEX: 50.09 KG/M2

## 2022-05-13 DIAGNOSIS — Z79.4 TYPE 2 DIABETES MELLITUS WITH DIABETIC POLYNEUROPATHY, WITH LONG-TERM CURRENT USE OF INSULIN: Primary | ICD-10-CM

## 2022-05-13 DIAGNOSIS — E78.5 HYPERLIPIDEMIA, UNSPECIFIED HYPERLIPIDEMIA TYPE: ICD-10-CM

## 2022-05-13 DIAGNOSIS — G62.9 PERIPHERAL POLYNEUROPATHY: ICD-10-CM

## 2022-05-13 DIAGNOSIS — E66.01 MORBID OBESITY WITH BMI OF 45.0-49.9, ADULT: ICD-10-CM

## 2022-05-13 DIAGNOSIS — E66.01 MORBID OBESITY WITH BMI OF 50.0-59.9, ADULT: ICD-10-CM

## 2022-05-13 DIAGNOSIS — E11.42 TYPE 2 DIABETES MELLITUS WITH DIABETIC POLYNEUROPATHY, WITH LONG-TERM CURRENT USE OF INSULIN: Primary | ICD-10-CM

## 2022-05-13 DIAGNOSIS — I10 PRIMARY HYPERTENSION: ICD-10-CM

## 2022-05-13 DIAGNOSIS — Z46.81 FITTING OR ADJUSTMENT OF INSULIN PUMP: ICD-10-CM

## 2022-05-13 DIAGNOSIS — Z96.41 INSULIN PUMP STATUS: Chronic | ICD-10-CM

## 2022-05-13 DIAGNOSIS — E55.9 VITAMIN D DEFICIENCY DISEASE: ICD-10-CM

## 2022-05-13 PROCEDURE — 95251 PR GLUCOSE MONITOR, 72 HOUR, PHYS INTERP: ICD-10-PCS | Mod: S$GLB,,, | Performed by: NURSE PRACTITIONER

## 2022-05-13 PROCEDURE — 3079F DIAST BP 80-89 MM HG: CPT | Mod: CPTII,S$GLB,, | Performed by: NURSE PRACTITIONER

## 2022-05-13 PROCEDURE — 99214 OFFICE O/P EST MOD 30 MIN: CPT | Mod: 25,S$GLB,, | Performed by: NURSE PRACTITIONER

## 2022-05-13 PROCEDURE — 3008F PR BODY MASS INDEX (BMI) DOCUMENTED: ICD-10-PCS | Mod: CPTII,S$GLB,, | Performed by: NURSE PRACTITIONER

## 2022-05-13 PROCEDURE — 3051F HG A1C>EQUAL 7.0%<8.0%: CPT | Mod: CPTII,S$GLB,, | Performed by: NURSE PRACTITIONER

## 2022-05-13 PROCEDURE — 1160F PR REVIEW ALL MEDS BY PRESCRIBER/CLIN PHARMACIST DOCUMENTED: ICD-10-PCS | Mod: CPTII,S$GLB,, | Performed by: NURSE PRACTITIONER

## 2022-05-13 PROCEDURE — 1160F RVW MEDS BY RX/DR IN RCRD: CPT | Mod: CPTII,S$GLB,, | Performed by: NURSE PRACTITIONER

## 2022-05-13 PROCEDURE — 95251 CONT GLUC MNTR ANALYSIS I&R: CPT | Mod: S$GLB,,, | Performed by: NURSE PRACTITIONER

## 2022-05-13 PROCEDURE — 99214 PR OFFICE/OUTPT VISIT, EST, LEVL IV, 30-39 MIN: ICD-10-PCS | Mod: 25,S$GLB,, | Performed by: NURSE PRACTITIONER

## 2022-05-13 PROCEDURE — 3077F PR MOST RECENT SYSTOLIC BLOOD PRESSURE >= 140 MM HG: ICD-10-PCS | Mod: CPTII,S$GLB,, | Performed by: NURSE PRACTITIONER

## 2022-05-13 PROCEDURE — 3051F PR MOST RECENT HEMOGLOBIN A1C LEVEL 7.0 - < 8.0%: ICD-10-PCS | Mod: CPTII,S$GLB,, | Performed by: NURSE PRACTITIONER

## 2022-05-13 PROCEDURE — 99999 PR PBB SHADOW E&M-EST. PATIENT-LVL III: CPT | Mod: PBBFAC,,, | Performed by: NURSE PRACTITIONER

## 2022-05-13 PROCEDURE — 3079F PR MOST RECENT DIASTOLIC BLOOD PRESSURE 80-89 MM HG: ICD-10-PCS | Mod: CPTII,S$GLB,, | Performed by: NURSE PRACTITIONER

## 2022-05-13 PROCEDURE — 3008F BODY MASS INDEX DOCD: CPT | Mod: CPTII,S$GLB,, | Performed by: NURSE PRACTITIONER

## 2022-05-13 PROCEDURE — 1159F PR MEDICATION LIST DOCUMENTED IN MEDICAL RECORD: ICD-10-PCS | Mod: CPTII,S$GLB,, | Performed by: NURSE PRACTITIONER

## 2022-05-13 PROCEDURE — 99999 PR PBB SHADOW E&M-EST. PATIENT-LVL III: ICD-10-PCS | Mod: PBBFAC,,, | Performed by: NURSE PRACTITIONER

## 2022-05-13 PROCEDURE — 1159F MED LIST DOCD IN RCRD: CPT | Mod: CPTII,S$GLB,, | Performed by: NURSE PRACTITIONER

## 2022-05-13 PROCEDURE — 3077F SYST BP >= 140 MM HG: CPT | Mod: CPTII,S$GLB,, | Performed by: NURSE PRACTITIONER

## 2022-05-13 RX ORDER — ERGOCALCIFEROL 1.25 MG/1
50000 CAPSULE ORAL
Qty: 12 CAPSULE | Refills: 1 | Status: SHIPPED | OUTPATIENT
Start: 2022-05-13 | End: 2022-11-21

## 2022-05-13 RX ORDER — LANCETS
EACH MISCELLANEOUS
Qty: 100 EACH | Status: SHIPPED | OUTPATIENT
Start: 2022-05-13 | End: 2023-08-18

## 2022-05-13 RX ORDER — INSULIN PUMP SYRINGE, 3 ML
EACH MISCELLANEOUS
Qty: 1 EACH | Refills: 0 | Status: SHIPPED | OUTPATIENT
Start: 2022-05-13 | End: 2023-09-27

## 2022-05-13 NOTE — PROGRESS NOTES
ubjective:       Patient ID: Mine Quiros is a 56 y.o. female.    Chief Complaint: routine DM f/u     HPI   Pt is a 56 y.o. AAF with a long hx of very uncontrolled Type 2 DM-- as well as chronic conditions pending review including HTN, peripheral neuropathy, diastolic dysfunciton, morbid obesity- now on insulin pump.She has had multiple difficulties being able to navigate pump and having freq hypoglycemia. Medically disbabled she states due to neuropathy.     Interim Events: NO acute events.  C/o pump waking her up with alarms every night. Pump does need to be calibrated  q 12, but since the patient sleeps sometimes 12 hrs it wakes her up.  Howver, DM has never been better controlled, and with out hypoglycemia.  Sensor downloaded and reviewed. This is unchanged    Sensor Interpretation   Dates of review: 4/30-5/13/2022    Estimated A1C;7.1%    Percent of sensor utilization during review period:83%     na% Very high  26% High  74% Time in range  0% Low  0% Very Low    Time in range parameters:   mg/d    Prominent Theme/Finding:  No marked hyperglycemia. NO hypoglycemia noted.  Overall, most glucoses in goal.                   Review of Systems   Constitutional: Positive for fatigue. Negative for activity change.   HENT: Negative for hearing loss and trouble swallowing.    Eyes: Negative for photophobia and visual disturbance.        Last Eye Exam: June 2021   Respiratory: Positive for shortness of breath (states r/t deconditioning. ). Negative for cough.    Cardiovascular: Negative for chest pain and palpitations.   Gastrointestinal: Negative for constipation and diarrhea.   Endocrine: Negative for polydipsia and polyuria.   Genitourinary: Negative for frequency and urgency.   Musculoskeletal: Positive for back pain. Negative for arthralgias and myalgias.   Skin: Negative for rash and wound.   Allergic/Immunologic: Negative for food allergies.   Neurological: Positive for numbness (hands and feet. ).  Negative for weakness.   Psychiatric/Behavioral: Negative for sleep disturbance. The patient is not nervous/anxious.         Sleeps well with gabapentin.         Objective:      Physical Exam  Constitutional:       Appearance: Normal appearance. She is well-developed. She is obese.   HENT:      Head: Normocephalic and atraumatic.      Nose: Nose normal.   Eyes:      Extraocular Movements: Extraocular movements intact.      Conjunctiva/sclera: Conjunctivae normal.      Pupils: Pupils are equal, round, and reactive to light.   Neck:      Vascular: No carotid bruit.   Cardiovascular:      Rate and Rhythm: Normal rate and regular rhythm.      Pulses: Normal pulses.      Heart sounds: Normal heart sounds.   Pulmonary:      Effort: Pulmonary effort is normal.      Breath sounds: Normal breath sounds.   Musculoskeletal:         General: Normal range of motion.      Cervical back: Normal range of motion and neck supple.      Comments: Feet: no open wounds or calluses.  Good pedal care Pedal pulses +2 bilaterally.   Vibratory sensation slightly decreased bilaterally.     Bandage to L shin--rash with bx from derm   Bandage to L heel--recent calluse debridement from pod    Lymphadenopathy:      Cervical: No cervical adenopathy.   Skin:     General: Skin is warm and dry.      Comments: Acanthosis nigricans    lipohypertrophy at infusion sites--reinforced proper site rotation    Neurological:      General: No focal deficit present.      Mental Status: She is alert and oriented to person, place, and time.   Psychiatric:         Mood and Affect: Mood normal.         Behavior: Behavior normal.         Thought Content: Thought content normal.         Judgment: Judgment normal.         Hemoglobin A1C   Date Value Ref Range Status   04/28/2022 7.8 (H) 4.0 - 5.6 % Final     Comment:     ADA Screening Guidelines:  5.7-6.4%  Consistent with prediabetes  >or=6.5%  Consistent with diabetes    High levels of fetal hemoglobin interfere with  the HbA1C  assay. Heterozygous hemoglobin variants (HbS, HgC, etc)do  not significantly interfere with this assay.   However, presence of multiple variants may affect accuracy.     01/04/2022 7.8 (H) 4.0 - 5.6 % Final     Comment:     ADA Screening Guidelines:  5.7-6.4%  Consistent with prediabetes  >or=6.5%  Consistent with diabetes    High levels of fetal hemoglobin interfere with the HbA1C  assay. Heterozygous hemoglobin variants (HbS, HgC, etc)do  not significantly interfere with this assay.   However, presence of multiple variants may affect accuracy.     10/07/2021 8.2 (H) 4.0 - 5.6 % Final     Comment:     ADA Screening Guidelines:  5.7-6.4%  Consistent with prediabetes  >or=6.5%  Consistent with diabetes    High levels of fetal hemoglobin interfere with the HbA1C  assay. Heterozygous hemoglobin variants (HbS, HgC, etc)do  not significantly interfere with this assay.   However, presence of multiple variants may affect accuracy.         Chemistry        Component Value Date/Time     04/28/2022 1011    K 4.1 04/28/2022 1011     04/28/2022 1011    CO2 27 04/28/2022 1011    BUN 14 04/28/2022 1011    CREATININE 1.2 04/28/2022 1011     (H) 04/28/2022 1011        Component Value Date/Time    CALCIUM 9.1 04/28/2022 1011    ALKPHOS 106 04/28/2022 1011    AST 74 (H) 04/28/2022 1011    ALT 39 04/28/2022 1011    BILITOT 0.5 04/28/2022 1011    ESTGFRAFRICA 58.4 (A) 04/28/2022 1011    EGFRNONAA 50.6 (A) 04/28/2022 1011        Lab Results   Component Value Date    LDLCALC 150.2 04/28/2022     Lab Results   Component Value Date    TSH 2.269 08/07/2020           Assessment:       1. Type 2 diabetes mellitus with diabetic polyneuropathy, with long-term current use of insulin  blood-glucose meter kit    lancets Misc    blood sugar diagnostic Strp    Hemoglobin A1C   2. Fitting or adjustment of insulin pump     3. Hyperlipidemia, unspecified hyperlipidemia type     4. Primary hypertension     5. Peripheral  polyneuropathy     6. Insulin pump status     7. Morbid obesity with BMI of 45.0-49.9, adult     8. Morbid obesity with BMI of 50.0-59.9, adult         Plan:     Pt not taking statin--states pill too large to swallow.     Encourage, diet,exercise, ETOH cessation, minimization.   .   Pt needs to f/u with Dr. Pitt.                          ORDERS 05/13/2022   4   mo with    A1c, prior

## 2022-05-25 LAB
FINAL PATHOLOGIC DIAGNOSIS: NORMAL
GROSS: NORMAL
Lab: NORMAL
MICROSCOPIC EXAM: NORMAL

## 2022-05-26 ENCOUNTER — OFFICE VISIT (OUTPATIENT)
Dept: DERMATOLOGY | Facility: CLINIC | Age: 57
End: 2022-05-26
Payer: COMMERCIAL

## 2022-05-26 VITALS — HEIGHT: 56 IN | WEIGHT: 222 LBS | BODY MASS INDEX: 49.94 KG/M2

## 2022-05-26 DIAGNOSIS — R22.9 SUBCUTANEOUS NODULE: ICD-10-CM

## 2022-05-26 DIAGNOSIS — L85.1 KERATODERMA CLIMACTERICUM: ICD-10-CM

## 2022-05-26 DIAGNOSIS — L20.84 INTRINSIC ECZEMA: Primary | ICD-10-CM

## 2022-05-26 PROCEDURE — 99999 PR PBB SHADOW E&M-EST. PATIENT-LVL III: ICD-10-PCS | Mod: PBBFAC,,, | Performed by: STUDENT IN AN ORGANIZED HEALTH CARE EDUCATION/TRAINING PROGRAM

## 2022-05-26 PROCEDURE — 99213 OFFICE O/P EST LOW 20 MIN: CPT | Mod: S$GLB,,, | Performed by: STUDENT IN AN ORGANIZED HEALTH CARE EDUCATION/TRAINING PROGRAM

## 2022-05-26 PROCEDURE — 1159F MED LIST DOCD IN RCRD: CPT | Mod: CPTII,S$GLB,, | Performed by: STUDENT IN AN ORGANIZED HEALTH CARE EDUCATION/TRAINING PROGRAM

## 2022-05-26 PROCEDURE — 1160F RVW MEDS BY RX/DR IN RCRD: CPT | Mod: CPTII,S$GLB,, | Performed by: STUDENT IN AN ORGANIZED HEALTH CARE EDUCATION/TRAINING PROGRAM

## 2022-05-26 PROCEDURE — 1159F PR MEDICATION LIST DOCUMENTED IN MEDICAL RECORD: ICD-10-PCS | Mod: CPTII,S$GLB,, | Performed by: STUDENT IN AN ORGANIZED HEALTH CARE EDUCATION/TRAINING PROGRAM

## 2022-05-26 PROCEDURE — 3008F PR BODY MASS INDEX (BMI) DOCUMENTED: ICD-10-PCS | Mod: CPTII,S$GLB,, | Performed by: STUDENT IN AN ORGANIZED HEALTH CARE EDUCATION/TRAINING PROGRAM

## 2022-05-26 PROCEDURE — 3051F HG A1C>EQUAL 7.0%<8.0%: CPT | Mod: CPTII,S$GLB,, | Performed by: STUDENT IN AN ORGANIZED HEALTH CARE EDUCATION/TRAINING PROGRAM

## 2022-05-26 PROCEDURE — 99999 PR PBB SHADOW E&M-EST. PATIENT-LVL III: CPT | Mod: PBBFAC,,, | Performed by: STUDENT IN AN ORGANIZED HEALTH CARE EDUCATION/TRAINING PROGRAM

## 2022-05-26 PROCEDURE — 3008F BODY MASS INDEX DOCD: CPT | Mod: CPTII,S$GLB,, | Performed by: STUDENT IN AN ORGANIZED HEALTH CARE EDUCATION/TRAINING PROGRAM

## 2022-05-26 PROCEDURE — 99213 PR OFFICE/OUTPT VISIT, EST, LEVL III, 20-29 MIN: ICD-10-PCS | Mod: S$GLB,,, | Performed by: STUDENT IN AN ORGANIZED HEALTH CARE EDUCATION/TRAINING PROGRAM

## 2022-05-26 PROCEDURE — 1160F PR REVIEW ALL MEDS BY PRESCRIBER/CLIN PHARMACIST DOCUMENTED: ICD-10-PCS | Mod: CPTII,S$GLB,, | Performed by: STUDENT IN AN ORGANIZED HEALTH CARE EDUCATION/TRAINING PROGRAM

## 2022-05-26 PROCEDURE — 3051F PR MOST RECENT HEMOGLOBIN A1C LEVEL 7.0 - < 8.0%: ICD-10-PCS | Mod: CPTII,S$GLB,, | Performed by: STUDENT IN AN ORGANIZED HEALTH CARE EDUCATION/TRAINING PROGRAM

## 2022-05-26 RX ORDER — DOXYCYCLINE 100 MG/1
100 CAPSULE ORAL 2 TIMES DAILY
Qty: 14 CAPSULE | Refills: 0 | Status: SHIPPED | OUTPATIENT
Start: 2022-05-26 | End: 2022-10-20

## 2022-05-26 RX ORDER — CLOBETASOL PROPIONATE 0.5 MG/G
CREAM TOPICAL 2 TIMES DAILY
Qty: 60 G | Refills: 1 | Status: SHIPPED | OUTPATIENT
Start: 2022-05-26 | End: 2022-10-20

## 2022-05-26 NOTE — PATIENT INSTRUCTIONS
- clobetasol cream twice daily with cerave moisturizing cream or vanicream moisturizing cream

## 2022-05-26 NOTE — PROGRESS NOTES
Subjective:       Patient ID:  Mine Quiros is a 56 y.o. female who presents for   Chief Complaint   Patient presents with    Rash     LOV- 5/12/22- rash, paronychia of finger on right hand    Here today for suture removal and Bx results  Follow up on rash    Final Pathologic Diagnosis Skin, left shin, punch biopsy:   -CHRONIC SPONGIOTIC DERMATITIS WITH SCATTERED EOSINOPHILS, see comment   COMMENT:  Clinical images reviewed in epic and differential diagnosis noted.   The histological findings (see microscopic description) supports the clinical   impression of a chronic eczematous process.   Comment: Interp By Angelo Ramirez M.D., Signed on 05/25/2022 at 11:29        Current Outpatient Medications:     albuterol (PROVENTIL/VENTOLIN HFA) 90 mcg/actuation inhaler, INHALE 1 PUFF BY MOUTH EVERY 4 HOURS AS NEEDED for SHORTNESS OF BREATH, Disp: , Rfl:     amLODIPine (NORVASC) 10 MG tablet, Take 1 tablet (10 mg total) by mouth once daily., Disp: 90 tablet, Rfl: 3    amLODIPine (NORVASC) 10 MG tablet, Take 10 mg by mouth., Disp: , Rfl:     ammonium lactate 12 % Crea, Apply to the feet twice daily concentrating on areas of callus buildup, Disp: 140 g, Rfl: 11    blood sugar diagnostic Strp, To check BG 3 times daily, to use with insurance preferred meter, Disp: 100 strip, Rfl: 12    blood-glucose meter kit, To check BG 3 times daily, to use with insurance preferred meter, Disp: 1 each, Rfl: 0    busPIRone (BUSPAR) 10 MG tablet, Take 1 tablet (10 mg total) by mouth 3 (three) times daily., Disp: 90 tablet, Rfl: 11    chlorthalidone (HYGROTEN) 25 MG Tab, Take 1 tablet (25 mg total) by mouth once daily., Disp: 90 tablet, Rfl: 3    citalopram (CELEXA) 40 MG tablet, Take 1 tablet (40 mg total) by mouth once daily., Disp: 90 tablet, Rfl: 3    clobetasoL (TEMOVATE) 0.05 % external solution, Use on scalp one - two times daily as needed for scaling or itching, Disp: 60 mL, Rfl: 3    diphenhydrAMINE (BENADRYL) 25 mg  "capsule, Take 25 mg by mouth every evening. USE PM BEFORE SURGERY, Disp: , Rfl:     doxycycline (MONODOX) 100 MG capsule, Take 1 capsule (100 mg total) by mouth 2 (two) times daily., Disp: 20 capsule, Rfl: 0    ergocalciferol (ERGOCALCIFEROL) 50,000 unit Cap, Take 1 capsule (50,000 Units total) by mouth every 7 days., Disp: 12 capsule, Rfl: 1    fluticasone propionate (FLONASE) 50 mcg/actuation nasal spray, 1 spray by Nasal route., Disp: , Rfl:     gabapentin (NEURONTIN) 300 MG capsule, TAKE 1 CAPSULE BY MOUTH BY MOUTH THREE TIMES DAILY, Disp: 90 capsule, Rfl: 3    gentamicin (GARAMYCIN) 0.1 % ointment, Apply topically 2 (two) times daily., Disp: 30 g, Rfl: 1    hydrocortisone 2.5 % cream, Apply topically 2 (two) times daily., Disp: 28 g, Rfl: 1    insulin lispro (HUMALOG U-100 INSULIN) 100 unit/mL injection, USE AS DIRECTED via insulin pump approx 150 UNITS EVERY DAY., Disp: 10 mL, Rfl: 12    ketoconazole (NIZORAL) 2 % shampoo, Apply topically once a week., Disp: 120 mL, Rfl: 11    lancets [facility], To check BG 3 times daily, to use with insurance preferred meter, Disp: 100 each, Rfl: prn    losartan (COZAAR) 100 MG tablet, Take 1 tablet (100 mg total) by mouth once daily., Disp: 90 tablet, Rfl: 3    metoprolol succinate (TOPROL-XL) 100 MG 24 hr tablet, Take 1 tablet (100 mg total) by mouth once daily., Disp: 90 tablet, Rfl: 3    MICROLET 2 LANCING DEVICE Kit, Use 4x/daily to check glucose., Disp: 1 each, Rfl: 2    mometasone (ELOCON) 0.1 % ointment, aaa bid, Disp: 45 g, Rfl: 2    mupirocin (BACTROBAN) 2 % ointment, Apply topically 2 (two) times daily., Disp: 30 g, Rfl: 1    pantoprazole (PROTONIX) 40 MG tablet, Take 1 tablet (40 mg total) by mouth once daily., Disp: 30 tablet, Rfl: 11    pen needle, diabetic 31 gauge x 5/16" Ndle, To use with insulin pens if needed, Disp: 100 each, Rfl: 12    pioglitazone (ACTOS) 15 MG tablet, Take 1 tablet (15 mg total) by mouth once daily. (Patient taking " differently: Take 15 mg by mouth once daily. DO NOT TAKE AM OF SURGERY), Disp: 90 tablet, Rfl: 3    latanoprost 0.005 % ophthalmic solution, Place 1 drop into both eyes every evening., Disp: 2.5 mL, Rfl: 12    timolol maleate 0.5% (TIMOPTIC) 0.5 % Drop, Place 1 drop into both eyes 2 (two) times daily., Disp: 10 mL, Rfl: 6        Review of Systems   Constitutional: Negative for fever and chills.   Respiratory: Negative for cough and shortness of breath.    Gastrointestinal: Negative for nausea and vomiting.   Skin: Positive for itching and rash.        Objective:    Physical Exam   Constitutional: She appears well-developed and well-nourished.   Neurological: She is alert and oriented to person, place, and time.   Psychiatric: She has a normal mood and affect.   Skin:   Areas Examined (abnormalities noted in diagram):   RUE Inspected  LUE Inspection Performed  RLE Inspected  LLE Inspection Performed  Nails and Digits Inspection Performed                  Diagram Legend     Erythematous scaling macule/papule c/w actinic keratosis       Vascular papule c/w angioma      Pigmented verrucoid papule/plaque c/w seborrheic keratosis      Yellow umbilicated papule c/w sebaceous hyperplasia      Irregularly shaped tan macule c/w lentigo     1-2 mm smooth white papules consistent with Milia      Movable subcutaneous cyst with punctum c/w epidermal inclusion cyst      Subcutaneous movable cyst c/w pilar cyst      Firm pink to brown papule c/w dermatofibroma      Pedunculated fleshy papule(s) c/w skin tag(s)      Evenly pigmented macule c/w junctional nevus     Mildly variegated pigmented, slightly irregular-bordered macule c/w mildly atypical nevus      Flesh colored to evenly pigmented papule c/w intradermal nevus       Pink pearly papule/plaque c/w basal cell carcinoma      Erythematous hyperkeratotic cursted plaque c/w SCC      Surgical scar with no sign of skin cancer recurrence      Open and closed comedones       Inflammatory papules and pustules      Verrucoid papule consistent consistent with wart     Erythematous eczematous patches and plaques     Dystrophic onycholytic nail with subungual debris c/w onychomycosis     Umbilicated papule    Erythematous-base heme-crusted tan verrucoid plaque consistent with inflamed seborrheic keratosis     Erythematous Silvery Scaling Plaque c/w Psoriasis     See annotation      Assessment / Plan:        Intrinsic eczema  -     clobetasoL (TEMOVATE) 0.05 % cream; Apply topically 2 (two) times daily.  Dispense: 60 g; Refill: 1  - left lower leg  - clobetasol BID x 2-3 weeks with vanicream moisturizer on top, avoid rubbing/ scratching    Subcutaneous nodule  -     doxycycline (MONODOX) 100 MG capsule; Take 1 capsule (100 mg total) by mouth 2 (two) times daily.  Dispense: 14 capsule; Refill: 0  Discussed benefits and risks of doxycyline therapy including but not limited to GI discomfort, esophageal irritation/ulceration, and increased sun sensitivity. Patient was counseled to take medicine with meals and at least 1 hour before lying down.   Recommend f/u with PCP in 2 weeks if not improving    Keratoderma climactericum  - recommend urea 40% cream nightly with cotton socks  - pumice stone             No follow-ups on file.

## 2022-07-05 ENCOUNTER — PATIENT MESSAGE (OUTPATIENT)
Dept: ADMINISTRATIVE | Facility: HOSPITAL | Age: 57
End: 2022-07-05
Payer: COMMERCIAL

## 2022-07-05 ENCOUNTER — PATIENT OUTREACH (OUTPATIENT)
Dept: ADMINISTRATIVE | Facility: HOSPITAL | Age: 57
End: 2022-07-05
Payer: COMMERCIAL

## 2022-07-05 NOTE — LETTER
July 12, 2022    Mine Quiros  [address]  [place] [address]             [facility]  [address]  [place] [address]  Phone: [phone] Dear Mine,     We have tried to reach you by [facility] email unsuccessfully.    [facility] is committed to your overall health.  To help you get the most out of each of your visits, we will review your information to make sure you are up to date on all of your recommended tests and/or procedures.       Hai Pitt MD  has found that your chart shows you may be due for the following:         Health Maintenance Due   Topic    Shingles Vaccine (1 of 2)    Pneumococcal Vaccines (Age 0-64) (2 - PCV)    COVID-19 Vaccine (4 - Booster for Moderna series)    Eye Exam          If you have had any of the above done at another facility, please bring the records with you or fax them to [phone] so that your record at [facility] will be complete. If you have not had any of these tests or procedures done recently and would like to complete this testing ,  please call [phone] or send a message through your [facility] portal to your provider's office.      If you have an upcoming scheduled appointment for the above test and/or procedures, please disregard this letter.     Sincerely,     Hai Pitt MD and your [facility] Team

## 2022-07-05 NOTE — PROGRESS NOTES
2022 Care Everywhere updates requested and reviewed.  Immunizations reconciled. Media reports reviewed.  Duplicate HM overrides and  orders removed.  Overdue HM topic chart audit and/or requested.  Overdue lab testing linked to upcoming lab appointments if applies.    DM LABS SCHEDULED  Uncontrolled BP REPORT  BP Readings from Last 3 Encounters:   22 (!) 148/82   22 (!) 140/80   22 (!) 172/92       Health Maintenance Due   Topic Date Due    Shingles Vaccine (1 of 2) Never done    Pneumococcal Vaccines (Age 0-64) (2 - PCV) 2018    COVID-19 Vaccine (4 - Booster for Moderna series) 2022    Eye Exam  2022

## 2022-07-19 ENCOUNTER — TELEPHONE (OUTPATIENT)
Dept: ORTHOPEDICS | Facility: CLINIC | Age: 57
End: 2022-07-19
Payer: COMMERCIAL

## 2022-07-19 ENCOUNTER — LAB VISIT (OUTPATIENT)
Dept: LAB | Facility: HOSPITAL | Age: 57
End: 2022-07-19
Attending: FAMILY MEDICINE
Payer: COMMERCIAL

## 2022-07-19 ENCOUNTER — OFFICE VISIT (OUTPATIENT)
Dept: FAMILY MEDICINE | Facility: CLINIC | Age: 57
End: 2022-07-19
Payer: COMMERCIAL

## 2022-07-19 ENCOUNTER — HOSPITAL ENCOUNTER (OUTPATIENT)
Dept: RADIOLOGY | Facility: HOSPITAL | Age: 57
Discharge: HOME | End: 2022-07-19
Attending: FAMILY MEDICINE
Payer: COMMERCIAL

## 2022-07-19 VITALS
HEART RATE: 100 BPM | OXYGEN SATURATION: 96 % | BODY MASS INDEX: 48.73 KG/M2 | DIASTOLIC BLOOD PRESSURE: 74 MMHG | SYSTOLIC BLOOD PRESSURE: 138 MMHG | WEIGHT: 217.38 LBS

## 2022-07-19 DIAGNOSIS — M67.441 GANGLION CYST OF FINGER OF RIGHT HAND: ICD-10-CM

## 2022-07-19 DIAGNOSIS — E66.01 MORBID OBESITY: ICD-10-CM

## 2022-07-19 DIAGNOSIS — N18.30 STAGE 3 CHRONIC KIDNEY DISEASE, UNSPECIFIED WHETHER STAGE 3A OR 3B CKD: ICD-10-CM

## 2022-07-19 DIAGNOSIS — F10.10 ETOH ABUSE: ICD-10-CM

## 2022-07-19 DIAGNOSIS — R06.02 SOB (SHORTNESS OF BREATH) ON EXERTION: Primary | ICD-10-CM

## 2022-07-19 DIAGNOSIS — R06.02 SOB (SHORTNESS OF BREATH) ON EXERTION: ICD-10-CM

## 2022-07-19 DIAGNOSIS — S90.812D: ICD-10-CM

## 2022-07-19 DIAGNOSIS — F41.9 ANXIETY: ICD-10-CM

## 2022-07-19 LAB
BASOPHILS # BLD AUTO: 0.05 K/UL (ref 0–0.2)
BASOPHILS NFR BLD: 0.5 % (ref 0–1.9)
DIFFERENTIAL METHOD: ABNORMAL
EOSINOPHIL # BLD AUTO: 0.2 K/UL (ref 0–0.5)
EOSINOPHIL NFR BLD: 2 % (ref 0–8)
ERYTHROCYTE [DISTWIDTH] IN BLOOD BY AUTOMATED COUNT: 14.2 % (ref 11.5–14.5)
HCT VFR BLD AUTO: 47.9 % (ref 37–48.5)
HGB BLD-MCNC: 15.1 G/DL (ref 12–16)
IMM GRANULOCYTES # BLD AUTO: 0.03 K/UL (ref 0–0.04)
IMM GRANULOCYTES NFR BLD AUTO: 0.3 % (ref 0–0.5)
LYMPHOCYTES # BLD AUTO: 3.1 K/UL (ref 1–4.8)
LYMPHOCYTES NFR BLD: 28.4 % (ref 18–48)
MCH RBC QN AUTO: 31.2 PG (ref 27–31)
MCHC RBC AUTO-ENTMCNC: 31.5 G/DL (ref 32–36)
MCV RBC AUTO: 99 FL (ref 82–98)
MONOCYTES # BLD AUTO: 0.6 K/UL (ref 0.3–1)
MONOCYTES NFR BLD: 5.8 % (ref 4–15)
NEUTROPHILS # BLD AUTO: 6.8 K/UL (ref 1.8–7.7)
NEUTROPHILS NFR BLD: 63 % (ref 38–73)
NRBC BLD-RTO: 0 /100 WBC
PLATELET # BLD AUTO: 368 K/UL (ref 150–450)
PMV BLD AUTO: 11 FL (ref 9.2–12.9)
RBC # BLD AUTO: 4.84 M/UL (ref 4–5.4)
WBC # BLD AUTO: 10.75 K/UL (ref 3.9–12.7)

## 2022-07-19 PROCEDURE — 99214 PR OFFICE/OUTPT VISIT, EST, LEVL IV, 30-39 MIN: ICD-10-PCS | Mod: S$GLB,,, | Performed by: FAMILY MEDICINE

## 2022-07-19 PROCEDURE — 99214 OFFICE O/P EST MOD 30 MIN: CPT | Mod: S$GLB,,, | Performed by: FAMILY MEDICINE

## 2022-07-19 PROCEDURE — 85025 COMPLETE CBC W/AUTO DIFF WBC: CPT | Performed by: FAMILY MEDICINE

## 2022-07-19 PROCEDURE — 71046 X-RAY EXAM CHEST 2 VIEWS: CPT | Mod: 26,,, | Performed by: RADIOLOGY

## 2022-07-19 PROCEDURE — 36415 COLL VENOUS BLD VENIPUNCTURE: CPT | Mod: PO | Performed by: FAMILY MEDICINE

## 2022-07-19 PROCEDURE — 99999 PR PBB SHADOW E&M-EST. PATIENT-LVL V: CPT | Mod: PBBFAC,,, | Performed by: FAMILY MEDICINE

## 2022-07-19 PROCEDURE — 1160F RVW MEDS BY RX/DR IN RCRD: CPT | Mod: CPTII,S$GLB,, | Performed by: FAMILY MEDICINE

## 2022-07-19 PROCEDURE — 3051F HG A1C>EQUAL 7.0%<8.0%: CPT | Mod: CPTII,S$GLB,, | Performed by: FAMILY MEDICINE

## 2022-07-19 PROCEDURE — 71046 X-RAY EXAM CHEST 2 VIEWS: CPT | Mod: TC,FY,PO

## 2022-07-19 PROCEDURE — 1159F MED LIST DOCD IN RCRD: CPT | Mod: CPTII,S$GLB,, | Performed by: FAMILY MEDICINE

## 2022-07-19 PROCEDURE — 3051F PR MOST RECENT HEMOGLOBIN A1C LEVEL 7.0 - < 8.0%: ICD-10-PCS | Mod: CPTII,S$GLB,, | Performed by: FAMILY MEDICINE

## 2022-07-19 PROCEDURE — 3078F DIAST BP <80 MM HG: CPT | Mod: CPTII,S$GLB,, | Performed by: FAMILY MEDICINE

## 2022-07-19 PROCEDURE — 3078F PR MOST RECENT DIASTOLIC BLOOD PRESSURE < 80 MM HG: ICD-10-PCS | Mod: CPTII,S$GLB,, | Performed by: FAMILY MEDICINE

## 2022-07-19 PROCEDURE — 3075F PR MOST RECENT SYSTOLIC BLOOD PRESS GE 130-139MM HG: ICD-10-PCS | Mod: CPTII,S$GLB,, | Performed by: FAMILY MEDICINE

## 2022-07-19 PROCEDURE — 1160F PR REVIEW ALL MEDS BY PRESCRIBER/CLIN PHARMACIST DOCUMENTED: ICD-10-PCS | Mod: CPTII,S$GLB,, | Performed by: FAMILY MEDICINE

## 2022-07-19 PROCEDURE — 3075F SYST BP GE 130 - 139MM HG: CPT | Mod: CPTII,S$GLB,, | Performed by: FAMILY MEDICINE

## 2022-07-19 PROCEDURE — 1159F PR MEDICATION LIST DOCUMENTED IN MEDICAL RECORD: ICD-10-PCS | Mod: CPTII,S$GLB,, | Performed by: FAMILY MEDICINE

## 2022-07-19 PROCEDURE — 71046 XR CHEST PA AND LATERAL: ICD-10-PCS | Mod: 26,,, | Performed by: RADIOLOGY

## 2022-07-19 PROCEDURE — 99999 PR PBB SHADOW E&M-EST. PATIENT-LVL V: ICD-10-PCS | Mod: PBBFAC,,, | Performed by: FAMILY MEDICINE

## 2022-07-19 PROCEDURE — 3008F PR BODY MASS INDEX (BMI) DOCUMENTED: ICD-10-PCS | Mod: CPTII,S$GLB,, | Performed by: FAMILY MEDICINE

## 2022-07-19 PROCEDURE — 3008F BODY MASS INDEX DOCD: CPT | Mod: CPTII,S$GLB,, | Performed by: FAMILY MEDICINE

## 2022-07-19 RX ORDER — PANTOPRAZOLE SODIUM 40 MG/1
40 TABLET, DELAYED RELEASE ORAL DAILY
Qty: 30 TABLET | Refills: 11 | Status: SHIPPED | OUTPATIENT
Start: 2022-07-19 | End: 2023-08-31

## 2022-07-19 NOTE — PROGRESS NOTES
Applied Vaseline dressing and non adhesive dressing to left heal and wrapped with coban per Dr. Pitt.

## 2022-07-19 NOTE — PROGRESS NOTES
Subjective:       Patient ID: Mine Quiros is a 56 y.o. female.    Chief Complaint: Follow-up (3 month f/u/Stated everything is fine, has been SOB, has a small raised bump on hand above wrist./Having heal pain, would like you to look at it. )    HPI     Here for a f/u    dm2 with neuropathy:  Uncontrolled.   Sees endocrinology  On insulin pump  Checks sugars daily: 114-180     On medical detention disability since April 2017 due to neuropathy.       ckd stage 3 stable.      On celexa and buspar for anxiety. Stable.      Still drinking 750 mg henrique every 3 days.     Reports shortness of breath on exertion x 4 weeks. No associated chest pain but has some upper back pain.     Has split superficial abrasion along skin of left lateral heel for several months. Not getting better.  See picture below under physical exam.      Has ganglion cyst on right dorsal hand for a couple of months.       Review of Systems      Review of Systems   Constitutional: Negative for fever and chills.   HENT: Negative for hearing loss and neck stiffness.    Eyes: Negative for redness and itching.   Respiratory: Negative for cough and choking.    Cardiovascular: Negative for chest pain and leg swelling.  Abdomen: Negative for abdominal pain and blood in stool.   Genitourinary: Negative for dysuria and flank pain.   Musculoskeletal: Negative for back pain and gait problem.   Neurological: Negative for light-headedness and headaches.   Hematological: Negative for adenopathy.   Psychiatric/Behavioral: Negative for behavioral problems.     Objective:      Physical Exam  Constitutional:       Appearance: She is well-developed.   HENT:      Head: Normocephalic and atraumatic.   Eyes:      Conjunctiva/sclera: Conjunctivae normal.      Pupils: Pupils are equal, round, and reactive to light.   Cardiovascular:      Rate and Rhythm: Normal rate and regular rhythm.      Heart sounds: No murmur heard.  Pulmonary:      Effort: Pulmonary effort is  normal.      Breath sounds: Normal breath sounds.   Musculoskeletal:      Cervical back: Normal range of motion.      Comments: Right dorsal central distal hand: 1.5 x 1.5 cm ganglion cyst noted. Non tender   Lymphadenopathy:      Cervical: No cervical adenopathy.                   Assessment:       1. SOB (shortness of breath) on exertion    2. Diabetes mellitus with neurological manifestations, uncontrolled    3. Abrasion of left heel, subsequent encounter    4. Stage 3 chronic kidney disease, unspecified whether stage 3a or 3b CKD    5. Anxiety    6. ETOH abuse    7. Morbid obesity    8. Ganglion cyst of finger of right hand        Plan:       SOB (shortness of breath) on exertion  -     CBC Auto Differential; Future  -     X-Ray Chest PA And Lateral; Future; Expected date: 07/19/2022  -     Nuclear Stress - Cardiology Interpreted; Future    Diabetes mellitus with neurological manifestations, uncontrolled  -     CBC Auto Differential; Future  -     X-Ray Chest PA And Lateral; Future; Expected date: 07/19/2022  -     Nuclear Stress - Cardiology Interpreted; Future  -     Ambulatory referral/consult to Ophthalmology; Future; Expected date: 07/26/2022    Abrasion of left heel, subsequent encounter    Stage 3 chronic kidney disease, unspecified whether stage 3a or 3b CKD    Anxiety    ETOH abuse    Morbid obesity    Ganglion cyst of finger of right hand  -     Ambulatory referral/consult to Orthopedics; Future    Other orders  -     pantoprazole (PROTONIX) 40 MG tablet; Take 1 tablet (40 mg total) by mouth once daily.  Dispense: 30 tablet; Refill: 11            Plan:  Nurse applied dressing change on heel  See orders and referral  Cont current meds  Refrain from etoh      Medication List with Changes/Refills   Current Medications    ALBUTEROL (PROVENTIL/VENTOLIN HFA) 90 MCG/ACTUATION INHALER    INHALE 1 PUFF BY MOUTH EVERY 4 HOURS AS NEEDED for SHORTNESS OF BREATH    AMLODIPINE (NORVASC) 10 MG TABLET    Take 1 tablet  (10 mg total) by mouth once daily.    AMLODIPINE (NORVASC) 10 MG TABLET    Take 10 mg by mouth.    AMMONIUM LACTATE 12 % CREA    Apply to the feet twice daily concentrating on areas of callus buildup    BLOOD SUGAR DIAGNOSTIC STRP    To check BG 3 times daily, to use with insurance preferred meter    BLOOD-GLUCOSE METER KIT    To check BG 3 times daily, to use with insurance preferred meter    BUSPIRONE (BUSPAR) 10 MG TABLET    Take 1 tablet (10 mg total) by mouth 3 (three) times daily.    CHLORTHALIDONE (HYGROTEN) 25 MG TAB    Take 1 tablet (25 mg total) by mouth once daily.    CITALOPRAM (CELEXA) 40 MG TABLET    Take 1 tablet (40 mg total) by mouth once daily.    CLOBETASOL (TEMOVATE) 0.05 % CREAM    Apply topically 2 (two) times daily.    CLOBETASOL (TEMOVATE) 0.05 % EXTERNAL SOLUTION    Use on scalp one - two times daily as needed for scaling or itching    DIPHENHYDRAMINE (BENADRYL) 25 MG CAPSULE    Take 25 mg by mouth every evening. USE PM BEFORE SURGERY    DOXYCYCLINE (MONODOX) 100 MG CAPSULE    Take 1 capsule (100 mg total) by mouth 2 (two) times daily.    DOXYCYCLINE (MONODOX) 100 MG CAPSULE    Take 1 capsule (100 mg total) by mouth 2 (two) times daily.    ERGOCALCIFEROL (ERGOCALCIFEROL) 50,000 UNIT CAP    Take 1 capsule (50,000 Units total) by mouth every 7 days.    FLUTICASONE PROPIONATE (FLONASE) 50 MCG/ACTUATION NASAL SPRAY    1 spray by Nasal route.    GABAPENTIN (NEURONTIN) 300 MG CAPSULE    TAKE 1 CAPSULE BY MOUTH BY MOUTH THREE TIMES DAILY    GENTAMICIN (GARAMYCIN) 0.1 % OINTMENT    Apply topically 2 (two) times daily.    HYDROCORTISONE 2.5 % CREAM    Apply topically 2 (two) times daily.    INSULIN LISPRO (HUMALOG U-100 INSULIN) 100 UNIT/ML INJECTION    USE AS DIRECTED via insulin pump approx 150 UNITS EVERY DAY.    KETOCONAZOLE (NIZORAL) 2 % SHAMPOO    Apply topically once a week.    LANCETS MISC    To check BG 3 times daily, to use with insurance preferred meter    LATANOPROST 0.005 % OPHTHALMIC  "SOLUTION    Place 1 drop into both eyes every evening.    LOSARTAN (COZAAR) 100 MG TABLET    Take 1 tablet (100 mg total) by mouth once daily.    METOPROLOL SUCCINATE (TOPROL-XL) 100 MG 24 HR TABLET    Take 1 tablet (100 mg total) by mouth once daily.    MICROLET 2 LANCING DEVICE KIT    Use 4x/daily to check glucose.    MOMETASONE (ELOCON) 0.1 % OINTMENT    aaa bid    MUPIROCIN (BACTROBAN) 2 % OINTMENT    Apply topically 2 (two) times daily.    PEN NEEDLE, DIABETIC 31 GAUGE X 5/16" NDLE    To use with insulin pens if needed    PIOGLITAZONE (ACTOS) 15 MG TABLET    Take 1 tablet (15 mg total) by mouth once daily.    TIMOLOL MALEATE 0.5% (TIMOPTIC) 0.5 % DROP    Place 1 drop into both eyes 2 (two) times daily.   Changed and/or Refilled Medications    Modified Medication Previous Medication    PANTOPRAZOLE (PROTONIX) 40 MG TABLET pantoprazole (PROTONIX) 40 MG tablet       Take 1 tablet (40 mg total) by mouth once daily.    Take 1 tablet (40 mg total) by mouth once daily.             "

## 2022-07-22 ENCOUNTER — PATIENT MESSAGE (OUTPATIENT)
Dept: CARDIOLOGY | Facility: HOSPITAL | Age: 57
End: 2022-07-22
Payer: COMMERCIAL

## 2022-07-22 ENCOUNTER — PATIENT OUTREACH (OUTPATIENT)
Dept: ADMINISTRATIVE | Facility: HOSPITAL | Age: 57
End: 2022-07-22
Payer: COMMERCIAL

## 2022-07-22 NOTE — PROGRESS NOTES
Routine Dilated Eye Exam  (Diabetic Retinopathy screening)     Non-compliant report chart audits for Eye Exam for Patients with Diabetes     Outreach to patient in reference to a routine Eye Exam      Chart review completed - Care Everywhere and Media reports - updates requested and reviewed.        ACTIVE PORTAL MESSAGE SENT OR LETTER COMMUNICATION MAILED    EYE EXAM APPT SCHEDULED

## 2022-07-23 ENCOUNTER — TELEPHONE (OUTPATIENT)
Dept: FAMILY MEDICINE | Facility: CLINIC | Age: 57
End: 2022-07-23
Payer: COMMERCIAL

## 2022-07-23 NOTE — TELEPHONE ENCOUNTER
inform pt via phone that I reviewed the test results and note the following:    Your chest xray was normal.    Your cbc, including your white and red blood cell count, were all reviewed.  All results within acceptable range.

## 2022-07-25 ENCOUNTER — TELEPHONE (OUTPATIENT)
Dept: ENDOCRINOLOGY | Facility: CLINIC | Age: 57
End: 2022-07-25
Payer: COMMERCIAL

## 2022-07-25 ENCOUNTER — HOSPITAL ENCOUNTER (OUTPATIENT)
Dept: RADIOLOGY | Facility: HOSPITAL | Age: 57
Discharge: HOME | End: 2022-07-25
Attending: FAMILY MEDICINE
Payer: COMMERCIAL

## 2022-07-25 ENCOUNTER — CLINICAL SUPPORT (OUTPATIENT)
Dept: CARDIOLOGY | Facility: HOSPITAL | Age: 57
End: 2022-07-25
Attending: FAMILY MEDICINE
Payer: COMMERCIAL

## 2022-07-25 ENCOUNTER — PATIENT OUTREACH (OUTPATIENT)
Dept: DIABETES | Facility: CLINIC | Age: 57
End: 2022-07-25
Payer: COMMERCIAL

## 2022-07-25 VITALS — BODY MASS INDEX: 48.81 KG/M2 | WEIGHT: 217 LBS | HEIGHT: 56 IN

## 2022-07-25 DIAGNOSIS — R06.02 SOB (SHORTNESS OF BREATH) ON EXERTION: ICD-10-CM

## 2022-07-25 LAB
CV PHARM DOSE: 0.4 MG
CV STRESS BASE HR: 100 BPM
DIASTOLIC BLOOD PRESSURE: 94 MMHG
NUC REST EJECTION FRACTION: 65
OHS CV CPX 1 MINUTE RECOVERY HEART RATE: 105 BPM
OHS CV CPX 85 PERCENT MAX PREDICTED HEART RATE MALE: 133
OHS CV CPX MAX PREDICTED HEART RATE: 157
OHS CV CPX PATIENT IS FEMALE: 1
OHS CV CPX PATIENT IS MALE: 0
OHS CV CPX PEAK DIASTOLIC BLOOD PRESSURE: 94 MMHG
OHS CV CPX PEAK HEAR RATE: 108 BPM
OHS CV CPX PEAK RATE PRESSURE PRODUCT: NORMAL
OHS CV CPX PEAK SYSTOLIC BLOOD PRESSURE: 191 MMHG
OHS CV CPX PERCENT MAX PREDICTED HEART RATE ACHIEVED: 69
OHS CV CPX RATE PRESSURE PRODUCT PRESENTING: NORMAL
OHS CV PHARM TIME: 1552 MIN
SYSTOLIC BLOOD PRESSURE: 191 MMHG

## 2022-07-25 PROCEDURE — 93017 CV STRESS TEST TRACING ONLY: CPT | Mod: PO

## 2022-07-25 PROCEDURE — 78452 HT MUSCLE IMAGE SPECT MULT: CPT | Mod: 26,,, | Performed by: INTERNAL MEDICINE

## 2022-07-25 PROCEDURE — 78452 STRESS TEST WITH MYOCARDIAL PERFUSION (CUPID ONLY): ICD-10-PCS | Mod: 26,,, | Performed by: INTERNAL MEDICINE

## 2022-07-25 PROCEDURE — A9502 TC99M TETROFOSMIN: HCPCS | Mod: PO

## 2022-07-25 PROCEDURE — 93018 CV STRESS TEST I&R ONLY: CPT | Mod: ,,, | Performed by: INTERNAL MEDICINE

## 2022-07-25 PROCEDURE — 63600175 PHARM REV CODE 636 W HCPCS: Mod: PO | Performed by: FAMILY MEDICINE

## 2022-07-25 PROCEDURE — 93016 STRESS TEST WITH MYOCARDIAL PERFUSION (CUPID ONLY): ICD-10-PCS | Mod: ,,, | Performed by: INTERNAL MEDICINE

## 2022-07-25 PROCEDURE — 93016 CV STRESS TEST SUPVJ ONLY: CPT | Mod: ,,, | Performed by: INTERNAL MEDICINE

## 2022-07-25 PROCEDURE — 93018 PR CARDIAC STRESS TST,INTERP/REPT ONLY: ICD-10-PCS | Mod: ,,, | Performed by: INTERNAL MEDICINE

## 2022-07-25 RX ORDER — REGADENOSON 0.08 MG/ML
0.4 INJECTION, SOLUTION INTRAVENOUS ONCE
Status: COMPLETED | OUTPATIENT
Start: 2022-07-25 | End: 2022-07-25

## 2022-07-25 RX ADMIN — REGADENOSON 0.4 MG: 0.08 INJECTION, SOLUTION INTRAVENOUS at 03:07

## 2022-07-25 NOTE — PROGRESS NOTES
"Patient arrives in lobby today with replacement Medtronic 770G insulin pump.  Patient states she broke the battery cap on her insulin pump last week and called [employer] customer support and they sent her a replacement battery cap.  This did not correct the issue of the pump not turning on.  She then contacted [employer] customer support again and replacement 770G insulin pump sent.  She was unaware the pump would be sent un programmed. Arrives today for another appointment and asking for help with programming new insulin pump.      Entered settings into replacement pump per May 2022 Endocrine visit.  Patient did not bring Guardian 3 sensor to pair to pump.  Demonstrated how to pair sensor to pump so that patient can do this once she arrives home this afternoon.  Of utmost concern is that patient has not been taking back up insulin plan after pump malfunction.  Patient denies checking her glucose but states she "knows it can't be too bad".  Long discussion with patient on importance of maintaining insulin regimen even in event of pump malfunction.  Patient provided with a sample glucometer but refusing to let me check her glucose at this time.      Insulin pump settings entered into replacement Medtronic 770G pump:      Basal: (total basal insulin 49.6 units)  12AM: 1.8 u/hr  8AM: 2.2 u/hr    Bolus:  CHO ratio: 1:3  ISF: 1:20  Target glucose: 100-140 mg/dL  Active insulin time: 4 hours    Max bolus: 25 units  Max basal: 3 u/hr    Sensor settings:  High alert: 250 mg/dL  Alert on high: on  Alert before high: off    Low alert: 85 mg/dL  Alert on low: on  Alert before low: off  Suspend on low:  Off    Low reservoir alarm: 20 units    Patient to restart insulin pump therapy. She is to call with any issues connecting sensor to new replacement pump.            "

## 2022-07-25 NOTE — TELEPHONE ENCOUNTER
Returning patient's call to assist with insulin pump.  Left voicemail with office number for patient to call back when she is able.

## 2022-07-25 NOTE — TELEPHONE ENCOUNTER
----- Message from Fiona Washington LPN sent at 7/25/2022 11:53 AM CDT -----    ----- Message -----  From: Sil Alfonso  Sent: 7/25/2022  10:46 AM CDT  To: [facility] Staff (Endo)    Who Called: Patient    What is the reqeust in detail: Requesting call back to have assistance with pump. Patient remembers speaking once with a Ms. Sheikh. Please advise.     Can the clinic reply by [facility]? No    Best Call Back Number: [phone]    Additional Information:

## 2022-07-26 NOTE — PROGRESS NOTES
Patient calls again today for assistance with connecting Guardian 3 sensor to Medtronic 770G insulin pump.  Asked patient if she restarted insulin pump therapy last night and she states she forgot and starting it now.  Has been off insulin pump for > 1 week and has not taken basal/bolus insulin nor has been checking glucose levels.  Emphasized patient's need for insulin and that if insulin pump fails--she needs to resume insulin injections.  Checked glucose while on phone with me to be 423 mg/dL.  Insulin pump therapy resumed and bolus of 14 units for elevated glucose given via insulin pump.  Walked patient through pairing Guardian 3 sensor to replacement 770G insulin pump that I programmed in clinic yesterday.  Patient  Then started senr and verified it was connected and in warm up prior to getting off phone today.  Patient to return to Auto Mode feature of Medtronic 770G as she previously had much better control of diabetes when in Auto Mode.

## 2022-07-27 ENCOUNTER — TELEPHONE (OUTPATIENT)
Dept: FAMILY MEDICINE | Facility: CLINIC | Age: 57
End: 2022-07-27
Payer: COMMERCIAL

## 2022-07-27 NOTE — TELEPHONE ENCOUNTER
----- Message from Balwinder Lockhart III, PA-C sent at 7/26/2022 11:05 AM CDT -----  Mine Quiros    Your stress test appears abnormal. Do you have an appointment with cardiology? If not, we need to get you an appointment asap

## 2022-07-30 ENCOUNTER — TELEPHONE (OUTPATIENT)
Dept: FAMILY MEDICINE | Facility: CLINIC | Age: 57
End: 2022-07-30
Payer: COMMERCIAL

## 2022-08-03 ENCOUNTER — PATIENT OUTREACH (OUTPATIENT)
Dept: ADMINISTRATIVE | Facility: HOSPITAL | Age: 57
End: 2022-08-03
Payer: COMMERCIAL

## 2022-08-03 NOTE — PROGRESS NOTES
Routine Dilated Eye Exam  (Diabetic Retinopathy screening)     Non-compliant report chart audits for Eye Exam for Patients with Diabetes     Outreach to patient in reference to a routine Eye Exam      Chart review completed - Care Everywhere and Media reports - updates requested and reviewed.        EYE EXAM SCHEDULED

## 2022-08-09 ENCOUNTER — TELEPHONE (OUTPATIENT)
Dept: FAMILY MEDICINE | Facility: CLINIC | Age: 57
End: 2022-08-09
Payer: COMMERCIAL

## 2022-08-09 DIAGNOSIS — R94.39 ABNORMAL STRESS TEST: Primary | ICD-10-CM

## 2022-08-10 ENCOUNTER — TELEPHONE (OUTPATIENT)
Dept: FAMILY MEDICINE | Facility: CLINIC | Age: 57
End: 2022-08-10

## 2022-08-10 ENCOUNTER — TELEPHONE (OUTPATIENT)
Dept: CARDIOLOGY | Facility: CLINIC | Age: 57
End: 2022-08-10
Payer: COMMERCIAL

## 2022-08-10 ENCOUNTER — PATIENT MESSAGE (OUTPATIENT)
Dept: FAMILY MEDICINE | Facility: CLINIC | Age: 57
End: 2022-08-10
Payer: COMMERCIAL

## 2022-08-10 NOTE — TELEPHONE ENCOUNTER
----- Message from David Aleman MD sent at 7/25/2022 10:21 PM CDT -----  Just read a positive stress test for this patient.  I would be happy to see them in consultation in my clinic if you feel that a referral is clinically appropriate.  Please let me know and I will be happy to have my staff arrange the appointment.    -David

## 2022-08-10 NOTE — TELEPHONE ENCOUNTER
----- Message from Blanca Jurado sent at 8/10/2022 12:17 PM CDT -----  Contact: pt at [phone]  Type: Needs Medical Advice  Who Called:  pt    Best Call Back Number:[phone]    Additional Information: pt is calling the office stating she tested + positive for Covid on 8/2/22 and she is still feeling really bad. She wnts to know if she can have something called in or what should she do. Please call back and advise.

## 2022-08-10 NOTE — TELEPHONE ENCOUNTER
Thank you for reading the stress test.     Please have your staff arrange for patient to see you.

## 2022-08-10 NOTE — TELEPHONE ENCOUNTER
----- Message from Shahzad Monteiro MA sent at 8/10/2022  9:38 AM CDT -----  Regarding: Abnormal stress test  Please schedule urgent referral

## 2022-08-10 NOTE — TELEPHONE ENCOUNTER
inform pt via phone that I reviewed the test results and note the following:    Nuclear stress test positive for decrease blood flow.    Will need to see Dr. Aleman, cardiologist, as soon as possible.  I signed off on referral.

## 2022-08-17 ENCOUNTER — OFFICE VISIT (OUTPATIENT)
Dept: ORTHOPEDICS | Facility: CLINIC | Age: 57
End: 2022-08-17
Payer: COMMERCIAL

## 2022-08-17 ENCOUNTER — HOSPITAL ENCOUNTER (OUTPATIENT)
Dept: RADIOLOGY | Facility: HOSPITAL | Age: 57
Discharge: HOME | End: 2022-08-17
Attending: ORTHOPAEDIC SURGERY
Payer: COMMERCIAL

## 2022-08-17 DIAGNOSIS — M67.431 GANGLION CYST OF DORSUM OF RIGHT WRIST: ICD-10-CM

## 2022-08-17 DIAGNOSIS — M79.641 PAIN IN RIGHT HAND: ICD-10-CM

## 2022-08-17 DIAGNOSIS — M79.641 PAIN IN RIGHT HAND: Primary | ICD-10-CM

## 2022-08-17 PROCEDURE — 73130 X-RAY EXAM OF HAND: CPT | Mod: TC,PO,RT

## 2022-08-17 PROCEDURE — 1159F MED LIST DOCD IN RCRD: CPT | Mod: CPTII,S$GLB,, | Performed by: ORTHOPAEDIC SURGERY

## 2022-08-17 PROCEDURE — 3051F HG A1C>EQUAL 7.0%<8.0%: CPT | Mod: CPTII,S$GLB,, | Performed by: ORTHOPAEDIC SURGERY

## 2022-08-17 PROCEDURE — 99203 PR OFFICE/OUTPT VISIT, NEW, LEVL III, 30-44 MIN: ICD-10-PCS | Mod: S$GLB,,, | Performed by: ORTHOPAEDIC SURGERY

## 2022-08-17 PROCEDURE — 99999 PR PBB SHADOW E&M-EST. PATIENT-LVL IV: ICD-10-PCS | Mod: PBBFAC,,, | Performed by: ORTHOPAEDIC SURGERY

## 2022-08-17 PROCEDURE — 99999 PR PBB SHADOW E&M-EST. PATIENT-LVL IV: CPT | Mod: PBBFAC,,, | Performed by: ORTHOPAEDIC SURGERY

## 2022-08-17 PROCEDURE — 3051F PR MOST RECENT HEMOGLOBIN A1C LEVEL 7.0 - < 8.0%: ICD-10-PCS | Mod: CPTII,S$GLB,, | Performed by: ORTHOPAEDIC SURGERY

## 2022-08-17 PROCEDURE — 73130 XR HAND COMPLETE 3 VIEW RIGHT: ICD-10-PCS | Mod: 26,RT,, | Performed by: RADIOLOGY

## 2022-08-17 PROCEDURE — 99203 OFFICE O/P NEW LOW 30 MIN: CPT | Mod: S$GLB,,, | Performed by: ORTHOPAEDIC SURGERY

## 2022-08-17 PROCEDURE — 73130 X-RAY EXAM OF HAND: CPT | Mod: 26,RT,, | Performed by: RADIOLOGY

## 2022-08-17 PROCEDURE — 1159F PR MEDICATION LIST DOCUMENTED IN MEDICAL RECORD: ICD-10-PCS | Mod: CPTII,S$GLB,, | Performed by: ORTHOPAEDIC SURGERY

## 2022-08-17 NOTE — PROGRESS NOTES
8/17/2022    Chief Complaint:  Chief Complaint   Patient presents with    Right Hand - Pain     Right ring finger cyst       HPI:  Mine Quiros is a 56 y.o. female, who presents to clinic today she has a history of right hand pain.  She has a possible cyst on her ring finger as well.  She states that she is having pain throughout the hand.  She has difficulty with flexing and extending.  She states that she has weakness as well.  She has not had any x-rays.  She is here today for further evaluation.    PMHX:  Past Medical History:   Diagnosis Date    Abnormal stress test 10/2016    Acne     Acute diastolic heart failure secondary to idiopathic cardiomyopathy 10/2016    Alcohol abuse     Allergy     Anxiety     Arthritis     Carpal tunnel syndrome of right wrist     bilateral    Cataract     Done OU    Chest pain     Diabetes mellitus     Type 2 IDDM - Uncontrolled    Diabetic neuropathy     Difficult intubation     needed glidescope for cholecystectomy 2009    Dry scalp     Fatty liver     GERD (gastroesophageal reflux disease)     on no meds    Glaucoma     Hyperlipidemia     Hypertension     Insomnia     Knee pain     Morbid obesity     Neuropathy due to type 2 diabetes mellitus     anaya feet    MARLON (obstructive sleep apnea)     CPAP, says she is compliant with use    Parotid mass 2014    had Biopsy    SOB (shortness of breath) 10/2016    Tachycardia     frequently,chronic for years per chart    Type 2 diabetes mellitus with both eyes affected by mild nonproliferative retinopathy without macular edema, without long-term current use of insulin        PSHX:  Past Surgical History:   Procedure Laterality Date    CARDIAC CATHETERIZATION  2016    WDL per patient    CATARACT EXTRACTION Right 08/09/2018    Dr Boothe    CATARACT EXTRACTION W/  INTRAOCULAR LENS IMPLANT Right 8/9/2018    Procedure: EXTRACTION, CATARACT, WITH IOL INSERTION;  Surgeon: Isaac Boothe Jr., MD;  Location:  [facility] OR;  Service: Ophthalmology;  Laterality: Right;  Topical w/ MAC    CATARACT EXTRACTION W/  INTRAOCULAR LENS IMPLANT Left 2018    Dr Boothe     SECTION      x 1    CHOLECYSTECTOMY      COLONOSCOPY N/A 10/21/2021    Procedure: COLONOSCOPY;  Surgeon: Rory Jones MD;  Location: [facility];  Service: Endoscopy;  Laterality: N/A;    ESOPHAGOGASTRODUODENOSCOPY  2009    Dr GRACIELA Jones - [facility]    HEMORRHOID SURGERY  2012    PPH with External Hemorrhoidectomy - Dr JAMAL Rollins, had spinal anesthesia    HYSTEROSCOPIC POLYPECTOMY OF UTERUS N/A 2019    Procedure: POLYPECTOMY, UTERUS, HYSTEROSCOPIC;  Surgeon: Lauren Reid MD;  [facility];  Service: OB/GYN;  Laterality: N/A;  Myosure    HYSTEROSCOPY WITH DILATION AND CURETTAGE OF UTERUS N/A 2019    Procedure: HYSTEROSCOPY, WITH DILATION AND CURETTAGE OF UTERUS;  Surgeon: Lauren Reid MD;  Location: [facility];  Service: OB/GYN;  Laterality: N/A;  Fractional D&C    ORIF FEMUR FRACTURE Left     IM dmitry.      PHACOEMULSIFICATION OF CATARACT Left 2018    Procedure: PHACOEMULSIFICATION, CATARACT;  Surgeon: Isaac Boothe Jr., MD;  Location: [facility] OR;  Service: Ophthalmology;  Laterality: Left;    TRIGGER FINGER RELEASE Right     TUBAL LIGATION      yag cap OS  Left 10/23/2018       FMHX:  Family History   Problem Relation Age of Onset    Diabetes Mother     Kidney disease Mother     Hyperlipidemia Father     Heart disease Father     Hypertension Father     Diabetes Sister     Heart attacks under age 50 Brother     No Known Problems Maternal Grandmother     No Known Problems Maternal Grandfather     No Known Problems Paternal Grandmother     No Known Problems Paternal Grandfather     Diabetes Sister     Hepatitis Brother     Kidney failure Sister     Diabetes Sister     Amblyopia Neg Hx     Blindness Neg Hx     Cancer Neg Hx     Cataracts Neg Hx     Glaucoma Neg Hx     Macular  degeneration Neg Hx     Retinal detachment Neg Hx     Strabismus Neg Hx     Stroke Neg Hx     Thyroid disease Neg Hx        SOCHX:  Social History     Tobacco Use    Smoking status: Never Smoker    Smokeless tobacco: Never Used   Substance Use Topics    Alcohol use: Yes     Alcohol/week: 42.0 standard drinks     Types: 42 Shots of liquor per week     Comment: Carmelina, unsure of daily or not?        ALLERGIES:  Morphine    CURRENT MEDICATIONS:  Current Outpatient Medications on File Prior to Visit   Medication Sig Dispense Refill    albuterol (PROVENTIL/VENTOLIN HFA) 90 mcg/actuation inhaler INHALE 1 PUFF BY MOUTH EVERY 4 HOURS AS NEEDED for SHORTNESS OF BREATH      amLODIPine (NORVASC) 10 MG tablet Take 1 tablet (10 mg total) by mouth once daily. 90 tablet 3    amLODIPine (NORVASC) 10 MG tablet Take 10 mg by mouth.      ammonium lactate 12 % Crea Apply to the feet twice daily concentrating on areas of callus buildup 140 g 11    blood sugar diagnostic Strp To check BG 3 times daily, to use with insurance preferred meter 100 strip 12    blood-glucose meter kit To check BG 3 times daily, to use with insurance preferred meter 1 each 0    busPIRone (BUSPAR) 10 MG tablet Take 1 tablet (10 mg total) by mouth 3 (three) times daily. 90 tablet 11    chlorthalidone (HYGROTEN) 25 MG Tab Take 1 tablet (25 mg total) by mouth once daily. 90 tablet 3    citalopram (CELEXA) 40 MG tablet Take 1 tablet (40 mg total) by mouth once daily. 90 tablet 3    clobetasoL (TEMOVATE) 0.05 % cream Apply topically 2 (two) times daily. 60 g 1    clobetasoL (TEMOVATE) 0.05 % external solution Use on scalp one - two times daily as needed for scaling or itching 60 mL 3    diphenhydrAMINE (BENADRYL) 25 mg capsule Take 25 mg by mouth every evening. USE PM BEFORE SURGERY      doxycycline (MONODOX) 100 MG capsule Take 1 capsule (100 mg total) by mouth 2 (two) times daily. 20 capsule 0    doxycycline (MONODOX) 100 MG capsule Take 1  "capsule (100 mg total) by mouth 2 (two) times daily. 14 capsule 0    ergocalciferol (ERGOCALCIFEROL) 50,000 unit Cap Take 1 capsule (50,000 Units total) by mouth every 7 days. 12 capsule 1    fluticasone propionate (FLONASE) 50 mcg/actuation nasal spray 1 spray by Nasal route.      gabapentin (NEURONTIN) 300 MG capsule TAKE 1 CAPSULE BY MOUTH BY MOUTH THREE TIMES DAILY 90 capsule 3    gentamicin (GARAMYCIN) 0.1 % ointment Apply topically 2 (two) times daily. 30 g 1    hydrocortisone 2.5 % cream Apply topically 2 (two) times daily. 28 g 1    insulin lispro (HUMALOG U-100 INSULIN) 100 unit/mL injection USE AS DIRECTED via insulin pump approx 150 UNITS EVERY DAY. 10 mL 12    ketoconazole (NIZORAL) 2 % shampoo Apply topically once a week. 120 mL 11    lancets Misc To check BG 3 times daily, to use with insurance preferred meter 100 each prn    losartan (COZAAR) 100 MG tablet Take 1 tablet (100 mg total) by mouth once daily. 90 tablet 3    metoprolol succinate (TOPROL-XL) 100 MG 24 hr tablet Take 1 tablet (100 mg total) by mouth once daily. 90 tablet 3    MICROLET 2 LANCING DEVICE Kit Use 4x/daily to check glucose. 1 each 2    mometasone (ELOCON) 0.1 % ointment aaa bid 45 g 2    mupirocin (BACTROBAN) 2 % ointment Apply topically 2 (two) times daily. 30 g 1    pantoprazole (PROTONIX) 40 MG tablet Take 1 tablet (40 mg total) by mouth once daily. 30 tablet 11    pen needle, diabetic 31 gauge x 5/16" Ndle To use with insulin pens if needed 100 each 12    pioglitazone (ACTOS) 15 MG tablet Take 1 tablet (15 mg total) by mouth once daily. (Patient taking differently: Take 15 mg by mouth once daily. DO NOT TAKE AM OF SURGERY) 90 tablet 3    latanoprost 0.005 % ophthalmic solution Place 1 drop into both eyes every evening. (Patient not taking: Reported on 7/19/2022) 2.5 mL 12    timolol maleate 0.5% (TIMOPTIC) 0.5 % Drop Place 1 drop into both eyes 2 (two) times daily. (Patient not taking: Reported on 7/19/2022) " 10 mL 6     No current facility-administered medications on file prior to visit.       REVIEW OF SYSTEMS:  Review of Systems   Constitutional: Negative.    HENT: Negative.    Eyes: Negative.    Respiratory: Negative.    Cardiovascular: Negative.    Gastrointestinal: Negative.    Genitourinary: Negative.    Musculoskeletal: Positive for joint pain.   Skin: Negative.    Neurological: Negative.    Endo/Heme/Allergies: Negative.    Psychiatric/Behavioral: Negative.        GENERAL PHYSICAL EXAM:   [facility] 08/31/2007 (Approximate)    GEN: well developed, well nourished, no acute distress   HENT: Normocephalic, atraumatic   EYES: No discharge, conjunctiva normal   NECK: Supple, non-tender   PULM: No wheezing, no respiratory distress   CV: RRR   ABD: Soft, non-tender    ORTHO EXAM:   Examination the right hand reveals that there is no edema.  There are no major skin changes.  Palpation about the hand produces multiple areas of tenderness.  There is a very small ganglion cyst overlying the metacarpal base of the ring finger.  This measures 0.3 x 0.3 cm in size.  It is mildly tender.  She is able make a full composite fist and fully extend but this is relatively stiff.  She does report intact sensation and has capillary refill less than 2 seconds    RADIOLOGY:   X-rays of the right hand were taken in clinic today.  The films reviewed by me.  There are noted to be no fractures or dislocations.  There are no masses noted.  There is no significant degenerative change    ASSESSMENT:   Right hand ganglion    PLAN:  1. Patient has a very small ganglion over the dorsum of the right hand/wrist.  This is only minimally tender.  Due to her medical comorbidities we will continue to monitor.  If it continues to get larger or becomes more painful will consider aspiration with steroid injection.    2.  Follow up with me on a p.r.n. basis

## 2022-08-30 ENCOUNTER — PATIENT MESSAGE (OUTPATIENT)
Dept: BARIATRICS | Facility: CLINIC | Age: 57
End: 2022-08-30
Payer: COMMERCIAL

## 2022-09-07 ENCOUNTER — TELEPHONE (OUTPATIENT)
Dept: BARIATRICS | Facility: CLINIC | Age: 57
End: 2022-09-07
Payer: COMMERCIAL

## 2022-09-08 ENCOUNTER — LAB VISIT (OUTPATIENT)
Dept: LAB | Facility: CLINIC | Age: 57
End: 2022-09-08
Payer: COMMERCIAL

## 2022-09-08 DIAGNOSIS — Z79.4 TYPE 2 DIABETES MELLITUS WITH DIABETIC POLYNEUROPATHY, WITH LONG-TERM CURRENT USE OF INSULIN: ICD-10-CM

## 2022-09-08 DIAGNOSIS — E11.42 TYPE 2 DIABETES MELLITUS WITH DIABETIC POLYNEUROPATHY, WITH LONG-TERM CURRENT USE OF INSULIN: ICD-10-CM

## 2022-09-08 LAB
ESTIMATED AVG GLUCOSE: 197 MG/DL (ref 68–131)
HBA1C MFR BLD: 8.5 % (ref 4–5.6)

## 2022-09-08 PROCEDURE — 36415 COLL VENOUS BLD VENIPUNCTURE: CPT | Mod: ,,, | Performed by: STUDENT IN AN ORGANIZED HEALTH CARE EDUCATION/TRAINING PROGRAM

## 2022-09-08 PROCEDURE — 83036 HEMOGLOBIN GLYCOSYLATED A1C: CPT | Performed by: NURSE PRACTITIONER

## 2022-09-08 PROCEDURE — 36415 PR COLLECTION VENOUS BLOOD,VENIPUNCTURE: ICD-10-PCS | Mod: ,,, | Performed by: STUDENT IN AN ORGANIZED HEALTH CARE EDUCATION/TRAINING PROGRAM

## 2022-09-13 ENCOUNTER — OFFICE VISIT (OUTPATIENT)
Dept: ENDOCRINOLOGY | Facility: CLINIC | Age: 57
End: 2022-09-13
Payer: COMMERCIAL

## 2022-09-13 VITALS
SYSTOLIC BLOOD PRESSURE: 200 MMHG | HEIGHT: 56 IN | BODY MASS INDEX: 49.77 KG/M2 | HEART RATE: 98 BPM | WEIGHT: 221.25 LBS | DIASTOLIC BLOOD PRESSURE: 110 MMHG

## 2022-09-13 DIAGNOSIS — Z96.41 INSULIN PUMP STATUS: ICD-10-CM

## 2022-09-13 DIAGNOSIS — I10 PRIMARY HYPERTENSION: ICD-10-CM

## 2022-09-13 DIAGNOSIS — Z79.4 TYPE 2 DIABETES MELLITUS WITH DIABETIC POLYNEUROPATHY, WITH LONG-TERM CURRENT USE OF INSULIN: Primary | ICD-10-CM

## 2022-09-13 DIAGNOSIS — G62.9 PERIPHERAL POLYNEUROPATHY: ICD-10-CM

## 2022-09-13 DIAGNOSIS — E78.5 HYPERLIPIDEMIA, UNSPECIFIED HYPERLIPIDEMIA TYPE: ICD-10-CM

## 2022-09-13 DIAGNOSIS — Z46.81 FITTING OR ADJUSTMENT OF INSULIN PUMP: ICD-10-CM

## 2022-09-13 DIAGNOSIS — E11.42 TYPE 2 DIABETES MELLITUS WITH DIABETIC POLYNEUROPATHY, WITH LONG-TERM CURRENT USE OF INSULIN: Primary | ICD-10-CM

## 2022-09-13 PROCEDURE — 3008F BODY MASS INDEX DOCD: CPT | Mod: CPTII,S$GLB,, | Performed by: NURSE PRACTITIONER

## 2022-09-13 PROCEDURE — 99214 OFFICE O/P EST MOD 30 MIN: CPT | Mod: 25,S$GLB,, | Performed by: NURSE PRACTITIONER

## 2022-09-13 PROCEDURE — 1159F PR MEDICATION LIST DOCUMENTED IN MEDICAL RECORD: ICD-10-PCS | Mod: CPTII,S$GLB,, | Performed by: NURSE PRACTITIONER

## 2022-09-13 PROCEDURE — 1159F MED LIST DOCD IN RCRD: CPT | Mod: CPTII,S$GLB,, | Performed by: NURSE PRACTITIONER

## 2022-09-13 PROCEDURE — 3052F PR MOST RECENT HEMOGLOBIN A1C LEVEL 8.0 - < 9.0%: ICD-10-PCS | Mod: CPTII,S$GLB,, | Performed by: NURSE PRACTITIONER

## 2022-09-13 PROCEDURE — 1160F RVW MEDS BY RX/DR IN RCRD: CPT | Mod: CPTII,S$GLB,, | Performed by: NURSE PRACTITIONER

## 2022-09-13 PROCEDURE — 3077F PR MOST RECENT SYSTOLIC BLOOD PRESSURE >= 140 MM HG: ICD-10-PCS | Mod: CPTII,S$GLB,, | Performed by: NURSE PRACTITIONER

## 2022-09-13 PROCEDURE — 95251 PR GLUCOSE MONITOR, 72 HOUR, PHYS INTERP: ICD-10-PCS | Mod: S$GLB,,, | Performed by: NURSE PRACTITIONER

## 2022-09-13 PROCEDURE — 3008F PR BODY MASS INDEX (BMI) DOCUMENTED: ICD-10-PCS | Mod: CPTII,S$GLB,, | Performed by: NURSE PRACTITIONER

## 2022-09-13 PROCEDURE — 99214 PR OFFICE/OUTPT VISIT, EST, LEVL IV, 30-39 MIN: ICD-10-PCS | Mod: 25,S$GLB,, | Performed by: NURSE PRACTITIONER

## 2022-09-13 PROCEDURE — 1160F PR REVIEW ALL MEDS BY PRESCRIBER/CLIN PHARMACIST DOCUMENTED: ICD-10-PCS | Mod: CPTII,S$GLB,, | Performed by: NURSE PRACTITIONER

## 2022-09-13 PROCEDURE — 3080F DIAST BP >= 90 MM HG: CPT | Mod: CPTII,S$GLB,, | Performed by: NURSE PRACTITIONER

## 2022-09-13 PROCEDURE — 99999 PR PBB SHADOW E&M-EST. PATIENT-LVL V: ICD-10-PCS | Mod: PBBFAC,,, | Performed by: NURSE PRACTITIONER

## 2022-09-13 PROCEDURE — 3052F HG A1C>EQUAL 8.0%<EQUAL 9.0%: CPT | Mod: CPTII,S$GLB,, | Performed by: NURSE PRACTITIONER

## 2022-09-13 PROCEDURE — 3080F PR MOST RECENT DIASTOLIC BLOOD PRESSURE >= 90 MM HG: ICD-10-PCS | Mod: CPTII,S$GLB,, | Performed by: NURSE PRACTITIONER

## 2022-09-13 PROCEDURE — 99999 PR PBB SHADOW E&M-EST. PATIENT-LVL V: CPT | Mod: PBBFAC,,, | Performed by: NURSE PRACTITIONER

## 2022-09-13 PROCEDURE — 3077F SYST BP >= 140 MM HG: CPT | Mod: CPTII,S$GLB,, | Performed by: NURSE PRACTITIONER

## 2022-09-13 PROCEDURE — 95251 CONT GLUC MNTR ANALYSIS I&R: CPT | Mod: S$GLB,,, | Performed by: NURSE PRACTITIONER

## 2022-09-13 NOTE — PROGRESS NOTES
ubjective:       Patient ID: Mine Quiros is a 56 y.o. female.    Chief Complaint: routine DM f/u     HPI   Pt is a 56 y.o. AAF with a long hx of very uncontrolled Type 2 DM-- as well as chronic conditions pending review including HTN, peripheral neuropathy, diastolic dysfunciton, morbid obesity- now on insulin pump.She has had multiple difficulties being able to navigate pump and having freq hypoglycemia. Medically disbabled she states due to neuropathy.     Interim Events: NO acute events. Did not take BP meds this AM--was in rush.  Sensor downloaded and reviewed. No c/o hypoglycemia.  Still drinking.  States foot wounds.       Sensor Interpretation   Dates of review: 8/31-9/13/2022    Estimated A1C;7.2%    Percent of sensor utilization during review period:90%     0% Very high  31% High  68% Time in range  0% Low  0 % Very Low    Time in range parameters:   mg/dL     Prominent Theme/Finding:  No marked hyperglycemia. NO hypoglycemia noted.      Pump Model:Medtronic 770    Preferred Infusion Sets and Tubing:quick set   Frequency of glucose checks a day:4  Frequency of infusion set changes required:q3 days    Average amount of daily insulin used:100     Basal rates:  12 mid----1.8   8 am ---2.2 u/hr     CHO:1:3  ISF:1:20   IOB:4    Glucometer required to optimize pump function     Recommended back up plan in event of insulin pump hiatus: 60 units basal,  15-20 with meals.        Review of Systems   Constitutional:  Positive for fatigue. Negative for activity change.   HENT:  Negative for hearing loss and trouble swallowing.    Eyes:  Negative for photophobia and visual disturbance.        Last Eye Exam: June 2021   Respiratory:  Positive for shortness of breath (states r/t deconditioning. ). Negative for cough.    Cardiovascular:  Negative for chest pain and palpitations.   Gastrointestinal:  Negative for constipation and diarrhea.   Endocrine: Negative for polydipsia and polyuria.   Genitourinary:   Negative for frequency and urgency.   Musculoskeletal:  Positive for back pain. Negative for arthralgias and myalgias.   Skin:  Negative for rash and wound.   Allergic/Immunologic: Negative for food allergies.   Neurological:  Positive for numbness (hands and feet.). Negative for weakness.   Psychiatric/Behavioral:  Negative for sleep disturbance. The patient is not nervous/anxious.         Sleeps well with gabapentin.       Objective:      Physical Exam  Constitutional:       Appearance: Normal appearance. She is well-developed. She is obese.   HENT:      Head: Normocephalic and atraumatic.      Nose: Nose normal.   Eyes:      Extraocular Movements: Extraocular movements intact.      Conjunctiva/sclera: Conjunctivae normal.      Pupils: Pupils are equal, round, and reactive to light.   Neck:      Vascular: No carotid bruit.   Cardiovascular:      Rate and Rhythm: Normal rate and regular rhythm.      Pulses: Normal pulses.      Heart sounds: Normal heart sounds.   Pulmonary:      Effort: Pulmonary effort is normal.      Breath sounds: Normal breath sounds.   Musculoskeletal:         General: Normal range of motion.      Cervical back: Normal range of motion and neck supple.      Comments: Feet: no open wounds or calluses.  Good pedal care Pedal pulses +2 bilaterally.   Vibratory sensation slightly decreased bilaterally.     Note some superficial cracks in some calluses on R heel--counseled rubber flip flops likely making worse.    Lymphadenopathy:      Cervical: No cervical adenopathy.   Skin:     General: Skin is warm and dry.      Comments: Acanthosis nigricans    lipohypertrophy at infusion sites--reinforced proper site rotation    Neurological:      General: No focal deficit present.      Mental Status: She is alert and oriented to person, place, and time.   Psychiatric:         Mood and Affect: Mood normal.         Behavior: Behavior normal.         Thought Content: Thought content normal.         Judgment:  Judgment normal.       Hemoglobin A1C   Date Value Ref Range Status   09/08/2022 8.5 (H) 4.0 - 5.6 % Final     Comment:     ADA Screening Guidelines:  5.7-6.4%  Consistent with prediabetes  >or=6.5%  Consistent with diabetes    High levels of fetal hemoglobin interfere with the HbA1C  assay. Heterozygous hemoglobin variants (HbS, HgC, etc)do  not significantly interfere with this assay.   However, presence of multiple variants may affect accuracy.     04/28/2022 7.8 (H) 4.0 - 5.6 % Final     Comment:     ADA Screening Guidelines:  5.7-6.4%  Consistent with prediabetes  >or=6.5%  Consistent with diabetes    High levels of fetal hemoglobin interfere with the HbA1C  assay. Heterozygous hemoglobin variants (HbS, HgC, etc)do  not significantly interfere with this assay.   However, presence of multiple variants may affect accuracy.     01/04/2022 7.8 (H) 4.0 - 5.6 % Final     Comment:     ADA Screening Guidelines:  5.7-6.4%  Consistent with prediabetes  >or=6.5%  Consistent with diabetes    High levels of fetal hemoglobin interfere with the HbA1C  assay. Heterozygous hemoglobin variants (HbS, HgC, etc)do  not significantly interfere with this assay.   However, presence of multiple variants may affect accuracy.         Chemistry        Component Value Date/Time     04/28/2022 1011    K 4.1 04/28/2022 1011     04/28/2022 1011    CO2 27 04/28/2022 1011    BUN 14 04/28/2022 1011    CREATININE 1.2 04/28/2022 1011     (H) 04/28/2022 1011        Component Value Date/Time    CALCIUM 9.1 04/28/2022 1011    ALKPHOS 106 04/28/2022 1011    AST 74 (H) 04/28/2022 1011    ALT 39 04/28/2022 1011    BILITOT 0.5 04/28/2022 1011    ESTGFRAFRICA 58.4 (A) 04/28/2022 1011    EGFRNONAA 50.6 (A) 04/28/2022 1011        Lab Results   Component Value Date    LDLCALC 150.2 04/28/2022     Lab Results   Component Value Date    TSH 2.269 08/07/2020       Assessment:       1. Type 2 diabetes mellitus with diabetic polyneuropathy, with  long-term current use of insulin  Hemoglobin A1C      2. Hyperlipidemia, unspecified hyperlipidemia type        3. Primary hypertension        4. Peripheral polyneuropathy        5. Fitting or adjustment of insulin pump        6. Insulin pump status              Plan:     Pt not taking statin--states pill too large to swallow.     Encourage, diet,exercise, ETOH cessation, minimization.   .   Pt needs to f/u with Dr. Pitt.                          ORDERS 09/13/2022   F/u podiatry    4   mo with    A1c, prior

## 2022-09-21 ENCOUNTER — TELEPHONE (OUTPATIENT)
Dept: ENDOCRINOLOGY | Facility: CLINIC | Age: 57
End: 2022-09-21
Payer: COMMERCIAL

## 2022-09-21 NOTE — TELEPHONE ENCOUNTER
----- Message from Erin Broderick sent at 9/21/2022  2:02 PM CDT -----  Contact: Self [phone]  Requesting an RX refill or new RX.    Is this a refill or new RX: refill    RX name and strength (copy/paste from chart):  pioglitazone (ACTOS) 15 MG tablet    Is this a 30 day or 90 day RX: 90    Pharmacy name and phone # (copy/paste from chart):      [facility] - [place] [address] [address]  Phone: [phone] Fax: [phone]]    Pt is requesting a call back.

## 2022-09-21 NOTE — TELEPHONE ENCOUNTER
Spoke to pt and she stated she thinks she has been taking actos and triued to get refilled and script was from 2018. Pt wants to know if she should be taking or not and if so needs refill.

## 2022-10-04 ENCOUNTER — TELEPHONE (OUTPATIENT)
Dept: FAMILY MEDICINE | Facility: CLINIC | Age: 57
End: 2022-10-04
Payer: COMMERCIAL

## 2022-10-04 ENCOUNTER — OFFICE VISIT (OUTPATIENT)
Dept: BARIATRICS | Facility: CLINIC | Age: 57
End: 2022-10-04
Payer: COMMERCIAL

## 2022-10-04 VITALS
DIASTOLIC BLOOD PRESSURE: 116 MMHG | SYSTOLIC BLOOD PRESSURE: 209 MMHG | TEMPERATURE: 98 F | BODY MASS INDEX: 46.75 KG/M2 | HEART RATE: 108 BPM | WEIGHT: 216.69 LBS | HEIGHT: 57 IN

## 2022-10-04 DIAGNOSIS — E78.5 HYPERLIPIDEMIA, UNSPECIFIED HYPERLIPIDEMIA TYPE: ICD-10-CM

## 2022-10-04 DIAGNOSIS — E66.01 MORBID OBESITY WITH BMI OF 45.0-49.9, ADULT: ICD-10-CM

## 2022-10-04 DIAGNOSIS — I10 PRIMARY HYPERTENSION: ICD-10-CM

## 2022-10-04 DIAGNOSIS — G47.33 OSA (OBSTRUCTIVE SLEEP APNEA): ICD-10-CM

## 2022-10-04 DIAGNOSIS — K76.0 FATTY LIVER: ICD-10-CM

## 2022-10-04 DIAGNOSIS — I10 HTN (HYPERTENSION), BENIGN: ICD-10-CM

## 2022-10-04 DIAGNOSIS — Z79.4 TYPE 2 DIABETES MELLITUS WITH DIABETIC POLYNEUROPATHY, WITH LONG-TERM CURRENT USE OF INSULIN: ICD-10-CM

## 2022-10-04 DIAGNOSIS — E66.01 MORBID OBESITY: Primary | ICD-10-CM

## 2022-10-04 DIAGNOSIS — E11.42 TYPE 2 DIABETES MELLITUS WITH DIABETIC POLYNEUROPATHY, WITH LONG-TERM CURRENT USE OF INSULIN: ICD-10-CM

## 2022-10-04 DIAGNOSIS — N18.30 STAGE 3 CHRONIC KIDNEY DISEASE, UNSPECIFIED WHETHER STAGE 3A OR 3B CKD: ICD-10-CM

## 2022-10-04 PROCEDURE — 3080F DIAST BP >= 90 MM HG: CPT | Mod: CPTII,S$GLB,, | Performed by: SURGERY

## 2022-10-04 PROCEDURE — 3077F SYST BP >= 140 MM HG: CPT | Mod: CPTII,S$GLB,, | Performed by: SURGERY

## 2022-10-04 PROCEDURE — 3052F PR MOST RECENT HEMOGLOBIN A1C LEVEL 8.0 - < 9.0%: ICD-10-PCS | Mod: CPTII,S$GLB,, | Performed by: SURGERY

## 2022-10-04 PROCEDURE — 1159F PR MEDICATION LIST DOCUMENTED IN MEDICAL RECORD: ICD-10-PCS | Mod: CPTII,S$GLB,, | Performed by: SURGERY

## 2022-10-04 PROCEDURE — 99999 PR PBB SHADOW E&M-EST. PATIENT-LVL V: CPT | Mod: PBBFAC,,, | Performed by: SURGERY

## 2022-10-04 PROCEDURE — 99999 PR PBB SHADOW E&M-EST. PATIENT-LVL V: ICD-10-PCS | Mod: PBBFAC,,, | Performed by: SURGERY

## 2022-10-04 PROCEDURE — 3077F PR MOST RECENT SYSTOLIC BLOOD PRESSURE >= 140 MM HG: ICD-10-PCS | Mod: CPTII,S$GLB,, | Performed by: SURGERY

## 2022-10-04 PROCEDURE — 99205 PR OFFICE/OUTPT VISIT, NEW, LEVL V, 60-74 MIN: ICD-10-PCS | Mod: S$GLB,,, | Performed by: SURGERY

## 2022-10-04 PROCEDURE — 3080F PR MOST RECENT DIASTOLIC BLOOD PRESSURE >= 90 MM HG: ICD-10-PCS | Mod: CPTII,S$GLB,, | Performed by: SURGERY

## 2022-10-04 PROCEDURE — 1159F MED LIST DOCD IN RCRD: CPT | Mod: CPTII,S$GLB,, | Performed by: SURGERY

## 2022-10-04 PROCEDURE — 3052F HG A1C>EQUAL 8.0%<EQUAL 9.0%: CPT | Mod: CPTII,S$GLB,, | Performed by: SURGERY

## 2022-10-04 PROCEDURE — 3008F BODY MASS INDEX DOCD: CPT | Mod: CPTII,S$GLB,, | Performed by: SURGERY

## 2022-10-04 PROCEDURE — 3008F PR BODY MASS INDEX (BMI) DOCUMENTED: ICD-10-PCS | Mod: CPTII,S$GLB,, | Performed by: SURGERY

## 2022-10-04 PROCEDURE — 99205 OFFICE O/P NEW HI 60 MIN: CPT | Mod: S$GLB,,, | Performed by: SURGERY

## 2022-10-04 NOTE — PROGRESS NOTES
Initial Consult    Chief Complaint   Patient presents with    Obesity       History of Present Illness:  Patient is a 57 y.o. female who is referred for evaluation of surgical treatment of morbid obesity. Her Body mass index is 47.73 kg/m². She has known comorbidities of diabetes mellitus, dyslipidemia, GERD, hypertension, and obstructive sleep apnea. She has not attended the bariatric seminar and is most interested in gastric sleeve surgery.      Past attempts at weight loss include: Unsupervised: na;  Supervised:  none;  Diet pills: na;  Exercise attempts: na    Weight history:   At current weight:  20 years  Obese for 20 years or more.  More than 35 pounds overweight for 30.  More than 100 pounds overweight for 30.  Started dieting at na years old.  Maximum weight reached: 235  Most weight lost was na through dont eat as much for na.  She describes Her eating habits as snacker/grazer.    MARLON screening: wears mask machine    Reflux screening: none    Review of patient's allergies indicates:   Allergen Reactions    Morphine Hives and Itching     Other reaction(s): Hives       Current Outpatient Medications   Medication Sig Dispense Refill    albuterol (PROVENTIL/VENTOLIN HFA) 90 mcg/actuation inhaler INHALE 1 PUFF BY MOUTH EVERY 4 HOURS AS NEEDED for SHORTNESS OF BREATH      amLODIPine (NORVASC) 10 MG tablet Take 1 tablet (10 mg total) by mouth once daily. 90 tablet 3    amLODIPine (NORVASC) 10 MG tablet Take 10 mg by mouth.      ammonium lactate 12 % Crea Apply to the feet twice daily concentrating on areas of callus buildup 140 g 11    blood sugar diagnostic Strp To check BG 3 times daily, to use with insurance preferred meter 100 strip 12    blood-glucose meter kit To check BG 3 times daily, to use with insurance preferred meter 1 each 0    busPIRone (BUSPAR) 10 MG tablet Take 1 tablet (10 mg total) by mouth 3 (three) times daily. 90 tablet 11    chlorthalidone (HYGROTEN) 25 MG Tab Take 1 tablet (25 mg  "total) by mouth once daily. 90 tablet 3    citalopram (CELEXA) 40 MG tablet TAKE 1 TABLET BY MOUTH DAILY 90 tablet 3    clobetasoL (TEMOVATE) 0.05 % cream Apply topically 2 (two) times daily. 60 g 1    clobetasoL (TEMOVATE) 0.05 % external solution Use on scalp one - two times daily as needed for scaling or itching 60 mL 3    diphenhydrAMINE (BENADRYL) 25 mg capsule Take 25 mg by mouth every evening. USE PM BEFORE SURGERY      ergocalciferol (ERGOCALCIFEROL) 50,000 unit Cap Take 1 capsule (50,000 Units total) by mouth every 7 days. 12 capsule 1    fluticasone propionate (FLONASE) 50 mcg/actuation nasal spray 1 spray by Nasal route.      gabapentin (NEURONTIN) 300 MG capsule TAKE 1 CAPSULE BY MOUTH BY MOUTH THREE TIMES DAILY 90 capsule 3    gentamicin (GARAMYCIN) 0.1 % ointment Apply topically 2 (two) times daily. 30 g 1    hydrocortisone 2.5 % cream Apply topically 2 (two) times daily. 28 g 1    insulin lispro (HUMALOG U-100 INSULIN) 100 unit/mL injection USE AS DIRECTED via insulin pump approx 150 UNITS EVERY DAY. 10 mL 12    ketoconazole (NIZORAL) 2 % shampoo Apply topically once a week. 120 mL 11    lancets Misc To check BG 3 times daily, to use with insurance preferred meter 100 each prn    losartan (COZAAR) 100 MG tablet Take 1 tablet (100 mg total) by mouth once daily. 90 tablet 3    metoprolol succinate (TOPROL-XL) 100 MG 24 hr tablet Take 1 tablet (100 mg total) by mouth once daily. 90 tablet 3    MICROLET 2 LANCING DEVICE Kit Use 4x/daily to check glucose. 1 each 2    mometasone (ELOCON) 0.1 % ointment aaa bid 45 g 2    mupirocin (BACTROBAN) 2 % ointment Apply topically 2 (two) times daily. 30 g 1    pantoprazole (PROTONIX) 40 MG tablet Take 1 tablet (40 mg total) by mouth once daily. 30 tablet 11    pen needle, diabetic 31 gauge x 5/16" Ndle To use with insulin pens if needed 100 each 12    pioglitazone (ACTOS) 15 MG tablet Take 1 tablet (15 mg total) by mouth once daily. (Patient " taking differently: Take 15 mg by mouth once daily. DO NOT TAKE AM OF SURGERY) 90 tablet 3    doxycycline (MONODOX) 100 MG capsule Take 1 capsule (100 mg total) by mouth 2 (two) times daily. (Patient not taking: No sig reported) 20 capsule 0    doxycycline (MONODOX) 100 MG capsule Take 1 capsule (100 mg total) by mouth 2 (two) times daily. (Patient not taking: No sig reported) 14 capsule 0    latanoprost 0.005 % ophthalmic solution Place 1 drop into both eyes every evening. (Patient not taking: No sig reported) 2.5 mL 12    timolol maleate 0.5% (TIMOPTIC) 0.5 % Drop Place 1 drop into both eyes 2 (two) times daily. (Patient not taking: No sig reported) 10 mL 6     No current facility-administered medications for this visit.       Past Medical History:   Diagnosis Date    Abnormal stress test 10/2016    Acne     Acute diastolic heart failure secondary to idiopathic cardiomyopathy 10/2016    Alcohol abuse     Allergy     Anxiety     Arthritis     Carpal tunnel syndrome of right wrist     bilateral    Cataract     Done OU    Chest pain     Diabetes mellitus     Type 2 IDDM - Uncontrolled    Diabetic neuropathy     Difficult intubation     needed glidescope for cholecystectomy 2009    Dry scalp     Fatty liver     GERD (gastroesophageal reflux disease)     on no meds    Glaucoma     Hyperlipidemia     Hypertension     Insomnia     Knee pain     Morbid obesity     Neuropathy due to type 2 diabetes mellitus     anaya feet    MARLON (obstructive sleep apnea)     CPAP, says she is compliant with use    Parotid mass 2014    had Biopsy    SOB (shortness of breath) 10/2016    Tachycardia     frequently,chronic for years per chart    Type 2 diabetes mellitus with both eyes affected by mild nonproliferative retinopathy without macular edema, without long-term current use of insulin      Past Surgical History:   Procedure Laterality Date    CARDIAC CATHETERIZATION  2016    WDL per patient    CATARACT  EXTRACTION Right 2018    Dr Boothe    CATARACT EXTRACTION W/  INTRAOCULAR LENS IMPLANT Right 2018    Procedure: EXTRACTION, CATARACT, WITH IOL INSERTION;  Surgeon: Isaac Boothe Jr., MD;  Location: [facility] OR;  Service: Ophthalmology;  Laterality: Right;  Topical w/ MAC    CATARACT EXTRACTION W/  INTRAOCULAR LENS IMPLANT Left 2018    Dr Boothe     SECTION      x 1    CHOLECYSTECTOMY      COLONOSCOPY N/A 10/21/2021    Procedure: COLONOSCOPY;  Surgeon: Rory Jones MD;  Location: [facility];  Service: Endoscopy;  Laterality: N/A;    ESOPHAGOGASTRODUODENOSCOPY  2009    Dr GRACIELA Jones - [facility]    HEMORRHOID SURGERY  2012    PPH with External Hemorrhoidectomy - Dr JAMAL Rollins, had spinal anesthesia    HYSTEROSCOPIC POLYPECTOMY OF UTERUS N/A 2019    Procedure: POLYPECTOMY, UTERUS, HYSTEROSCOPIC;  Surgeon: Lauren Reid MD;  Location: [facility];  Service: OB/GYN;  Laterality: N/A;  Myosure    HYSTEROSCOPY WITH DILATION AND CURETTAGE OF UTERUS N/A 2019    Procedure: HYSTEROSCOPY, WITH DILATION AND CURETTAGE OF UTERUS;  Surgeon: Lauren Reid MD;  Location: [facility];  Service: OB/GYN;  Laterality: N/A;  Fractional D&C    ORIF FEMUR FRACTURE Left     IM [email or web address]    PHACOEMULSIFICATION OF CATARACT Left 2018    Procedure: PHACOEMULSIFICATION, CATARACT;  Surgeon: Isaac Boothe Jr., MD;  Location: [facility] OR;  Service: Ophthalmology;  Laterality: Left;    TRIGGER FINGER RELEASE Right     TUBAL LIGATION      yag cap OS  Left 10/23/2018     Family History   Problem Relation Age of Onset    Diabetes Mother     Kidney disease Mother     Hyperlipidemia Father     Heart disease Father     Hypertension Father     Diabetes Sister     Heart attacks under age 50 Brother     No Known Problems Maternal Grandmother     No Known Problems Maternal Grandfather     No Known Problems Paternal Grandmother     No Known Problems Paternal Grandfather     Diabetes  "Sister     Hepatitis Brother     Kidney failure Sister     Diabetes Sister     Amblyopia Neg Hx     Blindness Neg Hx     Cancer Neg Hx     Cataracts Neg Hx     Glaucoma Neg Hx     Macular degeneration Neg Hx     Retinal detachment Neg Hx     Strabismus Neg Hx     Stroke Neg Hx     Thyroid disease Neg Hx      Social History     Tobacco Use    Smoking status: Never    Smokeless tobacco: Never   Substance Use Topics    Alcohol use: Yes     Alcohol/week: 42.0 standard drinks     Types: 42 Shots of liquor per week     Comment: Carmelina, unsure of daily or not?     Drug use: No      Review of Systems   HENT:  Positive for sinus pressure and sneezing.    Eyes:  Positive for discharge and itching.   Respiratory:  Positive for cough.    Cardiovascular:  Positive for chest pain.   Genitourinary:  Positive for difficulty urinating.   Musculoskeletal:  Positive for back pain.   Skin:  Positive for wound.   Neurological:  Positive for dizziness and headaches.   Psychiatric/Behavioral:  Positive for sleep disturbance. The patient is nervous/anxious.    All other systems reviewed and are negative.    Physical:     Vital Signs (Most Recent)  Temp: 98.4 °F (36.9 °C) (10/04/22 1024)  Pulse: 108 (10/04/22 1024)  BP: (!) 209/116 (10/04/22 1024)  4' 8.5" (1.435 m)  98.3 kg (216 lb 11.4 oz)     Body comp:  Fat Percent:  44.8 %  Fat Mass:  97.2 lb  FFM:  119.6 lb  TBW: 86.2 lb  TBW %:  39.8 %  BMR: 1669 kcal      Physical Exam    ASSESSMENT/PLAN:        1. Morbid obesity  Ambulatory referral/consult to Nutrition Services    Ambulatory referral/consult to Psychiatry    EKG 12-lead    FL Upper GI    Ambulatory referral/consult to Cardiology      2. Morbid obesity with BMI of 45.0-49.9, adult        3. Primary hypertension        4. Stage 3 chronic kidney disease, unspecified whether stage 3a or 3b CKD        5. Fatty liver        6. HTN (hypertension), benign        7. Hyperlipidemia, unspecified hyperlipidemia type        8. " MARLON (obstructive sleep apnea)        9. Type 2 diabetes mellitus with diabetic polyneuropathy, with long-term current use of insulin            Plan:  Mine Quiros has morbid obesity as their Body mass index is 47.73 kg/m². She would benefit from weight loss surgery and has chosen gastric sleeve surgery as the preferred procedure. She understands that this is a tool and lifestyle change will be necessary to keep weight off. I went over possible complications of all surgeries with the patient and she is agreeable to surgery.    She will need:    Labs  EKG  UGI   dietary consult  psych consult   Seminar  Alcohol cessation       I will obtain the following clearances prior to surgery: cardiology     She has multiple worsening medical problems which include diabetes, obesity, high blood pressure, high cholesterol, and sleep apnea.  We are planning to treat this with diet exercise and possibly elective major surgery.  I do want her to completely stop drinking alcohol.  She has risk factors for elective major surgery which include high blood pressure, diabetes, high cholesterol, sleep apnea.  We are planning to send her to Cardiology for risk stratification.    Diet plan: high protein low carb- mainly meats and vegetables  Exercise plan: Cardiovascular exercise, get HR over 100 for 20 minutes 3 times per week.  Start multivitamin

## 2022-10-06 ENCOUNTER — TELEPHONE (OUTPATIENT)
Dept: PSYCHIATRY | Facility: CLINIC | Age: 57
End: 2022-10-06
Payer: COMMERCIAL

## 2022-10-07 ENCOUNTER — PATIENT OUTREACH (OUTPATIENT)
Dept: ADMINISTRATIVE | Facility: HOSPITAL | Age: 57
End: 2022-10-07
Payer: COMMERCIAL

## 2022-10-07 ENCOUNTER — PATIENT MESSAGE (OUTPATIENT)
Dept: ADMINISTRATIVE | Facility: HOSPITAL | Age: 57
End: 2022-10-07
Payer: COMMERCIAL

## 2022-10-07 DIAGNOSIS — E11.9 DIABETES MELLITUS WITHOUT COMPLICATION: Primary | ICD-10-CM

## 2022-10-07 NOTE — PROGRESS NOTES
10/07/2022 Care Everywhere updates requested and reviewed.  Immunizations reconciled. Media reports reviewed.  Duplicate HM overrides and  orders removed.  Overdue HM topic chart audit and/or requested.  Overdue lab testing linked to upcoming lab appointments if applies.    EYE EXAM SCHEDULED    URINE MICRO ORDERED  Health Maintenance Due   Topic Date Due    Shingles Vaccine (1 of 2) Never done    Pneumococcal Vaccines (Age 0-64) (2 - PCV) 2018    Eye Exam  2022    Influenza Vaccine (1) 2022    Diabetes Urine Screening  10/07/2022

## 2022-10-07 NOTE — LETTER
October 13, 2022    Mine [address]  [address]  [place] [address]             [facility]  [address]  [facility] [address]  Phone: [phone] Dear Mary, [facility] is committed to your overall health.  To help you get the most out of each of your visits, we will review your information to make sure you are up to date on all of your recommended tests and/or procedures.      Hai Pitt MD  has found that your chart shows you may be due for the following:    Health Maintenance Due   Topic    Shingles Vaccine (1 of 2)    Pneumococcal Vaccines (Age 0-64) (2 - PCV)    Eye Exam     Influenza Vaccine (1)    Diabetes Urine Screening         If you have had any of the above done at another facility, please bring the records with you or Fax them to [phone] so that your record at [facility] will be complete. If you have not had any of these tests or procedures done recently and would like to complete this testing ,  please call [phone] or send a message through your [facility] portal to your provider's office.     If you have an upcoming scheduled appointment for the above test and/or procedures, please disregard this letter.    Thank you for letting us care for you,    Katerina Yates, Care Coordinator  [facility]  Phone: [phone]  Fax: [phone]

## 2022-10-11 ENCOUNTER — PATIENT MESSAGE (OUTPATIENT)
Dept: PSYCHIATRY | Facility: CLINIC | Age: 57
End: 2022-10-11
Payer: COMMERCIAL

## 2022-10-11 ENCOUNTER — TELEPHONE (OUTPATIENT)
Dept: PSYCHIATRY | Facility: CLINIC | Age: 57
End: 2022-10-11
Payer: COMMERCIAL

## 2022-10-20 ENCOUNTER — OFFICE VISIT (OUTPATIENT)
Dept: FAMILY MEDICINE | Facility: CLINIC | Age: 57
End: 2022-10-20
Payer: COMMERCIAL

## 2022-10-20 VITALS
WEIGHT: 214.5 LBS | SYSTOLIC BLOOD PRESSURE: 138 MMHG | DIASTOLIC BLOOD PRESSURE: 84 MMHG | HEART RATE: 92 BPM | OXYGEN SATURATION: 98 % | HEIGHT: 56 IN | BODY MASS INDEX: 48.25 KG/M2

## 2022-10-20 DIAGNOSIS — N18.30 STAGE 3 CHRONIC KIDNEY DISEASE, UNSPECIFIED WHETHER STAGE 3A OR 3B CKD: ICD-10-CM

## 2022-10-20 DIAGNOSIS — Z01.818 PREOP EXAMINATION: Primary | ICD-10-CM

## 2022-10-20 DIAGNOSIS — F41.9 ANXIETY: ICD-10-CM

## 2022-10-20 DIAGNOSIS — J30.9 ALLERGIC RHINITIS, UNSPECIFIED SEASONALITY, UNSPECIFIED TRIGGER: ICD-10-CM

## 2022-10-20 DIAGNOSIS — E66.01 MORBID OBESITY WITH BMI OF 45.0-49.9, ADULT: ICD-10-CM

## 2022-10-20 DIAGNOSIS — E11.42 TYPE 2 DIABETES MELLITUS WITH DIABETIC POLYNEUROPATHY, WITH LONG-TERM CURRENT USE OF INSULIN: ICD-10-CM

## 2022-10-20 DIAGNOSIS — Z79.4 TYPE 2 DIABETES MELLITUS WITH DIABETIC POLYNEUROPATHY, WITH LONG-TERM CURRENT USE OF INSULIN: ICD-10-CM

## 2022-10-20 PROCEDURE — 99999 PR PBB SHADOW E&M-EST. PATIENT-LVL IV: ICD-10-PCS | Mod: PBBFAC,,, | Performed by: FAMILY MEDICINE

## 2022-10-20 PROCEDURE — 3079F DIAST BP 80-89 MM HG: CPT | Mod: CPTII,S$GLB,, | Performed by: FAMILY MEDICINE

## 2022-10-20 PROCEDURE — 3052F PR MOST RECENT HEMOGLOBIN A1C LEVEL 8.0 - < 9.0%: ICD-10-PCS | Mod: CPTII,S$GLB,, | Performed by: FAMILY MEDICINE

## 2022-10-20 PROCEDURE — 99214 PR OFFICE/OUTPT VISIT, EST, LEVL IV, 30-39 MIN: ICD-10-PCS | Mod: S$GLB,,, | Performed by: FAMILY MEDICINE

## 2022-10-20 PROCEDURE — 3075F PR MOST RECENT SYSTOLIC BLOOD PRESS GE 130-139MM HG: ICD-10-PCS | Mod: CPTII,S$GLB,, | Performed by: FAMILY MEDICINE

## 2022-10-20 PROCEDURE — 1159F PR MEDICATION LIST DOCUMENTED IN MEDICAL RECORD: ICD-10-PCS | Mod: CPTII,S$GLB,, | Performed by: FAMILY MEDICINE

## 2022-10-20 PROCEDURE — 1159F MED LIST DOCD IN RCRD: CPT | Mod: CPTII,S$GLB,, | Performed by: FAMILY MEDICINE

## 2022-10-20 PROCEDURE — 99214 OFFICE O/P EST MOD 30 MIN: CPT | Mod: S$GLB,,, | Performed by: FAMILY MEDICINE

## 2022-10-20 PROCEDURE — 3075F SYST BP GE 130 - 139MM HG: CPT | Mod: CPTII,S$GLB,, | Performed by: FAMILY MEDICINE

## 2022-10-20 PROCEDURE — 3052F HG A1C>EQUAL 8.0%<EQUAL 9.0%: CPT | Mod: CPTII,S$GLB,, | Performed by: FAMILY MEDICINE

## 2022-10-20 PROCEDURE — 3079F PR MOST RECENT DIASTOLIC BLOOD PRESSURE 80-89 MM HG: ICD-10-PCS | Mod: CPTII,S$GLB,, | Performed by: FAMILY MEDICINE

## 2022-10-20 PROCEDURE — 99999 PR PBB SHADOW E&M-EST. PATIENT-LVL IV: CPT | Mod: PBBFAC,,, | Performed by: FAMILY MEDICINE

## 2022-10-20 RX ORDER — ALBUTEROL SULFATE 90 UG/1
AEROSOL, METERED RESPIRATORY (INHALATION)
Qty: 18 G | Refills: 5 | Status: SHIPPED | OUTPATIENT
Start: 2022-10-20 | End: 2023-11-01 | Stop reason: SDUPTHER

## 2022-10-20 NOTE — PROGRESS NOTES
Subjective:       Patient ID: Mine Quiros is a 57 y.o. female.    Chief Complaint: Follow-up (She also needs a letter for bypass surgery saying its ok if she gets it ) and Medication Refill (On inhaler)    HPI    Referred by Dr. Riley, bariatric surgeon, for a preop exam prior to gastric sleeve      dm2 with neuropathy:  Uncontrolled.   Sees endocrinology  On insulin pump       On medical [employer] disability since April 2017 due to neuropathy.       ckd stage 3 stable.      On celexa and buspar for anxiety. Stable.     Reports nasal congestion and itchy eyes for the past 2 weeks.       Review of Systems      Review of Systems   Constitutional: Negative for fever and chills.   HENT: Negative for hearing loss and neck stiffness.    Eyes: Negative for redness and itching.   Respiratory: Negative for cough and choking.    Cardiovascular: Negative for chest pain and leg swelling.  Abdomen: Negative for abdominal pain and blood in stool.   Genitourinary: Negative for dysuria and flank pain.   Musculoskeletal: Negative for back pain and gait problem.   Neurological: Negative for light-headedness and headaches.   Hematological: Negative for adenopathy.   Psychiatric/Behavioral: Negative for behavioral problems.     Objective:      Physical Exam  Constitutional:       Appearance: She is well-developed.   HENT:      Head: Normocephalic and atraumatic.   Eyes:      Conjunctiva/sclera: Conjunctivae normal.      Pupils: Pupils are equal, round, and reactive to light.   Cardiovascular:      Rate and Rhythm: Normal rate and regular rhythm.      Heart sounds: No murmur heard.  Pulmonary:      Effort: Pulmonary effort is normal.      Breath sounds: Normal breath sounds.   Musculoskeletal:      Cervical back: Normal range of motion.   Lymphadenopathy:      Cervical: No cervical adenopathy.         Hemoglobin A1C   Date Value Ref Range Status   09/08/2022 8.5 (H) 4.0 - 5.6 % Final     Comment:     ADA Screening  Guidelines:  5.7-6.4%  Consistent with prediabetes  >or=6.5%  Consistent with diabetes    High levels of fetal hemoglobin interfere with the HbA1C  assay. Heterozygous hemoglobin variants (HbS, HgC, etc)do  not significantly interfere with this assay.   However, presence of multiple variants may affect accuracy.     04/28/2022 7.8 (H) 4.0 - 5.6 % Final     Comment:     ADA Screening Guidelines:  5.7-6.4%  Consistent with prediabetes  >or=6.5%  Consistent with diabetes    High levels of fetal hemoglobin interfere with the HbA1C  assay. Heterozygous hemoglobin variants (HbS, HgC, etc)do  not significantly interfere with this assay.   However, presence of multiple variants may affect accuracy.     01/04/2022 7.8 (H) 4.0 - 5.6 % Final     Comment:     ADA Screening Guidelines:  5.7-6.4%  Consistent with prediabetes  >or=6.5%  Consistent with diabetes    High levels of fetal hemoglobin interfere with the HbA1C  assay. Heterozygous hemoglobin variants (HbS, HgC, etc)do  not significantly interfere with this assay.   However, presence of multiple variants may affect accuracy.         Assessment:       1. Preop examination    2. Morbid obesity with BMI of 45.0-49.9, adult    3. Type 2 diabetes mellitus with diabetic polyneuropathy, with long-term current use of insulin    4. Allergic rhinitis, unspecified seasonality, unspecified trigger    5. Stage 3 chronic kidney disease, unspecified whether stage 3a or 3b CKD    6. Anxiety        Plan:       Preop examination    Morbid obesity with BMI of 45.0-49.9, adult    Type 2 diabetes mellitus with diabetic polyneuropathy, with long-term current use of insulin    Allergic rhinitis, unspecified seasonality, unspecified trigger    Stage 3 chronic kidney disease, unspecified whether stage 3a or 3b CKD    Anxiety    Other orders  -     albuterol (PROVENTIL/VENTOLIN HFA) 90 mcg/actuation inhaler; INHALE 1 PUFF BY MOUTH EVERY 4 HOURS AS NEEDED for SHORTNESS OF BREATH Strength: 90  "mcg/actuation  Dispense: 18 g; Refill: 5              Plan:  This patient is medically cleared for surgery.      Thank you for referring this patient to me and allowing me to share his care.           Medication List with Changes/Refills   Current Medications    AMLODIPINE (NORVASC) 10 MG TABLET    Take 10 mg by mouth.    BLOOD SUGAR DIAGNOSTIC STRP    To check BG 3 times daily, to use with insurance preferred meter    BLOOD-GLUCOSE METER KIT    To check BG 3 times daily, to use with insurance preferred meter    BUSPIRONE (BUSPAR) 10 MG TABLET    Take 1 tablet (10 mg total) by mouth 3 (three) times daily.    CHLORTHALIDONE (HYGROTEN) 25 MG TAB    Take 1 tablet (25 mg total) by mouth once daily.    CITALOPRAM (CELEXA) 40 MG TABLET    TAKE 1 TABLET BY MOUTH DAILY    DIPHENHYDRAMINE (BENADRYL) 25 MG CAPSULE    Take 25 mg by mouth every evening. USE PM BEFORE SURGERY    ERGOCALCIFEROL (ERGOCALCIFEROL) 50,000 UNIT CAP    Take 1 capsule (50,000 Units total) by mouth every 7 days.    FLUTICASONE PROPIONATE (FLONASE) 50 MCG/ACTUATION NASAL SPRAY    1 spray by Nasal route.    GABAPENTIN (NEURONTIN) 300 MG CAPSULE    TAKE 1 CAPSULE BY MOUTH BY MOUTH THREE TIMES DAILY    INSULIN LISPRO (HUMALOG U-100 INSULIN) 100 UNIT/ML INJECTION    USE AS DIRECTED via insulin pump approx 150 UNITS EVERY DAY.    LANCETS MISC    To check BG 3 times daily, to use with insurance preferred meter    LATANOPROST 0.005 % OPHTHALMIC SOLUTION    Place 1 drop into both eyes every evening.    LOSARTAN (COZAAR) 100 MG TABLET    Take 1 tablet (100 mg total) by mouth once daily.    METOPROLOL SUCCINATE (TOPROL-XL) 100 MG 24 HR TABLET    Take 1 tablet (100 mg total) by mouth once daily.    MICROLET 2 LANCING DEVICE KIT    Use 4x/daily to check glucose.    PANTOPRAZOLE (PROTONIX) 40 MG TABLET    Take 1 tablet (40 mg total) by mouth once daily.    PEN NEEDLE, DIABETIC 31 GAUGE X 5/16" NDLE    To use with insulin pens if needed    TIMOLOL MALEATE 0.5% " (TIMOPTIC) 0.5 % DROP    Place 1 drop into both eyes 2 (two) times daily.   Changed and/or Refilled Medications    Modified Medication Previous Medication    ALBUTEROL (PROVENTIL/VENTOLIN HFA) 90 MCG/ACTUATION INHALER albuterol (PROVENTIL/VENTOLIN HFA) 90 mcg/actuation inhaler       INHALE 1 PUFF BY MOUTH EVERY 4 HOURS AS NEEDED for SHORTNESS OF BREATH Strength: 90 mcg/actuation    INHALE 1 PUFF BY MOUTH EVERY 4 HOURS AS NEEDED for SHORTNESS OF BREATH   Discontinued Medications    AMLODIPINE (NORVASC) 10 MG TABLET    Take 1 tablet (10 mg total) by mouth once daily.    AMMONIUM LACTATE 12 % CREA    Apply to the feet twice daily concentrating on areas of callus buildup    CLOBETASOL (TEMOVATE) 0.05 % CREAM    Apply topically 2 (two) times daily.    CLOBETASOL (TEMOVATE) 0.05 % EXTERNAL SOLUTION    Use on scalp one - two times daily as needed for scaling or itching    DOXYCYCLINE (MONODOX) 100 MG CAPSULE    Take 1 capsule (100 mg total) by mouth 2 (two) times daily.    DOXYCYCLINE (MONODOX) 100 MG CAPSULE    Take 1 capsule (100 mg total) by mouth 2 (two) times daily.    GENTAMICIN (GARAMYCIN) 0.1 % OINTMENT    Apply topically 2 (two) times daily.    HYDROCORTISONE 2.5 % CREAM    Apply topically 2 (two) times daily.    KETOCONAZOLE (NIZORAL) 2 % SHAMPOO    Apply topically once a week.    MOMETASONE (ELOCON) 0.1 % OINTMENT    aaa bid    MUPIROCIN (BACTROBAN) 2 % OINTMENT    Apply topically 2 (two) times daily.    PIOGLITAZONE (ACTOS) 15 MG TABLET    Take 1 tablet (15 mg total) by mouth once daily.

## 2022-10-25 ENCOUNTER — CLINICAL SUPPORT (OUTPATIENT)
Dept: BARIATRICS | Facility: CLINIC | Age: 57
End: 2022-10-25
Payer: COMMERCIAL

## 2022-10-25 DIAGNOSIS — Z79.4 TYPE 2 DIABETES MELLITUS WITH DIABETIC POLYNEUROPATHY, WITH LONG-TERM CURRENT USE OF INSULIN: ICD-10-CM

## 2022-10-25 DIAGNOSIS — E11.42 TYPE 2 DIABETES MELLITUS WITH DIABETIC POLYNEUROPATHY, WITH LONG-TERM CURRENT USE OF INSULIN: ICD-10-CM

## 2022-10-25 DIAGNOSIS — Z71.3 DIETARY COUNSELING AND SURVEILLANCE: Primary | ICD-10-CM

## 2022-10-25 DIAGNOSIS — E66.01 MORBID OBESITY: ICD-10-CM

## 2022-10-25 PROCEDURE — 99999 PR PBB SHADOW E&M-EST. PATIENT-LVL III: CPT | Mod: PBBFAC,,, | Performed by: DIETITIAN, REGISTERED

## 2022-10-25 PROCEDURE — 99999 PR PBB SHADOW E&M-EST. PATIENT-LVL III: ICD-10-PCS | Mod: PBBFAC,,, | Performed by: DIETITIAN, REGISTERED

## 2022-10-25 PROCEDURE — 97802 PR MED NUTR THER, 1ST, INDIV, EA 15 MIN: ICD-10-PCS | Mod: S$GLB,,, | Performed by: DIETITIAN, REGISTERED

## 2022-10-25 PROCEDURE — 97802 MEDICAL NUTRITION INDIV IN: CPT | Mod: S$GLB,,, | Performed by: DIETITIAN, REGISTERED

## 2022-10-26 ENCOUNTER — OFFICE VISIT (OUTPATIENT)
Dept: OPHTHALMOLOGY | Facility: CLINIC | Age: 57
End: 2022-10-26
Payer: COMMERCIAL

## 2022-10-26 VITALS — BODY MASS INDEX: 47.57 KG/M2 | WEIGHT: 212.19 LBS

## 2022-10-26 DIAGNOSIS — H40.053 OCULAR HYPERTENSION, BILATERAL: ICD-10-CM

## 2022-10-26 DIAGNOSIS — E11.3292 MILD NONPROLIFERATIVE DIABETIC RETINOPATHY OF LEFT EYE WITHOUT MACULAR EDEMA ASSOCIATED WITH TYPE 2 DIABETES MELLITUS: Primary | ICD-10-CM

## 2022-10-26 DIAGNOSIS — Z96.1 PSEUDOPHAKIA, BOTH EYES: ICD-10-CM

## 2022-10-26 PROCEDURE — 99214 PR OFFICE/OUTPT VISIT, EST, LEVL IV, 30-39 MIN: ICD-10-PCS | Mod: S$GLB,,, | Performed by: OPHTHALMOLOGY

## 2022-10-26 PROCEDURE — 99999 PR PBB SHADOW E&M-EST. PATIENT-LVL IV: CPT | Mod: PBBFAC,,, | Performed by: OPHTHALMOLOGY

## 2022-10-26 PROCEDURE — 3052F HG A1C>EQUAL 8.0%<EQUAL 9.0%: CPT | Mod: CPTII,S$GLB,, | Performed by: OPHTHALMOLOGY

## 2022-10-26 PROCEDURE — 3052F PR MOST RECENT HEMOGLOBIN A1C LEVEL 8.0 - < 9.0%: ICD-10-PCS | Mod: CPTII,S$GLB,, | Performed by: OPHTHALMOLOGY

## 2022-10-26 PROCEDURE — 92015 PR REFRACTION: ICD-10-PCS | Mod: S$GLB,,, | Performed by: OPHTHALMOLOGY

## 2022-10-26 PROCEDURE — 92015 DETERMINE REFRACTIVE STATE: CPT | Mod: S$GLB,,, | Performed by: OPHTHALMOLOGY

## 2022-10-26 PROCEDURE — 99999 PR PBB SHADOW E&M-EST. PATIENT-LVL IV: ICD-10-PCS | Mod: PBBFAC,,, | Performed by: OPHTHALMOLOGY

## 2022-10-26 PROCEDURE — 99214 OFFICE O/P EST MOD 30 MIN: CPT | Mod: S$GLB,,, | Performed by: OPHTHALMOLOGY

## 2022-10-26 RX ORDER — TIMOLOL MALEATE 5 MG/ML
1 SOLUTION/ DROPS OPHTHALMIC 2 TIMES DAILY
Qty: 10 ML | Refills: 6 | Status: SHIPPED | OUTPATIENT
Start: 2022-10-26 | End: 2023-03-08 | Stop reason: SDUPTHER

## 2022-10-26 RX ORDER — LATANOPROST 50 UG/ML
1 SOLUTION/ DROPS OPHTHALMIC NIGHTLY
Qty: 2.5 ML | Refills: 12 | Status: SHIPPED | OUTPATIENT
Start: 2022-10-26 | End: 2023-08-31

## 2022-10-26 NOTE — PROGRESS NOTES
HPI     Post-op Evaluation     Additional comments: DM eye exam           Comments    DLS: 5/31/21    Pt states vision not as good in OS as OD. Sees ok with both. + h/o   floaters, no flashes.     LBSL: 102 x 1 day  Hemoglobin A1C       Date                     Value               Ref Range             Status                09/08/2022               8.5 (H)             4.0 - 5.6 %           Final                 04/28/2022               7.8 (H)             4.0 - 5.6 %           Final                 01/04/2022               7.8 (H)             4.0 - 5.6 %           Final                    Last edited by Cheryle Quintana on 10/26/2022 10:37 AM.        ROS    Negative for: Constitutional, Gastrointestinal, Neurological, Skin,   Genitourinary, Musculoskeletal, HENT, Endocrine, Cardiovascular, Eyes,   Respiratory, Psychiatric, Allergic/Imm, Heme/Lymph  Last edited by Isaac Boothe Jr., MD on 10/26/2022 11:23 AM.        Assessment /Plan     For exam results, see Encounter Report.    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

## 2022-10-26 NOTE — PROGRESS NOTES
NUTRITIONAL CONSULT    Referring Physician: Dr. Riley  Reason for MNT Referral: Initial assessment for sleeve gastrectomy work-up    PAST MEDICAL HISTORY:   57 y.o. female presents with a BMI of Body mass index is 47.57 kg/m²..  Past attempts at weight loss include: Unsupervised: na;  Supervised:  none;  Diet pills: na;  Exercise attempts: na     Weight history:   At current weight:  20 years  Obese for 20 years or more.  More than 35 pounds overweight for 30.  More than 100 pounds overweight for 30.  Started dieting at na years old.  Maximum weight reached: 235  Most weight lost was na through dont eat as much for na.  She describes Her eating habits as snacker/grazer.    Past Medical History:   Diagnosis Date    Abnormal stress test 10/2016    Acne     Acute diastolic heart failure secondary to idiopathic cardiomyopathy 10/2016    Alcohol abuse     Allergy     Anxiety     Arthritis     Carpal tunnel syndrome of right wrist     bilateral    Cataract     Done OU    Chest pain     Diabetes mellitus     Type 2 IDDM - Uncontrolled    Diabetic neuropathy     Difficult intubation     needed glidescope for cholecystectomy 2009    Dry scalp     Fatty liver     GERD (gastroesophageal reflux disease)     on no meds    Glaucoma     Hyperlipidemia     Hypertension     Insomnia     Knee pain     Morbid obesity     Neuropathy due to type 2 diabetes mellitus     anaya feet    MARLON (obstructive sleep apnea)     CPAP, says she is compliant with use    Parotid mass 2014    had Biopsy    SOB (shortness of breath) 10/2016    Tachycardia     frequently,chronic for years per chart    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  Height: 4'8  Weight: 212 lbs  IBW: 96 lbs  BMI: 47.57  The patient's goal weight (50 % EBW): 154 lbs      Goal for Bariatric Surgery:  "to lose weight    NUTRITION & HEALTH HISTORY:  Greatest challenge: irregular meal plan instead eating one maybe two meals daily with daily alcohol intake.    Current diet recall: Breakfast: wakes up for 11:30-12:00. She reports she may be hungry, but does not report food intake.  She is she will have a large glass of water and an mixed drink made with Diet Coke.  Lunch: leftovers from dinner the night before which is often take out.  Yesterday she had Popeyes fried chicken and dirty rice.  Sometimes she will have a grilled cheese sandwich.  Dinner: "whatever my sister makes."  Protein + side dish, protein + side dish + vegetable    Current Diet:  Meal pattern: irregular  Protein supplements: 0.  Snackin-1 / day  Vegetables: Likes a variety. Eats 2-3 times per week.  Fruits: Likes a variety. Eats rarely.  Beverages: water and diet soda  Dining out:3x Weekly. Mostly fast food and take-out.  Cooking at home:multiple times Weekly. Mostly baked, grilled, smothered, and fried meat, fish, starchy CHO, and vegetables.    Exercise:  Past exercise: None    Current exercise: None  Restrictions to exercise: none.    Vitamins / Minerals / Herbs:   Vitamin D    Food Allergies:   NKFA    Social:  Lives with sister and daughter.  Grocery shopping and food prep by her sister.  Alcohol: Daily.  Smoking: None.    ASSESSMENT:  Patient reports attempts at weight loss, only to regain lost weight.  Patient demonstrated knowledge of healthy eating behaviors and exercise patterns; admits to not eating healthy and not exercising at this point.  Patient states willingness to change lifestyle and make behavior modifications.  Expect poor compliance after surgery at this time because she has not made any lifestyle changes since her initial assessment.  She does not prepare any of her own food.  Unsure if sister willing to make required dietary changes to menu.    Insurance requires medically supervised diet prior to consideration for " bariatric surgery.    BARIATRIC DIET DISCUSSION:  Discussed diet after surgery and related to patients food record.  Reviewed diet progression before and after surgery.  Stressed importance of exercise and its role in achieving weight loss goals.  RD discussed dietary changes in the current medically supervised dieting period could lead to hypoglycemic episodes.  Discussed rule of 15/15.  Importance of being in contact with endocrine provider Regina Solorio NP for bariatric surgery plans and any needed changes to settings to insulin pump.    Each of the three daily meals must have a protein source and healthy carbohydrate like fruit, beans, whole grains.  The importance of this balance was thoroughly discussed with patient due to her insulin pump status.  Must reach goal of 60 g protein daily which is met with eating protein source at three meals daily pre-op.  Must drink 64 ounces of sugar free, non-carbonated, non-caffeinated fluids daily.  Do not replace meal with a protein shake because it does not contain carbohydrates.  May have as part of balanced meal with carbohydrate.  No alcohol for 6 month post operation because alcohol is a gastric irritant. Stop alcohol intake now per Dr. Riley.  Answered all questions.    RECOMMENDATIONS:  Patient is not a good candidate for bariatric surgery.  She must make radical lifestyle changes including initiating her own food preparation to ensure adequate adherence to guidelines or convince sister to make significant dietary changes and adhere to them long term.  If patient can be observed to make and maintain these significant dietary changes including complete alcohol cessation for the duration of the medically supervised dieting period than she may be reassessed to be a potential candidate for bariatric surgery.    Needs additional visit(s) with REHAN.    PLAN:  Resume work-up for surgery.  Continue to review Bariatric Nutrition Guidebook at home and call with any  questions.  Work on Bariatric Nutrition Checklist.  Work on expanding variety of vegetables.  Work on gradually cutting back on starchy CHO in the diet.  Begin trying various protein supplements to determine preference.  Reduce frequency of dining out.  More grocery shopping and meal preparation at home.  Increase exercise.  Start MVI with iron.  Simple meal preparation such as eggs with whole wheat toast or fruit. Try tuna salad, chicken salad, or egg salad on whole grain crackers.  Consider cold cuts in whole grain wrap.  Try Lean Cuisine, Healthy Choice, or Atkins meals for easy preparation.      SESSION TIME:  60 minutes

## 2022-11-03 ENCOUNTER — HOSPITAL ENCOUNTER (OUTPATIENT)
Dept: RADIOLOGY | Facility: HOSPITAL | Age: 57
Discharge: HOME | End: 2022-11-03
Attending: SURGERY
Payer: COMMERCIAL

## 2022-11-03 DIAGNOSIS — E66.01 MORBID OBESITY: ICD-10-CM

## 2022-11-03 PROCEDURE — A9698 NON-RAD CONTRAST MATERIALNOC: HCPCS | Mod: PO | Performed by: SURGERY

## 2022-11-03 PROCEDURE — 74240 X-RAY XM UPR GI TRC 1CNTRST: CPT | Mod: 26,,, | Performed by: RADIOLOGY

## 2022-11-03 PROCEDURE — 25500020 PHARM REV CODE 255: Mod: PO | Performed by: SURGERY

## 2022-11-03 PROCEDURE — 74240 X-RAY XM UPR GI TRC 1CNTRST: CPT | Mod: TC,FY,PO

## 2022-11-03 PROCEDURE — 74240 FL UPPER GI: ICD-10-PCS | Mod: 26,,, | Performed by: RADIOLOGY

## 2022-11-03 RX ADMIN — BARIUM SULFATE 340 ML: 980 POWDER, FOR SUSPENSION ORAL at 08:11

## 2022-11-03 RX ADMIN — BARIUM SULFATE 355 ML: 0.6 SUSPENSION ORAL at 08:11

## 2022-11-15 ENCOUNTER — OFFICE VISIT (OUTPATIENT)
Dept: PSYCHIATRY | Facility: CLINIC | Age: 57
End: 2022-11-15
Payer: COMMERCIAL

## 2022-11-15 DIAGNOSIS — F10.20 ALCOHOL USE DISORDER, SEVERE, DEPENDENCE: Primary | ICD-10-CM

## 2022-11-15 DIAGNOSIS — F32.A DEPRESSION, UNSPECIFIED DEPRESSION TYPE: ICD-10-CM

## 2022-11-15 DIAGNOSIS — F41.9 ANXIETY DISORDER, UNSPECIFIED TYPE: ICD-10-CM

## 2022-11-15 PROCEDURE — 99999 PR PBB SHADOW E&M-EST. PATIENT-LVL I: CPT | Mod: PBBFAC,,, | Performed by: PSYCHOLOGIST

## 2022-11-15 PROCEDURE — 90791 PSYCH DIAGNOSTIC EVALUATION: CPT | Mod: S$GLB,,, | Performed by: PSYCHOLOGIST

## 2022-11-15 PROCEDURE — 99999 PR PBB SHADOW E&M-EST. PATIENT-LVL I: ICD-10-PCS | Mod: PBBFAC,,, | Performed by: PSYCHOLOGIST

## 2022-11-15 PROCEDURE — 90791 PR PSYCHIATRIC DIAGNOSTIC EVALUATION: ICD-10-PCS | Mod: S$GLB,,, | Performed by: PSYCHOLOGIST

## 2022-11-15 PROCEDURE — 3052F HG A1C>EQUAL 8.0%<EQUAL 9.0%: CPT | Mod: CPTII,S$GLB,, | Performed by: PSYCHOLOGIST

## 2022-11-15 PROCEDURE — 3052F PR MOST RECENT HEMOGLOBIN A1C LEVEL 8.0 - < 9.0%: ICD-10-PCS | Mod: CPTII,S$GLB,, | Performed by: PSYCHOLOGIST

## 2022-11-15 NOTE — PROGRESS NOTES
BEHAVIORAL HEALTH PRE-SURGICAL EVALUATION: BARIATRIC SURGERY     PROBLEM:  Mine Quiros is a 57 y.o. female Black or  referred by Dr. Riley for a routine behavioral health evaluation for bariatric surgery. Patient notes being interested in gastric sleeve surgery surgery and has considered this for several years.  Patient notes she has been seeking weight loss surgery for several years however due to insurance issues she has been unable to.  Patient notes she is seeking improved quality of life and improved health with weight loss.   Patient notes she is very limited on activities she can do and believes this is due to her weight and overall health.    Past Medical History:   Diagnosis Date    Abnormal stress test 10/2016    Acne     Acute diastolic heart failure secondary to idiopathic cardiomyopathy 10/2016    Alcohol abuse     Allergy     Anxiety     Arthritis     Carpal tunnel syndrome of right wrist     bilateral    Cataract     Done OU    Chest pain     Diabetes mellitus     Type 2 IDDM - Uncontrolled    Diabetic neuropathy     Difficult intubation     needed glidescope for cholecystectomy 2009    Dry scalp     Fatty liver     GERD (gastroesophageal reflux disease)     on no meds    Glaucoma     Hyperlipidemia     Hypertension     Insomnia     Knee pain     Morbid obesity     Neuropathy due to type 2 diabetes mellitus     anaya feet    MARLON (obstructive sleep apnea)     CPAP, says she is compliant with use    Parotid mass 2014    had Biopsy    SOB (shortness of breath) 10/2016    Tachycardia     frequently,chronic for years per chart    Type 2 diabetes mellitus with both eyes affected by mild nonproliferative retinopathy without macular edema, without long-term current use of insulin          Current Outpatient Medications:     albuterol (PROVENTIL/VENTOLIN HFA) 90 mcg/actuation inhaler, INHALE 1 PUFF BY MOUTH EVERY 4 HOURS AS NEEDED for SHORTNESS OF BREATH Strength: 90 mcg/actuation, Disp:  18 g, Rfl: 5    amLODIPine (NORVASC) 10 MG tablet, Take 10 mg by mouth., Disp: , Rfl:     blood sugar diagnostic Strp, To check BG 3 times daily, to use with insurance preferred meter, Disp: 100 strip, Rfl: 12    blood-glucose meter kit, To check BG 3 times daily, to use with insurance preferred meter, Disp: 1 each, Rfl: 0    busPIRone (BUSPAR) 10 MG tablet, Take 1 tablet (10 mg total) by mouth 3 (three) times daily., Disp: 90 tablet, Rfl: 11    chlorthalidone (HYGROTEN) 25 MG Tab, Take 1 tablet (25 mg total) by mouth once daily., Disp: 90 tablet, Rfl: 3    citalopram (CELEXA) 40 MG tablet, TAKE 1 TABLET BY MOUTH DAILY, Disp: 90 tablet, Rfl: 3    diphenhydrAMINE (BENADRYL) 25 mg capsule, Take 25 mg by mouth every evening. USE PM BEFORE SURGERY, Disp: , Rfl:     ergocalciferol (ERGOCALCIFEROL) 50,000 unit Cap, Take 1 capsule (50,000 Units total) by mouth every 7 days., Disp: 12 capsule, Rfl: 1    fluticasone propionate (FLONASE) 50 mcg/actuation nasal spray, 1 spray by Nasal route., Disp: , Rfl:     gabapentin (NEURONTIN) 300 MG capsule, TAKE 1 CAPSULE BY MOUTH BY MOUTH THREE TIMES DAILY, Disp: 90 capsule, Rfl: 3    insulin lispro (HUMALOG U-100 INSULIN) 100 unit/mL injection, USE AS DIRECTED via insulin pump approx 150 UNITS EVERY DAY., Disp: 10 mL, Rfl: 12    lancets Misc, To check BG 3 times daily, to use with insurance preferred meter, Disp: 100 each, Rfl: prn    latanoprost 0.005 % ophthalmic solution, Place 1 drop into both eyes every evening., Disp: 2.5 mL, Rfl: 12    losartan (COZAAR) 100 MG tablet, Take 1 tablet (100 mg total) by mouth once daily., Disp: 90 tablet, Rfl: 3    metoprolol succinate (TOPROL-XL) 100 MG 24 hr tablet, Take 1 tablet (100 mg total) by mouth once daily., Disp: 90 tablet, Rfl: 3    MICROLET 2 LANCING DEVICE Kit, Use 4x/daily to check glucose., Disp: 1 each, Rfl: 2    pantoprazole (PROTONIX) 40 MG tablet, Take 1 tablet (40 mg total) by mouth once daily., Disp: 30 tablet, Rfl: 11    pen  "needle, diabetic 31 gauge x 5/16" Ndle, To use with insulin pens if needed, Disp: 100 each, Rfl: 12    timolol maleate 0.5% (TIMOPTIC) 0.5 % Drop, Place 1 drop into both eyes 2 (two) times daily., Disp: 10 mL, Rfl: 6     KNOWLEDGE OF SURGERY: Patient notes that they have been talking with treatment team about the risks and benefits of bariatric surgery and appears to generally have realistic expectations.  Patient reports asking questions of treatment team.  Patient reports being motivated for recovery, rehabilitation, and lifestyle changes to support an active post bariatric surgery life.     WEIGHT MANAGEMENT HISTORY: Patient notes she has struggled her weight early in life.  Patient notes in high school she was not heavy and notes she weighed approximately 98lbs when graduating high school.  Patient notes following high school she started taking birth control pills and gained notable weight after starting this.  Patient notes she slowly and progressively gained weight throughout her adult life with particular weight gain throughout the last 10 years.  Patietn notes hx of some difficulty with portion control.  Patient denies hx of binge eating.  Patient denies hx of purging, excessive laxative or dieuetic use.  Patient notes she has tried minimal diets in the past.  Patient notes over 20 years ago she tried Slim Fast on one occasion and lost some weight.        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  Dinner: Red Beans and Rice, Cornbread (Sister cooks)  Snacks: Chips, Fruit  Drinks: Water, Soda     Current Diet:   Patient notes she started evaluation for bariatric surgery in September.  Patient notes she is a bit confused about dietary recommendations she should be following.  Patient notes she has been working on portion control.   Breakfast: " Skip Breakfast or Grits, Eggs, Toast or Biscuit, Bruno or Sausage  Lunch: Frozen meal, sandwich or left overs.  Dinner: Red Beans and Rice, Cornbread (Sister cooks)  Snacks: Fruit, Sardines and Crakers  Drinks: Water, Soda     Exercise:  Patient notes she does not enjoy exercise and does not engage in much physical activity.      MEDICAL ADHERENCE: Patient notes she struggles with medication adherence.  Patient notes she would like to get better with taking medication as prescribed.  Patient notes she woul dlike to come up with a system to better adhere to regimen.  Patient denies history of interpersonal conflict (anger management concerns, difficulty with doctors/nurses or leaving AMA.)         PSYCHOSOCIAL HISTORY   Patient notes she completed high school.  Patient notes she worked as a  and then worked at a [employer] for 12 years. Patient notes she has neuropathy in her feet so she was medically retired from the [employer] 5 years ago after 22 years of work.       SUPPORT SYSTEM FOR SURGERY  Patient notes two of her sisters reside close by to her and is a supportive person in her life.  Patient notes her other sister is a good source of support as well, mostly by phone.  Patient notes she resdies in [place] as well as [place].  Patient notes her son resides with her and he is also a source of support.  Patient notes her sister and son plan to assist her post-operatively.  Patient notes they both drive and have reliable transportation.  Patient notes she has one son and one gradson.       PSYCHIATRIC HISTORY   Patient notes she started to experience anxiety and depression 2-3 years ago.  Patient notes she has been experiencing episodes of panic for 2-3 years.  Patient notes she is prescribed Celexa and Buspar for symptoms of anxiety.     Patient denies hx of inpatient or outpatient mental health treatment.     Patient denies hx of bipolar disorder, schizophrenia.   Patient denies history  of manic symptoms or psychosis.       Patient denies hx of suicide attempt or self-injurious behavior.     Patient denies history of closed head injury, seizure or stroke.      Patient notes she struggled academically and does believes she might have a learning disability.  Patient notes she struggles with mathematics, reading and comprehension.        Patient denies history of interpersonal conflict (anger management concerns, difficulty with doctors/nurses or leaving AMA.)      Patient notes her sister passed away in her arms in her home in 2016.  Patient notes her Father passed away in 2016.  Patient notes her Brother passed away in 2015.  Patient notes her Mother passed away when patient was in high school.  Patient notes at times she experiences vivid images of her sister passing a times.  Patient denies nightmares, flashbacks, avoidance behavior.     PSYCHIATRIC SYMPTOMS     Mental Status Examination  Pt was alert, attentive, and cooperative with examination.  Mood was euthymic with appropriate and congruent affect.  Speech was generally clear and intelligible with normal rate and rhythm. Patient demonstrated capability of tracking conversation and providing appropriate responses.  Patient provided spontaneous discussion and engaged in reciprocal conversation.  Thought processes generally linear and goal directed. Thought content focused on present problem.  Memory and cognition demonstrated no gross impairments.  No evidence of perceptual disturbances noted at this time.  No evident psychomotor abnormalities observed at this time.  Insight and judgment fair-good.     Psychiatric Symptoms:     Depression: Patient notes       Anxiety:  Patient denies.     At the time of appointment patient denied suicidal and homicidal ideation, plan and intent.  Patient noted agreement to call 911 and/or present to the ED if she experienced such.       Little interest or pleasure in doing things: Nearly every day  Feeling  down, depressed, or hopeless: More than half the days  Trouble falling or staying asleep, or sleeping too much: Several days  Feeling tired or having little energy: Nearly every day  Poor appetite or overeating: More than half the days  Feeling bad about yourself - or that you are a failure or have let yourself or your family down: Nearly every day  Trouble concentrating on things, such as reading the newspaper or watching television: More than half the days  Moving or speaking so slowly that other people could have noticed. Or the opposite - being so fidgety or restless that you have been moving around a lot more than usual: More than half the days  Thoughts that you would be better off dead, or of hurting yourself in some way: More than half the days  PHQ-9 Total Score: 20     PHQ-9 Total Score: 20    GAD7 11/15/2022   1. Feeling nervous, anxious, or on edge? 2   2. Not being able to stop or control worrying? 2   3. Worrying too much about different things? 3   4. Trouble relaxing? 0   5. Being so restless that it is hard to sit still? 2   6. Becoming easily annoyed or irritable? 2   7. Feeling afraid as if something awful might happen? 2   CYNTHIA-7 Score 13      SUBSTANCE ABUSE/DEPENDENCY HISTORY   ETOH: Patient notes throughout much of her adult life she has drank ETOH daily.  Patient notes since retiring 5 years ago her ETOH use has significantly increased.  Patient notes she was drinking 1/2 a gallon of ETOH ([place]) over the course of 3 days.  Patient denies any legal issues related ETOH use.  Patient denies losing a job or having legal repurcussions related to ETOH use.  Patient denies hx of engagement in ETOH treatment.  Chacha notes her family (Sisters and Friend) wishes she would discontinue ETOH use.  Patient notes she does believe she gets some withdrawal symptoms due to not drinking; tremulousness, nervousness, irritability.    Illicit Drugs/Prescription Misuse: Patient denies.  Nicotine: Patient  denies.  Caffeine: 2 liter of soda every 3 days     FAMILY PSYCHIATRIC HISTORY  Patient denies.    FAMILY SUBSTANCE ABUSE/DEPENDENCY HISTORY  Father: ETOH Abuse  Brother: ETOH Abuse    IMPRESSIONS: Patient presetns today noting she has been drinking ETOH in excess for most of her adult life.  Patient notes ETOH use has significantly increased since retiring 5 years ago.  Patient recognizes she has symptoms of anxiety, depression and traits of PTSD and has never engaged in any mental health treatment outside of being prescribed psychiatric medication. Patient notes that ETOH does help manage symptoms of psychological distress and at times she does recognizie she is drinking to cope with symptoms. At this time, given the information provided, from a behavioral health perspective, patient currently presents with high risk factors that could potentially impact long term success post weight loss surgery.      DIAGNOSIS:  1. Alcohol use disorder, severe, dependence  Ambulatory referral/consult to Psychiatry      2. Depression, unspecified depression type        3. Anxiety disorder, unspecified type          PLAN:  1. Patient was encouraged to consider medically managed detox due to her reporting having withdrawal symptoms when she is not drinking ETOH.    2. Engagement in ETOH treatment is strongly recommended.  Provider informed patient that she would attempt to find ETOH treatment local to patient's home and message patient with resources.  3.  At this time, given the information provided, it is strongly recommended that patient engage in ETOH treatment, demonstrate a period of abstinence from ETOH.  After engagement in treatment and demonstration of abstinence from ETOH, it is recommended that patient be re-evaluated for bariatric surgery in 6 months or longer.  At that time, medical provider may wish to consider PETH testing.    4.  Patient to follow-up PRN.         Thank you very much for the opportunity to  participate in this patient's care. Please do not hesitate to contact us directly if we can be of any additional assistance.     Sincerely,  Courtney Han PsyD  Clinical Psychologist     60 min were spent with the patient and relevant others today.   20 min were spent in counseling and coordination of care.

## 2022-11-18 ENCOUNTER — OFFICE VISIT (OUTPATIENT)
Dept: BARIATRICS | Facility: CLINIC | Age: 57
End: 2022-11-18
Payer: COMMERCIAL

## 2022-11-18 VITALS
TEMPERATURE: 98 F | HEART RATE: 97 BPM | BODY MASS INDEX: 45.01 KG/M2 | WEIGHT: 208.63 LBS | SYSTOLIC BLOOD PRESSURE: 241 MMHG | DIASTOLIC BLOOD PRESSURE: 116 MMHG | RESPIRATION RATE: 16 BRPM | HEIGHT: 57 IN

## 2022-11-18 DIAGNOSIS — I10 UNCONTROLLED HYPERTENSION: Primary | ICD-10-CM

## 2022-11-18 PROCEDURE — 3080F PR MOST RECENT DIASTOLIC BLOOD PRESSURE >= 90 MM HG: ICD-10-PCS | Mod: CPTII,S$GLB,, | Performed by: NURSE PRACTITIONER

## 2022-11-18 PROCEDURE — 99211 OFF/OP EST MAY X REQ PHY/QHP: CPT | Mod: S$GLB,,, | Performed by: NURSE PRACTITIONER

## 2022-11-18 PROCEDURE — 1159F PR MEDICATION LIST DOCUMENTED IN MEDICAL RECORD: ICD-10-PCS | Mod: CPTII,S$GLB,, | Performed by: NURSE PRACTITIONER

## 2022-11-18 PROCEDURE — 99999 PR PBB SHADOW E&M-EST. PATIENT-LVL IV: CPT | Mod: PBBFAC,,, | Performed by: NURSE PRACTITIONER

## 2022-11-18 PROCEDURE — 3077F SYST BP >= 140 MM HG: CPT | Mod: CPTII,S$GLB,, | Performed by: NURSE PRACTITIONER

## 2022-11-18 PROCEDURE — 3080F DIAST BP >= 90 MM HG: CPT | Mod: CPTII,S$GLB,, | Performed by: NURSE PRACTITIONER

## 2022-11-18 PROCEDURE — 3052F PR MOST RECENT HEMOGLOBIN A1C LEVEL 8.0 - < 9.0%: ICD-10-PCS | Mod: CPTII,S$GLB,, | Performed by: NURSE PRACTITIONER

## 2022-11-18 PROCEDURE — 3008F PR BODY MASS INDEX (BMI) DOCUMENTED: ICD-10-PCS | Mod: CPTII,S$GLB,, | Performed by: NURSE PRACTITIONER

## 2022-11-18 PROCEDURE — 1159F MED LIST DOCD IN RCRD: CPT | Mod: CPTII,S$GLB,, | Performed by: NURSE PRACTITIONER

## 2022-11-18 PROCEDURE — 99999 PR PBB SHADOW E&M-EST. PATIENT-LVL IV: ICD-10-PCS | Mod: PBBFAC,,, | Performed by: NURSE PRACTITIONER

## 2022-11-18 PROCEDURE — 3008F BODY MASS INDEX DOCD: CPT | Mod: CPTII,S$GLB,, | Performed by: NURSE PRACTITIONER

## 2022-11-18 PROCEDURE — 3077F PR MOST RECENT SYSTOLIC BLOOD PRESSURE >= 140 MM HG: ICD-10-PCS | Mod: CPTII,S$GLB,, | Performed by: NURSE PRACTITIONER

## 2022-11-18 PROCEDURE — 99211 PR OFFICE/OUTPT VISIT, EST, LEVL I: ICD-10-PCS | Mod: S$GLB,,, | Performed by: NURSE PRACTITIONER

## 2022-11-18 PROCEDURE — 3052F HG A1C>EQUAL 8.0%<EQUAL 9.0%: CPT | Mod: CPTII,S$GLB,, | Performed by: NURSE PRACTITIONER

## 2022-11-18 NOTE — PROGRESS NOTES
Pt arrived ambulatory for [facility] appointment with Gwen Poe NP. Pt is AAOx3 and in good spirits, accompanied by a male (their relationship was not identified). VS as chart, pt complains of mild headache, reports that she did not take her blood pressure medicine today and does not take it regularly. Gwen notified and instructed to advise her to go to the ER. Instructed patient to go directly to the emergency room due to elevated blood pressure. Complete understanding verbalized.

## 2022-11-18 NOTE — PROGRESS NOTES
Spoke with nursing staff about patient with uncontrolled hypertension without symptoms. Unable to see pt at this time, referred to the ED.

## 2022-12-12 ENCOUNTER — OFFICE VISIT (OUTPATIENT)
Dept: BARIATRICS | Facility: CLINIC | Age: 57
End: 2022-12-12
Payer: COMMERCIAL

## 2022-12-12 VITALS
WEIGHT: 207.63 LBS | SYSTOLIC BLOOD PRESSURE: 205 MMHG | DIASTOLIC BLOOD PRESSURE: 83 MMHG | HEIGHT: 57 IN | HEART RATE: 90 BPM | TEMPERATURE: 98 F | RESPIRATION RATE: 16 BRPM | BODY MASS INDEX: 44.79 KG/M2

## 2022-12-12 DIAGNOSIS — I10 HTN (HYPERTENSION), BENIGN: ICD-10-CM

## 2022-12-12 DIAGNOSIS — G47.33 OSA (OBSTRUCTIVE SLEEP APNEA): ICD-10-CM

## 2022-12-12 DIAGNOSIS — E78.5 HYPERLIPIDEMIA, UNSPECIFIED HYPERLIPIDEMIA TYPE: ICD-10-CM

## 2022-12-12 DIAGNOSIS — Z71.41 ALCOHOL ABUSE COUNSELING AND SURVEILLANCE: ICD-10-CM

## 2022-12-12 DIAGNOSIS — E66.01 MORBID OBESITY: ICD-10-CM

## 2022-12-12 DIAGNOSIS — Z78.9 DAILY CONSUMPTION OF ALCOHOL: Primary | ICD-10-CM

## 2022-12-12 DIAGNOSIS — Z79.4 TYPE 2 DIABETES MELLITUS WITH DIABETIC POLYNEUROPATHY, WITH LONG-TERM CURRENT USE OF INSULIN: ICD-10-CM

## 2022-12-12 DIAGNOSIS — E11.42 TYPE 2 DIABETES MELLITUS WITH DIABETIC POLYNEUROPATHY, WITH LONG-TERM CURRENT USE OF INSULIN: ICD-10-CM

## 2022-12-12 PROCEDURE — 3077F PR MOST RECENT SYSTOLIC BLOOD PRESSURE >= 140 MM HG: ICD-10-PCS | Mod: CPTII,S$GLB,, | Performed by: NURSE PRACTITIONER

## 2022-12-12 PROCEDURE — 99999 PR PBB SHADOW E&M-EST. PATIENT-LVL V: CPT | Mod: PBBFAC,,, | Performed by: NURSE PRACTITIONER

## 2022-12-12 PROCEDURE — 3008F PR BODY MASS INDEX (BMI) DOCUMENTED: ICD-10-PCS | Mod: CPTII,S$GLB,, | Performed by: NURSE PRACTITIONER

## 2022-12-12 PROCEDURE — 1160F RVW MEDS BY RX/DR IN RCRD: CPT | Mod: CPTII,S$GLB,, | Performed by: NURSE PRACTITIONER

## 2022-12-12 PROCEDURE — 3052F HG A1C>EQUAL 8.0%<EQUAL 9.0%: CPT | Mod: CPTII,S$GLB,, | Performed by: NURSE PRACTITIONER

## 2022-12-12 PROCEDURE — 3079F PR MOST RECENT DIASTOLIC BLOOD PRESSURE 80-89 MM HG: ICD-10-PCS | Mod: CPTII,S$GLB,, | Performed by: NURSE PRACTITIONER

## 2022-12-12 PROCEDURE — 3077F SYST BP >= 140 MM HG: CPT | Mod: CPTII,S$GLB,, | Performed by: NURSE PRACTITIONER

## 2022-12-12 PROCEDURE — 3079F DIAST BP 80-89 MM HG: CPT | Mod: CPTII,S$GLB,, | Performed by: NURSE PRACTITIONER

## 2022-12-12 PROCEDURE — 99215 OFFICE O/P EST HI 40 MIN: CPT | Mod: S$GLB,,, | Performed by: NURSE PRACTITIONER

## 2022-12-12 PROCEDURE — 1159F PR MEDICATION LIST DOCUMENTED IN MEDICAL RECORD: ICD-10-PCS | Mod: CPTII,S$GLB,, | Performed by: NURSE PRACTITIONER

## 2022-12-12 PROCEDURE — 1159F MED LIST DOCD IN RCRD: CPT | Mod: CPTII,S$GLB,, | Performed by: NURSE PRACTITIONER

## 2022-12-12 PROCEDURE — 99999 PR PBB SHADOW E&M-EST. PATIENT-LVL V: ICD-10-PCS | Mod: PBBFAC,,, | Performed by: NURSE PRACTITIONER

## 2022-12-12 PROCEDURE — 1160F PR REVIEW ALL MEDS BY PRESCRIBER/CLIN PHARMACIST DOCUMENTED: ICD-10-PCS | Mod: CPTII,S$GLB,, | Performed by: NURSE PRACTITIONER

## 2022-12-12 PROCEDURE — 3052F PR MOST RECENT HEMOGLOBIN A1C LEVEL 8.0 - < 9.0%: ICD-10-PCS | Mod: CPTII,S$GLB,, | Performed by: NURSE PRACTITIONER

## 2022-12-12 PROCEDURE — 99215 PR OFFICE/OUTPT VISIT, EST, LEVL V, 40-54 MIN: ICD-10-PCS | Mod: S$GLB,,, | Performed by: NURSE PRACTITIONER

## 2022-12-12 PROCEDURE — 3008F BODY MASS INDEX DOCD: CPT | Mod: CPTII,S$GLB,, | Performed by: NURSE PRACTITIONER

## 2022-12-12 NOTE — PROGRESS NOTES
Medically Supervised Weight Loss Documentation    Chief Complaint   Patient presents with    Obesity    Follow-up    Nutrition Counseling       History of Present Illness:  Patient is a 57 y.o. female who is referred for evaluation of surgical treatment of morbid obesity. Patient has a Body mass index is 45.72 kg/m². kg/m².  She has known comorbidities of diabetes mellitus, dyslipidemia, and hypertension. Patient has attended the bariatric seminar and is most interested in gastric sleeve surgery surgery evaluation of surgical treatment of morbid obesity.       Comorbidities:   Past Medical History:   Diagnosis Date    Abnormal stress test 10/2016    Acne     Acute diastolic heart failure secondary to idiopathic cardiomyopathy 10/2016    Alcohol abuse     Allergy     Anxiety     Arthritis     Carpal tunnel syndrome of right wrist     bilateral    Cataract     Done OU    Chest pain     Diabetes mellitus     Type 2 IDDM - Uncontrolled    Diabetic neuropathy     Difficult intubation     needed glidescope for cholecystectomy 2009    Dry scalp     Fatty liver     GERD (gastroesophageal reflux disease)     on no meds    Glaucoma     Hyperlipidemia     Hypertension     Insomnia     Knee pain     Morbid obesity     Neuropathy due to type 2 diabetes mellitus     anaya feet    MARLON (obstructive sleep apnea)     CPAP, says she is compliant with use    Parotid mass 2014    had Biopsy    SOB (shortness of breath) 10/2016    Tachycardia     frequently,chronic for years per chart    Type 2 diabetes mellitus with both eyes affected by mild nonproliferative retinopathy without macular edema, without long-term current use of insulin      Past Surgical History:   Procedure Laterality Date    CARDIAC CATHETERIZATION  2016    [facility]DL per patient    CATARACT EXTRACTION Right 08/09/2018    Dr Boothe    CATARACT EXTRACTION W/  INTRAOCULAR LENS IMPLANT Right 8/9/2018    Procedure: EXTRACTION, CATARACT, WITH IOL INSERTION;  Surgeon: Isaac  Tl Tate MD;  Location: [facility] OR;  Service: Ophthalmology;  Laterality: Right;  Topical w/ MAC    CATARACT EXTRACTION W/  INTRAOCULAR LENS IMPLANT Left 2018    Dr Boothe     SECTION      x 1    CHOLECYSTECTOMY      COLONOSCOPY N/A 10/21/2021    Procedure: COLONOSCOPY;  Surgeon: Rory Jones MD;  Location: [facility];  Service: Endoscopy;  Laterality: N/A;    ESOPHAGOGASTRODUODENOSCOPY  2009    Dr GRACIELA Jones - [facility]    HEMORRHOID SURGERY  2012    PPH with External Hemorrhoidectomy - Dr JAMAL Rollins, had spinal anesthesia    HYSTEROSCOPIC POLYPECTOMY OF UTERUS N/A 2019    Procedure: POLYPECTOMY, UTERUS, HYSTEROSCOPIC;  Surgeon: Lauren Reid MD;  Location: [facility];  Service: OB/GYN;  Laterality: N/A;  Myosure    HYSTEROSCOPY WITH DILATION AND CURETTAGE OF UTERUS N/A 2019    Procedure: HYSTEROSCOPY, WITH DILATION AND CURETTAGE OF UTERUS;  Surgeon: Lauren Reid MD;  Location: [facility];  Service: OB/GYN;  Laterality: N/A;  Fractional D&C    ORIF FEMUR FRACTURE Left     IM dmitry.      PHACOEMULSIFICATION OF CATARACT Left 2018    Procedure: PHACOEMULSIFICATION, CATARACT;  Surgeon: Isaac Boothe Jr., MD;  Location: [facility] OR;  Service: Ophthalmology;  Laterality: Left;    TRIGGER FINGER RELEASE Right     TUBAL LIGATION      yag cap OS  Left 10/23/2018     Family History   Problem Relation Age of Onset    Diabetes Mother     Kidney disease Mother     Hyperlipidemia Father     Heart disease Father     Hypertension Father     Diabetes Sister     Heart attacks under age 50 Brother     No Known Problems Maternal Grandmother     No Known Problems Maternal Grandfather     No Known Problems Paternal Grandmother     No Known Problems Paternal Grandfather     Diabetes Sister     Hepatitis Brother     Kidney failure Sister     Diabetes Sister     Amblyopia Neg Hx     Blindness Neg Hx     Cancer Neg Hx     Cataracts Neg Hx     Glaucoma Neg Hx     Macular degeneration Neg Hx      Retinal detachment Neg Hx     Strabismus Neg Hx     Stroke Neg Hx     Thyroid disease Neg Hx      Social History     Tobacco Use    Smoking status: Never    Smokeless tobacco: Never   Substance Use Topics    Alcohol use: Yes     Alcohol/week: 42.0 standard drinks     Types: 42 Shots of liquor per week     Comment: Carmelina, unsure of daily or not?     Drug use: No        Review of patient's allergies indicates:   Allergen Reactions    Morphine Hives and Itching     Other reaction(s): Hives       Medications:  Current Outpatient Medications on File Prior to Visit   Medication Sig Dispense Refill    albuterol (PROVENTIL/VENTOLIN HFA) 90 mcg/actuation inhaler INHALE 1 PUFF BY MOUTH EVERY 4 HOURS AS NEEDED for SHORTNESS OF BREATH Strength: 90 mcg/actuation 18 g 5    amLODIPine (NORVASC) 10 MG tablet Take 10 mg by mouth.      blood sugar diagnostic Strp To check BG 3 times daily, to use with insurance preferred meter 100 strip 12    blood-glucose meter kit To check BG 3 times daily, to use with insurance preferred meter 1 each 0    busPIRone (BUSPAR) 10 MG tablet TAKE 1 TABLET BY MOUTH THREE TIMES DAILY 90 tablet 11    chlorthalidone (HYGROTEN) 25 MG Tab Take 1 tablet (25 mg total) by mouth once daily. 90 tablet 3    citalopram (CELEXA) 40 MG tablet TAKE 1 TABLET BY MOUTH DAILY 90 tablet 3    diphenhydrAMINE (BENADRYL) 25 mg capsule Take 25 mg by mouth every evening. USE PM BEFORE SURGERY      ergocalciferol (ERGOCALCIFEROL) 50,000 unit Cap Take 1 capsule (50,000 Units total) by mouth every 7 days. 12 capsule 1    fluticasone propionate (FLONASE) 50 mcg/actuation nasal spray 1 spray by Nasal route.      gabapentin (NEURONTIN) 300 MG capsule TAKE 1 CAPSULE BY MOUTH BY MOUTH THREE TIMES DAILY 90 capsule 3    insulin lispro (HUMALOG U-100 INSULIN) 100 unit/mL injection USE AS DIRECTED via insulin pump approx 150 UNITS EVERY DAY. 10 mL 12    lancets Misc To check BG 3 times daily, to use with insurance preferred meter 100  "each prn    latanoprost 0.005 % ophthalmic solution Place 1 drop into both eyes every evening. 2.5 mL 12    losartan (COZAAR) 100 MG tablet Take 1 tablet (100 mg total) by mouth once daily. 90 tablet 3    metoprolol succinate (TOPROL-XL) 100 MG 24 hr tablet Take 1 tablet (100 mg total) by mouth once daily. 90 tablet 3    MICROLET 2 LANCING DEVICE Kit Use 4x/daily to check glucose. 1 each 2    pantoprazole (PROTONIX) 40 MG tablet Take 1 tablet (40 mg total) by mouth once daily. 30 tablet 11    pen needle, diabetic 31 gauge x 5/16" Ndle To use with insulin pens if needed 100 each 12    timolol maleate 0.5% (TIMOPTIC) 0.5 % Drop Place 1 drop into both eyes 2 (two) times daily. 10 mL 6     No current facility-administered medications on file prior to visit.         Body mass index is 45.72 kg/m².     Beginning Weight: 216 lbs    Last Weight: 208 lbs    Current Weight: 207 lbs   Weight Change: -11 lbs      Body comp:  Fat Percent:  48.1 %  Fat Mass:  99.8 lb  FFM:  107.8 lb  TBW: 77.4 lb  TBW %:  37.3 %  BMR: 1529 kcal    Wt Readings from Last 5 Encounters:   12/12/22 94.2 kg (207 lb 9.6 oz)   11/18/22 94.3 kg (208 lb)   11/18/22 94.6 kg (208 lb 9.6 oz)   10/25/22 96.2 kg (212 lb 3.1 oz)   10/20/22 97.3 kg (214 lb 8.1 oz)         Chart review:  Primary Care Physician: Yoandy      Lab review:  Most Recent Data:  CBC:   Lab Results   Component Value Date    WBC 7.98 11/18/2022    HGB 13.2 11/18/2022    HCT 40.7 11/18/2022     (H) 11/18/2022    MCV 94 11/18/2022    RDW 13.4 11/18/2022     BMP:   Lab Results   Component Value Date     11/18/2022    K 3.4 (L) 11/18/2022     11/18/2022    CO2 33 (H) 11/18/2022    BUN 12 11/18/2022    CREATININE 0.69 11/18/2022     (H) 11/18/2022    CALCIUM 9.1 11/18/2022    MG 1.9 08/07/2020     LFTs:   Lab Results   Component Value Date    PROT 8.5 (H) 11/18/2022    ALBUMIN 3.4 (L) 11/18/2022    BILITOT 0.7 11/18/2022    AST 69 (H) 11/18/2022    ALKPHOS 183 (H) " "11/18/2022    ALT 23 11/18/2022     Coags:   Lab Results   Component Value Date    INR 1.0 10/24/2016     FLP:   Lab Results   Component Value Date    CHOL 221 (H) 04/28/2022    HDL 46 04/28/2022    LDLCALC 150.2 04/28/2022    TRIG 124 04/28/2022    CHOLHDL 20.8 04/28/2022     DM:   Lab Results   Component Value Date    HGBA1C 8.5 (H) 09/08/2022    HGBA1C 7.8 (H) 04/28/2022    HGBA1C 7.8 (H) 01/04/2022    GLUF 238 (H) 08/07/2008    LDLCALC 150.2 04/28/2022    CREATININE 0.69 11/18/2022     Thyroid:   Lab Results   Component Value Date    TSH 2.269 08/07/2020     Anemia:   Lab Results   Component Value Date    FERRITIN 448 (H) 06/15/2016     Cardiac:   Lab Results   Component Value Date    TROPONINI <0.012 11/18/2022     Urinalysis:   Lab Results   Component Value Date    LABURIN No significant growth 05/01/2019    COLORU Yellow 05/01/2019    SPECGRAV 1.010 05/01/2019    NITRITE Negative 05/01/2019    KETONESU Negative 05/01/2019    UROBILINOGEN 1.0 01/19/2018    WBCUA 4 04/24/2019         Radiology review: Images independently reviewed and interpreted.    UGI- normal UGI      Other Results:  EKG (my interpretation): normal sinus rhythm.        Review of Systems:  Review of Systems   HENT:  Positive for sinus pressure and sneezing.    Eyes:  Positive for discharge and itching.   Respiratory:  Positive for cough.    Cardiovascular:  Positive for chest pain.   Genitourinary:  Positive for difficulty urinating.   Musculoskeletal:  Positive for back pain.   Skin:  Positive for wound.   Neurological:  Positive for dizziness and headaches.   Psychiatric/Behavioral:  Positive for sleep disturbance. The patient is nervous/anxious.    All other systems reviewed and are negative.    Physical:     Vital Signs (Most Recent)  Temp: 98.2 °F (36.8 °C) (12/12/22 1108)  Pulse: 90 (12/12/22 1108)  Resp: 16 (12/12/22 1108)  BP: (!) 205/83 (12/12/22 1108)  4' 8.5" (1.435 m)  94.2 kg (207 lb 9.6 oz)     Physical Exam:  Physical " Exam  Vitals and nursing note reviewed.   Constitutional:       General: She is not in acute distress.     Appearance: Normal appearance. She is obese. She is not ill-appearing or toxic-appearing.   HENT:      Head: Normocephalic and atraumatic.      Right Ear: External ear normal.      Left Ear: External ear normal.      Nose: Nose normal. No congestion or rhinorrhea.      Mouth/Throat:      Mouth: Mucous membranes are moist.      Pharynx: Oropharynx is clear.   Eyes:      General:         Right eye: No discharge.         Left eye: No discharge.      Conjunctiva/sclera: Conjunctivae normal.   Cardiovascular:      Rate and Rhythm: Normal rate and regular rhythm.      Pulses: Normal pulses.      Heart sounds: Normal heart sounds. No murmur heard.  Pulmonary:      Effort: Pulmonary effort is normal. No respiratory distress.      Breath sounds: Normal breath sounds. No stridor. No wheezing.   Chest:      Chest wall: No tenderness.   Abdominal:      General: Bowel sounds are normal. There is no distension.      Palpations: There is no mass.      Tenderness: There is no abdominal tenderness. There is no guarding.      Hernia: No hernia is present.   Musculoskeletal:         General: No swelling, tenderness, deformity or signs of injury. Normal range of motion.      Right lower leg: No edema.      Left lower leg: No edema.   Skin:     General: Skin is warm and dry.      Capillary Refill: Capillary refill takes less than 2 seconds.      Coloration: Skin is not jaundiced or pale.      Findings: No bruising, erythema or rash.   Neurological:      General: No focal deficit present.      Mental Status: She is alert and oriented to person, place, and time.      Motor: No weakness.      Gait: Gait normal.   Psychiatric:         Mood and Affect: Mood normal.         Behavior: Behavior normal.         Thought Content: Thought content normal.         Judgment: Judgment normal.     ASSESSMENT/PLAN:        1. Daily consumption of  "alcohol  Ambulatory referral/consult to Addiction Specialist      2. Alcohol abuse counseling and surveillance  Ambulatory referral/consult to Addiction Specialist    Ambulatory referral/consult to Addiction Psychiatry      3. Morbid obesity        4. BMI 45.0-49.9, adult        5. Type 2 diabetes mellitus with diabetic polyneuropathy, with long-term current use of insulin        6. MARLON (obstructive sleep apnea)        7. Hyperlipidemia, unspecified hyperlipidemia type        8. HTN (hypertension), benign            Continue with Dr. Riley's established plan on initial evaluation        MSD 2/4     Patient will need:     Labs- will need new before surgery   EKG- done  UGI- done  dietary consult- done  psych consult- Dr. Han- will need re-eval after etoh abstinence.   Seminar- done  ETOH use- referral placed     Will obtain the following clearances prior to surgery:   Cardiology- 10/20/22- done      Diet Education Discussed:  Recommend high protein, low carb meals- mainly meats and vegetables.    Breakfast:  skip based on sleeping late  Lunch:  sandwich with white bread, mustard, lunch meat  Snacking: chips- lays, grapes and bananas  Dinner:  cabbage and meatloaf- peas/corn   Banana bread  Water: 1 (16oz) bottles   Soda- no carbonation  ETOH- Carmelina (3 shot)-- 1/3 of gallon bottle daily   Mixed with Diet Coke "make happy all day"  Hard candy every night (handful everyday)    MVI: none      Exercise/Activity Discussed: Recommend Cardiovascular exercise, get HR over 100 for 20 minutes 3 times per week-   Drive to sisters home approx 2 house from her house      Plan for this patient:  Hypertension- medication adherence, admits taking before she came to appointment  Diet adherence- need better consistency   Transition to 45 calorie bread   Limit bananas based on sugar content   Limit candy   Limit simple carbs   Right choice on vegetables- limit corn, peas   Start 60 gram per day protein   Track foods with " iraida  Tanita scale results reviewed with patient  Exercise/Activity adherence- increase activity  Fall reviewed with patient  Start multivitamins- discussion of post-operative life long MVI need, including Calcium, and a B-Complex  Alcohol counseling- referral placed        I spent a total of 40 minutes on the day of the visit.This includes face to face time and non-face to face time preparing to see the patient (eg, review of tests), obtaining and/or reviewing separately obtained history, documenting clinical information in the electronic or other health record, independently interpreting results and communicating results to the patient/family/caregiver, or care coordinator.

## 2022-12-27 ENCOUNTER — PATIENT OUTREACH (OUTPATIENT)
Dept: ADMINISTRATIVE | Facility: HOSPITAL | Age: 57
End: 2022-12-27
Payer: COMMERCIAL

## 2022-12-27 NOTE — PROGRESS NOTES
UNCONTROLLED A1C REPORT.  PATIENT HAS AN UPCOMING LAB APPOINTMENT.  CHART REVIEWED;  LABS ORDERED AND LINKED WHAT IS NEEDED    Hemoglobin A1C   Date Value Ref Range Status   09/08/2022 8.5 (H) 4.0 - 5.6 % Final     Comment:     ADA Screening Guidelines:  5.7-6.4%  Consistent with prediabetes  >or=6.5%  Consistent with diabetes    High levels of fetal hemoglobin interfere with the HbA1C  assay. Heterozygous hemoglobin variants (HbS, HgC, etc)do  not significantly interfere with this assay.   However, presence of multiple variants may affect accuracy.     04/28/2022 7.8 (H) 4.0 - 5.6 % Final     Comment:     ADA Screening Guidelines:  5.7-6.4%  Consistent with prediabetes  >or=6.5%  Consistent with diabetes    High levels of fetal hemoglobin interfere with the HbA1C  assay. Heterozygous hemoglobin variants (HbS, HgC, etc)do  not significantly interfere with this assay.   However, presence of multiple variants may affect accuracy.     01/04/2022 7.8 (H) 4.0 - 5.6 % Final     Comment:     ADA Screening Guidelines:  5.7-6.4%  Consistent with prediabetes  >or=6.5%  Consistent with diabetes    High levels of fetal hemoglobin interfere with the HbA1C  assay. Heterozygous hemoglobin variants (HbS, HgC, etc)do  not significantly interfere with this assay.   However, presence of multiple variants may affect accuracy.

## 2022-12-30 ENCOUNTER — TELEPHONE (OUTPATIENT)
Dept: OPHTHALMOLOGY | Facility: CLINIC | Age: 57
End: 2022-12-30
Payer: COMMERCIAL

## 2022-12-30 NOTE — TELEPHONE ENCOUNTER
----- Message from Pepper De Souza sent at 12/30/2022 11:32 AM CST -----  Contact: patient  Type:  Sooner Apoointment Request      Name of Caller: patient     When is the first available appointment?3/7    Symptoms: eye exam     Would the patient rather a call back or a response via [facility]? Call     Best Call Back Number:[phone] (home)      Additional Information:

## 2023-01-05 ENCOUNTER — LAB VISIT (OUTPATIENT)
Dept: LAB | Facility: CLINIC | Age: 58
End: 2023-01-05
Payer: COMMERCIAL

## 2023-01-05 DIAGNOSIS — E11.9 DIABETES MELLITUS WITHOUT COMPLICATION: ICD-10-CM

## 2023-01-05 DIAGNOSIS — Z79.4 TYPE 2 DIABETES MELLITUS WITH DIABETIC POLYNEUROPATHY, WITH LONG-TERM CURRENT USE OF INSULIN: ICD-10-CM

## 2023-01-05 DIAGNOSIS — E11.42 TYPE 2 DIABETES MELLITUS WITH DIABETIC POLYNEUROPATHY, WITH LONG-TERM CURRENT USE OF INSULIN: ICD-10-CM

## 2023-01-05 LAB
ALBUMIN/CREAT UR: 4302.9 UG/MG (ref 0–30)
CREAT UR-MCNC: 102 MG/DL (ref 15–325)
ESTIMATED AVG GLUCOSE: 169 MG/DL (ref 68–131)
HBA1C MFR BLD: 7.5 % (ref 4–5.6)
MICROALBUMIN UR DL<=1MG/L-MCNC: 4389 UG/ML

## 2023-01-05 PROCEDURE — 36415 PR COLLECTION VENOUS BLOOD,VENIPUNCTURE: ICD-10-PCS | Mod: ,,, | Performed by: STUDENT IN AN ORGANIZED HEALTH CARE EDUCATION/TRAINING PROGRAM

## 2023-01-05 PROCEDURE — 82570 ASSAY OF URINE CREATININE: CPT | Performed by: FAMILY MEDICINE

## 2023-01-05 PROCEDURE — 83036 HEMOGLOBIN GLYCOSYLATED A1C: CPT | Performed by: NURSE PRACTITIONER

## 2023-01-05 PROCEDURE — 36415 COLL VENOUS BLD VENIPUNCTURE: CPT | Mod: ,,, | Performed by: STUDENT IN AN ORGANIZED HEALTH CARE EDUCATION/TRAINING PROGRAM

## 2023-01-12 ENCOUNTER — OFFICE VISIT (OUTPATIENT)
Dept: ENDOCRINOLOGY | Facility: CLINIC | Age: 58
End: 2023-01-12
Payer: COMMERCIAL

## 2023-01-12 VITALS
HEIGHT: 56 IN | OXYGEN SATURATION: 99 % | BODY MASS INDEX: 47.21 KG/M2 | WEIGHT: 209.88 LBS | DIASTOLIC BLOOD PRESSURE: 80 MMHG | SYSTOLIC BLOOD PRESSURE: 128 MMHG | HEART RATE: 102 BPM

## 2023-01-12 DIAGNOSIS — I10 PRIMARY HYPERTENSION: ICD-10-CM

## 2023-01-12 DIAGNOSIS — Z96.41 INSULIN PUMP STATUS: ICD-10-CM

## 2023-01-12 DIAGNOSIS — Z79.4 TYPE 2 DIABETES MELLITUS WITH DIABETIC POLYNEUROPATHY, WITH LONG-TERM CURRENT USE OF INSULIN: Primary | ICD-10-CM

## 2023-01-12 DIAGNOSIS — E66.01 MORBID OBESITY WITH BMI OF 45.0-49.9, ADULT: ICD-10-CM

## 2023-01-12 DIAGNOSIS — E78.5 HYPERLIPIDEMIA, UNSPECIFIED HYPERLIPIDEMIA TYPE: ICD-10-CM

## 2023-01-12 DIAGNOSIS — E11.42 TYPE 2 DIABETES MELLITUS WITH DIABETIC POLYNEUROPATHY, WITH LONG-TERM CURRENT USE OF INSULIN: Primary | ICD-10-CM

## 2023-01-12 DIAGNOSIS — Z46.81 FITTING OR ADJUSTMENT OF INSULIN PUMP: ICD-10-CM

## 2023-01-12 PROCEDURE — 99999 PR PBB SHADOW E&M-EST. PATIENT-LVL V: CPT | Mod: PBBFAC,,, | Performed by: NURSE PRACTITIONER

## 2023-01-12 PROCEDURE — 3066F PR DOCUMENTATION OF TREATMENT FOR NEPHROPATHY: ICD-10-PCS | Mod: CPTII,S$GLB,, | Performed by: NURSE PRACTITIONER

## 2023-01-12 PROCEDURE — 3074F SYST BP LT 130 MM HG: CPT | Mod: CPTII,S$GLB,, | Performed by: NURSE PRACTITIONER

## 2023-01-12 PROCEDURE — 3062F PR POS MACROALBUMINURIA RESULT DOCUMENTED/REVIEW: ICD-10-PCS | Mod: CPTII,S$GLB,, | Performed by: NURSE PRACTITIONER

## 2023-01-12 PROCEDURE — 3051F PR MOST RECENT HEMOGLOBIN A1C LEVEL 7.0 - < 8.0%: ICD-10-PCS | Mod: CPTII,S$GLB,, | Performed by: NURSE PRACTITIONER

## 2023-01-12 PROCEDURE — 99214 OFFICE O/P EST MOD 30 MIN: CPT | Mod: 25,S$GLB,, | Performed by: NURSE PRACTITIONER

## 2023-01-12 PROCEDURE — 3008F PR BODY MASS INDEX (BMI) DOCUMENTED: ICD-10-PCS | Mod: CPTII,S$GLB,, | Performed by: NURSE PRACTITIONER

## 2023-01-12 PROCEDURE — 3079F PR MOST RECENT DIASTOLIC BLOOD PRESSURE 80-89 MM HG: ICD-10-PCS | Mod: CPTII,S$GLB,, | Performed by: NURSE PRACTITIONER

## 2023-01-12 PROCEDURE — 3062F POS MACROALBUMINURIA REV: CPT | Mod: CPTII,S$GLB,, | Performed by: NURSE PRACTITIONER

## 2023-01-12 PROCEDURE — 1160F RVW MEDS BY RX/DR IN RCRD: CPT | Mod: CPTII,S$GLB,, | Performed by: NURSE PRACTITIONER

## 2023-01-12 PROCEDURE — 95251 CONT GLUC MNTR ANALYSIS I&R: CPT | Mod: S$GLB,,, | Performed by: NURSE PRACTITIONER

## 2023-01-12 PROCEDURE — 3008F BODY MASS INDEX DOCD: CPT | Mod: CPTII,S$GLB,, | Performed by: NURSE PRACTITIONER

## 2023-01-12 PROCEDURE — 1159F MED LIST DOCD IN RCRD: CPT | Mod: CPTII,S$GLB,, | Performed by: NURSE PRACTITIONER

## 2023-01-12 PROCEDURE — 99999 PR PBB SHADOW E&M-EST. PATIENT-LVL V: ICD-10-PCS | Mod: PBBFAC,,, | Performed by: NURSE PRACTITIONER

## 2023-01-12 PROCEDURE — 99214 PR OFFICE/OUTPT VISIT, EST, LEVL IV, 30-39 MIN: ICD-10-PCS | Mod: 25,S$GLB,, | Performed by: NURSE PRACTITIONER

## 2023-01-12 PROCEDURE — 1159F PR MEDICATION LIST DOCUMENTED IN MEDICAL RECORD: ICD-10-PCS | Mod: CPTII,S$GLB,, | Performed by: NURSE PRACTITIONER

## 2023-01-12 PROCEDURE — 1160F PR REVIEW ALL MEDS BY PRESCRIBER/CLIN PHARMACIST DOCUMENTED: ICD-10-PCS | Mod: CPTII,S$GLB,, | Performed by: NURSE PRACTITIONER

## 2023-01-12 PROCEDURE — 3079F DIAST BP 80-89 MM HG: CPT | Mod: CPTII,S$GLB,, | Performed by: NURSE PRACTITIONER

## 2023-01-12 PROCEDURE — 3051F HG A1C>EQUAL 7.0%<8.0%: CPT | Mod: CPTII,S$GLB,, | Performed by: NURSE PRACTITIONER

## 2023-01-12 PROCEDURE — 95251 PR GLUCOSE MONITOR, 72 HOUR, PHYS INTERP: ICD-10-PCS | Mod: S$GLB,,, | Performed by: NURSE PRACTITIONER

## 2023-01-12 PROCEDURE — 3074F PR MOST RECENT SYSTOLIC BLOOD PRESSURE < 130 MM HG: ICD-10-PCS | Mod: CPTII,S$GLB,, | Performed by: NURSE PRACTITIONER

## 2023-01-12 PROCEDURE — 3066F NEPHROPATHY DOC TX: CPT | Mod: CPTII,S$GLB,, | Performed by: NURSE PRACTITIONER

## 2023-01-12 NOTE — PROGRESS NOTES
ubjective:       Patient ID: Mine Quiros is a 57 y.o. female.    Chief Complaint: routine DM f/u     HPI   Pt is a 57 y.o. AAF with a long hx of very uncontrolled Type 2 DM-- as well as chronic conditions pending review including HTN, peripheral neuropathy, diastolic dysfunciton, morbid obesity- now on insulin pump.She has had multiple difficulties being able to navigate pump and having freq hypoglycemia. Medically disbabled she states due to neuropathy.     Interim Events: jan 12, 2022:  No focal complaints except discolored nails.  A1C much improved. C/o hypo last night with glucose of 45---but she had eaten pizza, grapefuit and a couple of mr. Good bars. No marked insulin dosing.  Asymptomatic--Suspect sensor error r/t sleeping on it likely.  Approved for bariatric surgery--if she can get the pre weight off.      Sept 19, 2022:  NO acute events. Did not take BP meds this AM--was in rush.  Sensor downloaded and reviewed. No c/o hypoglycemia.  Still drinking.  States foot wounds.       Sensor Interpretation       Prominent Theme/Finding:  No marked hyperglycemia. NO hypoglycemia noted except as stated in HPI.       Pump Model:Medtronic 770    Preferred Infusion Sets and Tubing:quick set   Frequency of glucose checks a day:4  Frequency of infusion set changes required:q3 days    Average amount of daily insulin used:100     Basal rates:  12 mid----1.8   8 am ---2.2 u/hr     CHO:1:3  ISF:1:20   IOB:4    Glucometer required to optimize pump function     Recommended back up plan in event of insulin pump hiatus: 60 units basal,  15-20 with meals.        Review of Systems   Constitutional:  Negative for activity change and fatigue.   HENT:  Negative for hearing loss and trouble swallowing.    Eyes:  Negative for photophobia and visual disturbance.        Last Eye Exam: June 2021   Respiratory:  Negative for cough and shortness of breath.    Cardiovascular:  Negative for chest pain and palpitations.    Gastrointestinal:  Negative for constipation and diarrhea.   Endocrine: Negative for polydipsia and polyuria.   Genitourinary:  Negative for frequency and urgency.   Musculoskeletal:  Negative for arthralgias, back pain and myalgias.   Skin:  Negative for rash and wound.   Allergic/Immunologic: Negative for food allergies.   Neurological:  Positive for numbness (hands and feet.). Negative for weakness.   Psychiatric/Behavioral:  Negative for sleep disturbance. The patient is not nervous/anxious.         Sleeps well with gabapentin.       Objective:      Physical Exam  Constitutional:       Appearance: Normal appearance. She is well-developed. She is obese.   HENT:      Head: Normocephalic and atraumatic.      Nose: Nose normal.   Eyes:      Extraocular Movements: Extraocular movements intact.      Conjunctiva/sclera: Conjunctivae normal.      Pupils: Pupils are equal, round, and reactive to light.   Neck:      Vascular: No carotid bruit.   Cardiovascular:      Rate and Rhythm: Normal rate and regular rhythm.      Pulses: Normal pulses.      Heart sounds: Normal heart sounds.   Pulmonary:      Effort: Pulmonary effort is normal.      Breath sounds: Normal breath sounds.   Musculoskeletal:         General: Normal range of motion.      Cervical back: Normal range of motion and neck supple.      Comments: Deferred Feet: no open wounds or calluses.  Good pedal care Pedal pulses +2 bilaterally.   Vibratory sensation slightly decreased bilaterally.     Note some superficial cracks in some calluses on R heel--counseled rubber flip flops likely making worse.    Lymphadenopathy:      Cervical: No cervical adenopathy.   Skin:     General: Skin is warm and dry.      Comments: Acanthosis nigricans    lipohypertrophy at infusion sites--reinforced proper site rotation ---again       Neurological:      General: No focal deficit present.      Mental Status: She is alert and oriented to person, place, and time.   Psychiatric:        "  Mood and Affect: Mood normal.         Behavior: Behavior normal.         Thought Content: Thought content normal.         Judgment: Judgment normal.         /80 (BP Method: X-Large (Manual))   Pulse 102   Ht 4' 8" (1.422 m)   Wt 95.2 kg (209 lb 14.1 oz)   LMP 08/31/2007 (Approximate)   SpO2 99%   BMI 47.05 kg/m²     Hemoglobin A1C   Date Value Ref Range Status   01/05/2023 7.5 (H) 4.0 - 5.6 % Final     Comment:     ADA Screening Guidelines:  5.7-6.4%  Consistent with prediabetes  >or=6.5%  Consistent with diabetes    High levels of fetal hemoglobin interfere with the HbA1C  assay. Heterozygous hemoglobin variants (HbS, HgC, etc)do  not significantly interfere with this assay.   However, presence of multiple variants may affect accuracy.     09/08/2022 8.5 (H) 4.0 - 5.6 % Final     Comment:     ADA Screening Guidelines:  5.7-6.4%  Consistent with prediabetes  >or=6.5%  Consistent with diabetes    High levels of fetal hemoglobin interfere with the HbA1C  assay. Heterozygous hemoglobin variants (HbS, HgC, etc)do  not significantly interfere with this assay.   However, presence of multiple variants may affect accuracy.     04/28/2022 7.8 (H) 4.0 - 5.6 % Final     Comment:     ADA Screening Guidelines:  5.7-6.4%  Consistent with prediabetes  >or=6.5%  Consistent with diabetes    High levels of fetal hemoglobin interfere with the HbA1C  assay. Heterozygous hemoglobin variants (HbS, HgC, etc)do  not significantly interfere with this assay.   However, presence of multiple variants may affect accuracy.         Chemistry        Component Value Date/Time     11/18/2022 1337    K 3.4 (L) 11/18/2022 1337     11/18/2022 1337    CO2 33 (H) 11/18/2022 1337    BUN 12 11/18/2022 1337    CREATININE 0.69 11/18/2022 1337     (H) 11/18/2022 1337        Component Value Date/Time    CALCIUM 9.1 11/18/2022 1337    ALKPHOS 183 (H) 11/18/2022 1337    AST 69 (H) 11/18/2022 1337    ALT 23 11/18/2022 1337    " BILITOT 0.7 11/18/2022 1337    ESTGFRAFRICA 58.4 (A) 04/28/2022 1011    EGFRNONAA 50.6 (A) 04/28/2022 1011          Lab Results   Component Value Date    CHOL 221 (H) 04/28/2022     Lab Results   Component Value Date    HDL 46 04/28/2022     Lab Results   Component Value Date    LDLCALC 150.2 04/28/2022     Lab Results   Component Value Date    TRIG 124 04/28/2022     Lab Results   Component Value Date    CHOLHDL 20.8 04/28/2022     Lab Results   Component Value Date    MICALBCREAT 4302.9 (H) 01/05/2023     Lab Results   Component Value Date    TSH 2.269 08/07/2020     Vit D, 25-Hydroxy   Date Value Ref Range Status   08/07/2020 16 (L) 30 - 96 ng/mL Final     Comment:     Vitamin D deficiency.........<10 ng/mL                              Vitamin D insufficiency......10-29 ng/mL       Vitamin D sufficiency........> or equal to 30 ng/mL  Vitamin D toxicity............>100 ng/mL             Assessment:       1. Type 2 diabetes mellitus with diabetic polyneuropathy, with long-term current use of insulin  Hemoglobin A1C    Lipid Panel    Microalbumin/Creatinine Ratio, Urine    Comprehensive Metabolic Panel      2. Hyperlipidemia, unspecified hyperlipidemia type        3. Primary hypertension        4. Morbid obesity with BMI of 45.0-49.9, adult        5. Insulin pump status        6. Fitting or adjustment of insulin pump                  Plan:     Pt not taking statin--states pill too large to swallow.     Encourage, diet,exercise, ETOH cessation, minimization.   .   Pt needs to f/u with Dr. Pitt.                   ORDERS 01/12/2023     4   mo with  fasting  cmp, lipids, a1c, urine mc/chetr

## 2023-01-23 ENCOUNTER — OFFICE VISIT (OUTPATIENT)
Dept: FAMILY MEDICINE | Facility: CLINIC | Age: 58
End: 2023-01-23
Payer: COMMERCIAL

## 2023-01-23 VITALS
TEMPERATURE: 98 F | HEIGHT: 56 IN | DIASTOLIC BLOOD PRESSURE: 80 MMHG | RESPIRATION RATE: 18 BRPM | HEART RATE: 110 BPM | SYSTOLIC BLOOD PRESSURE: 138 MMHG | OXYGEN SATURATION: 96 % | BODY MASS INDEX: 47.96 KG/M2 | WEIGHT: 213.19 LBS

## 2023-01-23 DIAGNOSIS — I15.2 HYPERTENSION ASSOCIATED WITH DIABETES: ICD-10-CM

## 2023-01-23 DIAGNOSIS — Z79.4 TYPE 2 DIABETES MELLITUS WITH DIABETIC POLYNEUROPATHY, WITH LONG-TERM CURRENT USE OF INSULIN: Primary | ICD-10-CM

## 2023-01-23 DIAGNOSIS — E11.59 HYPERTENSION ASSOCIATED WITH DIABETES: ICD-10-CM

## 2023-01-23 DIAGNOSIS — E66.01 MORBID OBESITY WITH BMI OF 45.0-49.9, ADULT: ICD-10-CM

## 2023-01-23 DIAGNOSIS — J30.9 ALLERGIC RHINITIS, UNSPECIFIED SEASONALITY, UNSPECIFIED TRIGGER: ICD-10-CM

## 2023-01-23 DIAGNOSIS — N18.30 STAGE 3 CHRONIC KIDNEY DISEASE, UNSPECIFIED WHETHER STAGE 3A OR 3B CKD: ICD-10-CM

## 2023-01-23 DIAGNOSIS — F41.9 ANXIETY: ICD-10-CM

## 2023-01-23 DIAGNOSIS — Z12.31 OTHER SCREENING MAMMOGRAM: ICD-10-CM

## 2023-01-23 DIAGNOSIS — F10.20 ALCOHOL USE DISORDER, SEVERE, DEPENDENCE: ICD-10-CM

## 2023-01-23 DIAGNOSIS — E11.42 TYPE 2 DIABETES MELLITUS WITH DIABETIC POLYNEUROPATHY, WITH LONG-TERM CURRENT USE OF INSULIN: Primary | ICD-10-CM

## 2023-01-23 DIAGNOSIS — K21.9 GASTROESOPHAGEAL REFLUX DISEASE, UNSPECIFIED WHETHER ESOPHAGITIS PRESENT: ICD-10-CM

## 2023-01-23 PROCEDURE — 3075F PR MOST RECENT SYSTOLIC BLOOD PRESS GE 130-139MM HG: ICD-10-PCS | Mod: CPTII,S$GLB,, | Performed by: FAMILY MEDICINE

## 2023-01-23 PROCEDURE — 3079F DIAST BP 80-89 MM HG: CPT | Mod: CPTII,S$GLB,, | Performed by: FAMILY MEDICINE

## 2023-01-23 PROCEDURE — 99214 PR OFFICE/OUTPT VISIT, EST, LEVL IV, 30-39 MIN: ICD-10-PCS | Mod: S$GLB,,, | Performed by: FAMILY MEDICINE

## 2023-01-23 PROCEDURE — 3008F BODY MASS INDEX DOCD: CPT | Mod: CPTII,S$GLB,, | Performed by: FAMILY MEDICINE

## 2023-01-23 PROCEDURE — 3062F PR POS MACROALBUMINURIA RESULT DOCUMENTED/REVIEW: ICD-10-PCS | Mod: CPTII,S$GLB,, | Performed by: FAMILY MEDICINE

## 2023-01-23 PROCEDURE — 3008F PR BODY MASS INDEX (BMI) DOCUMENTED: ICD-10-PCS | Mod: CPTII,S$GLB,, | Performed by: FAMILY MEDICINE

## 2023-01-23 PROCEDURE — 3051F PR MOST RECENT HEMOGLOBIN A1C LEVEL 7.0 - < 8.0%: ICD-10-PCS | Mod: CPTII,S$GLB,, | Performed by: FAMILY MEDICINE

## 2023-01-23 PROCEDURE — 1159F MED LIST DOCD IN RCRD: CPT | Mod: CPTII,S$GLB,, | Performed by: FAMILY MEDICINE

## 2023-01-23 PROCEDURE — 99999 PR PBB SHADOW E&M-EST. PATIENT-LVL V: ICD-10-PCS | Mod: PBBFAC,,, | Performed by: FAMILY MEDICINE

## 2023-01-23 PROCEDURE — 3051F HG A1C>EQUAL 7.0%<8.0%: CPT | Mod: CPTII,S$GLB,, | Performed by: FAMILY MEDICINE

## 2023-01-23 PROCEDURE — 3066F PR DOCUMENTATION OF TREATMENT FOR NEPHROPATHY: ICD-10-PCS | Mod: CPTII,S$GLB,, | Performed by: FAMILY MEDICINE

## 2023-01-23 PROCEDURE — 3066F NEPHROPATHY DOC TX: CPT | Mod: CPTII,S$GLB,, | Performed by: FAMILY MEDICINE

## 2023-01-23 PROCEDURE — 3075F SYST BP GE 130 - 139MM HG: CPT | Mod: CPTII,S$GLB,, | Performed by: FAMILY MEDICINE

## 2023-01-23 PROCEDURE — 1159F PR MEDICATION LIST DOCUMENTED IN MEDICAL RECORD: ICD-10-PCS | Mod: CPTII,S$GLB,, | Performed by: FAMILY MEDICINE

## 2023-01-23 PROCEDURE — 99214 OFFICE O/P EST MOD 30 MIN: CPT | Mod: S$GLB,,, | Performed by: FAMILY MEDICINE

## 2023-01-23 PROCEDURE — 99999 PR PBB SHADOW E&M-EST. PATIENT-LVL V: CPT | Mod: PBBFAC,,, | Performed by: FAMILY MEDICINE

## 2023-01-23 PROCEDURE — 3079F PR MOST RECENT DIASTOLIC BLOOD PRESSURE 80-89 MM HG: ICD-10-PCS | Mod: CPTII,S$GLB,, | Performed by: FAMILY MEDICINE

## 2023-01-23 PROCEDURE — 3062F POS MACROALBUMINURIA REV: CPT | Mod: CPTII,S$GLB,, | Performed by: FAMILY MEDICINE

## 2023-01-23 RX ORDER — FLUTICASONE PROPIONATE 50 MCG
2 SPRAY, SUSPENSION (ML) NASAL NIGHTLY
Qty: 16 G | Refills: 11 | Status: SHIPPED | OUTPATIENT
Start: 2023-01-23

## 2023-01-23 NOTE — PROGRESS NOTES
Subjective:       Patient ID: Mine Quiros is a 57 y.o. female.    Chief Complaint: Follow-up    HPI    Here for a f/u    dm2 with neuropathy:  Uncontrolled.   Sees endocrinology  On insulin pump     On medical [employer] disability since April 2017 due to neuropathy.       ckd stage 3 stable.      On celexa and buspar for anxiety. Stable.     Gerd-on protonix. Stable.    Htn-on losartan and toprol xl. Stable.    Reports postnasal drip x several months. Worse at end of day.     Still drinking 750 mg henrique every 3 days for several years. Is dependent on etoh.     Review of Systems    Objective:      Physical Exam  Constitutional:       Appearance: She is well-developed.   HENT:      Head: Normocephalic and atraumatic.   Eyes:      Conjunctiva/sclera: Conjunctivae normal.      Pupils: Pupils are equal, round, and reactive to light.   Cardiovascular:      Rate and Rhythm: Normal rate and regular rhythm.      Heart sounds: No murmur heard.  Pulmonary:      Effort: Pulmonary effort is normal.      Breath sounds: Normal breath sounds.   Musculoskeletal:      Cervical back: Normal range of motion.   Lymphadenopathy:      Cervical: No cervical adenopathy.         Hemoglobin A1C   Date Value Ref Range Status   01/05/2023 7.5 (H) 4.0 - 5.6 % Final     Comment:     ADA Screening Guidelines:  5.7-6.4%  Consistent with prediabetes  >or=6.5%  Consistent with diabetes    High levels of fetal hemoglobin interfere with the HbA1C  assay. Heterozygous hemoglobin variants (HbS, HgC, etc)do  not significantly interfere with this assay.   However, presence of multiple variants may affect accuracy.     09/08/2022 8.5 (H) 4.0 - 5.6 % Final     Comment:     ADA Screening Guidelines:  5.7-6.4%  Consistent with prediabetes  >or=6.5%  Consistent with diabetes    High levels of fetal hemoglobin interfere with the HbA1C  assay. Heterozygous hemoglobin variants (HbS, HgC, etc)do  not significantly interfere with this assay.   However,  presence of multiple variants may affect accuracy.     04/28/2022 7.8 (H) 4.0 - 5.6 % Final     Comment:     ADA Screening Guidelines:  5.7-6.4%  Consistent with prediabetes  >or=6.5%  Consistent with diabetes    High levels of fetal hemoglobin interfere with the HbA1C  assay. Heterozygous hemoglobin variants (HbS, HgC, etc)do  not significantly interfere with this assay.   However, presence of multiple variants may affect accuracy.         Assessment:       1. Type 2 diabetes mellitus with diabetic polyneuropathy, with long-term current use of insulin    2. Other screening mammogram    3. Alcohol use disorder, severe, dependence    4. Stage 3 chronic kidney disease, unspecified whether stage 3a or 3b CKD    5. Anxiety    6. Allergic rhinitis, unspecified seasonality, unspecified trigger    7. Hypertension associated with diabetes    8. Morbid obesity with BMI of 45.0-49.9, adult    9. Gastroesophageal reflux disease, unspecified whether esophagitis present        Plan:       Type 2 diabetes mellitus with diabetic polyneuropathy, with long-term current use of insulin    Other screening mammogram  -     Cancel: Mammo Digital Screening Bilat; Future    Alcohol use disorder, severe, dependence    Stage 3 chronic kidney disease, unspecified whether stage 3a or 3b CKD    Anxiety    Allergic rhinitis, unspecified seasonality, unspecified trigger    Hypertension associated with diabetes    Morbid obesity with BMI of 45.0-49.9, adult    Gastroesophageal reflux disease, unspecified whether esophagitis present    Other orders  -     fluticasone propionate (FLONASE) 50 mcg/actuation nasal spray; 2 sprays (100 mcg total) by Each Nostril route every evening.  Dispense: 16 g; Refill: 11          Plan:  Start flonase  Cont all other meds  F/u in 6 months        Medication List with Changes/Refills   Current Medications    ALBUTEROL (PROVENTIL/VENTOLIN HFA) 90 MCG/ACTUATION INHALER    INHALE 1 PUFF BY MOUTH EVERY 4 HOURS AS NEEDED  "for SHORTNESS OF BREATH Strength: 90 mcg/actuation    AMLODIPINE (NORVASC) 10 MG TABLET    Take 10 mg by mouth.    BLOOD SUGAR DIAGNOSTIC STRP    To check BG 3 times daily, to use with insurance preferred meter    BLOOD-GLUCOSE METER KIT    To check BG 3 times daily, to use with insurance preferred meter    BUSPIRONE (BUSPAR) 10 MG TABLET    TAKE 1 TABLET BY MOUTH THREE TIMES DAILY    CHLORTHALIDONE (HYGROTEN) 25 MG TAB    Take 1 tablet (25 mg total) by mouth once daily.    CITALOPRAM (CELEXA) 40 MG TABLET    TAKE 1 TABLET BY MOUTH DAILY    DIPHENHYDRAMINE (BENADRYL) 25 MG CAPSULE    Take 25 mg by mouth every evening. USE PM BEFORE SURGERY    ERGOCALCIFEROL (ERGOCALCIFEROL) 50,000 UNIT CAP    Take 1 capsule (50,000 Units total) by mouth every 7 days.    GABAPENTIN (NEURONTIN) 300 MG CAPSULE    TAKE 1 CAPSULE BY MOUTH BY MOUTH THREE TIMES DAILY    INSULIN LISPRO (HUMALOG U-100 INSULIN) 100 UNIT/ML INJECTION    USE AS DIRECTED via insulin pump approx 150 UNITS EVERY DAY.    LANCETS MISC    To check BG 3 times daily, to use with insurance preferred meter    LATANOPROST 0.005 % OPHTHALMIC SOLUTION    Place 1 drop into both eyes every evening.    LOSARTAN (COZAAR) 100 MG TABLET    Take 1 tablet (100 mg total) by mouth once daily.    METOPROLOL SUCCINATE (TOPROL-XL) 100 MG 24 HR TABLET    Take 1 tablet (100 mg total) by mouth once daily.    MICROLET 2 LANCING DEVICE KIT    Use 4x/daily to check glucose.    PANTOPRAZOLE (PROTONIX) 40 MG TABLET    Take 1 tablet (40 mg total) by mouth once daily.    PEN NEEDLE, DIABETIC 31 GAUGE X 5/16" NDLE    To use with insulin pens if needed    TIMOLOL MALEATE 0.5% (TIMOPTIC) 0.5 % DROP    Place 1 drop into both eyes 2 (two) times daily.   Changed and/or Refilled Medications    Modified Medication Previous Medication    FLUTICASONE PROPIONATE (FLONASE) 50 MCG/ACTUATION NASAL SPRAY fluticasone propionate (FLONASE) 50 mcg/actuation nasal spray       2 sprays (100 mcg total) by Each Nostril " route every evening.    1 spray by Nasal route.

## 2023-01-26 ENCOUNTER — TELEPHONE (OUTPATIENT)
Dept: ENDOCRINOLOGY | Facility: CLINIC | Age: 58
End: 2023-01-26
Payer: COMMERCIAL

## 2023-01-26 NOTE — TELEPHONE ENCOUNTER
----- Message from Latha Marroquin sent at 1/26/2023 10:29 AM CST -----  Regarding: med supply  Contact: Patient  Type: Needs Medical Advice  Who Called:  Patient  Symptoms (please be specific):    How long has patient had these symptoms:    Pharmacy name and phone #:    Best Call Back Number: [phone] (home)     Additional Information: Patient states the medical supply is still waiting for a referral from the doctor. Please call to advise. Thanks!

## 2023-01-26 NOTE — TELEPHONE ENCOUNTER
S/w pt and notified her that will check w/ nurses @ [facility] to see if they got a fax for pump supplies. Advised her  to call medical supply and have them fax and order to us to the [facility]. Pt given fax number and verb understanding

## 2023-01-27 ENCOUNTER — PATIENT MESSAGE (OUTPATIENT)
Dept: BARIATRICS | Facility: CLINIC | Age: 58
End: 2023-01-27

## 2023-01-27 ENCOUNTER — CLINICAL SUPPORT (OUTPATIENT)
Dept: BARIATRICS | Facility: CLINIC | Age: 58
End: 2023-01-27
Payer: COMMERCIAL

## 2023-01-27 VITALS — WEIGHT: 212.94 LBS | BODY MASS INDEX: 45.94 KG/M2 | HEIGHT: 57 IN

## 2023-01-27 DIAGNOSIS — Z71.3 ENCOUNTER FOR DIETARY COUNSELING AND SURVEILLANCE: ICD-10-CM

## 2023-01-27 DIAGNOSIS — E66.01 SEVERE OBESITY (BMI >= 40): ICD-10-CM

## 2023-01-27 PROCEDURE — 99999 PR PBB SHADOW E&M-EST. PATIENT-LVL III: ICD-10-PCS | Mod: PBBFAC,,,

## 2023-01-27 PROCEDURE — 97803 PR MED NUTR THER, SUBSQ, INDIV, EA 15 MIN: ICD-10-PCS | Mod: S$GLB,,,

## 2023-01-27 PROCEDURE — 97803 MED NUTRITION INDIV SUBSEQ: CPT | Mod: S$GLB,,,

## 2023-01-27 PROCEDURE — 99999 PR PBB SHADOW E&M-EST. PATIENT-LVL III: CPT | Mod: PBBFAC,,,

## 2023-01-27 NOTE — PROGRESS NOTES
"NUTRITION NOTE    Referring Physician: Dr. Riley  Reason for MNT Referral: Medically Supervised Diet pending Gastric Sleeve    PAST MEDICAL HISTORY:    Patient presents for 3/4 visit for MSD with weight gain over the past month; total weight loss by making numerous dietary and lifestyle changes.    Past Medical History:   Diagnosis Date    Abnormal stress test 10/2016    Acne     Acute diastolic heart failure secondary to idiopathic cardiomyopathy 10/2016    Alcohol abuse     Allergy     Anxiety     Arthritis     Carpal tunnel syndrome of right wrist     bilateral    Cataract     Done OU    Chest pain     Diabetes mellitus     Type 2 IDDM - Uncontrolled    Diabetic neuropathy     Difficult intubation     needed glidescope for cholecystectomy 2009    Dry scalp     Fatty liver     GERD (gastroesophageal reflux disease)     on no meds    Glaucoma     Hyperlipidemia     Hypertension     Insomnia     Knee pain     Morbid obesity     Neuropathy due to type 2 diabetes mellitus     anaya feet    MARLON (obstructive sleep apnea)     CPAP, says she is compliant with use    Parotid mass 2014    had Biopsy    SOB (shortness of breath) 10/2016    Tachycardia     frequently,chronic for years per chart    Type 2 diabetes mellitus with both eyes affected by mild nonproliferative retinopathy without macular edema, without long-term current use of insulin        CLINICAL DATA:  57 y.o. female.    There were no vitals filed for this visit.    Current Weight: 212 lbs  Weight Change Since Initial Visit: +5 lbs  Ideal Body Weight: 96 lbs  BMI: 46.90    CURRENT DIET:  Regular diet.  Diet Recall: Food records are not present.  Diet Includes:   Breakfast- wakes up at 1pm. Reports skipping, but may be hungry. She will have a large glass of water.  Lunch - 1pm, eating leftovers from dinner the night before which is often what her sister cooks. Yesterday's lunch - red beans, rice, corn bread, chicken  Dinner- "whatever my sister makes."  Protein " + side dish, protein + side dish + vegetable or sandwich with ham, mustard, 1 slice white bread  12-3pm snacks- banana+grapes or lemon heads  Meal Pattern: Irregular.  Protein Supplements: 0 per day.  Snacking: Adequate. Snacks include healthy choices and sweets.    Vegetables: Likes a variety. Eats 2-3 times per week.  Fruits: Likes a variety. Eats rarely.  Beverages: water and diet soda  Dining out:3x Weekly. Mostly fast food and take-out.  Cooking at home:multiple times Weekly. Mostly baked, grilled, smothered, and fried meat, fish, starchy CHO, and vegetables.    CURRENT EXERCISE:  None    Vitamins / Minerals / Herbs:   Vitamin D    Food Allergies:   NKFA    Social:  Retired.  Alcohol: Daily.4-5 solo cups of Carmelina with a splash of diet coke  Smoking: None.    ASSESSMENT:  Patient demonstrates no to change lifestyle habits as evidenced by no exercise, no food logs, and no dietary changes .    Doing poorly with working on greatest challenges (irregular meal pattern and alcohol usage).    Excess calorie intake.  Inadequate protein intake.  Expect poor compliance after surgery at this time because she has not made any lifestyle changes since her initial assessment.  She does not prepare any of her own food. Unsure if sister willing to make required dietary changes to menu. RD to inquire about sister joining for next appointment.     Patient must make radical lifestyle changes including initiating her own food preparation to ensure adequate adherence to guidelines or convince sister to make significant dietary changes and adhere to them long term.  If patient can be observed to make and maintain these significant dietary changes including complete alcohol cessation for the duration of the medically supervised dieting period than she may be reassessed to be a potential candidate for bariatric surgery.    No alcohol for 6 month post operation because alcohol is a gastric irritant. Encouraged decrease intake of solo  cups to 2xd. Spoke with NP regarding receiving additional psychiatric services to aid in alcohol consumption.   PLAN:    Diet: Adjust diet plan.    Exercise: Increase.    Behavior Modification: Begin to document food & activity logs daily. RD discussed dietary changes in the current medically supervised dieting period could lead to hypoglycemic episodes.  Discussed rule of 15/15. Begin trying various protein supplements to determine preference.Start MVI with iron.  Simple meal preparation such as eggs with whole wheat toast or fruit. Try tuna salad, chicken salad, or egg salad on whole grain crackers.  Consider cold cuts in whole grain wrap. Try Lean Cuisine, Healthy Choice, or Atkins meals for easy preparation.    Weight loss prior to surgery (5-10% TBW): -3 lbs    Return to clinic in one month.    SESSION TIME:  60 minutes

## 2023-02-24 ENCOUNTER — CLINICAL SUPPORT (OUTPATIENT)
Dept: BARIATRICS | Facility: CLINIC | Age: 58
End: 2023-02-24
Payer: COMMERCIAL

## 2023-02-24 VITALS — BODY MASS INDEX: 46.24 KG/M2 | WEIGHT: 214.31 LBS | HEIGHT: 57 IN

## 2023-02-24 DIAGNOSIS — Z71.3 ENCOUNTER FOR DIETARY COUNSELING AND SURVEILLANCE: ICD-10-CM

## 2023-02-24 DIAGNOSIS — E66.01 SEVERE OBESITY (BMI >= 40): ICD-10-CM

## 2023-02-24 PROCEDURE — 99999 PR PBB SHADOW E&M-EST. PATIENT-LVL III: ICD-10-PCS | Mod: PBBFAC,,,

## 2023-02-24 PROCEDURE — 97803 PR MED NUTR THER, SUBSQ, INDIV, EA 15 MIN: ICD-10-PCS | Mod: S$GLB,,,

## 2023-02-24 PROCEDURE — 97803 MED NUTRITION INDIV SUBSEQ: CPT | Mod: S$GLB,,,

## 2023-02-24 PROCEDURE — 99999 PR PBB SHADOW E&M-EST. PATIENT-LVL III: CPT | Mod: PBBFAC,,,

## 2023-02-24 NOTE — PROGRESS NOTES
"NUTRITION NOTE    Referring Physician: Dr. Riley  Reason for MNT Referral: Medically Supervised Diet pending Gastric Sleeve    PAST MEDICAL HISTORY:    Patient presents for 4/4 visit for MSD with weight gain over the past month; total weight loss by making numerous dietary and lifestyle changes.    Past Medical History:   Diagnosis Date    Abnormal stress test 10/2016    Acne     Acute diastolic heart failure secondary to idiopathic cardiomyopathy 10/2016    Alcohol abuse     Allergy     Anxiety     Arthritis     Carpal tunnel syndrome of right wrist     bilateral    Cataract     Done OU    Chest pain     Diabetes mellitus     Type 2 IDDM - Uncontrolled    Diabetic neuropathy     Difficult intubation     needed glidescope for cholecystectomy 2009    Dry scalp     Fatty liver     GERD (gastroesophageal reflux disease)     on no meds    Glaucoma     Hyperlipidemia     Hypertension     Insomnia     Knee pain     Morbid obesity     Neuropathy due to type 2 diabetes mellitus     anaya feet    MARLON (obstructive sleep apnea)     CPAP, says she is compliant with use    Parotid mass 2014    had Biopsy    SOB (shortness of breath) 10/2016    Tachycardia     frequently,chronic for years per chart    Type 2 diabetes mellitus with both eyes affected by mild nonproliferative retinopathy without macular edema, without long-term current use of insulin        CLINICAL DATA:  57 y.o. female.    There were no vitals filed for this visit.    Current Weight: 214 lbs  Weight Change Since Initial Visit: -2 lbs  Ideal Body Weight: 96 lbs  BMI: 47.20    CURRENT DIET:  Regular diet.  Diet Recall: Food records are not present.    Diet Includes: Breakfast- skipping due to sleeping late, Lunch - (12-1pm) grilled cheese or fried chicken or leftover pizza/chinese food; Dinner - (6pm) steak with yellow rice or cereal or fish stick sandwich w/ wheat bread, tomato, mustard; bedtime snack: grapefruit, marshmellow candies, or popsicles, "whatever candy " "is in my bed"  Meal Pattern: Irregular.  Protein Supplements: 0 per day.  Snacking: Excess. Snacks include healthy choices, junk foods, and sweets.    Vegetables: Likes a variety. Eats 2-3 times per week.  Fruits: Likes a variety. Eats rarely.  Beverages: water and diet soda  Dining out:3x Weekly. Mostly fast food and take-out.  Cooking at home:multiple times Weekly. Mostly baked, grilled, smothered, and fried meat, fish, starchy CHO, and vegetables.    CURRENT EXERCISE:  None    Vitamins / Minerals / Herbs: None. Purchased Woman's One-A-Day MVI but has misplaced since purchasing.    Food Allergies: NKFA, no intolerances noted    Social:  Retired.  Alcohol: Daily.4-5 solo cups of Carmelina with a splash of diet coke  Smoking: None.    ASSESSMENT:  Patient demonstrates no  willingness to change lifestyle habits as evidenced by weight gain, no exercise, and no decrease in alcohol consumption .    Doing poorly with working on greatest challenges (irregular meal pattern and alcohol usage).    Excess calorie intake.  Inadequate protein intake.    Expect poor compliance after surgery at this time because she has not made any lifestyle changes since her initial assessment. She does not prepare any of her own food. Unsure if sister willing to make required dietary changes to menu. RD to inquire about sister joining for next appointment. Sisters were unable to join for appointment today. She has not yet reviewed nutrition guidelines with sisters and son regarding pre and post-op diet progression as well.     Patient continues to experience episodes of hypoglycemia at 3-4am with blood glucose levels be within the 60s. Discussion of how to improve food quality of meals to aid in prevention of hypoglycemic episodes. Reviewed the rule of 15/15 to aid in increasing blood glucose levels. Appointment with endocrinologist scheduled for May.     Patient must make radical lifestyle changes including initiating her own food preparation to " ensure adequate adherence to guidelines or convince sisters and son to make significant dietary changes and adhere to them long term.  If patient can be observed to make and maintain these significant dietary changes including complete alcohol cessation for the duration of the medically supervised dieting period than she may be reassessed to be a potential candidate for bariatric surgery.     No alcohol for 6 month post operation because alcohol is a gastric irritant. Encouraged decrease intake of solo cups to 2xd. She was unable to decrease alcohol consumption when reviewed in today's session. RD continues to ensure she receive additional psychiatric services to aid in alcohol consumption.     PLAN:    Diet: Adjust diet plan.    Exercise: Increase.    Behavior Modification: Begin to document food & activity logs daily. Begin trying various protein supplements to determine preference. Begin utilizing protein shake for breakfast. Begin MVI with iron. Start simple meal preparation such as eggs with whole wheat toast or fruit. Try tuna salad, chicken salad, or egg salad on whole grain crackers. Consider cold cuts in whole grain wrap. Try Lean Cuisine, Healthy Choice, or Atkins meals for easy preparation.    Weight loss prior to surgery (5-10% TBW): -2 lbs    Return to clinic in one month.    SESSION TIME:  60 minutes

## 2023-03-08 ENCOUNTER — OFFICE VISIT (OUTPATIENT)
Dept: OPHTHALMOLOGY | Facility: CLINIC | Age: 58
End: 2023-03-08
Payer: COMMERCIAL

## 2023-03-08 DIAGNOSIS — E11.3312 MODERATE NONPROLIFERATIVE DIABETIC RETINOPATHY OF LEFT EYE WITH MACULAR EDEMA ASSOCIATED WITH TYPE 2 DIABETES MELLITUS: Primary | ICD-10-CM

## 2023-03-08 DIAGNOSIS — E11.42 TYPE 2 DIABETES MELLITUS WITH DIABETIC POLYNEUROPATHY, WITH LONG-TERM CURRENT USE OF INSULIN: ICD-10-CM

## 2023-03-08 DIAGNOSIS — H35.352 CME (CYSTOID MACULAR EDEMA), LEFT: ICD-10-CM

## 2023-03-08 DIAGNOSIS — H40.053 OCULAR HYPERTENSION, BILATERAL: ICD-10-CM

## 2023-03-08 DIAGNOSIS — Z79.4 TYPE 2 DIABETES MELLITUS WITH DIABETIC POLYNEUROPATHY, WITH LONG-TERM CURRENT USE OF INSULIN: ICD-10-CM

## 2023-03-08 DIAGNOSIS — Z96.1 PSEUDOPHAKIA, BOTH EYES: ICD-10-CM

## 2023-03-08 PROCEDURE — 3051F HG A1C>EQUAL 7.0%<8.0%: CPT | Mod: CPTII,S$GLB,, | Performed by: OPHTHALMOLOGY

## 2023-03-08 PROCEDURE — 1159F PR MEDICATION LIST DOCUMENTED IN MEDICAL RECORD: ICD-10-PCS | Mod: CPTII,S$GLB,, | Performed by: OPHTHALMOLOGY

## 2023-03-08 PROCEDURE — 3066F NEPHROPATHY DOC TX: CPT | Mod: CPTII,S$GLB,, | Performed by: OPHTHALMOLOGY

## 2023-03-08 PROCEDURE — 3051F PR MOST RECENT HEMOGLOBIN A1C LEVEL 7.0 - < 8.0%: ICD-10-PCS | Mod: CPTII,S$GLB,, | Performed by: OPHTHALMOLOGY

## 2023-03-08 PROCEDURE — 99214 OFFICE O/P EST MOD 30 MIN: CPT | Mod: S$GLB,,, | Performed by: OPHTHALMOLOGY

## 2023-03-08 PROCEDURE — 3066F PR DOCUMENTATION OF TREATMENT FOR NEPHROPATHY: ICD-10-PCS | Mod: CPTII,S$GLB,, | Performed by: OPHTHALMOLOGY

## 2023-03-08 PROCEDURE — 3062F POS MACROALBUMINURIA REV: CPT | Mod: CPTII,S$GLB,, | Performed by: OPHTHALMOLOGY

## 2023-03-08 PROCEDURE — 3062F PR POS MACROALBUMINURIA RESULT DOCUMENTED/REVIEW: ICD-10-PCS | Mod: CPTII,S$GLB,, | Performed by: OPHTHALMOLOGY

## 2023-03-08 PROCEDURE — 1159F MED LIST DOCD IN RCRD: CPT | Mod: CPTII,S$GLB,, | Performed by: OPHTHALMOLOGY

## 2023-03-08 PROCEDURE — 99999 PR PBB SHADOW E&M-EST. PATIENT-LVL IV: CPT | Mod: PBBFAC,,, | Performed by: OPHTHALMOLOGY

## 2023-03-08 PROCEDURE — 1160F RVW MEDS BY RX/DR IN RCRD: CPT | Mod: CPTII,S$GLB,, | Performed by: OPHTHALMOLOGY

## 2023-03-08 PROCEDURE — 99214 PR OFFICE/OUTPT VISIT, EST, LEVL IV, 30-39 MIN: ICD-10-PCS | Mod: S$GLB,,, | Performed by: OPHTHALMOLOGY

## 2023-03-08 PROCEDURE — 99999 PR PBB SHADOW E&M-EST. PATIENT-LVL IV: ICD-10-PCS | Mod: PBBFAC,,, | Performed by: OPHTHALMOLOGY

## 2023-03-08 PROCEDURE — 1160F PR REVIEW ALL MEDS BY PRESCRIBER/CLIN PHARMACIST DOCUMENTED: ICD-10-PCS | Mod: CPTII,S$GLB,, | Performed by: OPHTHALMOLOGY

## 2023-03-08 RX ORDER — TIMOLOL MALEATE 5 MG/ML
1 SOLUTION/ DROPS OPHTHALMIC 2 TIMES DAILY
Qty: 10 ML | Refills: 6 | Status: SHIPPED | OUTPATIENT
Start: 2023-03-08 | End: 2023-09-27

## 2023-03-08 NOTE — PROGRESS NOTES
HPI     Glaucoma     Additional comments: 1 month iop ck           Comments    DLS: 10/26/22    Pt states no va complaints at this time. Was seeing a yellow floater   starting 1 month ago but has not seen in a week. Has not been doing drops   since end of November.     Gtts: Latanoprost QHS, Timolol BID OU          Last edited by Cheryle Quintana on 3/8/2023 10:34 AM.        ROS    Negative for: Constitutional, Gastrointestinal, Neurological, Skin,   Genitourinary, Musculoskeletal, HENT, Endocrine, Cardiovascular, Eyes,   Respiratory, Psychiatric, Allergic/Imm, Heme/Lymph  Last edited by Isaac Boothe Jr., MD on 3/8/2023 10:58 AM.        Assessment /Plan     For exam results, see Encounter Report.    Moderate nonproliferative diabetic retinopathy of left eye with macular edema associated with type 2 diabetes mellitus    CME (cystoid macular edema), left    Ocular hypertension, bilateral  -     timolol maleate 0.5% (TIMOPTIC) 0.5 % Drop; Place 1 drop into both eyes 2 (two) times daily.  Dispense: 10 mL; Refill: 6    Pseudophakia, both eyes    Type 2 diabetes mellitus with diabetic polyneuropathy, with long-term current use of insulin      OCT MAC  OD  WNL   OS CME, intraretinal edema    IOP OD 17 OS 22, no taking drops since November  D/c latanoprost  Restart timolol BID OU  Referral to retina for CSME and CME OS  Follow up in about 6 months (around 9/8/2023) for IOP and Medication check.

## 2023-03-13 ENCOUNTER — OFFICE VISIT (OUTPATIENT)
Dept: OPHTHALMOLOGY | Facility: CLINIC | Age: 58
End: 2023-03-13
Payer: COMMERCIAL

## 2023-03-13 DIAGNOSIS — E11.3313 CONTROLLED TYPE 2 DIABETES MELLITUS WITH BOTH EYES AFFECTED BY MODERATE NONPROLIFERATIVE RETINOPATHY AND MACULAR EDEMA, WITH LONG-TERM CURRENT USE OF INSULIN: Primary | ICD-10-CM

## 2023-03-13 DIAGNOSIS — H35.033 HYPERTENSIVE RETINOPATHY, BILATERAL: ICD-10-CM

## 2023-03-13 DIAGNOSIS — Z79.4 CONTROLLED TYPE 2 DIABETES MELLITUS WITH BOTH EYES AFFECTED BY MODERATE NONPROLIFERATIVE RETINOPATHY AND MACULAR EDEMA, WITH LONG-TERM CURRENT USE OF INSULIN: Primary | ICD-10-CM

## 2023-03-13 PROCEDURE — 92134 POSTERIOR SEGMENT OCT RETINA (OCULAR COHERENCE TOMOGRAPHY)-BOTH EYES: ICD-10-PCS | Mod: S$GLB,,, | Performed by: OPHTHALMOLOGY

## 2023-03-13 PROCEDURE — 67028 INJECTION EYE DRUG: CPT | Mod: RT,S$GLB,, | Performed by: OPHTHALMOLOGY

## 2023-03-13 PROCEDURE — 99999 PR PBB SHADOW E&M-EST. PATIENT-LVL III: ICD-10-PCS | Mod: PBBFAC,,, | Performed by: OPHTHALMOLOGY

## 2023-03-13 PROCEDURE — 1159F MED LIST DOCD IN RCRD: CPT | Mod: CPTII,S$GLB,, | Performed by: OPHTHALMOLOGY

## 2023-03-13 PROCEDURE — 3062F POS MACROALBUMINURIA REV: CPT | Mod: CPTII,S$GLB,, | Performed by: OPHTHALMOLOGY

## 2023-03-13 PROCEDURE — 3051F PR MOST RECENT HEMOGLOBIN A1C LEVEL 7.0 - < 8.0%: ICD-10-PCS | Mod: CPTII,S$GLB,, | Performed by: OPHTHALMOLOGY

## 2023-03-13 PROCEDURE — 92134 CPTRZ OPH DX IMG PST SGM RTA: CPT | Mod: S$GLB,,, | Performed by: OPHTHALMOLOGY

## 2023-03-13 PROCEDURE — 3066F NEPHROPATHY DOC TX: CPT | Mod: CPTII,S$GLB,, | Performed by: OPHTHALMOLOGY

## 2023-03-13 PROCEDURE — 3051F HG A1C>EQUAL 7.0%<8.0%: CPT | Mod: CPTII,S$GLB,, | Performed by: OPHTHALMOLOGY

## 2023-03-13 PROCEDURE — 3066F PR DOCUMENTATION OF TREATMENT FOR NEPHROPATHY: ICD-10-PCS | Mod: CPTII,S$GLB,, | Performed by: OPHTHALMOLOGY

## 2023-03-13 PROCEDURE — 1159F PR MEDICATION LIST DOCUMENTED IN MEDICAL RECORD: ICD-10-PCS | Mod: CPTII,S$GLB,, | Performed by: OPHTHALMOLOGY

## 2023-03-13 PROCEDURE — 67028 PR INJECT INTRAVITREAL PHARMCOLOGIC: ICD-10-PCS | Mod: RT,S$GLB,, | Performed by: OPHTHALMOLOGY

## 2023-03-13 PROCEDURE — 3062F PR POS MACROALBUMINURIA RESULT DOCUMENTED/REVIEW: ICD-10-PCS | Mod: CPTII,S$GLB,, | Performed by: OPHTHALMOLOGY

## 2023-03-13 PROCEDURE — 99999 PR PBB SHADOW E&M-EST. PATIENT-LVL III: CPT | Mod: PBBFAC,,, | Performed by: OPHTHALMOLOGY

## 2023-03-13 PROCEDURE — 92014 PR EYE EXAM, EST PATIENT,COMPREHESV: ICD-10-PCS | Mod: 25,S$GLB,, | Performed by: OPHTHALMOLOGY

## 2023-03-13 PROCEDURE — 92014 COMPRE OPH EXAM EST PT 1/>: CPT | Mod: 25,S$GLB,, | Performed by: OPHTHALMOLOGY

## 2023-03-13 RX ORDER — FLUORESCEIN 500 MG/ML
5 INJECTION INTRAVENOUS ONCE
Status: COMPLETED | OUTPATIENT
Start: 2023-03-13 | End: 2023-04-17

## 2023-03-13 RX ADMIN — Medication 1.25 MG: at 11:03

## 2023-03-13 NOTE — PROGRESS NOTES
HPI    Pt was referred by Dr. Boothe for CSME and CME OS. Pt states the vision   in OS appears to be lopsided and blurry. Pt sees floaters in OS and notice   them more in the evening. Pt states that OS feels achy and pain level is a   1. Pt denies flashes.    Gtts: Timolol BID  Last edited by Kika Britt MA on 3/13/2023  9:32 AM.        OCT - OD low grade non central DME, with VMA  OS - central DME/DENEEN      A/P    Mod NPDR OU  T2 controlled on insulin pump    2. DME OS>OD  Been present since at least 2018    Avastin OS today     Gett approval for Ozurdex due to chronicity    3. HTN Ret OU  BS/BP/chol control    4. PCIOL OU      1 month OCT/FA OS    Risks, benefits, and alternatives to treatment discussed in detail with the patient.  The patient voiced understanding and wished to proceed with the procedure    Injection Procedure Note:  Diagnosis: DME OS    Patient Identified and Time Out complete  Pt marked  Topical Proparacaine and Betadine.  Inject Avastin OS at 6:00 @ 3.5-4mm posterior to limbus  Post Operative Dx: Same  Complications: None  Follow up as above.

## 2023-03-14 DIAGNOSIS — E11.42 TYPE 2 DIABETES MELLITUS WITH DIABETIC POLYNEUROPATHY, WITH LONG-TERM CURRENT USE OF INSULIN: ICD-10-CM

## 2023-03-14 DIAGNOSIS — Z79.4 TYPE 2 DIABETES MELLITUS WITH DIABETIC POLYNEUROPATHY, WITH LONG-TERM CURRENT USE OF INSULIN: ICD-10-CM

## 2023-03-14 RX ORDER — INSULIN LISPRO 100 [IU]/ML
INJECTION, SOLUTION INTRAVENOUS; SUBCUTANEOUS
Qty: 50 ML | Refills: 11 | Status: SHIPPED | OUTPATIENT
Start: 2023-03-14 | End: 2023-05-08 | Stop reason: SDUPTHER

## 2023-03-14 NOTE — TELEPHONE ENCOUNTER
----- Message from Brea Gordonalley sent at 3/14/2023 12:17 PM CDT -----  Regarding: refill  Contact: pt at [phone]  Type:  RX Refill Request    Who Called:  pt  Refill or New Rx:  refill  RX Name and Strength:  insulin lispro (HUMALOG U-100 INSULIN) 100 unit/mL injection 10 mL 12 4/15/2021    Sig: USE AS DIRECTED via insulin pump approx 150 UNITS EVERY DAY.   Class: Sample     How is the patient currently taking it? (ex. 1XDay):  as directed  Is this a 30 day or 90 day RX:  see above  Preferred Pharmacy with phone number:    [facility] - [place], [place] - [id number] [address]  Phone: [phone] Fax: [phone]    Local or Mail Order:  local  Ordering Provider:  Dr. Regina Zavala Call Back Number:  [phone]    Additional Information:  Patient is in need of a refill. She is out of her insulin. Please call back and advise. Thank you

## 2023-03-16 NOTE — PATIENT INSTRUCTIONS

## 2023-03-17 ENCOUNTER — OFFICE VISIT (OUTPATIENT)
Dept: BARIATRICS | Facility: CLINIC | Age: 58
End: 2023-03-17
Payer: COMMERCIAL

## 2023-03-17 VITALS
BODY MASS INDEX: 45.3 KG/M2 | SYSTOLIC BLOOD PRESSURE: 216 MMHG | RESPIRATION RATE: 16 BRPM | HEIGHT: 57 IN | WEIGHT: 210 LBS | DIASTOLIC BLOOD PRESSURE: 105 MMHG | HEART RATE: 101 BPM | TEMPERATURE: 98 F

## 2023-03-17 DIAGNOSIS — E11.42 TYPE 2 DIABETES MELLITUS WITH DIABETIC POLYNEUROPATHY, WITH LONG-TERM CURRENT USE OF INSULIN: ICD-10-CM

## 2023-03-17 DIAGNOSIS — Z79.4 TYPE 2 DIABETES MELLITUS WITH DIABETIC POLYNEUROPATHY, WITH LONG-TERM CURRENT USE OF INSULIN: ICD-10-CM

## 2023-03-17 DIAGNOSIS — E66.01 MORBID OBESITY: Primary | ICD-10-CM

## 2023-03-17 DIAGNOSIS — F10.20 ALCOHOL USE DISORDER, SEVERE, DEPENDENCE: ICD-10-CM

## 2023-03-17 DIAGNOSIS — I10 HTN (HYPERTENSION), BENIGN: ICD-10-CM

## 2023-03-17 PROCEDURE — 3066F NEPHROPATHY DOC TX: CPT | Mod: CPTII,S$GLB,, | Performed by: NURSE PRACTITIONER

## 2023-03-17 PROCEDURE — 3062F POS MACROALBUMINURIA REV: CPT | Mod: CPTII,S$GLB,, | Performed by: NURSE PRACTITIONER

## 2023-03-17 PROCEDURE — 99215 OFFICE O/P EST HI 40 MIN: CPT | Mod: S$GLB,,, | Performed by: NURSE PRACTITIONER

## 2023-03-17 PROCEDURE — 3008F BODY MASS INDEX DOCD: CPT | Mod: CPTII,S$GLB,, | Performed by: NURSE PRACTITIONER

## 2023-03-17 PROCEDURE — 3080F DIAST BP >= 90 MM HG: CPT | Mod: CPTII,S$GLB,, | Performed by: NURSE PRACTITIONER

## 2023-03-17 PROCEDURE — 3077F SYST BP >= 140 MM HG: CPT | Mod: CPTII,S$GLB,, | Performed by: NURSE PRACTITIONER

## 2023-03-17 PROCEDURE — 3051F HG A1C>EQUAL 7.0%<8.0%: CPT | Mod: CPTII,S$GLB,, | Performed by: NURSE PRACTITIONER

## 2023-03-17 PROCEDURE — 3062F PR POS MACROALBUMINURIA RESULT DOCUMENTED/REVIEW: ICD-10-PCS | Mod: CPTII,S$GLB,, | Performed by: NURSE PRACTITIONER

## 2023-03-17 PROCEDURE — 1159F MED LIST DOCD IN RCRD: CPT | Mod: CPTII,S$GLB,, | Performed by: NURSE PRACTITIONER

## 2023-03-17 PROCEDURE — 3066F PR DOCUMENTATION OF TREATMENT FOR NEPHROPATHY: ICD-10-PCS | Mod: CPTII,S$GLB,, | Performed by: NURSE PRACTITIONER

## 2023-03-17 PROCEDURE — 1160F PR REVIEW ALL MEDS BY PRESCRIBER/CLIN PHARMACIST DOCUMENTED: ICD-10-PCS | Mod: CPTII,S$GLB,, | Performed by: NURSE PRACTITIONER

## 2023-03-17 PROCEDURE — 99999 PR PBB SHADOW E&M-EST. PATIENT-LVL V: ICD-10-PCS | Mod: PBBFAC,,, | Performed by: NURSE PRACTITIONER

## 2023-03-17 PROCEDURE — 3051F PR MOST RECENT HEMOGLOBIN A1C LEVEL 7.0 - < 8.0%: ICD-10-PCS | Mod: CPTII,S$GLB,, | Performed by: NURSE PRACTITIONER

## 2023-03-17 PROCEDURE — 3077F PR MOST RECENT SYSTOLIC BLOOD PRESSURE >= 140 MM HG: ICD-10-PCS | Mod: CPTII,S$GLB,, | Performed by: NURSE PRACTITIONER

## 2023-03-17 PROCEDURE — 1160F RVW MEDS BY RX/DR IN RCRD: CPT | Mod: CPTII,S$GLB,, | Performed by: NURSE PRACTITIONER

## 2023-03-17 PROCEDURE — 3080F PR MOST RECENT DIASTOLIC BLOOD PRESSURE >= 90 MM HG: ICD-10-PCS | Mod: CPTII,S$GLB,, | Performed by: NURSE PRACTITIONER

## 2023-03-17 PROCEDURE — 3008F PR BODY MASS INDEX (BMI) DOCUMENTED: ICD-10-PCS | Mod: CPTII,S$GLB,, | Performed by: NURSE PRACTITIONER

## 2023-03-17 PROCEDURE — 1159F PR MEDICATION LIST DOCUMENTED IN MEDICAL RECORD: ICD-10-PCS | Mod: CPTII,S$GLB,, | Performed by: NURSE PRACTITIONER

## 2023-03-17 PROCEDURE — 99215 PR OFFICE/OUTPT VISIT, EST, LEVL V, 40-54 MIN: ICD-10-PCS | Mod: S$GLB,,, | Performed by: NURSE PRACTITIONER

## 2023-03-17 PROCEDURE — 99999 PR PBB SHADOW E&M-EST. PATIENT-LVL V: CPT | Mod: PBBFAC,,, | Performed by: NURSE PRACTITIONER

## 2023-03-17 NOTE — PROGRESS NOTES
Medically Supervised Weight Loss Documentation    Chief Complaint   Patient presents with    Follow-up       History of Present Illness:  Patient is a 57 y.o. female who is referred for evaluation of surgical treatment of morbid obesity. Patient has a Body mass index is 46.25 kg/m². kg/m².  She has known comorbidities of diabetes mellitus, dyslipidemia, and hypertension. Patient has attended the bariatric seminar and is most interested in gastric sleeve surgery surgery evaluation of surgical treatment of morbid obesity.       Comorbidities:   Past Medical History:   Diagnosis Date    Abnormal stress test 10/2016    Acne     Acute diastolic heart failure secondary to idiopathic cardiomyopathy 10/2016    Alcohol abuse     Allergy     Anxiety     Arthritis     Carpal tunnel syndrome of right wrist     bilateral    Cataract     Done OU    Chest pain     Controlled type 2 diabetes mellitus with both eyes affected by moderate nonproliferative retinopathy and macular edema, with long-term current use of insulin 3/13/2023    Diabetes mellitus     Type 2 IDDM - Uncontrolled    Diabetic neuropathy     Difficult intubation     needed glidescope for cholecystectomy 2009    Dry scalp     Fatty liver     GERD (gastroesophageal reflux disease)     on no meds    Glaucoma     Hyperlipidemia     Hypertension     Insomnia     Knee pain     Morbid obesity     Neuropathy due to type 2 diabetes mellitus     anaya feet    MARLON (obstructive sleep apnea)     CPAP, says she is compliant with use    Parotid mass 2014    had Biopsy    SOB (shortness of breath) 10/2016    Tachycardia     frequently,chronic for years per chart    Type 2 diabetes mellitus with both eyes affected by mild nonproliferative retinopathy without macular edema, without long-term current use of insulin      Past Surgical History:   Procedure Laterality Date    CARDIAC CATHETERIZATION  2016    [facility]DL per patient    CATARACT EXTRACTION Right 08/09/2018    Dr Boothe     CATARACT EXTRACTION W/  INTRAOCULAR LENS IMPLANT Right 2018    Procedure: EXTRACTION, CATARACT, WITH IOL INSERTION;  Surgeon: Isaac Boothe Jr., MD;  Location: [facility] OR;  Service: Ophthalmology;  Laterality: Right;  Topical w/ MAC    CATARACT EXTRACTION W/  INTRAOCULAR LENS IMPLANT Left 2018    Dr Boothe     SECTION      x 1    CHOLECYSTECTOMY      COLONOSCOPY N/A 10/21/2021    Procedure: COLONOSCOPY;  Surgeon: Rory Jones MD;  Location: [facility];  Service: Endoscopy;  Laterality: N/A;    ESOPHAGOGASTRODUODENOSCOPY  2009    Dr GRACIELA Jones - [facility]    HEMORRHOID SURGERY  2012    PPH with External Hemorrhoidectomy - Dr JAMAL Rollins, had spinal anesthesia    HYSTEROSCOPIC POLYPECTOMY OF UTERUS N/A 2019    Procedure: POLYPECTOMY, UTERUS, HYSTEROSCOPIC;  Surgeon: Lauren Reid MD;  Location: [facility];  Service: OB/GYN;  Laterality: N/A;  Myosure    HYSTEROSCOPY WITH DILATION AND CURETTAGE OF UTERUS N/A 2019    Procedure: HYSTEROSCOPY, WITH DILATION AND CURETTAGE OF UTERUS;  Surgeon: Lauren Reid MD;  Location: [facility];  Service: OB/GYN;  Laterality: N/A;  Fractional D&C    ORIF FEMUR FRACTURE Left     IM [email or web address]    PHACOEMULSIFICATION OF CATARACT Left 2018    Procedure: PHACOEMULSIFICATION, CATARACT;  Surgeon: Isaac Boothe Jr., MD;  Location: [facility] OR;  Service: Ophthalmology;  Laterality: Left;    TRIGGER FINGER RELEASE Right     TUBAL LIGATION      yag cap OS  Left 10/23/2018     Family History   Problem Relation Age of Onset    Diabetes Mother     Kidney disease Mother     Hyperlipidemia Father     Heart disease Father     Hypertension Father     Diabetes Sister     Heart attacks under age 50 Brother     No Known Problems Maternal Grandmother     No Known Problems Maternal Grandfather     No Known Problems Paternal Grandmother     No Known Problems Paternal Grandfather     Diabetes Sister     Hepatitis Brother     Kidney failure Sister     Diabetes  Sister     Amblyopia Neg Hx     Blindness Neg Hx     Cancer Neg Hx     Cataracts Neg Hx     Glaucoma Neg Hx     Macular degeneration Neg Hx     Retinal detachment Neg Hx     Strabismus Neg Hx     Stroke Neg Hx     Thyroid disease Neg Hx      Social History     Tobacco Use    Smoking status: Never    Smokeless tobacco: Never   Substance Use Topics    Alcohol use: Yes     Alcohol/week: 42.0 standard drinks     Types: 42 Shots of liquor per week     Comment: Carmelina, unsure of daily or not?     Drug use: No        Review of patient's allergies indicates:   Allergen Reactions    Morphine Hives and Itching     Other reaction(s): Hives       Medications:  Current Outpatient Medications on File Prior to Visit   Medication Sig Dispense Refill    albuterol (PROVENTIL/VENTOLIN HFA) 90 mcg/actuation inhaler INHALE 1 PUFF BY MOUTH EVERY 4 HOURS AS NEEDED for SHORTNESS OF BREATH Strength: 90 mcg/actuation 18 g 5    amLODIPine (NORVASC) 10 MG tablet Take 10 mg by mouth.      blood sugar diagnostic Strp To check BG 3 times daily, to use with insurance preferred meter 100 strip 12    blood-glucose meter kit To check BG 3 times daily, to use with insurance preferred meter 1 each 0    busPIRone (BUSPAR) 10 MG tablet TAKE 1 TABLET BY MOUTH THREE TIMES DAILY 90 tablet 11    chlorthalidone (HYGROTEN) 25 MG Tab Take 1 tablet (25 mg total) by mouth once daily. 90 tablet 3    citalopram (CELEXA) 40 MG tablet TAKE 1 TABLET BY MOUTH DAILY 90 tablet 3    diphenhydrAMINE (BENADRYL) 25 mg capsule Take 25 mg by mouth every evening. USE PM BEFORE SURGERY      ergocalciferol (ERGOCALCIFEROL) 50,000 unit Cap Take 1 capsule (50,000 Units total) by mouth every 7 days. 12 capsule 1    fluticasone propionate (FLONASE) 50 mcg/actuation nasal spray 2 sprays (100 mcg total) by Each Nostril route every evening. 16 g 11    gabapentin (NEURONTIN) 300 MG capsule TAKE 1 CAPSULE BY MOUTH BY MOUTH THREE TIMES DAILY 90 capsule 3    insulin lispro (HUMALOG U-100  "INSULIN) 100 unit/mL injection USE AS DIRECTED via insulin pump approx 150 UNITS EVERY DAY. 50 mL 11    lancets Misc To check BG 3 times daily, to use with insurance preferred meter 100 each prn    latanoprost 0.005 % ophthalmic solution Place 1 drop into both eyes every evening. 2.5 mL 12    losartan (COZAAR) 100 MG tablet Take 1 tablet (100 mg total) by mouth once daily. 90 tablet 3    metoprolol succinate (TOPROL-XL) 100 MG 24 hr tablet Take 1 tablet (100 mg total) by mouth once daily. 90 tablet 3    MICROLET 2 LANCING DEVICE Kit Use 4x/daily to check glucose. 1 each 2    pantoprazole (PROTONIX) 40 MG tablet Take 1 tablet (40 mg total) by mouth once daily. 30 tablet 11    pen needle, diabetic 31 gauge x 5/16" Ndle To use with insulin pens if needed 100 each 12    timolol maleate 0.5% (TIMOPTIC) 0.5 % Drop Place 1 drop into both eyes 2 (two) times daily. 10 mL 6     Current Facility-Administered Medications on File Prior to Visit   Medication Dose Route Frequency Provider Last Rate Last Admin    fluorescein 500 mg/5 mL (10 %) injection 500 mg  5 mL Intravenous Once D. Jake Palmer MD             Body mass index is 46.25 kg/m².     Beginning Weight: 216 lbs    Last Weight: 207 lbs    Current Weight: 210 lbs   Weight Change: -6 lbs      Body comp:  Fat Percent:  49.4 %  Fat Mass:  103.8 lb  FFM:  106.2 lb  TBW: 76.4 lb  TBW %:  36.4 %  BMR: 1517 kcal    Wt Readings from Last 5 Encounters:   03/17/23 95.3 kg (210 lb)   02/24/23 97.2 kg (214 lb 4.6 oz)   01/27/23 96.6 kg (212 lb 15.4 oz)   01/23/23 96.7 kg (213 lb 3 oz)   01/12/23 95.2 kg (209 lb 14.1 oz)         Chart review:  Primary Care Physician: Yoandy      Lab review:  Most Recent Data:  CBC:   Lab Results   Component Value Date    WBC 7.98 11/18/2022    HGB 13.2 11/18/2022    HCT 40.7 11/18/2022     (H) 11/18/2022    MCV 94 11/18/2022    RDW 13.4 11/18/2022     BMP:   Lab Results   Component Value Date     11/18/2022    K 3.4 (L) 11/18/2022 "     11/18/2022    CO2 33 (H) 11/18/2022    BUN 12 11/18/2022    CREATININE 0.69 11/18/2022     (H) 11/18/2022    CALCIUM 9.1 11/18/2022    MG 1.9 08/07/2020     LFTs:   Lab Results   Component Value Date    PROT 8.5 (H) 11/18/2022    ALBUMIN 3.4 (L) 11/18/2022    BILITOT 0.7 11/18/2022    AST 69 (H) 11/18/2022    ALKPHOS 183 (H) 11/18/2022    ALT 23 11/18/2022     Coags:   Lab Results   Component Value Date    INR 1.0 10/24/2016     FLP:   Lab Results   Component Value Date    CHOL 221 (H) 04/28/2022    HDL 46 04/28/2022    LDLCALC 150.2 04/28/2022    TRIG 124 04/28/2022    CHOLHDL 20.8 04/28/2022     DM:   Lab Results   Component Value Date    HGBA1C 7.5 (H) 01/05/2023    HGBA1C 8.5 (H) 09/08/2022    HGBA1C 7.8 (H) 04/28/2022    GLUF 238 (H) 08/07/2008    LDLCALC 150.2 04/28/2022    CREATININE 0.69 11/18/2022     Thyroid:   Lab Results   Component Value Date    TSH 2.269 08/07/2020     Anemia:   Lab Results   Component Value Date    FERRITIN 448 (H) 06/15/2016     Cardiac:   Lab Results   Component Value Date    TROPONINI <0.012 11/18/2022     Urinalysis:   Lab Results   Component Value Date    LABURIN No significant growth 05/01/2019    COLORU Yellow 05/01/2019    SPECGRAV 1.010 05/01/2019    NITRITE Negative 05/01/2019    KETONESU Negative 05/01/2019    UROBILINOGEN 1.0 01/19/2018    WBCUA 4 04/24/2019         Radiology review: Images independently reviewed and interpreted.    UGI- normal UGI      Other Results:  EKG (my interpretation): normal sinus rhythm.        Review of Systems:  Review of Systems   HENT:  Positive for sinus pressure and sneezing.    Eyes:  Positive for discharge and itching.   Respiratory:  Positive for cough.    Cardiovascular:  Positive for chest pain.   Genitourinary:  Positive for difficulty urinating.   Musculoskeletal:  Positive for back pain.   Skin:  Positive for wound.   Neurological:  Positive for dizziness and headaches.   Psychiatric/Behavioral:  Positive for sleep  "disturbance. The patient is nervous/anxious.    All other systems reviewed and are negative.    Physical:     Vital Signs (Most Recent)  Temp: 97.6 °F (36.4 °C) (03/17/23 1118)  Pulse: 101 (03/17/23 1118)  Resp: 16 (03/17/23 1118)  BP: (!) 216/105 (03/17/23 1118)  4' 8.5" (1.435 m)  95.3 kg (210 lb)     Physical Exam:  Physical Exam  Vitals and nursing note reviewed.   Constitutional:       General: She is not in acute distress.     Appearance: Normal appearance. She is obese. She is not ill-appearing or toxic-appearing.   HENT:      Head: Normocephalic and atraumatic.      Right Ear: External ear normal.      Left Ear: External ear normal.      Nose: Nose normal. No congestion or rhinorrhea.      Mouth/Throat:      Mouth: Mucous membranes are moist.      Pharynx: Oropharynx is clear.   Eyes:      General:         Right eye: No discharge.         Left eye: No discharge.      Conjunctiva/sclera: Conjunctivae normal.   Cardiovascular:      Rate and Rhythm: Normal rate and regular rhythm.      Pulses: Normal pulses.      Heart sounds: Normal heart sounds. No murmur heard.  Pulmonary:      Effort: Pulmonary effort is normal. No respiratory distress.      Breath sounds: Normal breath sounds. No stridor. No wheezing.   Chest:      Chest wall: No tenderness.   Abdominal:      General: Bowel sounds are normal. There is no distension.      Palpations: There is no mass.      Tenderness: There is no abdominal tenderness. There is no guarding.      Hernia: No hernia is present.   Musculoskeletal:         General: No swelling, tenderness, deformity or signs of injury. Normal range of motion.      Right lower leg: No edema.      Left lower leg: No edema.   Skin:     General: Skin is warm and dry.      Capillary Refill: Capillary refill takes less than 2 seconds.      Coloration: Skin is not jaundiced or pale.      Findings: No bruising, erythema or rash.   Neurological:      General: No focal deficit present.      Mental Status: " "She is alert and oriented to person, place, and time.      Motor: No weakness.      Gait: Gait normal.   Psychiatric:         Mood and Affect: Mood normal.         Behavior: Behavior normal.         Thought Content: Thought content normal.         Judgment: Judgment normal.     ASSESSMENT/PLAN:        1. Morbid obesity        2. BMI 45.0-49.9, adult        3. Alcohol use disorder, severe, dependence  Ambulatory referral/consult to Psychiatry      4. HTN (hypertension), benign        5. Type 2 diabetes mellitus with diabetic polyneuropathy, with long-term current use of insulin              Continue with Dr. Riley's established plan on initial evaluation        MSD 5/4     Patient will need:     Labs- will need new before surgery   EKG- done  UGI- done  dietary consult- done  psych consult- Dr. Han- will need re-eval after etoh abstinence.   Seminar- done  ETOH use- referral placed     Will obtain the following clearances prior to surgery:   Cardiology- 10/20/22- done      Diet Education Discussed:  Recommend high protein, low carb meals- mainly meats and vegetables.    Breakfast:  skip based on sleeping late  Lunch: burritos with beans/beef (frozen) x 1,   Snacking: chips- lays, grapes, strawberries and oranges   Dinner:  pork chops with white beans and cornbread  Snack: grapes, strawberries and oranges      Water: 1.5 (16oz) bottles   Soda- no carbonation  ETOH- Carmelina (3 shot)-- 1/3 of gallon bottle daily where she started- currently 1/2 gallon in 3-4 days   Mixed with Diet Coke "make happy all day"  Stopped Hard candy every night (handful everyday)    MVI: none      Exercise/Activity Discussed: Recommend Cardiovascular exercise, get HR over 100 for 20 minutes 3 times per week-   Drive to sisters home approx 2 house from her house      Plan for this patient:  Hypertension- medication adherence, admits NOT taking before she came to appointment   Journal BP daily   Discussed controlling; otherwise needs to " follow up with PCP for dose adjusted  Diet adherence- need better consistency   Extensive discussion on surgical plan and what she is wanting/needed   Discussed our requirement for this, despite the pre-approval by [employer] will need to comply with our requirements as well   Limited candy   Limit simple carbs   Right choice on vegetables- limit corn, peas   Start 60 gram per day protein   Track foods with iraida   Do not eat 2- 3 hours before bed  Tanita scale results reviewed with patient   Increase 4 lbs to fat mass   Increase 1 lb muscle   Increase 1 lb water  Exercise/Activity adherence- increase activity  Use FitBit-   Do house work  Fall reviewed with patient  Start multivitamins- discussion of post-operative life long MVI need, including Calcium, and a B-Complex  Alcohol counseling- referral placed        I spent a total of 40 minutes on the day of the visit.This includes face to face time and non-face to face time preparing to see the patient (eg, review of tests), obtaining and/or reviewing separately obtained history, documenting clinical information in the electronic or other health record, independently interpreting results and communicating results to the patient/family/caregiver, or care coordinator.

## 2023-03-17 NOTE — PATIENT INSTRUCTIONS
Log food- [employer]- code 054998 for minimum 1 week- only 25% carbohydrates  BP monitoring for 1 month  Decrease alcohol  Referral to Psychiatry for alcohol dependence

## 2023-04-05 NOTE — ADDENDUM NOTE
Addended by: CLIVE TARIQ on: 9/14/2017 04:59 PM     Modules accepted: Orders     General Sunscreen Counseling: I recommended a broad spectrum sunscreen with a SPF of 30 or higher.  I explained that SPF 30 sunscreens block approximately 97 percent of the sun's harmful rays.  Sunscreens should be applied at least 15 minutes prior to expected sun exposure and then every 2 hours after that as long as sun exposure continues. If swimming or exercising sunscreen should be reapplied every 45 minutes to an hour after getting wet or sweating.  One ounce, or the equivalent of a shot glass full of sunscreen, is adequate to protect the skin not covered by a bathing suit. I also recommended a lip balm with a sunscreen as well. Sun protective clothing can be used in lieu of sunscreen but must be worn the entire time you are exposed to the sun's rays. Detail Level: Zone

## 2023-04-17 ENCOUNTER — PROCEDURE VISIT (OUTPATIENT)
Dept: OPHTHALMOLOGY | Facility: CLINIC | Age: 58
End: 2023-04-17
Payer: COMMERCIAL

## 2023-04-17 DIAGNOSIS — Z79.4 CONTROLLED TYPE 2 DIABETES MELLITUS WITH BOTH EYES AFFECTED BY MODERATE NONPROLIFERATIVE RETINOPATHY AND MACULAR EDEMA, WITH LONG-TERM CURRENT USE OF INSULIN: Primary | ICD-10-CM

## 2023-04-17 DIAGNOSIS — H35.033 HYPERTENSIVE RETINOPATHY, BILATERAL: ICD-10-CM

## 2023-04-17 DIAGNOSIS — E11.3313 CONTROLLED TYPE 2 DIABETES MELLITUS WITH BOTH EYES AFFECTED BY MODERATE NONPROLIFERATIVE RETINOPATHY AND MACULAR EDEMA, WITH LONG-TERM CURRENT USE OF INSULIN: Primary | ICD-10-CM

## 2023-04-17 PROCEDURE — 92134 CPTRZ OPH DX IMG PST SGM RTA: CPT | Mod: S$GLB,,, | Performed by: OPHTHALMOLOGY

## 2023-04-17 PROCEDURE — 92134 POSTERIOR SEGMENT OCT RETINA (OCULAR COHERENCE TOMOGRAPHY)-BOTH EYES: ICD-10-PCS | Mod: S$GLB,,, | Performed by: OPHTHALMOLOGY

## 2023-04-17 PROCEDURE — 92014 PR EYE EXAM, EST PATIENT,COMPREHESV: ICD-10-PCS | Mod: 25,S$GLB,, | Performed by: OPHTHALMOLOGY

## 2023-04-17 PROCEDURE — 92014 COMPRE OPH EXAM EST PT 1/>: CPT | Mod: 25,S$GLB,, | Performed by: OPHTHALMOLOGY

## 2023-04-17 PROCEDURE — 92235 FLUORESCEIN ANGRPH MLTIFRAME: CPT | Mod: S$GLB,,, | Performed by: OPHTHALMOLOGY

## 2023-04-17 PROCEDURE — 67028 INJECTION EYE DRUG: CPT | Mod: LT,S$GLB,, | Performed by: OPHTHALMOLOGY

## 2023-04-17 PROCEDURE — 92235 FLUORESCEIN ANGIOGRAPHY - OU - BOTH EYES: ICD-10-PCS | Mod: S$GLB,,, | Performed by: OPHTHALMOLOGY

## 2023-04-17 PROCEDURE — 67028 PR INJECT INTRAVITREAL PHARMCOLOGIC: ICD-10-PCS | Mod: LT,S$GLB,, | Performed by: OPHTHALMOLOGY

## 2023-04-17 RX ADMIN — FLUORESCEIN 500 MG: 500 INJECTION INTRAVENOUS at 11:04

## 2023-04-17 RX ADMIN — Medication 1.25 MG: at 12:04

## 2023-04-17 NOTE — PROGRESS NOTES
HPI    1 month OCT/FA and AV OS    Pt stated that there has been no changes in her vision since last visit.   Pt does have a floater in OS. Pt denies eye pain. Pt stated her eyes have   been itchy.         OCT - OD low grade non central DME, with VMA  OS - central DME/CME    FA - late macular leakage OS > OD  No NV OU      A/P    Mod NPDR OU  T2 controlled on insulin pump    2. DME OS>OD  Been present since at least 2018  S/p Avastin OS x 1    Avastin OS today     Get approval for Ozurdex due to chronicity - try again    3. HTN Ret OU  BS/BP/chol control    4. PCIOL OU      1 month Avastin OS only    Risks, benefits, and alternatives to treatment discussed in detail with the patient.  The patient voiced understanding and wished to proceed with the procedure    Injection Procedure Note:  Diagnosis: DME OS    Patient Identified and Time Out complete  Pt marked  Topical Proparacaine and Betadine.  Inject Avastin OS at 6:00 @ 3.5-4mm posterior to limbus  Post Operative Dx: Same  Complications: None  Follow up as above.

## 2023-04-17 NOTE — PATIENT INSTRUCTIONS

## 2023-04-21 ENCOUNTER — CLINICAL SUPPORT (OUTPATIENT)
Dept: BARIATRICS | Facility: CLINIC | Age: 58
End: 2023-04-21
Payer: COMMERCIAL

## 2023-04-21 VITALS — BODY MASS INDEX: 46.04 KG/M2 | HEIGHT: 57 IN | WEIGHT: 213.38 LBS

## 2023-04-21 DIAGNOSIS — Z71.3 ENCOUNTER FOR DIETARY COUNSELING AND SURVEILLANCE: ICD-10-CM

## 2023-04-21 DIAGNOSIS — E66.01 SEVERE OBESITY (BMI >= 40): Primary | ICD-10-CM

## 2023-04-21 PROCEDURE — 99999 PR PBB SHADOW E&M-EST. PATIENT-LVL III: ICD-10-PCS | Mod: PBBFAC,,,

## 2023-04-21 PROCEDURE — 97803 MED NUTRITION INDIV SUBSEQ: CPT | Mod: S$GLB,,,

## 2023-04-21 PROCEDURE — 99999 PR PBB SHADOW E&M-EST. PATIENT-LVL III: CPT | Mod: PBBFAC,,,

## 2023-04-21 PROCEDURE — 97803 PR MED NUTR THER, SUBSQ, INDIV, EA 15 MIN: ICD-10-PCS | Mod: S$GLB,,,

## 2023-04-21 NOTE — PROGRESS NOTES
NUTRITION NOTE    Referring Physician: Dr. Riley  Reason for MNT Referral: Medically Supervised Diet pending Gastric Sleeve    PAST MEDICAL HISTORY:    Patient presents for 6/4 visit for MSD with weight gain over the past month; total weight loss by making numerous dietary and lifestyle changes.    Past Medical History:   Diagnosis Date    Abnormal stress test 10/2016    Acne     Acute diastolic heart failure secondary to idiopathic cardiomyopathy 10/2016    Alcohol abuse     Allergy     Anxiety     Arthritis     Carpal tunnel syndrome of right wrist     bilateral    Cataract     Done OU    Chest pain     Controlled type 2 diabetes mellitus with both eyes affected by moderate nonproliferative retinopathy and macular edema, with long-term current use of insulin 3/13/2023    Diabetes mellitus     Type 2 IDDM - Uncontrolled    Diabetic neuropathy     Difficult intubation     needed glidescope for cholecystectomy 2009    Dry scalp     Fatty liver     GERD (gastroesophageal reflux disease)     on no meds    Glaucoma     Hyperlipidemia     Hypertension     Insomnia     Knee pain     Morbid obesity     Neuropathy due to type 2 diabetes mellitus     anaya feet    MARLON (obstructive sleep apnea)     CPAP, says she is compliant with use    Parotid mass 2014    had Biopsy    SOB (shortness of breath) 10/2016    Tachycardia     frequently,chronic for years per chart    Type 2 diabetes mellitus with both eyes affected by mild nonproliferative retinopathy without macular edema, without long-term current use of insulin        CLINICAL DATA:  57 y.o. female.    There were no vitals filed for this visit.    Current Weight: 213 lbs  Weight Change Since Initial Visit: -3 lbs  Ideal Body Weight: 96 lbs  BMI: 47    CURRENT DIET:  Regular diet.  Diet Recall: Food records are not present.    Diet Includes: Breakfast- skipping due to sleeping late, waking up around 12pm, Lunch - (1-2pm) denzel sausage with crackers or chips or leftover  "pizza/chinese food; Dinner - (6pm) grilled cheese, fried chicken/steak with yellow rice or fish stick sandwich w/ wheat bread, tomato, mustard; bedtime snack: grapefruit, grapes, strawberries or "whatever candy is in my bed"  Meal Pattern: Irregular.  Protein Supplements: 0 per day.  Snacking: Excess. Snacks include healthy choices, junk foods, and sweets.     Vegetables: Likes a variety. Eats 2-3 times per week.  Fruits: Likes a variety. Eats rarely.  Beverages: water and diet Coke  Dining out:3x Weekly. Mostly fast food and take-out.  Cooking at home:1-2x Weekly. Mostly baked, grilled, smothered, and fried meat, fish, starchy CHO, and vegetables.    CURRENT EXERCISE:  None    Vitamins / Minerals / Herbs: Women's One-A-Day    Food Allergies:   NKFA, no intolerances noted     Social:  Retired.  Alcohol: Daily.4-5 solo cups of Ai with a splash of diet coke; 1/2 gallon of ai in 3-4 days.  Smoking: None.    ASSESSMENT:  Patient demonstrates no  willingness to change lifestyle habits as evidenced by weight gain, no exercise, no decrease in alcohol intake and no food logs.    Doing poorly with working on greatest challenges (irregular meal pattern and alcohol usage).    Excess calorie intake.  Inadequate protein intake.    Expect poor compliance after surgery at this time because she has not made any lifestyle changes since her initial assessment. She does not prepare any of her own food. Sister's have decreased the amount of cooking they were doing. Son is no longer providing food. RD inquired about patient's ability to cook meals. She reports having the ability and knowledge necessary to begin cooking more for herself. Encouraged her to review nutrition guidelines with sisters regarding pre and post-op diet progression as well.      Patient continues to experience episodes of hypoglycemia at 3-4am with blood glucose levels be within the 50-60s. Discussion of how to improve food quality of meals to aid in " prevention of hypoglycemic episodes.Now reporting hyperglycemic episodes with BG levels of 277-312 due to not consumption apple juice to correct hypo, but not waiting 15 minutes before consuming more. Reviewed the rule of 15/15 to aid in increasing blood glucose levels. Appointment with endocrinologist scheduled for May.     Patient must make radical lifestyle changes including initiating her own food preparation to ensure adequate adherence to guidelines or convince sisters  to make significant dietary changes and adhere to them long term.  If patient can be observed to make and maintain these significant dietary changes including complete alcohol cessation for the duration of the medically supervised dieting period than she may be reassessed to be a potential candidate for bariatric surgery. No alcohol for 6 month post operation because alcohol is a gastric irritant. She was unable to decrease alcohol consumption when reviewed in today's session. Intake has increased since last appointment with NP.       RD recommends she receive additional psychiatric services to aid in alcohol consumption.    PLAN:    Diet: Adjust diet plan.    Exercise: Increase.    Behavior Modification: Begin to document food & activity logs daily. Begin improving sleep schedule to allow for breakfast at an earlier time. Begin utilizing protein shake for breakfast. Decrease of alcohol consumption. Begin fixing 1 meal daily to demonstrate patient can prepare her own meals.     Weight loss prior to surgery (5-10% TBW): -3 lbs    Return to clinic in one month.    SESSION TIME:  60 minutes

## 2023-05-08 ENCOUNTER — TELEPHONE (OUTPATIENT)
Dept: ENDOCRINOLOGY | Facility: CLINIC | Age: 58
End: 2023-05-08
Payer: COMMERCIAL

## 2023-05-08 DIAGNOSIS — E11.42 TYPE 2 DIABETES MELLITUS WITH DIABETIC POLYNEUROPATHY, WITH LONG-TERM CURRENT USE OF INSULIN: ICD-10-CM

## 2023-05-08 DIAGNOSIS — Z79.4 TYPE 2 DIABETES MELLITUS WITH DIABETIC POLYNEUROPATHY, WITH LONG-TERM CURRENT USE OF INSULIN: ICD-10-CM

## 2023-05-08 RX ORDER — INSULIN ASPART 100 [IU]/ML
INJECTION, SOLUTION INTRAVENOUS; SUBCUTANEOUS
Qty: 50 ML | Refills: 11 | Status: SHIPPED | OUTPATIENT
Start: 2023-05-08

## 2023-05-08 NOTE — TELEPHONE ENCOUNTER
----- Message from Yancy Penny sent at 5/8/2023  1:12 PM CDT -----  Regarding: med refill  Name of Who is Calling: RODDY FAULKNER [[id number]]        What is the request in detail: Pharmacy says pt needs another prescription wrote in order for them to give the pt her medicine, pt is out. please advise.     insulin lispro (HUMALOG U-100 INSULIN) 100 unit/mL injection    Can the clinic reply by [facility]:           What Number to Call Back if not in [facility]: [phone]      [facility] [address] [facility]  [place] [address]  Phone: [phone] Fax: [phone]

## 2023-05-08 NOTE — TELEPHONE ENCOUNTER
Spoke to pt and neftali Tapia sent in script for novolog per her ins and to check with the pharmacy, voiced understanding.

## 2023-05-11 ENCOUNTER — LAB VISIT (OUTPATIENT)
Dept: PRIMARY CARE CLINIC | Facility: CLINIC | Age: 58
End: 2023-05-11
Payer: COMMERCIAL

## 2023-05-11 ENCOUNTER — CLINICAL SUPPORT (OUTPATIENT)
Dept: PRIMARY CARE CLINIC | Facility: CLINIC | Age: 58
End: 2023-05-11
Payer: COMMERCIAL

## 2023-05-11 DIAGNOSIS — E11.42 TYPE 2 DIABETES MELLITUS WITH DIABETIC POLYNEUROPATHY, WITH LONG-TERM CURRENT USE OF INSULIN: ICD-10-CM

## 2023-05-11 DIAGNOSIS — Z79.4 TYPE 2 DIABETES MELLITUS WITH DIABETIC POLYNEUROPATHY, WITH LONG-TERM CURRENT USE OF INSULIN: ICD-10-CM

## 2023-05-11 LAB
ALBUMIN SERPL BCP-MCNC: 2.8 G/DL (ref 3.5–5.2)
ALBUMIN/CREAT UR: 6030.2 UG/MG (ref 0–30)
ALP SERPL-CCNC: 121 U/L (ref 55–135)
ALT SERPL W/O P-5'-P-CCNC: 28 U/L (ref 10–44)
ANION GAP SERPL CALC-SCNC: 15 MMOL/L (ref 8–16)
AST SERPL-CCNC: 49 U/L (ref 10–40)
BILIRUB SERPL-MCNC: 0.3 MG/DL (ref 0.1–1)
BUN SERPL-MCNC: 10 MG/DL (ref 6–20)
CALCIUM SERPL-MCNC: 9.1 MG/DL (ref 8.7–10.5)
CHLORIDE SERPL-SCNC: 102 MMOL/L (ref 95–110)
CHOLEST SERPL-MCNC: 270 MG/DL (ref 120–199)
CHOLEST/HDLC SERPL: 6.3 {RATIO} (ref 2–5)
CO2 SERPL-SCNC: 22 MMOL/L (ref 23–29)
CREAT SERPL-MCNC: 1 MG/DL (ref 0.5–1.4)
CREAT UR-MCNC: 53 MG/DL (ref 15–325)
EST. GFR  (NO RACE VARIABLE): >60 ML/MIN/1.73 M^2
ESTIMATED AVG GLUCOSE: 166 MG/DL (ref 68–131)
GLUCOSE SERPL-MCNC: 106 MG/DL (ref 70–110)
HBA1C MFR BLD: 7.4 % (ref 4–5.6)
HDLC SERPL-MCNC: 43 MG/DL (ref 40–75)
HDLC SERPL: 15.9 % (ref 20–50)
LDLC SERPL CALC-MCNC: 179.2 MG/DL (ref 63–159)
MICROALBUMIN UR DL<=1MG/L-MCNC: 3196 UG/ML
NONHDLC SERPL-MCNC: 227 MG/DL
POTASSIUM SERPL-SCNC: 3.6 MMOL/L (ref 3.5–5.1)
PROT SERPL-MCNC: 6.8 G/DL (ref 6–8.4)
SODIUM SERPL-SCNC: 139 MMOL/L (ref 136–145)
TRIGL SERPL-MCNC: 239 MG/DL (ref 30–150)

## 2023-05-11 PROCEDURE — 80061 LIPID PANEL: CPT | Performed by: NURSE PRACTITIONER

## 2023-05-11 PROCEDURE — 82570 ASSAY OF URINE CREATININE: CPT | Performed by: NURSE PRACTITIONER

## 2023-05-11 PROCEDURE — 80053 COMPREHEN METABOLIC PANEL: CPT | Performed by: NURSE PRACTITIONER

## 2023-05-11 PROCEDURE — 83036 HEMOGLOBIN GLYCOSYLATED A1C: CPT | Performed by: NURSE PRACTITIONER

## 2023-05-15 ENCOUNTER — PROCEDURE VISIT (OUTPATIENT)
Dept: OPHTHALMOLOGY | Facility: CLINIC | Age: 58
End: 2023-05-15
Payer: COMMERCIAL

## 2023-05-15 DIAGNOSIS — Z79.4 CONTROLLED TYPE 2 DIABETES MELLITUS WITH BOTH EYES AFFECTED BY MODERATE NONPROLIFERATIVE RETINOPATHY AND MACULAR EDEMA, WITH LONG-TERM CURRENT USE OF INSULIN: Primary | ICD-10-CM

## 2023-05-15 DIAGNOSIS — E11.3313 CONTROLLED TYPE 2 DIABETES MELLITUS WITH BOTH EYES AFFECTED BY MODERATE NONPROLIFERATIVE RETINOPATHY AND MACULAR EDEMA, WITH LONG-TERM CURRENT USE OF INSULIN: Primary | ICD-10-CM

## 2023-05-15 PROCEDURE — 67028 INJECTION EYE DRUG: CPT | Mod: LT,S$GLB,, | Performed by: OPHTHALMOLOGY

## 2023-05-15 PROCEDURE — 99499 NO LOS: ICD-10-PCS | Mod: S$GLB,,, | Performed by: OPHTHALMOLOGY

## 2023-05-15 PROCEDURE — 99499 UNLISTED E&M SERVICE: CPT | Mod: S$GLB,,, | Performed by: OPHTHALMOLOGY

## 2023-05-15 PROCEDURE — 67028 PR INJECT INTRAVITREAL PHARMCOLOGIC: ICD-10-PCS | Mod: LT,S$GLB,, | Performed by: OPHTHALMOLOGY

## 2023-05-15 RX ORDER — OLMESARTAN MEDOXOMIL 40 MG/1
40 TABLET ORAL
COMMUNITY
Start: 2023-04-24 | End: 2023-09-27 | Stop reason: SDUPTHER

## 2023-05-15 RX ADMIN — Medication 1.25 MG: at 12:05

## 2023-05-15 NOTE — PATIENT INSTRUCTIONS

## 2023-05-15 NOTE — PROGRESS NOTES
HPI     Diabetic Eye Exam     Additional comments: 1 mon dm chk        Pt stated that there has been no changes in her vision since last visit.   Pt does have a floater in OS. Pt denies eye pain. Pt stated her eyes have   been itchy.         Prior OCT - OD low grade non central DME, with VMA  OS - central DME/CME    Prior FA - late macular leakage OS > OD  No NV OU      A/P    Mod NPDR OU  T2 controlled on insulin pump    2. DME OS>OD  Been present since at least 2018  S/p Avastin OS x 2    Avastin OS today     Get approval for Ozurdex due to chronicity - try again    3. HTN Ret OU  BS/BP/chol control    4. PCIOL OU      1 month OCT no dilate (possible Oz if NI)    Risks, benefits, and alternatives to treatment discussed in detail with the patient.  The patient voiced understanding and wished to proceed with the procedure    Injection Procedure Note:  Diagnosis: DME OS    Patient Identified and Time Out complete  Pt marked  Topical Proparacaine and Betadine.  Inject Avastin OS at 6:00 @ 3.5-4mm posterior to limbus  Post Operative Dx: Same  Complications: None  Follow up as above.

## 2023-05-17 ENCOUNTER — OFFICE VISIT (OUTPATIENT)
Dept: ENDOCRINOLOGY | Facility: CLINIC | Age: 58
End: 2023-05-17
Payer: COMMERCIAL

## 2023-05-17 VITALS
BODY MASS INDEX: 46.77 KG/M2 | HEART RATE: 105 BPM | SYSTOLIC BLOOD PRESSURE: 180 MMHG | DIASTOLIC BLOOD PRESSURE: 118 MMHG | WEIGHT: 216.81 LBS | HEIGHT: 57 IN | OXYGEN SATURATION: 98 %

## 2023-05-17 DIAGNOSIS — E66.01 MORBID OBESITY WITH BMI OF 45.0-49.9, ADULT: ICD-10-CM

## 2023-05-17 DIAGNOSIS — Z79.4 TYPE 2 DIABETES MELLITUS WITH DIABETIC POLYNEUROPATHY, WITH LONG-TERM CURRENT USE OF INSULIN: Primary | ICD-10-CM

## 2023-05-17 DIAGNOSIS — Z96.41 INSULIN PUMP STATUS: ICD-10-CM

## 2023-05-17 DIAGNOSIS — E11.42 TYPE 2 DIABETES MELLITUS WITH DIABETIC POLYNEUROPATHY, WITH LONG-TERM CURRENT USE OF INSULIN: Primary | ICD-10-CM

## 2023-05-17 DIAGNOSIS — I10 PRIMARY HYPERTENSION: ICD-10-CM

## 2023-05-17 DIAGNOSIS — E78.5 HYPERLIPIDEMIA, UNSPECIFIED HYPERLIPIDEMIA TYPE: ICD-10-CM

## 2023-05-17 PROCEDURE — 99999 PR PBB SHADOW E&M-EST. PATIENT-LVL V: CPT | Mod: PBBFAC,,, | Performed by: NURSE PRACTITIONER

## 2023-05-17 PROCEDURE — 1159F PR MEDICATION LIST DOCUMENTED IN MEDICAL RECORD: ICD-10-PCS | Mod: CPTII,S$GLB,, | Performed by: NURSE PRACTITIONER

## 2023-05-17 PROCEDURE — 3080F PR MOST RECENT DIASTOLIC BLOOD PRESSURE >= 90 MM HG: ICD-10-PCS | Mod: CPTII,S$GLB,, | Performed by: NURSE PRACTITIONER

## 2023-05-17 PROCEDURE — 3066F PR DOCUMENTATION OF TREATMENT FOR NEPHROPATHY: ICD-10-PCS | Mod: CPTII,S$GLB,, | Performed by: NURSE PRACTITIONER

## 2023-05-17 PROCEDURE — 95251 PR GLUCOSE MONITOR, 72 HOUR, PHYS INTERP: ICD-10-PCS | Mod: S$GLB,,, | Performed by: NURSE PRACTITIONER

## 2023-05-17 PROCEDURE — 99999 PR PBB SHADOW E&M-EST. PATIENT-LVL V: ICD-10-PCS | Mod: PBBFAC,,, | Performed by: NURSE PRACTITIONER

## 2023-05-17 PROCEDURE — 4010F PR ACE/ARB THEARPY RXD/TAKEN: ICD-10-PCS | Mod: CPTII,S$GLB,, | Performed by: NURSE PRACTITIONER

## 2023-05-17 PROCEDURE — 1160F RVW MEDS BY RX/DR IN RCRD: CPT | Mod: CPTII,S$GLB,, | Performed by: NURSE PRACTITIONER

## 2023-05-17 PROCEDURE — 3066F NEPHROPATHY DOC TX: CPT | Mod: CPTII,S$GLB,, | Performed by: NURSE PRACTITIONER

## 2023-05-17 PROCEDURE — 3062F POS MACROALBUMINURIA REV: CPT | Mod: CPTII,S$GLB,, | Performed by: NURSE PRACTITIONER

## 2023-05-17 PROCEDURE — 3051F PR MOST RECENT HEMOGLOBIN A1C LEVEL 7.0 - < 8.0%: ICD-10-PCS | Mod: CPTII,S$GLB,, | Performed by: NURSE PRACTITIONER

## 2023-05-17 PROCEDURE — 3077F SYST BP >= 140 MM HG: CPT | Mod: CPTII,S$GLB,, | Performed by: NURSE PRACTITIONER

## 2023-05-17 PROCEDURE — 3080F DIAST BP >= 90 MM HG: CPT | Mod: CPTII,S$GLB,, | Performed by: NURSE PRACTITIONER

## 2023-05-17 PROCEDURE — 4010F ACE/ARB THERAPY RXD/TAKEN: CPT | Mod: CPTII,S$GLB,, | Performed by: NURSE PRACTITIONER

## 2023-05-17 PROCEDURE — 99214 PR OFFICE/OUTPT VISIT, EST, LEVL IV, 30-39 MIN: ICD-10-PCS | Mod: 25,S$GLB,, | Performed by: NURSE PRACTITIONER

## 2023-05-17 PROCEDURE — 3008F PR BODY MASS INDEX (BMI) DOCUMENTED: ICD-10-PCS | Mod: CPTII,S$GLB,, | Performed by: NURSE PRACTITIONER

## 2023-05-17 PROCEDURE — 3062F PR POS MACROALBUMINURIA RESULT DOCUMENTED/REVIEW: ICD-10-PCS | Mod: CPTII,S$GLB,, | Performed by: NURSE PRACTITIONER

## 2023-05-17 PROCEDURE — 95251 CONT GLUC MNTR ANALYSIS I&R: CPT | Mod: S$GLB,,, | Performed by: NURSE PRACTITIONER

## 2023-05-17 PROCEDURE — 99214 OFFICE O/P EST MOD 30 MIN: CPT | Mod: 25,S$GLB,, | Performed by: NURSE PRACTITIONER

## 2023-05-17 PROCEDURE — 1159F MED LIST DOCD IN RCRD: CPT | Mod: CPTII,S$GLB,, | Performed by: NURSE PRACTITIONER

## 2023-05-17 PROCEDURE — 3008F BODY MASS INDEX DOCD: CPT | Mod: CPTII,S$GLB,, | Performed by: NURSE PRACTITIONER

## 2023-05-17 PROCEDURE — 1160F PR REVIEW ALL MEDS BY PRESCRIBER/CLIN PHARMACIST DOCUMENTED: ICD-10-PCS | Mod: CPTII,S$GLB,, | Performed by: NURSE PRACTITIONER

## 2023-05-17 PROCEDURE — 3077F PR MOST RECENT SYSTOLIC BLOOD PRESSURE >= 140 MM HG: ICD-10-PCS | Mod: CPTII,S$GLB,, | Performed by: NURSE PRACTITIONER

## 2023-05-17 PROCEDURE — 3051F HG A1C>EQUAL 7.0%<8.0%: CPT | Mod: CPTII,S$GLB,, | Performed by: NURSE PRACTITIONER

## 2023-05-17 RX ORDER — ROSUVASTATIN CALCIUM 10 MG/1
10 TABLET, COATED ORAL DAILY
Qty: 90 TABLET | Refills: 3 | Status: SHIPPED | OUTPATIENT
Start: 2023-05-17 | End: 2023-09-27 | Stop reason: SDUPTHER

## 2023-05-17 NOTE — PROGRESS NOTES
ubjective:       Patient ID: Mine Quiros is a 57 y.o. female.    Chief Complaint: routine DM f/u     HPI   Pt is a 57 y.o. AAF with a long hx of very uncontrolled Type 2 DM-- as well as chronic conditions pending review including HTN, peripheral neuropathy, diastolic dysfunciton, morbid obesity- now on insulin pump.She has had multiple difficulties being able to navigate pump and having freq hypoglycemia. Medically disbabled she states due to neuropathy.     Interim Events: May 17, 2023:  currently undergoing photocoagulation for retinopathy--  Diabetes control stable and quite reasonable especially considering past hx.  Questions regarding wt loss.  Advised ai has a lot of calories.  And makes liver fatter, Which makes her need more insulin and causes wt gain.  She is not thrilled at that sugguestion.  Otherwise no significant focal complaints.  Complaint of nighttime hypoglycemia.  Per sensor, once she dropped to 60's--but she had eaten right before so I suspect carb/insulin mismatch.  Also advised ETOH does contribute to nocturnal hypoglycemia. She did not take BP meds this AM.     Current DM meds:   Medtronic pump--~100 units day        Failed DM meds:  na        Back up plan for insulin pump hiatus: 60 units basal,  15-20 with meals.    Statin: rosuvastatin 20 mg        Not tolerated statin : na   ACE/ARB:losartan 100 mg or olmesartan 40 mg        Not tolerated ACE/ARB: na   Known Diabetic complications: neuropathy, retinopathy       Jan 12, 2022:  No focal complaints except discolored nails.  A1C much improved. C/o hypo last night with glucose of 45---but she had eaten pizza, grapefuit and a couple of mr. Good bars. No marked insulin dosing.  Asymptomatic--Suspect sensor error r/t sleeping on it likely.  Approved for bariatric surgery--if she can get the pre weight off.      Sept 19, 2022:  NO acute events. Did not take BP meds this AM--was in rush.  Sensor downloaded and reviewed. No c/o  hypoglycemia.  Still drinking.  States foot wounds.       Sensor Interpretation       Prominent Theme/Finding:  No marked hyperglycemia. NO hypoglycemia noted except as stated in HPI.       Pump Model:Medtronic 770    Preferred Infusion Sets and Tubing:quick set   Frequency of glucose checks a day:4  Frequency of infusion set changes required:q3 days    Average amount of daily insulin used:100     Basal rates:  12 mid----1.8   8 am ---2.2 u/hr     CHO:1:3  ISF:1:20   IOB:4    Glucometer required to optimize pump function     Recommended back up plan in event of insulin pump hiatus:     Review of Systems   Constitutional:  Negative for activity change and fatigue.   HENT:  Negative for hearing loss and trouble swallowing.    Eyes:  Negative for photophobia and visual disturbance.        Last Eye Exam: June 2021   Respiratory:  Negative for cough and shortness of breath.    Cardiovascular:  Negative for chest pain and palpitations.   Gastrointestinal:  Negative for constipation and diarrhea (loose stools, and immediately after eating.).   Endocrine: Negative for polydipsia and polyuria.   Genitourinary:  Negative for frequency and urgency.   Musculoskeletal:  Negative for arthralgias, back pain and myalgias.   Skin:  Negative for rash and wound.   Allergic/Immunologic: Negative for food allergies.   Neurological:  Positive for numbness (hands and feet.). Negative for weakness.   Psychiatric/Behavioral:  Negative for sleep disturbance. The patient is not nervous/anxious.         Sleeps well with gabapentin.       Objective:      Physical Exam  Constitutional:       Appearance: Normal appearance. She is well-developed. She is obese.   HENT:      Head: Normocephalic and atraumatic.      Nose: Nose normal.   Eyes:      Extraocular Movements: Extraocular movements intact.      Conjunctiva/sclera: Conjunctivae normal.      Pupils: Pupils are equal, round, and reactive to light.   Neck:      Vascular: No carotid bruit.  "  Cardiovascular:      Rate and Rhythm: Normal rate and regular rhythm.      Pulses: Normal pulses.      Heart sounds: Normal heart sounds.   Pulmonary:      Effort: Pulmonary effort is normal.      Breath sounds: Normal breath sounds.   Musculoskeletal:         General: Normal range of motion.      Cervical back: Normal range of motion and neck supple.      Comments: Feet: no open wounds or calluses.  Good pedal care Pedal pulses +2 bilaterally.   Vibratory sensation slightly decreased bilaterally.      Lymphadenopathy:      Cervical: No cervical adenopathy.   Skin:     General: Skin is warm and dry.      Comments: Acanthosis nigricans    lipohypertrophy at infusion sites--reinforced proper site rotation ---again       Neurological:      General: No focal deficit present.      Mental Status: She is alert and oriented to person, place, and time.   Psychiatric:         Mood and Affect: Mood normal.         Behavior: Behavior normal.         Thought Content: Thought content normal.         Judgment: Judgment normal.         BP (!) 180/118 (BP Location: Right arm, Patient Position: Sitting, BP Method: Large (Manual))   Pulse 105   Ht 4' 8.5" (1.435 m)   Wt 98.4 kg (216 lb 13.2 oz)   LMP 08/31/2007 (Approximate)   SpO2 98%   BMI 47.75 kg/m²     Hemoglobin A1C   Date Value Ref Range Status   05/11/2023 7.4 (H) 4.0 - 5.6 % Final     Comment:     ADA Screening Guidelines:  5.7-6.4%  Consistent with prediabetes  >or=6.5%  Consistent with diabetes    High levels of fetal hemoglobin interfere with the HbA1C  assay. Heterozygous hemoglobin variants (HbS, HgC, etc)do  not significantly interfere with this assay.   However, presence of multiple variants may affect accuracy.     01/05/2023 7.5 (H) 4.0 - 5.6 % Final     Comment:     ADA Screening Guidelines:  5.7-6.4%  Consistent with prediabetes  >or=6.5%  Consistent with diabetes    High levels of fetal hemoglobin interfere with the HbA1C  assay. Heterozygous hemoglobin " variants (HbS, HgC, etc)do  not significantly interfere with this assay.   However, presence of multiple variants may affect accuracy.     09/08/2022 8.5 (H) 4.0 - 5.6 % Final     Comment:     ADA Screening Guidelines:  5.7-6.4%  Consistent with prediabetes  >or=6.5%  Consistent with diabetes    High levels of fetal hemoglobin interfere with the HbA1C  assay. Heterozygous hemoglobin variants (HbS, HgC, etc)do  not significantly interfere with this assay.   However, presence of multiple variants may affect accuracy.         Chemistry        Component Value Date/Time     05/11/2023 1051    K 3.6 05/11/2023 1051     05/11/2023 1051    CO2 22 (L) 05/11/2023 1051    BUN 10 05/11/2023 1051    CREATININE 1.0 05/11/2023 1051     05/11/2023 1051        Component Value Date/Time    CALCIUM 9.1 05/11/2023 1051    ALKPHOS 121 05/11/2023 1051    AST 49 (H) 05/11/2023 1051    ALT 28 05/11/2023 1051    BILITOT 0.3 05/11/2023 1051    ESTGFRAFRICA 58.4 (A) 04/28/2022 1011    EGFRNONAA 50.6 (A) 04/28/2022 1011          Lab Results   Component Value Date    CHOL 270 (H) 05/11/2023     Lab Results   Component Value Date    HDL 43 05/11/2023     Lab Results   Component Value Date    LDLCALC 179.2 (H) 05/11/2023     Lab Results   Component Value Date    TRIG 239 (H) 05/11/2023     Lab Results   Component Value Date    CHOLHDL 15.9 (L) 05/11/2023     Lab Results   Component Value Date    MICALBCREAT 6030.2 (H) 05/11/2023     Lab Results   Component Value Date    TSH 2.269 08/07/2020     Vit D, 25-Hydroxy   Date Value Ref Range Status   08/07/2020 16 (L) 30 - 96 ng/mL Final     Comment:     Vitamin D deficiency.........<10 ng/mL                              Vitamin D insufficiency......10-29 ng/mL       Vitamin D sufficiency........> or equal to 30 ng/mL  Vitamin D toxicity............>100 ng/mL             Assessment:      1. Type 2 diabetes mellitus with diabetic polyneuropathy, with long-term current use of insulin         2. Hyperlipidemia, unspecified hyperlipidemia type  rosuvastatin (CRESTOR) 10 MG tablet      3. Primary hypertension        4. Morbid obesity with BMI of 45.0-49.9, adult        5. Insulin pump status            Plan:     Pt not taking statin--states pill too large to swallow. Reinforced its better than having chest sawed in 1/2.    Go home take BP meds.   Encourage, diet,exercise, ETOH cessation, minimization.   .                      ORDERS 05/17/2023     4   mo with  fasting   lipids, a1c,

## 2023-05-18 ENCOUNTER — OFFICE VISIT (OUTPATIENT)
Dept: BARIATRICS | Facility: CLINIC | Age: 58
End: 2023-05-18
Payer: COMMERCIAL

## 2023-05-18 VITALS
TEMPERATURE: 98 F | HEART RATE: 90 BPM | WEIGHT: 212.38 LBS | SYSTOLIC BLOOD PRESSURE: 194 MMHG | DIASTOLIC BLOOD PRESSURE: 106 MMHG | BODY MASS INDEX: 45.82 KG/M2 | HEIGHT: 57 IN | RESPIRATION RATE: 16 BRPM

## 2023-05-18 DIAGNOSIS — E11.42 TYPE 2 DIABETES MELLITUS WITH DIABETIC POLYNEUROPATHY, WITH LONG-TERM CURRENT USE OF INSULIN: ICD-10-CM

## 2023-05-18 DIAGNOSIS — E66.01 MORBID OBESITY: Primary | ICD-10-CM

## 2023-05-18 DIAGNOSIS — G47.33 OSA (OBSTRUCTIVE SLEEP APNEA): ICD-10-CM

## 2023-05-18 DIAGNOSIS — Z79.4 TYPE 2 DIABETES MELLITUS WITH DIABETIC POLYNEUROPATHY, WITH LONG-TERM CURRENT USE OF INSULIN: ICD-10-CM

## 2023-05-18 PROCEDURE — 3080F PR MOST RECENT DIASTOLIC BLOOD PRESSURE >= 90 MM HG: ICD-10-PCS | Mod: CPTII,S$GLB,, | Performed by: SURGERY

## 2023-05-18 PROCEDURE — 1159F MED LIST DOCD IN RCRD: CPT | Mod: CPTII,S$GLB,, | Performed by: SURGERY

## 2023-05-18 PROCEDURE — 3008F BODY MASS INDEX DOCD: CPT | Mod: CPTII,S$GLB,, | Performed by: SURGERY

## 2023-05-18 PROCEDURE — 99999 PR PBB SHADOW E&M-EST. PATIENT-LVL V: CPT | Mod: PBBFAC,,, | Performed by: SURGERY

## 2023-05-18 PROCEDURE — 3062F POS MACROALBUMINURIA REV: CPT | Mod: CPTII,S$GLB,, | Performed by: SURGERY

## 2023-05-18 PROCEDURE — 99213 PR OFFICE/OUTPT VISIT, EST, LEVL III, 20-29 MIN: ICD-10-PCS | Mod: S$GLB,,, | Performed by: SURGERY

## 2023-05-18 PROCEDURE — 3077F PR MOST RECENT SYSTOLIC BLOOD PRESSURE >= 140 MM HG: ICD-10-PCS | Mod: CPTII,S$GLB,, | Performed by: SURGERY

## 2023-05-18 PROCEDURE — 3066F PR DOCUMENTATION OF TREATMENT FOR NEPHROPATHY: ICD-10-PCS | Mod: CPTII,S$GLB,, | Performed by: SURGERY

## 2023-05-18 PROCEDURE — 3080F DIAST BP >= 90 MM HG: CPT | Mod: CPTII,S$GLB,, | Performed by: SURGERY

## 2023-05-18 PROCEDURE — 4010F PR ACE/ARB THEARPY RXD/TAKEN: ICD-10-PCS | Mod: CPTII,S$GLB,, | Performed by: SURGERY

## 2023-05-18 PROCEDURE — 3008F PR BODY MASS INDEX (BMI) DOCUMENTED: ICD-10-PCS | Mod: CPTII,S$GLB,, | Performed by: SURGERY

## 2023-05-18 PROCEDURE — 3051F HG A1C>EQUAL 7.0%<8.0%: CPT | Mod: CPTII,S$GLB,, | Performed by: SURGERY

## 2023-05-18 PROCEDURE — 99213 OFFICE O/P EST LOW 20 MIN: CPT | Mod: S$GLB,,, | Performed by: SURGERY

## 2023-05-18 PROCEDURE — 3062F PR POS MACROALBUMINURIA RESULT DOCUMENTED/REVIEW: ICD-10-PCS | Mod: CPTII,S$GLB,, | Performed by: SURGERY

## 2023-05-18 PROCEDURE — 1159F PR MEDICATION LIST DOCUMENTED IN MEDICAL RECORD: ICD-10-PCS | Mod: CPTII,S$GLB,, | Performed by: SURGERY

## 2023-05-18 PROCEDURE — 99999 PR PBB SHADOW E&M-EST. PATIENT-LVL V: ICD-10-PCS | Mod: PBBFAC,,, | Performed by: SURGERY

## 2023-05-18 PROCEDURE — 3066F NEPHROPATHY DOC TX: CPT | Mod: CPTII,S$GLB,, | Performed by: SURGERY

## 2023-05-18 PROCEDURE — 3077F SYST BP >= 140 MM HG: CPT | Mod: CPTII,S$GLB,, | Performed by: SURGERY

## 2023-05-18 PROCEDURE — 4010F ACE/ARB THERAPY RXD/TAKEN: CPT | Mod: CPTII,S$GLB,, | Performed by: SURGERY

## 2023-05-18 PROCEDURE — 3051F PR MOST RECENT HEMOGLOBIN A1C LEVEL 7.0 - < 8.0%: ICD-10-PCS | Mod: CPTII,S$GLB,, | Performed by: SURGERY

## 2023-05-18 NOTE — PROGRESS NOTES
Medically Supervised Weight Loss Documentation    Date of Visit: 05/26/2023    Patient: Mine Quiros    Current Weight: 212  Current BMI: Body mass index is 46.78 kg/m².  Weight Change: +5     Last Weight: 210    Beginning Weight: 207      Vitals:   Vitals:    05/18/23 1131   BP: (!) 194/106   Pulse: 90   Resp: 16   Temp: 97.8 °F (36.6 °C)       Comorbidities:   Past Medical History:   Diagnosis Date    Abnormal stress test 10/2016    Acne     Acute diastolic heart failure secondary to idiopathic cardiomyopathy 10/2016    Alcohol abuse     Allergy     Anxiety     Arthritis     Carpal tunnel syndrome of right wrist     bilateral    Cataract     Done OU    Chest pain     Controlled type 2 diabetes mellitus with both eyes affected by moderate nonproliferative retinopathy and macular edema, with long-term current use of insulin 3/13/2023    Diabetes mellitus     Type 2 IDDM - Uncontrolled    Diabetic neuropathy     Difficult intubation     needed glidescope for cholecystectomy 2009    Dry scalp     Fatty liver     GERD (gastroesophageal reflux disease)     on no meds    Glaucoma     Hyperlipidemia     Hypertension     Insomnia     Knee pain     Morbid obesity     Neuropathy due to type 2 diabetes mellitus     anaya feet    MARLON (obstructive sleep apnea)     CPAP, says she is compliant with use    Parotid mass 2014    had Biopsy    SOB (shortness of breath) 10/2016    Tachycardia     frequently,chronic for years per chart    Type 2 diabetes mellitus with both eyes affected by mild nonproliferative retinopathy without macular edema, without long-term current use of insulin        Medications:  Current Outpatient Medications on File Prior to Visit   Medication Sig Dispense Refill    albuterol (PROVENTIL/VENTOLIN HFA) 90 mcg/actuation inhaler INHALE 1 PUFF BY MOUTH EVERY 4 HOURS AS NEEDED for SHORTNESS OF BREATH Strength: 90 mcg/actuation 18 g 5    amLODIPine (NORVASC) 10 MG tablet Take 10 mg by mouth.      blood sugar  "diagnostic Strp To check BG 3 times daily, to use with insurance preferred meter 100 strip 12    blood-glucose meter kit To check BG 3 times daily, to use with insurance preferred meter 1 each 0    busPIRone (BUSPAR) 10 MG tablet TAKE 1 TABLET BY MOUTH THREE TIMES DAILY 90 tablet 11    chlorthalidone (HYGROTEN) 25 MG Tab Take 1 tablet (25 mg total) by mouth once daily. 90 tablet 3    citalopram (CELEXA) 40 MG tablet TAKE 1 TABLET BY MOUTH DAILY 90 tablet 3    diphenhydrAMINE (BENADRYL) 25 mg capsule Take 25 mg by mouth every evening. USE PM BEFORE SURGERY      ergocalciferol (ERGOCALCIFEROL) 50,000 unit Cap Take 1 capsule (50,000 Units total) by mouth every 7 days. 12 capsule 1    fluticasone propionate (FLONASE) 50 mcg/actuation nasal spray 2 sprays (100 mcg total) by Each Nostril route every evening. 16 g 11    gabapentin (NEURONTIN) 300 MG capsule TAKE 1 CAPSULE BY MOUTH BY MOUTH THREE TIMES DAILY 90 capsule 3    insulin aspart U-100 (NOVOLOG U-100 INSULIN ASPART) 100 unit/mL injection USE AS DIRECTED via insulin pump approx 150 UNITS EVERY DAY. 50 mL 11    lancets Misc To check BG 3 times daily, to use with insurance preferred meter 100 each prn    latanoprost 0.005 % ophthalmic solution Place 1 drop into both eyes every evening. 2.5 mL 12    losartan (COZAAR) 100 MG tablet Take 1 tablet (100 mg total) by mouth once daily. 90 tablet 3    metoprolol succinate (TOPROL-XL) 100 MG 24 hr tablet Take 1 tablet (100 mg total) by mouth once daily. 90 tablet 3    MICROLET 2 LANCING DEVICE Kit Use 4x/daily to check glucose. 1 each 2    olmesartan (BENICAR) 40 MG tablet Take 40 mg by mouth.      pantoprazole (PROTONIX) 40 MG tablet Take 1 tablet (40 mg total) by mouth once daily. 30 tablet 11    pen needle, diabetic 31 gauge x 5/16" Ndle To use with insulin pens if needed 100 each 12    rosuvastatin (CRESTOR) 10 MG tablet Take 1 tablet (10 mg total) by mouth once daily. 90 tablet 3    timolol maleate 0.5% (TIMOPTIC) 0.5 % " Drop Place 1 drop into both eyes 2 (two) times daily. 10 mL 6    [DISCONTINUED] insulin lispro (HUMALOG U-100 INSULIN) 100 unit/mL injection USE AS DIRECTED via insulin pump approx 150 UNITS EVERY DAY. 50 mL 11     No current facility-administered medications on file prior to visit.         Body comp:  Fat Percent:  46.8 %  Fat Mass:  99.4 lb  FFM:  113 lb  TBW: 81.2 lb  TBW %:  38.2 %  BMR: 1592 kcal      Diet Education Discussed:    Breakfast:  skip  Lunch:  skip  Dinner:  2-3 pm eats some leftover- picked up     Exercise/Activity Discussed:    Walking some    Behavior or Diet Goals for this patient:    Stop skipping meals, use protein shakes at least  Get on exercise routine at least 20 minutes 3 times per week when possible    Labs  EKG  UGI   dietary consult  psych consult   Seminar  Alcohol cessation        I will obtain the following clearances prior to surgery: cardiology     : total time spent for visit: 20 minutes

## 2023-05-26 DIAGNOSIS — Z79.4 TYPE 2 DIABETES MELLITUS WITH DIABETIC POLYNEUROPATHY, WITH LONG-TERM CURRENT USE OF INSULIN: ICD-10-CM

## 2023-05-26 DIAGNOSIS — E11.42 TYPE 2 DIABETES MELLITUS WITH DIABETIC POLYNEUROPATHY, WITH LONG-TERM CURRENT USE OF INSULIN: ICD-10-CM

## 2023-05-26 RX ORDER — BLOOD SUGAR DIAGNOSTIC
STRIP MISCELLANEOUS
Qty: 100 STRIP | Refills: 12 | Status: SHIPPED | OUTPATIENT
Start: 2023-05-26

## 2023-07-07 ENCOUNTER — TELEPHONE (OUTPATIENT)
Dept: FAMILY MEDICINE | Facility: CLINIC | Age: 58
End: 2023-07-07
Payer: COMMERCIAL

## 2023-07-07 NOTE — TELEPHONE ENCOUNTER
----- Message from Darrian Courtney sent at 7/7/2023  2:08 PM CDT -----  Contact: pt  Type: Needs Medical Advice  Who Called:  pt  Best Call Back Number: [phone]    Additional Information: Pt is calling the office trying to establish care.Please call back and advise.

## 2023-07-31 ENCOUNTER — PROCEDURE VISIT (OUTPATIENT)
Dept: OPHTHALMOLOGY | Facility: CLINIC | Age: 58
End: 2023-07-31
Payer: COMMERCIAL

## 2023-07-31 DIAGNOSIS — H35.033 HYPERTENSIVE RETINOPATHY, BILATERAL: ICD-10-CM

## 2023-07-31 DIAGNOSIS — Z79.4 CONTROLLED TYPE 2 DIABETES MELLITUS WITH BOTH EYES AFFECTED BY MODERATE NONPROLIFERATIVE RETINOPATHY AND MACULAR EDEMA, WITH LONG-TERM CURRENT USE OF INSULIN: Primary | ICD-10-CM

## 2023-07-31 DIAGNOSIS — H25.13 NUCLEAR SCLEROSIS, BILATERAL: ICD-10-CM

## 2023-07-31 DIAGNOSIS — E11.3313 CONTROLLED TYPE 2 DIABETES MELLITUS WITH BOTH EYES AFFECTED BY MODERATE NONPROLIFERATIVE RETINOPATHY AND MACULAR EDEMA, WITH LONG-TERM CURRENT USE OF INSULIN: Primary | ICD-10-CM

## 2023-07-31 PROCEDURE — 92134 CPTRZ OPH DX IMG PST SGM RTA: CPT | Mod: S$GLB,,, | Performed by: OPHTHALMOLOGY

## 2023-07-31 PROCEDURE — 67028 INJECTION EYE DRUG: CPT | Mod: LT,S$GLB,, | Performed by: OPHTHALMOLOGY

## 2023-07-31 PROCEDURE — 92012 INTRM OPH EXAM EST PATIENT: CPT | Mod: 25,S$GLB,, | Performed by: OPHTHALMOLOGY

## 2023-07-31 PROCEDURE — 67028 PR INJECT INTRAVITREAL PHARMCOLOGIC: ICD-10-PCS | Mod: LT,S$GLB,, | Performed by: OPHTHALMOLOGY

## 2023-07-31 PROCEDURE — 92134 POSTERIOR SEGMENT OCT RETINA (OCULAR COHERENCE TOMOGRAPHY)-BOTH EYES: ICD-10-PCS | Mod: S$GLB,,, | Performed by: OPHTHALMOLOGY

## 2023-07-31 PROCEDURE — 92012 PR EYE EXAM, EST PATIENT,INTERMED: ICD-10-PCS | Mod: 25,S$GLB,, | Performed by: OPHTHALMOLOGY

## 2023-07-31 RX ADMIN — Medication 1.25 MG: at 11:07

## 2023-07-31 NOTE — PROGRESS NOTES
HHPI     Diabetic Eye Exam     Additional comments: 1 mon dm chk        Pt stated that there has been no changes in her vision since last visit.   Pt does have a floater in OS. Pt denies eye pain. Pt stated her eyes have   been itchy.         OCT - OD low grade non central DME, with VMA  OS - central DME/CME    Prior FA - late macular leakage OS > OD  No NV OU      A/P    Mod NPDR OU  T2 controlled on insulin pump    2. DME OS>OD  Been present since at least 2018  S/p Avastin OS x 3  7/23 - increased at 10 weeks    Avastin OS today     Get approval for Ozurdex due to chronicity - try again    3. HTN Ret OU  BS/BP/chol control    4. PCIOL OU      1 month OCT no dilate (possible Oz if NI)    Risks, benefits, and alternatives to treatment discussed in detail with the patient.  The patient voiced understanding and wished to proceed with the procedure    Injection Procedure Note:  Diagnosis: DME OS    Patient Identified and Time Out complete  Pt marked  Topical Proparacaine and Betadine.  Inject Avastin OS at 6:00 @ 3.5-4mm posterior to limbus  Post Operative Dx: Same  Complications: None  Follow up as above.

## 2023-08-01 ENCOUNTER — PATIENT MESSAGE (OUTPATIENT)
Dept: BARIATRICS | Facility: CLINIC | Age: 58
End: 2023-08-01
Payer: COMMERCIAL

## 2023-08-07 NOTE — PATIENT INSTRUCTIONS

## 2023-08-08 DIAGNOSIS — E11.8 TYPE 2 DIABETES MELLITUS WITH COMPLICATION, WITH LONG-TERM CURRENT USE OF INSULIN: ICD-10-CM

## 2023-08-08 DIAGNOSIS — Z79.4 TYPE 2 DIABETES MELLITUS WITH COMPLICATION, WITH LONG-TERM CURRENT USE OF INSULIN: ICD-10-CM

## 2023-08-08 RX ORDER — GABAPENTIN 300 MG/1
CAPSULE ORAL
Qty: 90 CAPSULE | Refills: 3 | Status: SHIPPED | OUTPATIENT
Start: 2023-08-08 | End: 2023-09-27 | Stop reason: SDUPTHER

## 2023-08-09 ENCOUNTER — PATIENT MESSAGE (OUTPATIENT)
Dept: ADMINISTRATIVE | Facility: HOSPITAL | Age: 58
End: 2023-08-09
Payer: COMMERCIAL

## 2023-08-16 DIAGNOSIS — E11.42 TYPE 2 DIABETES MELLITUS WITH DIABETIC POLYNEUROPATHY, WITH LONG-TERM CURRENT USE OF INSULIN: Primary | ICD-10-CM

## 2023-08-16 DIAGNOSIS — Z79.4 TYPE 2 DIABETES MELLITUS WITH DIABETIC POLYNEUROPATHY, WITH LONG-TERM CURRENT USE OF INSULIN: Primary | ICD-10-CM

## 2023-08-17 ENCOUNTER — LAB VISIT (OUTPATIENT)
Dept: PRIMARY CARE CLINIC | Facility: CLINIC | Age: 58
End: 2023-08-17
Payer: COMMERCIAL

## 2023-08-17 DIAGNOSIS — Z79.4 TYPE 2 DIABETES MELLITUS WITH DIABETIC POLYNEUROPATHY, WITH LONG-TERM CURRENT USE OF INSULIN: ICD-10-CM

## 2023-08-17 DIAGNOSIS — E11.42 TYPE 2 DIABETES MELLITUS WITH DIABETIC POLYNEUROPATHY, WITH LONG-TERM CURRENT USE OF INSULIN: ICD-10-CM

## 2023-08-17 LAB
CHOLEST SERPL-MCNC: 281 MG/DL (ref 120–199)
CHOLEST/HDLC SERPL: 6.2 {RATIO} (ref 2–5)
ESTIMATED AVG GLUCOSE: 157 MG/DL (ref 68–131)
HBA1C MFR BLD: 7.1 % (ref 4–5.6)
HDLC SERPL-MCNC: 45 MG/DL (ref 40–75)
HDLC SERPL: 16 % (ref 20–50)
LDLC SERPL CALC-MCNC: 210.6 MG/DL (ref 63–159)
NONHDLC SERPL-MCNC: 236 MG/DL
TRIGL SERPL-MCNC: 127 MG/DL (ref 30–150)

## 2023-08-17 PROCEDURE — 83036 HEMOGLOBIN GLYCOSYLATED A1C: CPT | Performed by: NURSE PRACTITIONER

## 2023-08-17 PROCEDURE — 80061 LIPID PANEL: CPT | Performed by: NURSE PRACTITIONER

## 2023-08-18 RX ORDER — LANCETS
EACH MISCELLANEOUS
Qty: 100 EACH | Refills: 11 | Status: SHIPPED | OUTPATIENT
Start: 2023-08-18

## 2023-08-23 ENCOUNTER — OFFICE VISIT (OUTPATIENT)
Dept: ENDOCRINOLOGY | Facility: CLINIC | Age: 58
End: 2023-08-23
Payer: COMMERCIAL

## 2023-08-23 ENCOUNTER — LAB VISIT (OUTPATIENT)
Dept: LAB | Facility: HOSPITAL | Age: 58
End: 2023-08-23
Payer: COMMERCIAL

## 2023-08-23 VITALS
WEIGHT: 215.94 LBS | HEIGHT: 56 IN | HEART RATE: 100 BPM | DIASTOLIC BLOOD PRESSURE: 92 MMHG | BODY MASS INDEX: 48.58 KG/M2 | SYSTOLIC BLOOD PRESSURE: 140 MMHG

## 2023-08-23 DIAGNOSIS — N18.31 STAGE 3A CHRONIC KIDNEY DISEASE: ICD-10-CM

## 2023-08-23 DIAGNOSIS — I10 PRIMARY HYPERTENSION: ICD-10-CM

## 2023-08-23 DIAGNOSIS — E66.01 MORBID OBESITY WITH BMI OF 50.0-59.9, ADULT: ICD-10-CM

## 2023-08-23 DIAGNOSIS — Z46.81 FITTING OR ADJUSTMENT OF INSULIN PUMP: ICD-10-CM

## 2023-08-23 DIAGNOSIS — E78.5 HYPERLIPIDEMIA, UNSPECIFIED HYPERLIPIDEMIA TYPE: ICD-10-CM

## 2023-08-23 DIAGNOSIS — E11.42 TYPE 2 DIABETES MELLITUS WITH DIABETIC POLYNEUROPATHY, WITH LONG-TERM CURRENT USE OF INSULIN: Primary | ICD-10-CM

## 2023-08-23 DIAGNOSIS — Z79.4 TYPE 2 DIABETES MELLITUS WITH DIABETIC POLYNEUROPATHY, WITH LONG-TERM CURRENT USE OF INSULIN: Primary | ICD-10-CM

## 2023-08-23 LAB
ANION GAP SERPL CALC-SCNC: 13 MMOL/L (ref 8–16)
BUN SERPL-MCNC: 12 MG/DL (ref 6–20)
CALCIUM SERPL-MCNC: 9.4 MG/DL (ref 8.7–10.5)
CHLORIDE SERPL-SCNC: 105 MMOL/L (ref 95–110)
CO2 SERPL-SCNC: 22 MMOL/L (ref 23–29)
CREAT SERPL-MCNC: 1.2 MG/DL (ref 0.5–1.4)
EST. GFR  (NO RACE VARIABLE): 52.8 ML/MIN/1.73 M^2
GLUCOSE SERPL-MCNC: 117 MG/DL (ref 70–110)
POTASSIUM SERPL-SCNC: 3.7 MMOL/L (ref 3.5–5.1)
PTH-INTACT SERPL-MCNC: 103.8 PG/ML (ref 9–77)
SODIUM SERPL-SCNC: 140 MMOL/L (ref 136–145)

## 2023-08-23 PROCEDURE — 36415 COLL VENOUS BLD VENIPUNCTURE: CPT | Mod: PO | Performed by: NURSE PRACTITIONER

## 2023-08-23 PROCEDURE — 3008F PR BODY MASS INDEX (BMI) DOCUMENTED: ICD-10-PCS | Mod: CPTII,S$GLB,, | Performed by: NURSE PRACTITIONER

## 2023-08-23 PROCEDURE — 3077F SYST BP >= 140 MM HG: CPT | Mod: CPTII,S$GLB,, | Performed by: NURSE PRACTITIONER

## 2023-08-23 PROCEDURE — 95251 PR GLUCOSE MONITOR, 72 HOUR, PHYS INTERP: ICD-10-PCS | Mod: S$GLB,,, | Performed by: NURSE PRACTITIONER

## 2023-08-23 PROCEDURE — 1160F PR REVIEW ALL MEDS BY PRESCRIBER/CLIN PHARMACIST DOCUMENTED: ICD-10-PCS | Mod: CPTII,S$GLB,, | Performed by: NURSE PRACTITIONER

## 2023-08-23 PROCEDURE — 4010F ACE/ARB THERAPY RXD/TAKEN: CPT | Mod: CPTII,S$GLB,, | Performed by: NURSE PRACTITIONER

## 2023-08-23 PROCEDURE — 3080F DIAST BP >= 90 MM HG: CPT | Mod: CPTII,S$GLB,, | Performed by: NURSE PRACTITIONER

## 2023-08-23 PROCEDURE — 1160F RVW MEDS BY RX/DR IN RCRD: CPT | Mod: CPTII,S$GLB,, | Performed by: NURSE PRACTITIONER

## 2023-08-23 PROCEDURE — 99999 PR PBB SHADOW E&M-EST. PATIENT-LVL V: CPT | Mod: PBBFAC,,, | Performed by: NURSE PRACTITIONER

## 2023-08-23 PROCEDURE — 83970 ASSAY OF PARATHORMONE: CPT | Performed by: NURSE PRACTITIONER

## 2023-08-23 PROCEDURE — 3051F PR MOST RECENT HEMOGLOBIN A1C LEVEL 7.0 - < 8.0%: ICD-10-PCS | Mod: CPTII,S$GLB,, | Performed by: NURSE PRACTITIONER

## 2023-08-23 PROCEDURE — 99999 PR PBB SHADOW E&M-EST. PATIENT-LVL V: ICD-10-PCS | Mod: PBBFAC,,, | Performed by: NURSE PRACTITIONER

## 2023-08-23 PROCEDURE — 1159F PR MEDICATION LIST DOCUMENTED IN MEDICAL RECORD: ICD-10-PCS | Mod: CPTII,S$GLB,, | Performed by: NURSE PRACTITIONER

## 2023-08-23 PROCEDURE — 3051F HG A1C>EQUAL 7.0%<8.0%: CPT | Mod: CPTII,S$GLB,, | Performed by: NURSE PRACTITIONER

## 2023-08-23 PROCEDURE — 3080F PR MOST RECENT DIASTOLIC BLOOD PRESSURE >= 90 MM HG: ICD-10-PCS | Mod: CPTII,S$GLB,, | Performed by: NURSE PRACTITIONER

## 2023-08-23 PROCEDURE — 3062F PR POS MACROALBUMINURIA RESULT DOCUMENTED/REVIEW: ICD-10-PCS | Mod: CPTII,S$GLB,, | Performed by: NURSE PRACTITIONER

## 2023-08-23 PROCEDURE — 3062F POS MACROALBUMINURIA REV: CPT | Mod: CPTII,S$GLB,, | Performed by: NURSE PRACTITIONER

## 2023-08-23 PROCEDURE — 3008F BODY MASS INDEX DOCD: CPT | Mod: CPTII,S$GLB,, | Performed by: NURSE PRACTITIONER

## 2023-08-23 PROCEDURE — 3077F PR MOST RECENT SYSTOLIC BLOOD PRESSURE >= 140 MM HG: ICD-10-PCS | Mod: CPTII,S$GLB,, | Performed by: NURSE PRACTITIONER

## 2023-08-23 PROCEDURE — 3066F NEPHROPATHY DOC TX: CPT | Mod: CPTII,S$GLB,, | Performed by: NURSE PRACTITIONER

## 2023-08-23 PROCEDURE — 1159F MED LIST DOCD IN RCRD: CPT | Mod: CPTII,S$GLB,, | Performed by: NURSE PRACTITIONER

## 2023-08-23 PROCEDURE — 80048 BASIC METABOLIC PNL TOTAL CA: CPT | Performed by: NURSE PRACTITIONER

## 2023-08-23 PROCEDURE — 99214 PR OFFICE/OUTPT VISIT, EST, LEVL IV, 30-39 MIN: ICD-10-PCS | Mod: 25,S$GLB,, | Performed by: NURSE PRACTITIONER

## 2023-08-23 PROCEDURE — 4010F PR ACE/ARB THEARPY RXD/TAKEN: ICD-10-PCS | Mod: CPTII,S$GLB,, | Performed by: NURSE PRACTITIONER

## 2023-08-23 PROCEDURE — 95251 CONT GLUC MNTR ANALYSIS I&R: CPT | Mod: S$GLB,,, | Performed by: NURSE PRACTITIONER

## 2023-08-23 PROCEDURE — 3066F PR DOCUMENTATION OF TREATMENT FOR NEPHROPATHY: ICD-10-PCS | Mod: CPTII,S$GLB,, | Performed by: NURSE PRACTITIONER

## 2023-08-23 PROCEDURE — 99214 OFFICE O/P EST MOD 30 MIN: CPT | Mod: 25,S$GLB,, | Performed by: NURSE PRACTITIONER

## 2023-08-23 RX ORDER — TIRZEPATIDE 2.5 MG/.5ML
2.5 INJECTION, SOLUTION SUBCUTANEOUS
Qty: 4 PEN | Refills: 0 | Status: SHIPPED | OUTPATIENT
Start: 2023-08-23 | End: 2023-09-27

## 2023-08-23 NOTE — PROGRESS NOTES
ubjective:       Patient ID: Mine Quiros is a 57 y.o. female.    Chief Complaint: routine DM f/u     HPI   Pt is a 57 y.o. AAF with a long hx of very uncontrolled Type 2 DM-- as well as chronic conditions pending review including HTN, peripheral neuropathy, diastolic dysfunciton, morbid obesity- now on insulin pump.She has had multiple difficulties being able to navigate pump and having freq hypoglycemia. Medically disbabled she states due to neuropathy.     Interim Events: Aug 23, 2023:   No acute events. No focal complaints. Inquires about monjauro--denies any personal or family hx of pancreatic or thyroid cancer or pancreatitis.  PUmp and sensor downloaded.  DM is actually well controlled. ]  She did not take her BP meds today--which I strongly encouraged she better because her kidneys are starting to struggle based on proteinuria.  And she did not start the crestor I ordered, for her ridiculous LDL.  Her sister is present and reports she likes to [place] everything and we need to take her Frydaddy away.States she is better on ETOH intake--but 1/2 ai only last about 5 days.       Current DM meds:   Medtronic pump--~100 units day        Failed DM meds:  na        Back up plan for insulin pump hiatus: 60 units basal,  15-20 with meals.    Statin: rosuvastatin 20 mg        Not tolerated statin : na   ACE/ARB:losartan 100 mg or olmesartan 40 mg        Not tolerated ACE/ARB: na   Known Diabetic complications: neuropathy, retinopathy       CGMS Interpretation:       Sensor/Pump Interpretation or Prominent Theme:  No hypoglycemia. No marked prandial glucose excursions.     May 17, 2023:  currently undergoing photocoagulation for retinopathy--  Diabetes control stable and quite reasonable especially considering past hx.  Questions regarding wt loss.  Advised ai has a lot of calories.  And makes liver fatter, Which makes her need more insulin and causes wt gain.  She is not thrilled at that sugguestion.   Otherwise no significant focal complaints.  Complaint of nighttime hypoglycemia.  Per sensor, once she dropped to 60's--but she had eaten right before so I suspect carb/insulin mismatch.  Also advised ETOH does contribute to nocturnal hypoglycemia. She did not take BP meds this AM.       Jan 12, 2022:  No focal complaints except discolored nails.  A1C much improved. C/o hypo last night with glucose of 45---but she had eaten pizza, grapefuit and a couple of mr. Good bars. No marked insulin dosing.  Asymptomatic--Suspect sensor error r/t sleeping on it likely.  Approved for bariatric surgery--if she can get the pre weight off.      Sept 19, 2022:  NO acute events. Did not take BP meds this AM--was in rush.  Sensor downloaded and reviewed. No c/o hypoglycemia.  Still drinking.  States foot wounds.       Sensor Interpretation       Prominent Theme/Finding:  No marked hyperglycemia. NO hypoglycemia noted except as stated in HPI.       Pump Model:Medtronic 770    Preferred Infusion Sets and Tubing:quick set   Frequency of glucose checks a day:4  Frequency of infusion set changes required:q3 days    Average amount of daily insulin used:100     Basal rates:  12 mid----1.8   8 am ---2.2 u/hr     CHO:1:3  ISF:1:20   IOB:4    Review of Systems   Constitutional:  Negative for activity change and fatigue.   HENT:  Negative for hearing loss and trouble swallowing.    Eyes:  Negative for photophobia and visual disturbance.        Last Eye Exam: June 2021   Respiratory:  Negative for cough and shortness of breath.    Cardiovascular:  Negative for chest pain and palpitations.   Gastrointestinal:  Negative for constipation and diarrhea (loose stools, and immediately after eating.).   Endocrine: Negative for polydipsia and polyuria.   Genitourinary:  Negative for frequency and urgency.   Musculoskeletal:  Negative for arthralgias, back pain and myalgias.   Skin:  Negative for rash and wound.   Allergic/Immunologic: Negative for food  "allergies.   Neurological:  Positive for numbness (hands and feet.). Negative for weakness.   Psychiatric/Behavioral:  Negative for sleep disturbance. The patient is not nervous/anxious.         Sleeps well with gabapentin.         Objective:      Physical Exam  Constitutional:       Appearance: Normal appearance. She is well-developed. She is obese.   HENT:      Head: Normocephalic and atraumatic.      Nose: Nose normal.   Eyes:      Extraocular Movements: Extraocular movements intact.      Conjunctiva/sclera: Conjunctivae normal.      Pupils: Pupils are equal, round, and reactive to light.   Neck:      Vascular: No carotid bruit.   Cardiovascular:      Rate and Rhythm: Normal rate and regular rhythm.      Pulses: Normal pulses.      Heart sounds: Normal heart sounds.   Pulmonary:      Effort: Pulmonary effort is normal.      Breath sounds: Normal breath sounds.   Musculoskeletal:         General: Normal range of motion.      Cervical back: Normal range of motion and neck supple.      Comments: Feet: no open wounds or calluses.  Good pedal care Pedal pulses +2 bilaterally.   Vibratory sensation slightly decreased bilaterally.      Lymphadenopathy:      Cervical: No cervical adenopathy.   Skin:     General: Skin is warm and dry.      Comments: Acanthosis nigricans    lipohypertrophy at infusion sites--reinforced proper site rotation ---again       Neurological:      General: No focal deficit present.      Mental Status: She is alert and oriented to person, place, and time.   Psychiatric:         Mood and Affect: Mood normal.         Behavior: Behavior normal.         Thought Content: Thought content normal.         Judgment: Judgment normal.           BP (!) 140/92 (BP Location: Right arm, Patient Position: Sitting, BP Method: X-Large (Manual))   Pulse 100   Ht 4' 8" (1.422 m)   Wt 98 kg (215 lb 15.1 oz)   LMP 08/31/2007 (Approximate)   BMI 48.41 kg/m²     Hemoglobin A1C   Date Value Ref Range Status "   08/17/2023 7.1 (H) 4.0 - 5.6 % Final     Comment:     ADA Screening Guidelines:  5.7-6.4%  Consistent with prediabetes  >or=6.5%  Consistent with diabetes    High levels of fetal hemoglobin interfere with the HbA1C  assay. Heterozygous hemoglobin variants (HbS, HgC, etc)do  not significantly interfere with this assay.   However, presence of multiple variants may affect accuracy.     05/11/2023 7.4 (H) 4.0 - 5.6 % Final     Comment:     ADA Screening Guidelines:  5.7-6.4%  Consistent with prediabetes  >or=6.5%  Consistent with diabetes    High levels of fetal hemoglobin interfere with the HbA1C  assay. Heterozygous hemoglobin variants (HbS, HgC, etc)do  not significantly interfere with this assay.   However, presence of multiple variants may affect accuracy.     01/05/2023 7.5 (H) 4.0 - 5.6 % Final     Comment:     ADA Screening Guidelines:  5.7-6.4%  Consistent with prediabetes  >or=6.5%  Consistent with diabetes    High levels of fetal hemoglobin interfere with the HbA1C  assay. Heterozygous hemoglobin variants (HbS, HgC, etc)do  not significantly interfere with this assay.   However, presence of multiple variants may affect accuracy.         Chemistry        Component Value Date/Time     05/11/2023 1051    K 3.6 05/11/2023 1051     05/11/2023 1051    CO2 22 (L) 05/11/2023 1051    BUN 10 05/11/2023 1051    CREATININE 1.0 05/11/2023 1051     05/11/2023 1051        Component Value Date/Time    CALCIUM 9.1 05/11/2023 1051    ALKPHOS 121 05/11/2023 1051    AST 49 (H) 05/11/2023 1051    ALT 28 05/11/2023 1051    BILITOT 0.3 05/11/2023 1051    ESTGFRAFRICA 58.4 (A) 04/28/2022 1011    EGFRNONAA 50.6 (A) 04/28/2022 1011          Lab Results   Component Value Date    CHOL 281 (H) 08/17/2023     Lab Results   Component Value Date    HDL 45 08/17/2023     Lab Results   Component Value Date    LDLCALC 210.6 (H) 08/17/2023     Lab Results   Component Value Date    TRIG 127 08/17/2023     Lab Results    Component Value Date    CHOLHDL 16.0 (L) 08/17/2023     Lab Results   Component Value Date    MICALBCREAT 6030.2 (H) 05/11/2023     Lab Results   Component Value Date    TSH 2.269 08/07/2020     Vit D, 25-Hydroxy   Date Value Ref Range Status   08/07/2020 16 (L) 30 - 96 ng/mL Final     Comment:     Vitamin D deficiency.........<10 ng/mL                              Vitamin D insufficiency......10-29 ng/mL       Vitamin D sufficiency........> or equal to 30 ng/mL  Vitamin D toxicity............>100 ng/mL             Assessment:      1. Type 2 diabetes mellitus with diabetic polyneuropathy, with long-term current use of insulin  tirzepatide (MOUNJARO) 2.5 mg/0.5 mL PnIj    Hemoglobin A1C      2. Stage 3a chronic kidney disease  Basic Metabolic Panel    PTH, Intact    Protein/Creatinine Ratio, Urine      3. Primary hypertension        4. Morbid obesity with BMI of 50.0-59.9, adult        5. Fitting or adjustment of insulin pump        6. Hyperlipidemia, unspecified hyperlipidemia type  Lipid Panel              Plan:     Pt not taking statin--states pill too large to swallow. Reinforced its better than having chest sawed in 1/2.    Go home take BP meds. And crestor   Encourage, diet,exercise, ETOH cessation, minimization.   .                      ORDERS 08/23/2023   BMP, PTH, urine/protein   Cons nephrology--proteinuria.      4   mo with a1c, lipids,

## 2023-08-31 ENCOUNTER — OFFICE VISIT (OUTPATIENT)
Dept: NEPHROLOGY | Facility: CLINIC | Age: 58
End: 2023-08-31
Payer: COMMERCIAL

## 2023-08-31 VITALS
OXYGEN SATURATION: 98 % | DIASTOLIC BLOOD PRESSURE: 100 MMHG | HEIGHT: 56 IN | HEART RATE: 96 BPM | WEIGHT: 215 LBS | SYSTOLIC BLOOD PRESSURE: 144 MMHG | BODY MASS INDEX: 48.36 KG/M2

## 2023-08-31 DIAGNOSIS — H40.053 OCULAR HYPERTENSION, BILATERAL: Primary | ICD-10-CM

## 2023-08-31 DIAGNOSIS — E11.3313 CONTROLLED TYPE 2 DIABETES MELLITUS WITH BOTH EYES AFFECTED BY MODERATE NONPROLIFERATIVE RETINOPATHY AND MACULAR EDEMA, WITH LONG-TERM CURRENT USE OF INSULIN: ICD-10-CM

## 2023-08-31 DIAGNOSIS — N18.31 STAGE 3A CHRONIC KIDNEY DISEASE: ICD-10-CM

## 2023-08-31 DIAGNOSIS — I10 HTN (HYPERTENSION), BENIGN: ICD-10-CM

## 2023-08-31 DIAGNOSIS — Z79.4 CONTROLLED TYPE 2 DIABETES MELLITUS WITH BOTH EYES AFFECTED BY MODERATE NONPROLIFERATIVE RETINOPATHY AND MACULAR EDEMA, WITH LONG-TERM CURRENT USE OF INSULIN: ICD-10-CM

## 2023-08-31 DIAGNOSIS — Z79.4 TYPE 2 DIABETES MELLITUS WITH DIABETIC POLYNEUROPATHY, WITH LONG-TERM CURRENT USE OF INSULIN: ICD-10-CM

## 2023-08-31 DIAGNOSIS — E11.42 TYPE 2 DIABETES MELLITUS WITH DIABETIC POLYNEUROPATHY, WITH LONG-TERM CURRENT USE OF INSULIN: ICD-10-CM

## 2023-08-31 DIAGNOSIS — H35.033 HYPERTENSIVE RETINOPATHY, BILATERAL: ICD-10-CM

## 2023-08-31 DIAGNOSIS — E66.01 MORBID OBESITY WITH BMI OF 50.0-59.9, ADULT: ICD-10-CM

## 2023-08-31 DIAGNOSIS — G47.33 OSA (OBSTRUCTIVE SLEEP APNEA): ICD-10-CM

## 2023-08-31 PROCEDURE — 99999 PR PBB SHADOW E&M-EST. PATIENT-LVL IV: CPT | Mod: PBBFAC,,, | Performed by: INTERNAL MEDICINE

## 2023-08-31 PROCEDURE — 4010F PR ACE/ARB THEARPY RXD/TAKEN: ICD-10-PCS | Mod: CPTII,S$GLB,, | Performed by: INTERNAL MEDICINE

## 2023-08-31 PROCEDURE — 99204 PR OFFICE/OUTPT VISIT, NEW, LEVL IV, 45-59 MIN: ICD-10-PCS | Mod: S$GLB,,, | Performed by: INTERNAL MEDICINE

## 2023-08-31 PROCEDURE — 1160F RVW MEDS BY RX/DR IN RCRD: CPT | Mod: CPTII,S$GLB,, | Performed by: INTERNAL MEDICINE

## 2023-08-31 PROCEDURE — 3066F PR DOCUMENTATION OF TREATMENT FOR NEPHROPATHY: ICD-10-PCS | Mod: CPTII,S$GLB,, | Performed by: INTERNAL MEDICINE

## 2023-08-31 PROCEDURE — 3062F POS MACROALBUMINURIA REV: CPT | Mod: CPTII,S$GLB,, | Performed by: INTERNAL MEDICINE

## 2023-08-31 PROCEDURE — 1159F PR MEDICATION LIST DOCUMENTED IN MEDICAL RECORD: ICD-10-PCS | Mod: CPTII,S$GLB,, | Performed by: INTERNAL MEDICINE

## 2023-08-31 PROCEDURE — 4010F ACE/ARB THERAPY RXD/TAKEN: CPT | Mod: CPTII,S$GLB,, | Performed by: INTERNAL MEDICINE

## 2023-08-31 PROCEDURE — 3066F NEPHROPATHY DOC TX: CPT | Mod: CPTII,S$GLB,, | Performed by: INTERNAL MEDICINE

## 2023-08-31 PROCEDURE — 1159F MED LIST DOCD IN RCRD: CPT | Mod: CPTII,S$GLB,, | Performed by: INTERNAL MEDICINE

## 2023-08-31 PROCEDURE — 3051F HG A1C>EQUAL 7.0%<8.0%: CPT | Mod: CPTII,S$GLB,, | Performed by: INTERNAL MEDICINE

## 2023-08-31 PROCEDURE — 3062F PR POS MACROALBUMINURIA RESULT DOCUMENTED/REVIEW: ICD-10-PCS | Mod: CPTII,S$GLB,, | Performed by: INTERNAL MEDICINE

## 2023-08-31 PROCEDURE — 3008F BODY MASS INDEX DOCD: CPT | Mod: CPTII,S$GLB,, | Performed by: INTERNAL MEDICINE

## 2023-08-31 PROCEDURE — 3077F SYST BP >= 140 MM HG: CPT | Mod: CPTII,S$GLB,, | Performed by: INTERNAL MEDICINE

## 2023-08-31 PROCEDURE — 3077F PR MOST RECENT SYSTOLIC BLOOD PRESSURE >= 140 MM HG: ICD-10-PCS | Mod: CPTII,S$GLB,, | Performed by: INTERNAL MEDICINE

## 2023-08-31 PROCEDURE — 99204 OFFICE O/P NEW MOD 45 MIN: CPT | Mod: S$GLB,,, | Performed by: INTERNAL MEDICINE

## 2023-08-31 PROCEDURE — 3008F PR BODY MASS INDEX (BMI) DOCUMENTED: ICD-10-PCS | Mod: CPTII,S$GLB,, | Performed by: INTERNAL MEDICINE

## 2023-08-31 PROCEDURE — 3080F DIAST BP >= 90 MM HG: CPT | Mod: CPTII,S$GLB,, | Performed by: INTERNAL MEDICINE

## 2023-08-31 PROCEDURE — 3080F PR MOST RECENT DIASTOLIC BLOOD PRESSURE >= 90 MM HG: ICD-10-PCS | Mod: CPTII,S$GLB,, | Performed by: INTERNAL MEDICINE

## 2023-08-31 PROCEDURE — 99999 PR PBB SHADOW E&M-EST. PATIENT-LVL IV: ICD-10-PCS | Mod: PBBFAC,,, | Performed by: INTERNAL MEDICINE

## 2023-08-31 PROCEDURE — 1160F PR REVIEW ALL MEDS BY PRESCRIBER/CLIN PHARMACIST DOCUMENTED: ICD-10-PCS | Mod: CPTII,S$GLB,, | Performed by: INTERNAL MEDICINE

## 2023-08-31 PROCEDURE — 3051F PR MOST RECENT HEMOGLOBIN A1C LEVEL 7.0 - < 8.0%: ICD-10-PCS | Mod: CPTII,S$GLB,, | Performed by: INTERNAL MEDICINE

## 2023-08-31 NOTE — PROGRESS NOTES
"Subjective:       Patient ID: Mine Quiros is a 57 y.o. Black or  female who presents for initial evaluation of proteinuria referred by Osmin vera NP.     Renal function per chart review with sr cr baseline of 1 - 1.2 mg/dL with nephrotic proteinuria. Renal US 2020: R 12 cm and L 11.2  cm.    - DM type 2 diagnosed in 2008 with initial HgbA1c of 12.1%. Review of hemoglobin A1C show poorly control with average HgbA1c between 7-12%%. Never hospitalized for DKA. With proteinuria,  currently on RASSi.   - HTN diagnosed about 10 years. Patient reports good adherence to the current regiment, which includes amlodipine, metoprolol and olmesartan. They are following low salt diet. Previously on chlorthalidone and stopped due to alt therapy. Never hospitalized for uncontrolled HTN or hypotension/syncopal episodes.    Denies use of NSAIDs, nephrolithiasis.    Fam Hx of renal disease: mother on dialysis due to DM.      Review of Systems   Constitutional:  Negative for chills, fatigue and fever.   HENT:  Negative for hearing loss, trouble swallowing and voice change.    Respiratory:  Negative for cough, shortness of breath and wheezing.    Cardiovascular:  Negative for chest pain, palpitations and leg swelling.   Gastrointestinal:  Positive for diarrhea. Negative for abdominal distention, abdominal pain and constipation.   Endocrine: Negative for polydipsia, polyphagia and polyuria.   Genitourinary:  Negative for dysuria, flank pain, frequency and hematuria.   Musculoskeletal:  Negative for arthralgias, back pain and myalgias.   Skin:  Negative for rash and wound.   Neurological:  Negative for dizziness, syncope, weakness and light-headedness.   Hematological:  Negative for adenopathy. Does not bruise/bleed easily.       The past medical, family and social histories were reviewed for this encounter.     BP (!) 144/100 (BP Location: Left forearm, Patient Position: Sitting)   Pulse 96   Ht 4' 8" (1.422 " m)   Wt 97.5 kg (215 lb)   LMP 08/31/2007 (Approximate)   SpO2 98%   BMI 48.20 kg/m²     Objective:      Physical Exam  Vitals and nursing note reviewed.   Constitutional:       Appearance: Normal appearance. She is obese. She is not ill-appearing.   HENT:      Head: Normocephalic and atraumatic.      Mouth/Throat:      Mouth: Mucous membranes are moist.      Pharynx: Oropharynx is clear.   Eyes:      Extraocular Movements: Extraocular movements intact.      Conjunctiva/sclera: Conjunctivae normal.   Neck:      Vascular: No carotid bruit.   Cardiovascular:      Rate and Rhythm: Normal rate and regular rhythm.      Heart sounds: Normal heart sounds.   Pulmonary:      Effort: Pulmonary effort is normal. No respiratory distress.      Breath sounds: Normal breath sounds.   Abdominal:      General: Bowel sounds are normal. There is no distension.      Palpations: Abdomen is soft.      Tenderness: There is no abdominal tenderness.   Musculoskeletal:         General: No swelling or tenderness. Normal range of motion.      Cervical back: Normal range of motion. No tenderness.   Lymphadenopathy:      Cervical: No cervical adenopathy.   Skin:     General: Skin is warm and dry.      Capillary Refill: Capillary refill takes less than 2 seconds.      Coloration: Skin is not jaundiced.   Neurological:      General: No focal deficit present.      Mental Status: She is alert and oriented to person, place, and time.   Psychiatric:         Mood and Affect: Mood normal.         Behavior: Behavior normal.         Judgment: Judgment normal.         Assessment:       1. Ocular hypertension, bilateral    2. Hypertensive retinopathy, bilateral    3. HTN (hypertension), benign    4. Stage 3a chronic kidney disease    5. Morbid obesity with BMI of 50.0-59.9, adult    6. Type 2 diabetes mellitus with diabetic polyneuropathy, with long-term current use of insulin    7. Controlled type 2 diabetes mellitus with both eyes affected by moderate  nonproliferative retinopathy and macular edema, with long-term current use of insulin    8. MARLON (obstructive sleep apnea)        Plan:   Return to clinic in 6 months    CKD stage 3a in a setting of DM and HTN  Baseline creatinine is 1 - 1.2 mg/dL with severely increased proteinuria  Lab Results   Component Value Date    CREATININE 1.2 08/23/2023      - Euvolemic   - Pt understands importance of blood pressure and glycemic control and is aware that uncontrolled HTN and DM leads to progression of CKD   - Pt will benefit from initiation of SGLT2i for cario-renal protection - Complete avoidance of NSAIDs   - Low salt diet with < 2000 mg/day   - Dose adjust medications to GFR of 45-60   - Avoid nephrotoxins including IV contrast    HTN   - BP uncontrolled: continue current medications, avoid hypotension and dehydration   - Restart medications    DM with nephropathy   - DM with diabetic retinopathy: well controlled with most recent HgbA1c of 7.1%, continue yearly optho checks    SHPT  Lab Results   Component Value Date    .8 (H) 08/23/2023    CALCIUM 9.4 08/23/2023    - Restart Vit D      Med changes: restart home meds

## 2023-09-07 ENCOUNTER — PATIENT MESSAGE (OUTPATIENT)
Dept: BARIATRICS | Facility: CLINIC | Age: 58
End: 2023-09-07
Payer: MEDICAID

## 2023-09-12 ENCOUNTER — PATIENT OUTREACH (OUTPATIENT)
Dept: ADMINISTRATIVE | Facility: HOSPITAL | Age: 58
End: 2023-09-12
Payer: MEDICAID

## 2023-09-12 NOTE — PROGRESS NOTES
2023 Care Everywhere updates requested and reviewed.  Immunizations reconciled. Media reports reviewed.  Duplicate HM overrides and  orders removed.  Overdue HM topic chart audit and/or requested.  Overdue lab testing linked to upcoming lab appointments if applies.    Future Appointments   Date Time Provider Department Center   2023 10:20 AM ASHTYN Palmer MD [facility]   2023  1:30 PM Rene Alcocer MD [facility]   2023  8:40 AM LAB, [facility] FAMILY MEDICINE [facility] FAM [facility]   2023  9:30 AM Reg[facility] APR[facility]   2024  2:20 PM LAB, [facility]ILY [facility] FAM [facility]   2024  2:40 PM LAB, [facility] FAMILY MEDICINE [facility] FAM [facility]   3/7/2024  2:00 PM Kiran Moncada DO [facility] NEPHR [facility]       Health Maintenance Due   Topic Date Due    Shingles Vaccine (1 of 2) Never done    Pneumococcal Vaccines (Age 0-64) (2 - PCV) 2018    Mammogram  2023    Influenza Vaccine (1) Never done

## 2023-09-25 ENCOUNTER — PROCEDURE VISIT (OUTPATIENT)
Dept: OPHTHALMOLOGY | Facility: CLINIC | Age: 58
End: 2023-09-25
Payer: MEDICAID

## 2023-09-25 DIAGNOSIS — E11.3313 CONTROLLED TYPE 2 DIABETES MELLITUS WITH BOTH EYES AFFECTED BY MODERATE NONPROLIFERATIVE RETINOPATHY AND MACULAR EDEMA, WITH LONG-TERM CURRENT USE OF INSULIN: Primary | ICD-10-CM

## 2023-09-25 DIAGNOSIS — H35.033 HYPERTENSIVE RETINOPATHY, BILATERAL: ICD-10-CM

## 2023-09-25 DIAGNOSIS — Z79.4 CONTROLLED TYPE 2 DIABETES MELLITUS WITH BOTH EYES AFFECTED BY MODERATE NONPROLIFERATIVE RETINOPATHY AND MACULAR EDEMA, WITH LONG-TERM CURRENT USE OF INSULIN: Primary | ICD-10-CM

## 2023-09-25 PROCEDURE — 92134 CPTRZ OPH DX IMG PST SGM RTA: CPT | Mod: PBBFAC,PO | Performed by: OPHTHALMOLOGY

## 2023-09-25 PROCEDURE — 92012 INTRM OPH EXAM EST PATIENT: CPT | Mod: 25,S$PBB,, | Performed by: OPHTHALMOLOGY

## 2023-09-25 PROCEDURE — 67028 PR INJECT INTRAVITREAL PHARMCOLOGIC: ICD-10-PCS | Mod: S$PBB,LT,, | Performed by: OPHTHALMOLOGY

## 2023-09-25 PROCEDURE — 99999PBSHW PR PBB SHADOW TECHNICAL ONLY FILED TO HB: Mod: JZ,PBBFAC,,

## 2023-09-25 PROCEDURE — 92012 PR EYE EXAM, EST PATIENT,INTERMED: ICD-10-PCS | Mod: 25,S$PBB,, | Performed by: OPHTHALMOLOGY

## 2023-09-25 PROCEDURE — 67028 INJECTION EYE DRUG: CPT | Mod: S$PBB,LT,, | Performed by: OPHTHALMOLOGY

## 2023-09-25 PROCEDURE — 92134 POSTERIOR SEGMENT OCT RETINA (OCULAR COHERENCE TOMOGRAPHY)-BOTH EYES: ICD-10-PCS | Mod: 26,S$PBB,, | Performed by: OPHTHALMOLOGY

## 2023-09-25 PROCEDURE — 67028 INJECTION EYE DRUG: CPT | Mod: PBBFAC,PO,LT | Performed by: OPHTHALMOLOGY

## 2023-09-25 PROCEDURE — 99999PBSHW PR PBB SHADOW TECHNICAL ONLY FILED TO HB: ICD-10-PCS | Mod: JZ,PBBFAC,,

## 2023-09-25 RX ADMIN — DEXAMETHASONE 0.7 MG: 0.7 IMPLANT INTRAVITREAL at 12:09

## 2023-09-27 ENCOUNTER — OFFICE VISIT (OUTPATIENT)
Dept: FAMILY MEDICINE | Facility: CLINIC | Age: 58
End: 2023-09-27
Payer: MEDICAID

## 2023-09-27 VITALS
OXYGEN SATURATION: 96 % | HEART RATE: 100 BPM | WEIGHT: 215.19 LBS | SYSTOLIC BLOOD PRESSURE: 164 MMHG | RESPIRATION RATE: 19 BRPM | HEIGHT: 56 IN | DIASTOLIC BLOOD PRESSURE: 90 MMHG | BODY MASS INDEX: 48.41 KG/M2

## 2023-09-27 DIAGNOSIS — E78.5 HYPERLIPIDEMIA, UNSPECIFIED HYPERLIPIDEMIA TYPE: ICD-10-CM

## 2023-09-27 DIAGNOSIS — E11.59 HYPERTENSION ASSOCIATED WITH DIABETES: Primary | ICD-10-CM

## 2023-09-27 DIAGNOSIS — I15.2 HYPERTENSION ASSOCIATED WITH DIABETES: Primary | ICD-10-CM

## 2023-09-27 DIAGNOSIS — F10.10 ETOH ABUSE: ICD-10-CM

## 2023-09-27 DIAGNOSIS — Z79.4 TYPE 2 DIABETES MELLITUS WITH COMPLICATION, WITH LONG-TERM CURRENT USE OF INSULIN: ICD-10-CM

## 2023-09-27 DIAGNOSIS — E11.8 TYPE 2 DIABETES MELLITUS WITH COMPLICATION, WITH LONG-TERM CURRENT USE OF INSULIN: ICD-10-CM

## 2023-09-27 DIAGNOSIS — G47.33 OSA (OBSTRUCTIVE SLEEP APNEA): ICD-10-CM

## 2023-09-27 PROCEDURE — 99214 PR OFFICE/OUTPT VISIT, EST, LEVL IV, 30-39 MIN: ICD-10-PCS | Mod: S$PBB,,, | Performed by: FAMILY MEDICINE

## 2023-09-27 PROCEDURE — 99999 PR PBB SHADOW E&M-EST. PATIENT-LVL IV: ICD-10-PCS | Mod: PBBFAC,,, | Performed by: FAMILY MEDICINE

## 2023-09-27 PROCEDURE — 1159F MED LIST DOCD IN RCRD: CPT | Mod: CPTII,,, | Performed by: FAMILY MEDICINE

## 2023-09-27 PROCEDURE — 99214 OFFICE O/P EST MOD 30 MIN: CPT | Mod: S$PBB,,, | Performed by: FAMILY MEDICINE

## 2023-09-27 PROCEDURE — 3051F HG A1C>EQUAL 7.0%<8.0%: CPT | Mod: CPTII,,, | Performed by: FAMILY MEDICINE

## 2023-09-27 PROCEDURE — 3062F PR POS MACROALBUMINURIA RESULT DOCUMENTED/REVIEW: ICD-10-PCS | Mod: CPTII,,, | Performed by: FAMILY MEDICINE

## 2023-09-27 PROCEDURE — 3008F PR BODY MASS INDEX (BMI) DOCUMENTED: ICD-10-PCS | Mod: CPTII,,, | Performed by: FAMILY MEDICINE

## 2023-09-27 PROCEDURE — 99999 PR PBB SHADOW E&M-EST. PATIENT-LVL IV: CPT | Mod: PBBFAC,,, | Performed by: FAMILY MEDICINE

## 2023-09-27 PROCEDURE — 1160F RVW MEDS BY RX/DR IN RCRD: CPT | Mod: CPTII,,, | Performed by: FAMILY MEDICINE

## 2023-09-27 PROCEDURE — 4010F ACE/ARB THERAPY RXD/TAKEN: CPT | Mod: CPTII,,, | Performed by: FAMILY MEDICINE

## 2023-09-27 PROCEDURE — 3066F PR DOCUMENTATION OF TREATMENT FOR NEPHROPATHY: ICD-10-PCS | Mod: CPTII,,, | Performed by: FAMILY MEDICINE

## 2023-09-27 PROCEDURE — 3077F PR MOST RECENT SYSTOLIC BLOOD PRESSURE >= 140 MM HG: ICD-10-PCS | Mod: CPTII,,, | Performed by: FAMILY MEDICINE

## 2023-09-27 PROCEDURE — 1160F PR REVIEW ALL MEDS BY PRESCRIBER/CLIN PHARMACIST DOCUMENTED: ICD-10-PCS | Mod: CPTII,,, | Performed by: FAMILY MEDICINE

## 2023-09-27 PROCEDURE — 4010F PR ACE/ARB THEARPY RXD/TAKEN: ICD-10-PCS | Mod: CPTII,,, | Performed by: FAMILY MEDICINE

## 2023-09-27 PROCEDURE — 1159F PR MEDICATION LIST DOCUMENTED IN MEDICAL RECORD: ICD-10-PCS | Mod: CPTII,,, | Performed by: FAMILY MEDICINE

## 2023-09-27 PROCEDURE — 3080F PR MOST RECENT DIASTOLIC BLOOD PRESSURE >= 90 MM HG: ICD-10-PCS | Mod: CPTII,,, | Performed by: FAMILY MEDICINE

## 2023-09-27 PROCEDURE — 99214 OFFICE O/P EST MOD 30 MIN: CPT | Mod: PBBFAC,PO | Performed by: FAMILY MEDICINE

## 2023-09-27 PROCEDURE — 3008F BODY MASS INDEX DOCD: CPT | Mod: CPTII,,, | Performed by: FAMILY MEDICINE

## 2023-09-27 PROCEDURE — 3080F DIAST BP >= 90 MM HG: CPT | Mod: CPTII,,, | Performed by: FAMILY MEDICINE

## 2023-09-27 PROCEDURE — 3066F NEPHROPATHY DOC TX: CPT | Mod: CPTII,,, | Performed by: FAMILY MEDICINE

## 2023-09-27 PROCEDURE — 3077F SYST BP >= 140 MM HG: CPT | Mod: CPTII,,, | Performed by: FAMILY MEDICINE

## 2023-09-27 PROCEDURE — 3062F POS MACROALBUMINURIA REV: CPT | Mod: CPTII,,, | Performed by: FAMILY MEDICINE

## 2023-09-27 PROCEDURE — 3051F PR MOST RECENT HEMOGLOBIN A1C LEVEL 7.0 - < 8.0%: ICD-10-PCS | Mod: CPTII,,, | Performed by: FAMILY MEDICINE

## 2023-09-27 RX ORDER — METOPROLOL SUCCINATE 100 MG/1
100 TABLET, EXTENDED RELEASE ORAL DAILY
Qty: 90 TABLET | Refills: 3 | Status: SHIPPED | OUTPATIENT
Start: 2023-09-27

## 2023-09-27 RX ORDER — ROSUVASTATIN CALCIUM 10 MG/1
10 TABLET, COATED ORAL DAILY
Qty: 90 TABLET | Refills: 3 | Status: SHIPPED | OUTPATIENT
Start: 2023-09-27 | End: 2024-09-26

## 2023-09-27 RX ORDER — TIRZEPATIDE 5 MG/.5ML
5 INJECTION, SOLUTION SUBCUTANEOUS
Qty: 4 PEN | Refills: 2 | Status: SHIPPED | OUTPATIENT
Start: 2023-09-27 | End: 2023-11-29

## 2023-09-27 RX ORDER — AMLODIPINE BESYLATE 10 MG/1
10 TABLET ORAL DAILY
Qty: 90 TABLET | Refills: 3 | Status: SHIPPED | OUTPATIENT
Start: 2023-09-27

## 2023-09-27 RX ORDER — OLMESARTAN MEDOXOMIL 40 MG/1
40 TABLET ORAL DAILY
Qty: 90 TABLET | Refills: 3 | Status: SHIPPED | OUTPATIENT
Start: 2023-09-27

## 2023-09-27 RX ORDER — GABAPENTIN 300 MG/1
CAPSULE ORAL
Qty: 90 CAPSULE | Refills: 3 | Status: SHIPPED | OUTPATIENT
Start: 2023-09-27

## 2023-09-27 NOTE — PROGRESS NOTES
"Subjective:       Patient ID: Mine Quiros is a 58 y.o. female.    Chief Complaint: Annual Exam and Establish Care    HPI:  Pleasant 58-year-old patient here today to establish care.  She does need CPAP equipment right now.  Her last machine was replaced about 2 years ago.  She does use it every night.  Patient has not been taking her blood pressure medicines on a regular basis.  I am not sure if it was a supply issue or delay in select a PCP, it is unclear, but she also admits to be in relatively noncompliant.  I told her she is got stroke level blood pressure measurements and that she should be back on all of her medicines for blood pressure for going back to Nephrology and we can recheck her every couple of months for awhile.    The patient is being seen by endocrinology Ms. Tapia for her diabetes.  She is also on month Americo but needs dosing increases on a regular basis.  We will try to have her lose weight that way.  She is also getting [place] calories in by drinking alcohol at night and eating late at night.  Those are bad habits at she needs to decrease as much as possible but she likes being retired sleeping late  Review of Systems    Objective:      Vitals:    09/27/23 1336   BP: (!) 164/90   Pulse: 100   Resp: 19   SpO2: 96%   Weight: 97.6 kg (215 lb 2.7 oz)   Height: 4' 8" (1.422 m)      Physical Exam    Results for orders placed or performed in visit on 08/23/23   Basic Metabolic Panel   Result Value Ref Range    Sodium 140 136 - 145 mmol/L    Potassium 3.7 3.5 - 5.1 mmol/L    Chloride 105 95 - 110 mmol/L    CO2 22 (L) 23 - 29 mmol/L    Glucose 117 (H) 70 - 110 mg/dL    BUN 12 6 - 20 mg/dL    Creatinine 1.2 0.5 - 1.4 mg/dL    Calcium 9.4 8.7 - 10.5 mg/dL    Anion Gap 13 8 - 16 mmol/L    eGFR 52.8 (A) >60 mL/min/1.73 m^2   PTH, Intact   Result Value Ref Range    PTH, Intact 103.8 (H) 9.0 - 77.0 pg/mL      Assessment:       1. Hypertension associated with diabetes    2. Type 2 diabetes mellitus with " complication, with long-term current use of insulin    3. Hyperlipidemia, unspecified hyperlipidemia type    4. ETOH abuse    5. MARLON (obstructive sleep apnea)        Plan:       Hypertension associated with diabetes  -     amLODIPine (NORVASC) 10 MG tablet; Take 1 tablet (10 mg total) by mouth once daily.  Dispense: 90 tablet; Refill: 3  -     metoprolol succinate (TOPROL-XL) 100 MG 24 hr tablet; Take 1 tablet (100 mg total) by mouth once daily.  Dispense: 90 tablet; Refill: 3  -     olmesartan (BENICAR) 40 MG tablet; Take 1 tablet (40 mg total) by mouth once daily.  Dispense: 90 tablet; Refill: 3    Type 2 diabetes mellitus with complication, with long-term current use of insulin  -     gabapentin (NEURONTIN) 300 MG capsule; TAKE 1 CAPSULE BY MOUTH BY MOUTH THREE TIMES DAILY  Dispense: 90 capsule; Refill: 3  -     tirzepatide (MOUNJARO) 5 mg/0.5 mL PnIj; Inject 5 mg into the skin every 7 days.  Dispense: 4 pen ; Refill: 2    Hyperlipidemia, unspecified hyperlipidemia type  -     rosuvastatin (CRESTOR) 10 MG tablet; Take 1 tablet (10 mg total) by mouth once daily.  Dispense: 90 tablet; Refill: 3    ETOH abuse  Comments:  Patient will try to decrease intake.  There is also treatment programs available should she choose. She will research a program.    MARLON (obstructive sleep apnea)  -     CPAP/BIPAP SUPPLIES          Medication List with Changes/Refills   New Medications    TIRZEPATIDE (MOUNJARO) 5 MG/0.5 ML PNIJ    Inject 5 mg into the skin every 7 days.   Current Medications    ACCU-CHEK GUIDE TEST STRIPS STRP    To check BG 3 times daily, to use with insurance preferred meter    ALBUTEROL (PROVENTIL/VENTOLIN HFA) 90 MCG/ACTUATION INHALER    INHALE 1 PUFF BY MOUTH EVERY 4 HOURS AS NEEDED for SHORTNESS OF BREATH Strength: 90 mcg/actuation    BLOOD-GLUCOSE METER KIT    To check BG 3 times daily, to use with insurance preferred meter    BUSPIRONE (BUSPAR) 10 MG TABLET    TAKE 1 TABLET BY MOUTH THREE TIMES DAILY     "CITALOPRAM (CELEXA) 40 MG TABLET    TAKE 1 TABLET BY MOUTH DAILY    DIPHENHYDRAMINE (BENADRYL) 25 MG CAPSULE    Take 25 mg by mouth every evening. USE PM BEFORE SURGERY    ERGOCALCIFEROL (ERGOCALCIFEROL) 50,000 UNIT CAP    Take 1 capsule (50,000 Units total) by mouth every 7 days.    FLUTICASONE PROPIONATE (FLONASE) 50 MCG/ACTUATION NASAL SPRAY    2 sprays (100 mcg total) by Each Nostril route every evening.    INSULIN ASPART U-100 (NOVOLOG U-100 INSULIN ASPART) 100 UNIT/ML INJECTION    USE AS DIRECTED via insulin pump approx 150 UNITS EVERY DAY.    LANCETS (ACCU-CHEK FASTCLIX LANCET DRUM) [facility] [place]    use to check glucose THREE TIMES DAILY    MICROLET 2 LANCING DEVICE KIT    Use 4x/daily to check glucose.    PEN NEEDLE, DIABETIC 31 GAUGE X 5/16" NDLE    To use with insulin pens if needed   Changed and/or Refilled Medications    Modified Medication Previous Medication    AMLODIPINE (NORVASC) 10 MG TABLET amLODIPine (NORVASC) 10 MG tablet       Take 1 tablet (10 mg total) by mouth once daily.    Take 10 mg by mouth.    GABAPENTIN (NEURONTIN) 300 MG CAPSULE gabapentin (NEURONTIN) 300 MG capsule       TAKE 1 CAPSULE BY MOUTH BY MOUTH THREE TIMES DAILY    TAKE 1 CAPSULE BY MOUTH BY MOUTH THREE TIMES DAILY    METOPROLOL SUCCINATE (TOPROL-XL) 100 MG 24 HR TABLET metoprolol succinate (TOPROL-XL) 100 MG 24 hr tablet       Take 1 tablet (100 mg total) by mouth once daily.    Take 1 tablet (100 mg total) by mouth once daily.    OLMESARTAN (BENICAR) 40 MG TABLET olmesartan (BENICAR) 40 MG tablet       Take 1 tablet (40 mg total) by mouth once daily.    Take 40 mg by mouth.    ROSUVASTATIN (CRESTOR) 10 MG TABLET rosuvastatin (CRESTOR) 10 MG tablet       Take 1 tablet (10 mg total) by mouth once daily.    Take 1 tablet (10 mg total) by mouth once daily.   Discontinued Medications    TIMOLOL MALEATE 0.5% (TIMOPTIC) 0.5 % DROP    Place 1 drop into both eyes 2 (two) times daily.    TIRZEPATIDE (MOUNJARO) 2.5 MG/0.5 ML PNIJ    Inject 2.5 " mg into the skin every 7 days.

## 2023-10-03 ENCOUNTER — PATIENT MESSAGE (OUTPATIENT)
Dept: ADMINISTRATIVE | Facility: HOSPITAL | Age: 58
End: 2023-10-03
Payer: MEDICAID

## 2023-10-03 ENCOUNTER — PATIENT OUTREACH (OUTPATIENT)
Dept: ADMINISTRATIVE | Facility: HOSPITAL | Age: 58
End: 2023-10-03
Payer: MEDICAID

## 2023-10-03 NOTE — PROGRESS NOTES
HPI     1 MONTH   F/U  DM    AVASTIN INJECTION  OS      Additional comments: AVASTIN INJECTION     DM LAST BS    157 TO 90  IS A LOW   LAST A1C    7.1      DME   MODERRATE NPDR     HTN vs BP ISSUES   PCIOL            Comments    DLS     07/31/23        OCT - OD low grade non central DME, with VMA  OS - central DME/CME    Prior FA - late macular leakage OS > OD  No NV OU      A/P    Mod NPDR OU  T2 controlled on insulin pump    2. DME OS>OD  Been present since at least 2018  S/p Avastin OS x 4  7/23 - increased at 10 weeks    Ozurdex OS today       3. HTN Ret OU  BS/BP/chol control    4. PCIOL OU      2 month OCT no dilate     Risks, benefits, and alternatives to treatment discussed in detail with the patient.  The patient voiced understanding and wished to proceed with the procedure    Injection Procedure Note:  Diagnosis: DME OS    Patient Identified and Time Out complete  Pt marked  Topical Proparacaine and Betadine.subconjl tonio  Inject ozurdex OS at 6:00 @ 3.5-4mm posterior to limbus  Post Operative Dx: Same  Complications: None  Follow up as above.

## 2023-10-03 NOTE — PROGRESS NOTES
Working MOP HTN report  Contacted for a Remote BP Reading or to be scheduled to come in for a BP check via nurse visit  Portal message sent

## 2023-10-23 NOTE — TELEPHONE ENCOUNTER
----- Message from Alia Mcgrath sent at 10/23/2023  2:22 PM CDT -----  Regarding: CPAP SUPPLIES  Contact: RODDY FAULKNER [phone]  Type:  Diabetic/Medical Supplies Request        Name of Caller:  KAUSHIK RODDY     What supplies are needed:  CPAP NASAL MASK, HOSE/TUBING, FILTERS and ETC     What is the brand of the supplies:  N/A    Refill or New Rx:  REFILL    Who prescribed the original supplies:  SANDY    Pharmacy/Company Name, Phone #, Location:   [facility]   Phone Number: [phone]    Requesting a Call Back:  YES    Best Call Back Number:  [phone] (home)       Additional Information:  Patient is calling to speak with nurse regarding a Rx for her home CPAP supplies.   Please call back and advise. Thanks

## 2023-10-24 ENCOUNTER — RESEARCH ENCOUNTER (OUTPATIENT)
Dept: RESEARCH | Facility: HOSPITAL | Age: 58
End: 2023-10-24
Payer: MEDICAID

## 2023-11-01 RX ORDER — ALBUTEROL SULFATE 90 UG/1
AEROSOL, METERED RESPIRATORY (INHALATION)
Qty: 18 G | Refills: 5 | Status: SHIPPED | OUTPATIENT
Start: 2023-11-01

## 2023-11-01 NOTE — TELEPHONE ENCOUNTER
No care due was identified.  [facility] Embedded Care Due Messages. Reference number: [id number].   11/01/2023 4:23:10 PM CDT

## 2023-11-13 ENCOUNTER — TELEPHONE (OUTPATIENT)
Dept: FAMILY MEDICINE | Facility: CLINIC | Age: 58
End: 2023-11-13
Payer: COMMERCIAL

## 2023-11-13 NOTE — TELEPHONE ENCOUNTER
----- Message from Elle Shea sent at 2023 11:33 AM CST -----  Contact: Self  Patient is calling in regards to her CPAP supplies, stated she was told that her prescription has  and was calling to see if Dr Alcocer can send an order to [facility] for these supplies. Can we please check on this and call pt back to advise at [phone]. Thank You.

## 2023-11-20 ENCOUNTER — PROCEDURE VISIT (OUTPATIENT)
Dept: OPHTHALMOLOGY | Facility: CLINIC | Age: 58
End: 2023-11-20
Payer: COMMERCIAL

## 2023-11-20 DIAGNOSIS — Z79.4 CONTROLLED TYPE 2 DIABETES MELLITUS WITH BOTH EYES AFFECTED BY MODERATE NONPROLIFERATIVE RETINOPATHY AND MACULAR EDEMA, WITH LONG-TERM CURRENT USE OF INSULIN: ICD-10-CM

## 2023-11-20 DIAGNOSIS — E11.3313 CONTROLLED TYPE 2 DIABETES MELLITUS WITH BOTH EYES AFFECTED BY MODERATE NONPROLIFERATIVE RETINOPATHY AND MACULAR EDEMA, WITH LONG-TERM CURRENT USE OF INSULIN: ICD-10-CM

## 2023-11-20 DIAGNOSIS — H35.033 HYPERTENSIVE RETINOPATHY, BILATERAL: Primary | ICD-10-CM

## 2023-11-20 PROCEDURE — 92134 CPTRZ OPH DX IMG PST SGM RTA: CPT | Mod: PBBFAC,PO | Performed by: OPHTHALMOLOGY

## 2023-11-20 PROCEDURE — 92012 PR EYE EXAM, EST PATIENT,INTERMED: ICD-10-PCS | Mod: S$PBB,,, | Performed by: OPHTHALMOLOGY

## 2023-11-20 PROCEDURE — 92134 POSTERIOR SEGMENT OCT RETINA (OCULAR COHERENCE TOMOGRAPHY)-BOTH EYES: ICD-10-PCS | Mod: 26,S$PBB,, | Performed by: OPHTHALMOLOGY

## 2023-11-20 PROCEDURE — 92012 INTRM OPH EXAM EST PATIENT: CPT | Mod: S$PBB,,, | Performed by: OPHTHALMOLOGY

## 2023-11-20 NOTE — PROGRESS NOTES
HPI     2  month  DM  and npdr      Additional comments: NPDR   DM   LAST BS   100 AVER   LAST A1C   7.1    LATE FA  PRIOR  OS>OD   DME   OZURDEX INJECTION TODAY   PCIOL           Comments    DLS  09/25/23    Pt states that vision has been stable since last visit. Pt denies eye   pain, and flashes. Pt has one floater in OS.    GTTS: none          OCT - OD low grade non central DME, with VMA  OS - central DME/CME - resolved    Prior FA - late macular leakage OS > OD  No NV OU      A/P    Mod NPDR OU  T2 controlled on insulin pump    2. DME OS>OD  Been present since at least 2018  S/p Avastin OS x 4  S/p Ozurdex OS x 1 9/25/23 7/23 - increased at 10 weeks    Improved after Ozurdex Obs today      3. HTN Ret OU  BS/BP/chol control    4. PCIOL OU      2 month OCT and dilate

## 2023-11-24 ENCOUNTER — LAB VISIT (OUTPATIENT)
Dept: PRIMARY CARE CLINIC | Facility: CLINIC | Age: 58
End: 2023-11-24
Payer: COMMERCIAL

## 2023-11-24 DIAGNOSIS — E11.42 TYPE 2 DIABETES MELLITUS WITH DIABETIC POLYNEUROPATHY, WITH LONG-TERM CURRENT USE OF INSULIN: ICD-10-CM

## 2023-11-24 DIAGNOSIS — Z79.4 TYPE 2 DIABETES MELLITUS WITH DIABETIC POLYNEUROPATHY, WITH LONG-TERM CURRENT USE OF INSULIN: ICD-10-CM

## 2023-11-24 DIAGNOSIS — E78.5 HYPERLIPIDEMIA, UNSPECIFIED HYPERLIPIDEMIA TYPE: ICD-10-CM

## 2023-11-24 LAB
CHOLEST SERPL-MCNC: 276 MG/DL (ref 120–199)
CHOLEST/HDLC SERPL: 6.6 {RATIO} (ref 2–5)
ESTIMATED AVG GLUCOSE: 140 MG/DL (ref 68–131)
HBA1C MFR BLD: 6.5 % (ref 4–5.6)
HDLC SERPL-MCNC: 42 MG/DL (ref 40–75)
HDLC SERPL: 15.2 % (ref 20–50)
LDLC SERPL CALC-MCNC: 194.8 MG/DL (ref 63–159)
NONHDLC SERPL-MCNC: 234 MG/DL
TRIGL SERPL-MCNC: 196 MG/DL (ref 30–150)

## 2023-11-24 PROCEDURE — 80061 LIPID PANEL: CPT | Performed by: NURSE PRACTITIONER

## 2023-11-24 PROCEDURE — 83036 HEMOGLOBIN GLYCOSYLATED A1C: CPT | Performed by: NURSE PRACTITIONER

## 2023-11-29 ENCOUNTER — OFFICE VISIT (OUTPATIENT)
Dept: ENDOCRINOLOGY | Facility: CLINIC | Age: 58
End: 2023-11-29
Payer: MEDICAID

## 2023-11-29 VITALS
HEIGHT: 56 IN | HEART RATE: 102 BPM | SYSTOLIC BLOOD PRESSURE: 152 MMHG | BODY MASS INDEX: 48.42 KG/M2 | DIASTOLIC BLOOD PRESSURE: 90 MMHG | WEIGHT: 215.25 LBS

## 2023-11-29 DIAGNOSIS — E78.5 HYPERLIPIDEMIA, UNSPECIFIED HYPERLIPIDEMIA TYPE: ICD-10-CM

## 2023-11-29 DIAGNOSIS — E11.42 TYPE 2 DIABETES MELLITUS WITH DIABETIC POLYNEUROPATHY, WITH LONG-TERM CURRENT USE OF INSULIN: Primary | ICD-10-CM

## 2023-11-29 DIAGNOSIS — E66.01 MORBID OBESITY WITH BMI OF 50.0-59.9, ADULT: ICD-10-CM

## 2023-11-29 DIAGNOSIS — I10 PRIMARY HYPERTENSION: ICD-10-CM

## 2023-11-29 DIAGNOSIS — Z79.4 TYPE 2 DIABETES MELLITUS WITH DIABETIC POLYNEUROPATHY, WITH LONG-TERM CURRENT USE OF INSULIN: Primary | ICD-10-CM

## 2023-11-29 DIAGNOSIS — Z46.81 FITTING OR ADJUSTMENT OF INSULIN PUMP: ICD-10-CM

## 2023-11-29 DIAGNOSIS — Z79.4 CONTROLLED TYPE 2 DIABETES MELLITUS WITH BOTH EYES AFFECTED BY MODERATE NONPROLIFERATIVE RETINOPATHY AND MACULAR EDEMA, WITH LONG-TERM CURRENT USE OF INSULIN: ICD-10-CM

## 2023-11-29 DIAGNOSIS — E11.3313 CONTROLLED TYPE 2 DIABETES MELLITUS WITH BOTH EYES AFFECTED BY MODERATE NONPROLIFERATIVE RETINOPATHY AND MACULAR EDEMA, WITH LONG-TERM CURRENT USE OF INSULIN: ICD-10-CM

## 2023-11-29 PROBLEM — E55.9 VITAMIN D DEFICIENCY DISEASE: Status: RESOLVED | Noted: 2019-11-01 | Resolved: 2023-11-29

## 2023-11-29 PROBLEM — Z12.11 SCREEN FOR COLON CANCER: Status: RESOLVED | Noted: 2021-10-21 | Resolved: 2023-11-29

## 2023-11-29 PROCEDURE — 4010F ACE/ARB THERAPY RXD/TAKEN: CPT | Mod: CPTII,S$GLB,, | Performed by: NURSE PRACTITIONER

## 2023-11-29 PROCEDURE — 3062F PR POS MACROALBUMINURIA RESULT DOCUMENTED/REVIEW: ICD-10-PCS | Mod: CPTII,S$GLB,, | Performed by: NURSE PRACTITIONER

## 2023-11-29 PROCEDURE — 3008F PR BODY MASS INDEX (BMI) DOCUMENTED: ICD-10-PCS | Mod: CPTII,S$GLB,, | Performed by: NURSE PRACTITIONER

## 2023-11-29 PROCEDURE — 95251 PR GLUCOSE MONITOR, 72 HOUR, PHYS INTERP: ICD-10-PCS | Mod: S$GLB,,, | Performed by: NURSE PRACTITIONER

## 2023-11-29 PROCEDURE — 1159F PR MEDICATION LIST DOCUMENTED IN MEDICAL RECORD: ICD-10-PCS | Mod: CPTII,S$GLB,, | Performed by: NURSE PRACTITIONER

## 2023-11-29 PROCEDURE — 3008F BODY MASS INDEX DOCD: CPT | Mod: CPTII,S$GLB,, | Performed by: NURSE PRACTITIONER

## 2023-11-29 PROCEDURE — 3077F PR MOST RECENT SYSTOLIC BLOOD PRESSURE >= 140 MM HG: ICD-10-PCS | Mod: CPTII,S$GLB,, | Performed by: NURSE PRACTITIONER

## 2023-11-29 PROCEDURE — 1159F MED LIST DOCD IN RCRD: CPT | Mod: CPTII,S$GLB,, | Performed by: NURSE PRACTITIONER

## 2023-11-29 PROCEDURE — 99214 PR OFFICE/OUTPT VISIT, EST, LEVL IV, 30-39 MIN: ICD-10-PCS | Mod: S$GLB,,, | Performed by: NURSE PRACTITIONER

## 2023-11-29 PROCEDURE — 95251 CONT GLUC MNTR ANALYSIS I&R: CPT | Mod: S$GLB,,, | Performed by: NURSE PRACTITIONER

## 2023-11-29 PROCEDURE — 3044F HG A1C LEVEL LT 7.0%: CPT | Mod: CPTII,S$GLB,, | Performed by: NURSE PRACTITIONER

## 2023-11-29 PROCEDURE — 99214 OFFICE O/P EST MOD 30 MIN: CPT | Mod: S$GLB,,, | Performed by: NURSE PRACTITIONER

## 2023-11-29 PROCEDURE — 3066F PR DOCUMENTATION OF TREATMENT FOR NEPHROPATHY: ICD-10-PCS | Mod: CPTII,S$GLB,, | Performed by: NURSE PRACTITIONER

## 2023-11-29 PROCEDURE — 3077F SYST BP >= 140 MM HG: CPT | Mod: CPTII,S$GLB,, | Performed by: NURSE PRACTITIONER

## 2023-11-29 PROCEDURE — 3044F PR MOST RECENT HEMOGLOBIN A1C LEVEL <7.0%: ICD-10-PCS | Mod: CPTII,S$GLB,, | Performed by: NURSE PRACTITIONER

## 2023-11-29 PROCEDURE — 3080F DIAST BP >= 90 MM HG: CPT | Mod: CPTII,S$GLB,, | Performed by: NURSE PRACTITIONER

## 2023-11-29 PROCEDURE — 3080F PR MOST RECENT DIASTOLIC BLOOD PRESSURE >= 90 MM HG: ICD-10-PCS | Mod: CPTII,S$GLB,, | Performed by: NURSE PRACTITIONER

## 2023-11-29 PROCEDURE — 3066F NEPHROPATHY DOC TX: CPT | Mod: CPTII,S$GLB,, | Performed by: NURSE PRACTITIONER

## 2023-11-29 PROCEDURE — 3062F POS MACROALBUMINURIA REV: CPT | Mod: CPTII,S$GLB,, | Performed by: NURSE PRACTITIONER

## 2023-11-29 PROCEDURE — 4010F PR ACE/ARB THEARPY RXD/TAKEN: ICD-10-PCS | Mod: CPTII,S$GLB,, | Performed by: NURSE PRACTITIONER

## 2023-11-29 RX ORDER — GLUCAGON 3 MG/1
POWDER NASAL
Qty: 1 EACH | Refills: 1 | Status: SHIPPED | OUTPATIENT
Start: 2023-11-29

## 2023-11-29 RX ORDER — INSULIN DETEMIR 100 [IU]/ML
INJECTION, SOLUTION SUBCUTANEOUS
Qty: 15 ML | Refills: 0 | Status: SHIPPED | OUTPATIENT
Start: 2023-11-29

## 2023-11-29 NOTE — PROGRESS NOTES
"   Patient ID: Mine Quiros is a 58 y.o. female.    Chief Complaint: overnight hypoglycemia    HPI:  Pt is a 58 y.o. AAF with a long hx of very uncontrolled Type 2 DM-- as well as chronic conditions pending review including HTN, peripheral neuropathy, diastolic dysfunciton, morbid obesity- now on insulin pump.She has had multiple difficulties being able to navigate pump and having freq hypoglycemia. Medically disbabled she states due to neuropathy.     Patient of FRANKIE Solorio, who arrives today for c/o of overnight hypoglycemia- see sensor and pump downloads in chart  States she is better on ETOH intake--but 1/2 ai only last about 5 days.       Diet: Eats 2 meals a day  Ski[ps breakfast- sleeps in  Snacks- Cheese puffs or chips or [employer] to treat lows    Exercise: none    Current DM meds:   Medtronic pump 770 w Novolog, Mounjaro 5 mg weekly (Tuesday)  Basal rates:  12 mid----1.8   8 am ---2.2 u/hr     CHO:1:3  ISF:1:20   IOB:4      Last eye exam: 11/2023    Follows w Dr Hughes cardiology     ROS:   Gen: Appetite good   Eyes: Denies visual disturbances  Resp:  ESPINOSA   Cardiac: no palpitations, chest pain   GI: No nausea or vomiting, diarrhea, + constipation   /GYN: No nocturia, burning or pain.   MS/Neuro: Denies numbness/ tingling in BLE; Gait steady, speech clear  Psych: +ETOH abuse, no hx of depression.  Other systems: negative.    PE:  GENERAL: Well developed, well nourished.  PSYCH: AAOx3, appropriate mood and affect, pleasant expression, conversant, appears relaxed, well groomed.   EYES: Conjunctiva, corneas clear  NECK: Supple, trachea midline  SKIN: + acanthosis nigracans.  FOOT EXAMINATION: 8/2023      Personally reviewed Past Medical, Surgical, Social History.    BP (!) 152/90 (BP Location: Left arm, Patient Position: Sitting, BP Method: X-Large (Manual))   Pulse 102   Ht 4' 8" (1.422 m)   Wt 97.7 kg (215 lb 4.5 oz)   LMP 08/31/2007 (Approximate)   BMI 48.26 kg/m²      Personally " reviewed the below labs:      Chemistry        Component Value Date/Time     08/23/2023 1151    K 3.7 08/23/2023 1151     08/23/2023 1151    CO2 22 (L) 08/23/2023 1151    BUN 12 08/23/2023 1151    CREATININE 1.2 08/23/2023 1151     (H) 08/23/2023 1151        Component Value Date/Time    CALCIUM 9.4 08/23/2023 1151    ALKPHOS 121 05/11/2023 1051    AST 49 (H) 05/11/2023 1051    ALT 28 05/11/2023 1051    BILITOT 0.3 05/11/2023 1051    ESTGFRAFRICA 58.4 (A) 04/28/2022 1011    EGFRNONAA 50.6 (A) 04/28/2022 1011            Lab Results   Component Value Date    TSH 2.269 08/07/2020       Recent Labs   Lab 11/24/23  0855   LDL Cholesterol 194.8 H   HDL 42   Cholesterol 276 H        Results for orders placed or performed in visit on 08/07/20   Vitamin D   Result Value Ref Range    Vit D, 25-Hydroxy 16 (L) 30 - 96 ng/mL     No results found. However, due to the size of the patient record, not all encounters were searched. Please check Results Review for a complete set of results.    Lab Results   Component Value Date    MICALBCREAT 6030.2 (H) 05/11/2023       Hemoglobin A1C   Date Value Ref Range Status   11/24/2023 6.5 (H) 4.0 - 5.6 % Final     Comment:     ADA Screening Guidelines:  5.7-6.4%  Consistent with prediabetes  >or=6.5%  Consistent with diabetes    High levels of fetal hemoglobin interfere with the HbA1C  assay. Heterozygous hemoglobin variants (HbS, HgC, etc)do  not significantly interfere with this assay.   However, presence of multiple variants may affect accuracy.     08/17/2023 7.1 (H) 4.0 - 5.6 % Final     Comment:     ADA Screening Guidelines:  5.7-6.4%  Consistent with prediabetes  >or=6.5%  Consistent with diabetes    High levels of fetal hemoglobin interfere with the HbA1C  assay. Heterozygous hemoglobin variants (HbS, HgC, etc)do  not significantly interfere with this assay.   However, presence of multiple variants may affect accuracy.     05/11/2023 7.4 (H) 4.0 - 5.6 % Final      Comment:     ADA Screening Guidelines:  5.7-6.4%  Consistent with prediabetes  >or=6.5%  Consistent with diabetes    High levels of fetal hemoglobin interfere with the HbA1C  assay. Heterozygous hemoglobin variants (HbS, HgC, etc)do  not significantly interfere with this assay.   However, presence of multiple variants may affect accuracy.                         Chemistry        Component Value Date/Time     08/23/2023 1151    K 3.7 08/23/2023 1151     08/23/2023 1151    CO2 22 (L) 08/23/2023 1151    BUN 12 08/23/2023 1151    CREATININE 1.2 08/23/2023 1151     (H) 08/23/2023 1151        Component Value Date/Time    CALCIUM 9.4 08/23/2023 1151    ALKPHOS 121 05/11/2023 1051    AST 49 (H) 05/11/2023 1051    ALT 28 05/11/2023 1051    BILITOT 0.3 05/11/2023 1051    ESTGFRAFRICA 58.4 (A) 04/28/2022 1011    EGFRNONAA 50.6 (A) 04/28/2022 1011          Lab Results   Component Value Date    CHOL 276 (H) 11/24/2023     Lab Results   Component Value Date    HDL 42 11/24/2023     Lab Results   Component Value Date    LDLCALC 194.8 (H) 11/24/2023     Lab Results   Component Value Date    TRIG 196 (H) 11/24/2023     Lab Results   Component Value Date    CHOLHDL 15.2 (L) 11/24/2023     Lab Results   Component Value Date    MICALBCREAT 6030.2 (H) 05/11/2023     Lab Results   Component Value Date    TSH 2.269 08/07/2020     Vit D, 25-Hydroxy   Date Value Ref Range Status   08/07/2020 16 (L) 30 - 96 ng/mL Final     Comment:     Vitamin D deficiency.........<10 ng/mL                              Vitamin D insufficiency......10-29 ng/mL       Vitamin D sufficiency........> or equal to 30 ng/mL  Vitamin D toxicity............>100 ng/mL          1. Typ 2 diabetes w hypoglycemia, DM PN, DM DR  Upgrade to 780  Pump changes  12 mid----1.65  8 am ---2 u/hr   CHO:1:5  ISF:1:30   IOB:4  Increase Mounjaro to 7.5 mg weekly   Back up plan for insulin pump malfunction: Levemir 60 units qd- new Rx, Novolog 15-20u  with meals.    Rx for Baqsimi    2. HTN, uncontrolled today- continue current meds and monitor    3. HLP on statin- not taking bc she says pill is too big- encouraged her to take     4. Morbid obesity - Body mass index is 48.26 kg/m². Discussed cutting back on alcohol consumption, exercise    5. ETOH abuse - reports that she is not ready to go quit    F/u w FRANKIE Solorio NP in 3 mon w A1C

## 2024-01-01 NOTE — TELEPHONE ENCOUNTER
----- Message from Milton Lenz sent at 3/10/2017  1:01 PM CST -----  Contact: Patient  Patient called requesting light duty form to be fill out. Please call back at [phone] to advise thanks,  
Patients sister verbally understood all paperwork was faxed   
PAST MEDICAL HISTORY:  Bronchiolitis

## 2024-01-09 ENCOUNTER — TELEPHONE (OUTPATIENT)
Dept: ENDOCRINOLOGY | Facility: CLINIC | Age: 59
End: 2024-01-09
Payer: MEDICAID

## 2024-01-09 NOTE — TELEPHONE ENCOUNTER
----- Message from Mariluz Gaviria sent at 1/9/2024  1:26 PM CST -----  Regarding: Refill Request  Type: RX Refill Request      Who Called: Self       Refill or New Rx: refill       RX Name and Strength: evin         Preferred Pharmacy with phone number:    [facility] - [place], [place] - [address] [address]      Would the patient rather a call back or a response via [facility]? Call       Best Call Back Number: [phone]

## 2024-02-19 ENCOUNTER — OFFICE VISIT (OUTPATIENT)
Dept: OPHTHALMOLOGY | Facility: CLINIC | Age: 59
End: 2024-02-19
Payer: MEDICAID

## 2024-02-19 DIAGNOSIS — E11.3313 CONTROLLED TYPE 2 DIABETES MELLITUS WITH BOTH EYES AFFECTED BY MODERATE NONPROLIFERATIVE RETINOPATHY AND MACULAR EDEMA, WITH LONG-TERM CURRENT USE OF INSULIN: Primary | ICD-10-CM

## 2024-02-19 DIAGNOSIS — Z79.4 CONTROLLED TYPE 2 DIABETES MELLITUS WITH BOTH EYES AFFECTED BY MODERATE NONPROLIFERATIVE RETINOPATHY AND MACULAR EDEMA, WITH LONG-TERM CURRENT USE OF INSULIN: Primary | ICD-10-CM

## 2024-02-19 DIAGNOSIS — H35.033 HYPERTENSIVE RETINOPATHY, BILATERAL: ICD-10-CM

## 2024-02-19 PROCEDURE — 92134 CPTRZ OPH DX IMG PST SGM RTA: CPT | Mod: PBBFAC,PO | Performed by: OPHTHALMOLOGY

## 2024-02-19 PROCEDURE — 1159F MED LIST DOCD IN RCRD: CPT | Mod: CPTII,,, | Performed by: OPHTHALMOLOGY

## 2024-02-19 PROCEDURE — 92014 COMPRE OPH EXAM EST PT 1/>: CPT | Mod: 25,S$PBB,, | Performed by: OPHTHALMOLOGY

## 2024-02-19 PROCEDURE — 67028 INJECTION EYE DRUG: CPT | Mod: S$PBB,LT,, | Performed by: OPHTHALMOLOGY

## 2024-02-19 PROCEDURE — 67028 INJECTION EYE DRUG: CPT | Mod: PBBFAC,PO,LT | Performed by: OPHTHALMOLOGY

## 2024-02-19 PROCEDURE — 1160F RVW MEDS BY RX/DR IN RCRD: CPT | Mod: CPTII,,, | Performed by: OPHTHALMOLOGY

## 2024-02-19 PROCEDURE — 3044F HG A1C LEVEL LT 7.0%: CPT | Mod: CPTII,,, | Performed by: OPHTHALMOLOGY

## 2024-02-19 PROCEDURE — 99999PBSHW PR PBB SHADOW TECHNICAL ONLY FILED TO HB: Mod: JZ,PBBFAC,,

## 2024-02-19 PROCEDURE — 99213 OFFICE O/P EST LOW 20 MIN: CPT | Mod: PBBFAC,PO | Performed by: OPHTHALMOLOGY

## 2024-02-19 PROCEDURE — 2022F DILAT RTA XM EVC RTNOPTHY: CPT | Mod: CPTII,,, | Performed by: OPHTHALMOLOGY

## 2024-02-19 PROCEDURE — 99999 PR PBB SHADOW E&M-EST. PATIENT-LVL III: CPT | Mod: PBBFAC,,, | Performed by: OPHTHALMOLOGY

## 2024-02-19 RX ADMIN — DEXAMETHASONE 0.7 MG: 0.7 IMPLANT INTRAVITREAL at 01:02

## 2024-02-27 ENCOUNTER — TELEPHONE (OUTPATIENT)
Dept: ENDOCRINOLOGY | Facility: CLINIC | Age: 59
End: 2024-02-27
Payer: MEDICAID

## 2024-02-27 NOTE — TELEPHONE ENCOUNTER
Pt states you Rob called her to call her back. Agnes simons was not able to find Rob on the routing msg

## 2024-02-27 NOTE — TELEPHONE ENCOUNTER
----- Message from Tenisha Fonseca sent at 2/27/2024 10:50 AM CST -----  Regarding: missed call  Type:  Patient Returning Call    Who Called:Mine  Who Left Message for Patient:unknown  Does the patient know what this is regarding?:no  Would the patient rather a call back or a response via [facility]? Call back  Best Call Back Number:[phone]  Additional Information:

## 2024-02-29 ENCOUNTER — CLINICAL SUPPORT (OUTPATIENT)
Dept: PRIMARY CARE CLINIC | Facility: CLINIC | Age: 59
End: 2024-02-29
Payer: MEDICAID

## 2024-02-29 ENCOUNTER — LAB VISIT (OUTPATIENT)
Dept: PRIMARY CARE CLINIC | Facility: CLINIC | Age: 59
End: 2024-02-29
Payer: MEDICAID

## 2024-02-29 DIAGNOSIS — E11.3313 CONTROLLED TYPE 2 DIABETES MELLITUS WITH BOTH EYES AFFECTED BY MODERATE NONPROLIFERATIVE RETINOPATHY AND MACULAR EDEMA, WITH LONG-TERM CURRENT USE OF INSULIN: ICD-10-CM

## 2024-02-29 DIAGNOSIS — H35.033 HYPERTENSIVE RETINOPATHY, BILATERAL: ICD-10-CM

## 2024-02-29 DIAGNOSIS — N18.31 STAGE 3A CHRONIC KIDNEY DISEASE: ICD-10-CM

## 2024-02-29 DIAGNOSIS — E11.42 TYPE 2 DIABETES MELLITUS WITH DIABETIC POLYNEUROPATHY, WITH LONG-TERM CURRENT USE OF INSULIN: ICD-10-CM

## 2024-02-29 DIAGNOSIS — Z79.4 TYPE 2 DIABETES MELLITUS WITH DIABETIC POLYNEUROPATHY, WITH LONG-TERM CURRENT USE OF INSULIN: ICD-10-CM

## 2024-02-29 DIAGNOSIS — G47.33 OSA (OBSTRUCTIVE SLEEP APNEA): ICD-10-CM

## 2024-02-29 DIAGNOSIS — I10 HTN (HYPERTENSION), BENIGN: ICD-10-CM

## 2024-02-29 DIAGNOSIS — H40.053 OCULAR HYPERTENSION, BILATERAL: ICD-10-CM

## 2024-02-29 DIAGNOSIS — E66.01 MORBID OBESITY WITH BMI OF 50.0-59.9, ADULT: ICD-10-CM

## 2024-02-29 DIAGNOSIS — Z79.4 CONTROLLED TYPE 2 DIABETES MELLITUS WITH BOTH EYES AFFECTED BY MODERATE NONPROLIFERATIVE RETINOPATHY AND MACULAR EDEMA, WITH LONG-TERM CURRENT USE OF INSULIN: ICD-10-CM

## 2024-02-29 LAB
25(OH)D3+25(OH)D2 SERPL-MCNC: 7 NG/ML (ref 30–96)
ALBUMIN SERPL BCP-MCNC: 2.7 G/DL (ref 3.5–5.2)
ANION GAP SERPL CALC-SCNC: 9 MMOL/L (ref 8–16)
BACTERIA #/AREA URNS AUTO: ABNORMAL /HPF
BASOPHILS # BLD AUTO: 0.04 K/UL (ref 0–0.2)
BASOPHILS NFR BLD: 0.4 % (ref 0–1.9)
BILIRUB UR QL STRIP: NEGATIVE
BUN SERPL-MCNC: 11 MG/DL (ref 6–20)
CALCIUM SERPL-MCNC: 9.2 MG/DL (ref 8.7–10.5)
CHLORIDE SERPL-SCNC: 103 MMOL/L (ref 95–110)
CLARITY UR REFRACT.AUTO: CLEAR
CO2 SERPL-SCNC: 27 MMOL/L (ref 23–29)
COLOR UR AUTO: YELLOW
CREAT SERPL-MCNC: 1 MG/DL (ref 0.5–1.4)
CREAT UR-MCNC: 73 MG/DL (ref 15–325)
DIFFERENTIAL METHOD BLD: NORMAL
EOSINOPHIL # BLD AUTO: 0.2 K/UL (ref 0–0.5)
EOSINOPHIL NFR BLD: 1.9 % (ref 0–8)
ERYTHROCYTE [DISTWIDTH] IN BLOOD BY AUTOMATED COUNT: 13.6 % (ref 11.5–14.5)
EST. GFR  (NO RACE VARIABLE): >60 ML/MIN/1.73 M^2
GLUCOSE SERPL-MCNC: 106 MG/DL (ref 70–110)
GLUCOSE UR QL STRIP: ABNORMAL
HCT VFR BLD AUTO: 47.2 % (ref 37–48.5)
HGB BLD-MCNC: 15.4 G/DL (ref 12–16)
HGB UR QL STRIP: ABNORMAL
HYALINE CASTS UR QL AUTO: 0 /LPF
IMM GRANULOCYTES # BLD AUTO: 0.03 K/UL (ref 0–0.04)
IMM GRANULOCYTES NFR BLD AUTO: 0.3 % (ref 0–0.5)
KETONES UR QL STRIP: NEGATIVE
LEUKOCYTE ESTERASE UR QL STRIP: NEGATIVE
LYMPHOCYTES # BLD AUTO: 2.8 K/UL (ref 1–4.8)
LYMPHOCYTES NFR BLD: 28.5 % (ref 18–48)
MCH RBC QN AUTO: 30.8 PG (ref 27–31)
MCHC RBC AUTO-ENTMCNC: 32.6 G/DL (ref 32–36)
MCV RBC AUTO: 94 FL (ref 82–98)
MICROSCOPIC COMMENT: ABNORMAL
MONOCYTES # BLD AUTO: 0.5 K/UL (ref 0.3–1)
MONOCYTES NFR BLD: 4.6 % (ref 4–15)
NEUTROPHILS # BLD AUTO: 6.2 K/UL (ref 1.8–7.7)
NEUTROPHILS NFR BLD: 64.3 % (ref 38–73)
NITRITE UR QL STRIP: NEGATIVE
NRBC BLD-RTO: 0 /100 WBC
PH UR STRIP: 7 [PH] (ref 5–8)
PHOSPHATE SERPL-MCNC: 4.2 MG/DL (ref 2.7–4.5)
PLATELET # BLD AUTO: 367 K/UL (ref 150–450)
PMV BLD AUTO: 10.3 FL (ref 9.2–12.9)
POTASSIUM SERPL-SCNC: 3.5 MMOL/L (ref 3.5–5.1)
PROT UR QL STRIP: ABNORMAL
PROT UR-MCNC: 769 MG/DL (ref 0–15)
PROT/CREAT UR: 10.53 MG/G{CREAT} (ref 0–0.2)
RBC # BLD AUTO: 5 M/UL (ref 4–5.4)
RBC #/AREA URNS AUTO: 1 /HPF (ref 0–4)
SODIUM SERPL-SCNC: 139 MMOL/L (ref 136–145)
SP GR UR STRIP: 1.01 (ref 1–1.03)
SQUAMOUS #/AREA URNS AUTO: 2 /HPF
URATE SERPL-MCNC: 8.5 MG/DL (ref 2.4–5.7)
URN SPEC COLLECT METH UR: ABNORMAL
WBC # BLD AUTO: 9.7 K/UL (ref 3.9–12.7)
WBC #/AREA URNS AUTO: 6 /HPF (ref 0–5)

## 2024-02-29 PROCEDURE — 82570 ASSAY OF URINE CREATININE: CPT | Performed by: INTERNAL MEDICINE

## 2024-02-29 PROCEDURE — 80069 RENAL FUNCTION PANEL: CPT | Performed by: INTERNAL MEDICINE

## 2024-02-29 PROCEDURE — 82306 VITAMIN D 25 HYDROXY: CPT | Performed by: INTERNAL MEDICINE

## 2024-02-29 PROCEDURE — 81001 URINALYSIS AUTO W/SCOPE: CPT | Performed by: INTERNAL MEDICINE

## 2024-02-29 PROCEDURE — 84550 ASSAY OF BLOOD/URIC ACID: CPT | Performed by: INTERNAL MEDICINE

## 2024-02-29 PROCEDURE — 85025 COMPLETE CBC W/AUTO DIFF WBC: CPT | Performed by: INTERNAL MEDICINE

## 2024-02-29 NOTE — TELEPHONE ENCOUNTER
Pt had reached out to educator to find out where to get pump supplies d/t ins change. Spoke to pt and adv she had to call DME and set up acct and have them fax us CMN for supplies, voiced understanding.

## 2024-03-01 ENCOUNTER — TELEPHONE (OUTPATIENT)
Dept: ENDOCRINOLOGY | Facility: CLINIC | Age: 59
End: 2024-03-01
Payer: MEDICAID

## 2024-03-01 NOTE — TELEPHONE ENCOUNTER
Pt stated she was unable to get her test strips and lancets. PA was done and denied stating not medically necessary. Spoke to ins to redo PA d/t only test strips that communicate with insulin pump. Did PA over the phone and was approved good thru 03/31/2025. PA # [id number] for test strips and PA # [id number] for lancets. Pt is aware.

## 2024-03-14 ENCOUNTER — LAB VISIT (OUTPATIENT)
Dept: PRIMARY CARE CLINIC | Facility: CLINIC | Age: 59
End: 2024-03-14
Payer: MEDICAID

## 2024-03-14 DIAGNOSIS — E11.42 TYPE 2 DIABETES MELLITUS WITH DIABETIC POLYNEUROPATHY, WITH LONG-TERM CURRENT USE OF INSULIN: ICD-10-CM

## 2024-03-14 DIAGNOSIS — Z79.4 TYPE 2 DIABETES MELLITUS WITH DIABETIC POLYNEUROPATHY, WITH LONG-TERM CURRENT USE OF INSULIN: ICD-10-CM

## 2024-03-14 LAB
ESTIMATED AVG GLUCOSE: 128 MG/DL (ref 68–131)
HBA1C MFR BLD: 6.1 % (ref 4–5.6)

## 2024-03-14 PROCEDURE — 83036 HEMOGLOBIN GLYCOSYLATED A1C: CPT | Performed by: NURSE PRACTITIONER

## 2024-03-14 PROCEDURE — 36415 COLL VENOUS BLD VENIPUNCTURE: CPT | Mod: S$GLB,,, | Performed by: NURSE PRACTITIONER

## 2024-03-21 ENCOUNTER — OFFICE VISIT (OUTPATIENT)
Dept: ENDOCRINOLOGY | Facility: CLINIC | Age: 59
End: 2024-03-21
Payer: MEDICAID

## 2024-03-21 ENCOUNTER — TELEPHONE (OUTPATIENT)
Dept: ENDOCRINOLOGY | Facility: CLINIC | Age: 59
End: 2024-03-21

## 2024-03-21 DIAGNOSIS — E66.01 MORBID OBESITY WITH BMI OF 50.0-59.9, ADULT: ICD-10-CM

## 2024-03-21 DIAGNOSIS — E11.42 TYPE 2 DIABETES MELLITUS WITH DIABETIC POLYNEUROPATHY, WITH LONG-TERM CURRENT USE OF INSULIN: Primary | ICD-10-CM

## 2024-03-21 DIAGNOSIS — Z79.4 TYPE 2 DIABETES MELLITUS WITH DIABETIC POLYNEUROPATHY, WITH LONG-TERM CURRENT USE OF INSULIN: Primary | ICD-10-CM

## 2024-03-21 DIAGNOSIS — Z46.81 FITTING OR ADJUSTMENT OF INSULIN PUMP: ICD-10-CM

## 2024-03-21 DIAGNOSIS — E78.5 HYPERLIPIDEMIA, UNSPECIFIED HYPERLIPIDEMIA TYPE: ICD-10-CM

## 2024-03-21 DIAGNOSIS — I10 PRIMARY HYPERTENSION: ICD-10-CM

## 2024-03-21 DIAGNOSIS — F10.20 ALCOHOL USE DISORDER, SEVERE, DEPENDENCE: ICD-10-CM

## 2024-03-21 DIAGNOSIS — E11.649 HYPOGLYCEMIA ASSOCIATED WITH DIABETES: ICD-10-CM

## 2024-03-21 DIAGNOSIS — Z96.41 INSULIN PUMP STATUS: ICD-10-CM

## 2024-03-21 PROCEDURE — 1160F RVW MEDS BY RX/DR IN RCRD: CPT | Mod: CPTII,95,, | Performed by: NURSE PRACTITIONER

## 2024-03-21 PROCEDURE — 1159F MED LIST DOCD IN RCRD: CPT | Mod: CPTII,95,, | Performed by: NURSE PRACTITIONER

## 2024-03-21 PROCEDURE — 3044F HG A1C LEVEL LT 7.0%: CPT | Mod: CPTII,95,, | Performed by: NURSE PRACTITIONER

## 2024-03-21 PROCEDURE — 99214 OFFICE O/P EST MOD 30 MIN: CPT | Mod: 95,,, | Performed by: NURSE PRACTITIONER

## 2024-03-21 NOTE — TELEPHONE ENCOUNTER
----- Message from Tracy Das sent at 3/21/2024  4:15 PM CDT -----  Contact: self  Type: Needs Medical Advice  Who Called:  pt  Best Call Back Number: [phone]   Additional Information: pt states that an order needs to be given for her to get her supplies for her pump.pt would like to speak with the office please call

## 2024-03-21 NOTE — PROGRESS NOTES
ubjective:       Patient ID: Mine Quiros is a 58 y.o. female.    Chief Complaint: routine DM f/u   The patient location is: home   The chief complaint leading to consultation is: diabetes     Visit type: audiovisual    Face to Face time with patient: 2 min --audio issues, finished via phone   25 minutes of total time spent on the encounter, which includes face to face time and non-face to face time preparing to see the patient (eg, review of tests), Obtaining and/or reviewing separately obtained history, Documenting clinical information in the electronic or other health record, Independently interpreting results (not separately reported) and communicating results to the patient/family/caregiver, or Care coordination (not separately reported).       Each patient to whom he or she provides medical services by telemedicine is:  (1) informed of the relationship between the physician and patient and the respective role of any other health care provider with respect to management of the patient; and (2) notified that he or she may decline to receive medical services by telemedicine and may withdraw from such care at any time.    Notes:    HPI   Pt is a 58 y.o. AAF with a long hx of very uncontrolled Type 2 DM-- as well as chronic conditions pending review including HTN, peripheral neuropathy, diastolic dysfunciton, morbid obesity- now on insulin pump.She has had multiple difficulties being able to navigate pump and having freq hypoglycemia. Medically disbabled she states due to neuropathy.     Interim Events: march 21, 2024:  Pt had bee seen interim by PETE Reis NP. At that time pt had been complaining of some hypoglycemia and pump adjustments were made.  Pt is still complaining of hypoglycemia and specifically at night.  Does report a glucose in 50's.  Pt not real tech saavy, and I did instruct her to run exercise mode at  night to keep numbers higher until she can get in and we adjust.  I dont' think she will be  able to to well over the phone with instruction.    Also c/o constipation--advised SE of Mounjaro and dehydration.  C/o no wt loss--advised the ETOH is giving her calories and adding to dehydration.   -pt not taking statin because too big--  Reports current wt 207 lbs.      Current DM meds:   Medtronic pump--~100 units day        Failed DM meds:  na        Back up plan for insulin pump hiatus: 60 units basal,  15-20 with meals.    Statin: rosuvastatin 20 mg        Not tolerated statin : na   ACE/ARB:losartan 100 mg or olmesartan 40 mg        Not tolerated ACE/ARB: na   Known Diabetic complications: neuropathy, retinopathy       Aug 23, 2023:   No acute events. No focal complaints. Inquires about monjauro--denies any personal or family hx of pancreatic or thyroid cancer or pancreatitis.  PUmp and sensor downloaded.  DM is actually well controlled. ]  She did not take her BP meds today--which I strongly encouraged she better because her kidneys are starting to struggle based on proteinuria.  And she did not start the crestor I ordered, for her ridiculous LDL.  Her sister is present and reports she likes to [place] everything and we need to take her Frydaddy away.States she is better on ETOH intake--but 1/2 ai only last about 5 days.    .     May 17, 2023:  currently undergoing photocoagulation for retinopathy--  Diabetes control stable and quite reasonable especially considering past hx.  Questions regarding wt loss.  Advised ai has a lot of calories.  And makes liver fatter, Which makes her need more insulin and causes wt gain.  She is not thrilled at that sugguestion.  Otherwise no significant focal complaints.  Complaint of nighttime hypoglycemia.  Per sensor, once she dropped to 60's--but she had eaten right before so I suspect carb/insulin mismatch.  Also advised ETOH does contribute to nocturnal hypoglycemia. She did not take BP meds this AM.       Jan 12, 2022:  No focal complaints except discolored nails.   A1C much improved. C/o hypo last night with glucose of 45---but she had eaten pizza, grapefuit and a couple of mr. Good bars. No marked insulin dosing.  Asymptomatic--Suspect sensor error r/t sleeping on it likely.  Approved for bariatric surgery--if she can get the pre weight off.      Sept 19, 2022:  NO acute events. Did not take BP meds this AM--was in rush.  Sensor downloaded and reviewed. No c/o hypoglycemia.  Still drinking.  States foot wounds.      Pump Model:Medtronic 770    Preferred Infusion Sets and Tubing:quick set   Frequency of glucose checks a day:4  Frequency of infusion set changes required:q3 days    Average amount of daily insulin used:100     Basal rates:  12 mid----1.8   8 am ---2.2 u/hr     CHO:1:3  ISF:1:20   IOB:4    Review of Systems   Constitutional:  Negative for activity change and fatigue.   HENT:  Negative for hearing loss and trouble swallowing.    Eyes:  Negative for photophobia and visual disturbance.        Last Eye Exam: June 2021   Respiratory:  Negative for cough and shortness of breath.    Cardiovascular:  Negative for chest pain and palpitations.   Gastrointestinal:  Positive for constipation and diarrhea.   Endocrine: Negative for polydipsia and polyuria.   Genitourinary:  Negative for frequency and urgency.   Musculoskeletal:  Negative for arthralgias, back pain and myalgias.   Skin:  Negative for rash and wound.   Allergic/Immunologic: Negative for food allergies.   Neurological:  Positive for numbness (hands and feet.). Negative for weakness.   Psychiatric/Behavioral:  Negative for sleep disturbance. The patient is not nervous/anxious.         Sleeps well with gabapentin.         Objective:      Physical Exam  Constitutional:       Appearance: Normal appearance. She is obese.   HENT:      Head: Normocephalic and atraumatic.      Nose: Nose normal.   Neurological:      General: No focal deficit present.      Mental Status: She is alert and oriented to person, place, and  time.   Psychiatric:         Mood and Affect: Mood normal.         Behavior: Behavior normal.         Thought Content: Thought content normal.         Judgment: Judgment normal.         LMP 08/31/2007 (Approximate)     Hemoglobin A1C   Date Value Ref Range Status   03/14/2024 6.1 (H) 4.0 - 5.6 % Final     Comment:     ADA Screening Guidelines:  5.7-6.4%  Consistent with prediabetes  >or=6.5%  Consistent with diabetes    High levels of fetal hemoglobin interfere with the HbA1C  assay. Heterozygous hemoglobin variants (HbS, HgC, etc)do  not significantly interfere with this assay.   However, presence of multiple variants may affect accuracy.     11/24/2023 6.5 (H) 4.0 - 5.6 % Final     Comment:     ADA Screening Guidelines:  5.7-6.4%  Consistent with prediabetes  >or=6.5%  Consistent with diabetes    High levels of fetal hemoglobin interfere with the HbA1C  assay. Heterozygous hemoglobin variants (HbS, HgC, etc)do  not significantly interfere with this assay.   However, presence of multiple variants may affect accuracy.     08/17/2023 7.1 (H) 4.0 - 5.6 % Final     Comment:     ADA Screening Guidelines:  5.7-6.4%  Consistent with prediabetes  >or=6.5%  Consistent with diabetes    High levels of fetal hemoglobin interfere with the HbA1C  assay. Heterozygous hemoglobin variants (HbS, HgC, etc)do  not significantly interfere with this assay.   However, presence of multiple variants may affect accuracy.         Chemistry        Component Value Date/Time     02/29/2024 1344    K 3.5 02/29/2024 1344     02/29/2024 1344    CO2 27 02/29/2024 1344    BUN 11 02/29/2024 1344    CREATININE 1.0 02/29/2024 1344     02/29/2024 1344        Component Value Date/Time    CALCIUM 9.2 02/29/2024 1344    ALKPHOS 121 05/11/2023 1051    AST 49 (H) 05/11/2023 1051    ALT 28 05/11/2023 1051    BILITOT 0.3 05/11/2023 1051    ESTGFRAFRICA 58.4 (A) 04/28/2022 1011    EGFRNONAA 50.6 (A) 04/28/2022 1011          Lab Results    Component Value Date    CHOL 276 (H) 11/24/2023     Lab Results   Component Value Date    HDL 42 11/24/2023     Lab Results   Component Value Date    LDLCALC 194.8 (H) 11/24/2023     Lab Results   Component Value Date    TRIG 196 (H) 11/24/2023     Lab Results   Component Value Date    CHOLHDL 15.2 (L) 11/24/2023     Lab Results   Component Value Date    MICALBCREAT 6030.2 (H) 05/11/2023     Lab Results   Component Value Date    TSH 2.269 08/07/2020     Vit D, 25-Hydroxy   Date Value Ref Range Status   02/29/2024 7 (L) 30 - 96 ng/mL Final     Comment:     Vitamin D deficiency.........<10 ng/mL                              Vitamin D insufficiency......10-29 ng/mL       Vitamin D sufficiency........> or equal to 30 ng/mL  Vitamin D toxicity............>100 ng/mL             Assessment:      1. Type 2 diabetes mellitus with diabetic polyneuropathy, with long-term current use of insulin        2. Primary hypertension        3. Hyperlipidemia, unspecified hyperlipidemia type        4. Morbid obesity with BMI of 50.0-59.9, adult        5. Fitting or adjustment of insulin pump        6. Alcohol use disorder, severe, dependence        7. Insulin pump status        8. Hypoglycemia associated with diabetes                Plan:     Pt not taking statin--states pill too large to swallow. Reinforced its better than having chest sawed in 1/2.    Go home take BP meds. And crestor   Encourage, diet,exercise, ETOH cessation, minimization.   .                    ORDERS 03/21/2024   Cons DE--download pump and reach out to me for adjustments.      --Other    3 mo with me.

## 2024-03-22 ENCOUNTER — OFFICE VISIT (OUTPATIENT)
Dept: ENDOCRINOLOGY | Facility: CLINIC | Age: 59
End: 2024-03-22
Payer: MEDICAID

## 2024-03-22 ENCOUNTER — OFFICE VISIT (OUTPATIENT)
Dept: NEPHROLOGY | Facility: CLINIC | Age: 59
End: 2024-03-22
Payer: MEDICAID

## 2024-03-22 DIAGNOSIS — G47.33 OSA (OBSTRUCTIVE SLEEP APNEA): ICD-10-CM

## 2024-03-22 DIAGNOSIS — Z96.41 INSULIN PUMP STATUS: ICD-10-CM

## 2024-03-22 DIAGNOSIS — E66.01 MORBID OBESITY WITH BMI OF 50.0-59.9, ADULT: ICD-10-CM

## 2024-03-22 DIAGNOSIS — Z79.4 TYPE 2 DIABETES MELLITUS WITH DIABETIC POLYNEUROPATHY, WITH LONG-TERM CURRENT USE OF INSULIN: Primary | ICD-10-CM

## 2024-03-22 DIAGNOSIS — E11.42 TYPE 2 DIABETES MELLITUS WITH DIABETIC POLYNEUROPATHY, WITH LONG-TERM CURRENT USE OF INSULIN: ICD-10-CM

## 2024-03-22 DIAGNOSIS — N25.81 SECONDARY HYPERPARATHYROIDISM: ICD-10-CM

## 2024-03-22 DIAGNOSIS — E11.649 HYPOGLYCEMIA ASSOCIATED WITH DIABETES: ICD-10-CM

## 2024-03-22 DIAGNOSIS — F10.20 ALCOHOL USE DISORDER, SEVERE, DEPENDENCE: ICD-10-CM

## 2024-03-22 DIAGNOSIS — E11.42 TYPE 2 DIABETES MELLITUS WITH DIABETIC POLYNEUROPATHY, WITH LONG-TERM CURRENT USE OF INSULIN: Primary | ICD-10-CM

## 2024-03-22 DIAGNOSIS — E78.5 HYPERLIPIDEMIA, UNSPECIFIED HYPERLIPIDEMIA TYPE: ICD-10-CM

## 2024-03-22 DIAGNOSIS — Z46.81 FITTING OR ADJUSTMENT OF INSULIN PUMP: ICD-10-CM

## 2024-03-22 DIAGNOSIS — Z79.4 CONTROLLED TYPE 2 DIABETES MELLITUS WITH BOTH EYES AFFECTED BY MODERATE NONPROLIFERATIVE RETINOPATHY AND MACULAR EDEMA, WITH LONG-TERM CURRENT USE OF INSULIN: ICD-10-CM

## 2024-03-22 DIAGNOSIS — I10 HTN (HYPERTENSION), BENIGN: ICD-10-CM

## 2024-03-22 DIAGNOSIS — N18.31 STAGE 3A CHRONIC KIDNEY DISEASE: Primary | ICD-10-CM

## 2024-03-22 DIAGNOSIS — E11.3313 CONTROLLED TYPE 2 DIABETES MELLITUS WITH BOTH EYES AFFECTED BY MODERATE NONPROLIFERATIVE RETINOPATHY AND MACULAR EDEMA, WITH LONG-TERM CURRENT USE OF INSULIN: ICD-10-CM

## 2024-03-22 DIAGNOSIS — I10 PRIMARY HYPERTENSION: ICD-10-CM

## 2024-03-22 DIAGNOSIS — R80.1 PERSISTENT PROTEINURIA: ICD-10-CM

## 2024-03-22 DIAGNOSIS — Z79.4 TYPE 2 DIABETES MELLITUS WITH DIABETIC POLYNEUROPATHY, WITH LONG-TERM CURRENT USE OF INSULIN: ICD-10-CM

## 2024-03-22 PROCEDURE — 99214 OFFICE O/P EST MOD 30 MIN: CPT | Mod: 95,,, | Performed by: STUDENT IN AN ORGANIZED HEALTH CARE EDUCATION/TRAINING PROGRAM

## 2024-03-22 PROCEDURE — 99214 OFFICE O/P EST MOD 30 MIN: CPT | Mod: 25,S$PBB,, | Performed by: NURSE PRACTITIONER

## 2024-03-22 PROCEDURE — 95251 CONT GLUC MNTR ANALYSIS I&R: CPT | Mod: ,,, | Performed by: NURSE PRACTITIONER

## 2024-03-22 PROCEDURE — 3066F NEPHROPATHY DOC TX: CPT | Mod: CPTII,95,, | Performed by: STUDENT IN AN ORGANIZED HEALTH CARE EDUCATION/TRAINING PROGRAM

## 2024-03-22 PROCEDURE — 3044F HG A1C LEVEL LT 7.0%: CPT | Mod: CPTII,95,, | Performed by: STUDENT IN AN ORGANIZED HEALTH CARE EDUCATION/TRAINING PROGRAM

## 2024-03-22 PROCEDURE — 3044F HG A1C LEVEL LT 7.0%: CPT | Mod: CPTII,,, | Performed by: NURSE PRACTITIONER

## 2024-03-22 RX ORDER — FINERENONE 10 MG/1
10 TABLET, FILM COATED ORAL DAILY
Qty: 30 TABLET | Refills: 11 | Status: ACTIVE | OUTPATIENT
Start: 2024-03-22 | End: 2025-03-17

## 2024-03-22 NOTE — PROGRESS NOTES
ubjective:       Patient ID: Mine Quiros is a 58 y.o. female.    Chief Complaint: routine DM f/u   HPI   Pt is a 58 y.o. AAF with a long hx of very uncontrolled Type 2 DM-- as well as chronic conditions pending review including HTN, peripheral neuropathy, diastolic dysfunciton, morbid obesity- now on insulin pump.She has had multiple difficulties being able to navigate pump and having freq hypoglycemia. Medically disbabled she states due to neuropathy.     Interim Events: March 22, 2024:  Had pt come in person so I could adjust her insulin pump (she nor I trust her to do it)   Has been complaining of hypogycemia--specifically at night--and says she feels so bad she can't event drink or eat anything so she just prays.  But I did download pump and sensor and no hypoglycemia actually noted.  I do see she landed near 70 once, but glucoses usually hovering around 100 overnight.  She is also on  7.5 Mounjaro and requesting increase dose.      Wt 92.4 kg  Did not take bp meds yet.     CGMS Interpretation:       Sensor/Pump Interpretation or Prominent Theme:   Current DM meds:   Medtronic pump--~100 units day        Failed DM meds:  na        Back up plan for insulin pump hiatus: 60 units basal,  15-20 with meals.    Statin: rosuvastatin 20 mg        Not tolerated statin : na   ACE/ARB:losartan 100 mg or olmesartan 40 mg        Not tolerated ACE/ARB: na   Known Diabetic complications: neuropathy, retinopathy         march 21, 2024:  Pt had bee seen interim by PETE Reis NP. At that time pt had been complaining of some hypoglycemia and pump adjustments were made.  Pt is still complaining of hypoglycemia and specifically at night.  Does report a glucose in 50's.  Pt not real tech saavy, and I did instruct her to run exercise mode at  night to keep numbers higher until she can get in and we adjust.  I dont' think she will be able to to well over the phone with instruction.    Also c/o constipation--advised SE of  Mounjaro and dehydration.  C/o no wt loss--advised the ETOH is giving her calories and adding to dehydration.   -pt not taking statin because too big--  Reports current wt 207 lbs.      Aug 23, 2023:   No acute events. No focal complaints. Inquires about monjauro--denies any personal or family hx of pancreatic or thyroid cancer or pancreatitis.  PUmp and sensor downloaded.  DM is actually well controlled. ]  She did not take her BP meds today--which I strongly encouraged she better because her kidneys are starting to struggle based on proteinuria.  And she did not start the crestor I ordered, for her ridiculous LDL.  Her sister is present and reports she likes to [place] everything and we need to take her Frydaddy away.States she is better on ETOH intake--but 1/2 ai only last about 5 days.    .     May 17, 2023:  currently undergoing photocoagulation for retinopathy--  Diabetes control stable and quite reasonable especially considering past hx.  Questions regarding wt loss.  Advised ai has a lot of calories.  And makes liver fatter, Which makes her need more insulin and causes wt gain.  She is not thrilled at that sugguestion.  Otherwise no significant focal complaints.  Complaint of nighttime hypoglycemia.  Per sensor, once she dropped to 60's--but she had eaten right before so I suspect carb/insulin mismatch.  Also advised ETOH does contribute to nocturnal hypoglycemia. She did not take BP meds this AM.       Jan 12, 2022:  No focal complaints except discolored nails.  A1C much improved. C/o hypo last night with glucose of 45---but she had eaten pizza, grapefuit and a couple of mr. Good bars. No marked insulin dosing.  Asymptomatic--Suspect sensor error r/t sleeping on it likely.  Approved for bariatric surgery--if she can get the pre weight off.      Sept 19, 2022:  NO acute events. Did not take BP meds this AM--was in rush.  Sensor downloaded and reviewed. No c/o hypoglycemia.  Still drinking.  States foot  wounds.        Review of Systems   Constitutional:  Negative for activity change and fatigue.   HENT:  Negative for hearing loss and trouble swallowing.    Eyes:  Negative for photophobia and visual disturbance.        Last Eye Exam: June 2021   Respiratory:  Negative for cough and shortness of breath.    Cardiovascular:  Negative for chest pain and palpitations.   Gastrointestinal:  Positive for constipation and diarrhea.   Endocrine: Negative for polydipsia and polyuria.   Genitourinary:  Negative for frequency and urgency.   Musculoskeletal:  Negative for arthralgias, back pain and myalgias.   Skin:  Negative for rash and wound.   Allergic/Immunologic: Negative for food allergies.   Neurological:  Positive for numbness (hands and feet.). Negative for weakness.   Psychiatric/Behavioral:  Negative for sleep disturbance. The patient is not nervous/anxious.         Sleeps well with gabapentin.         Objective:      Physical Exam  Constitutional:       Appearance: Normal appearance. She is obese.   HENT:      Head: Normocephalic and atraumatic.      Nose: Nose normal.   Neurological:      General: No focal deficit present.      Mental Status: She is alert and oriented to person, place, and time.   Psychiatric:         Mood and Affect: Mood normal.         Behavior: Behavior normal.         Thought Content: Thought content normal.         Judgment: Judgment normal.           LMP 08/31/2007 (Approximate)     Hemoglobin A1C   Date Value Ref Range Status   03/14/2024 6.1 (H) 4.0 - 5.6 % Final     Comment:     ADA Screening Guidelines:  5.7-6.4%  Consistent with prediabetes  >or=6.5%  Consistent with diabetes    High levels of fetal hemoglobin interfere with the HbA1C  assay. Heterozygous hemoglobin variants (HbS, HgC, etc)do  not significantly interfere with this assay.   However, presence of multiple variants may affect accuracy.     11/24/2023 6.5 (H) 4.0 - 5.6 % Final     Comment:     ADA Screening  Guidelines:  5.7-6.4%  Consistent with prediabetes  >or=6.5%  Consistent with diabetes    High levels of fetal hemoglobin interfere with the HbA1C  assay. Heterozygous hemoglobin variants (HbS, HgC, etc)do  not significantly interfere with this assay.   However, presence of multiple variants may affect accuracy.     08/17/2023 7.1 (H) 4.0 - 5.6 % Final     Comment:     ADA Screening Guidelines:  5.7-6.4%  Consistent with prediabetes  >or=6.5%  Consistent with diabetes    High levels of fetal hemoglobin interfere with the HbA1C  assay. Heterozygous hemoglobin variants (HbS, HgC, etc)do  not significantly interfere with this assay.   However, presence of multiple variants may affect accuracy.         Chemistry        Component Value Date/Time     02/29/2024 1344    K 3.5 02/29/2024 1344     02/29/2024 1344    CO2 27 02/29/2024 1344    BUN 11 02/29/2024 1344    CREATININE 1.0 02/29/2024 1344     02/29/2024 1344        Component Value Date/Time    CALCIUM 9.2 02/29/2024 1344    ALKPHOS 121 05/11/2023 1051    AST 49 (H) 05/11/2023 1051    ALT 28 05/11/2023 1051    BILITOT 0.3 05/11/2023 1051    ESTGFRAFRICA 58.4 (A) 04/28/2022 1011    EGFRNONAA 50.6 (A) 04/28/2022 1011          Lab Results   Component Value Date    CHOL 276 (H) 11/24/2023     Lab Results   Component Value Date    HDL 42 11/24/2023     Lab Results   Component Value Date    LDLCALC 194.8 (H) 11/24/2023     Lab Results   Component Value Date    TRIG 196 (H) 11/24/2023     Lab Results   Component Value Date    CHOLHDL 15.2 (L) 11/24/2023     Lab Results   Component Value Date    MICALBCREAT 6030.2 (H) 05/11/2023     Lab Results   Component Value Date    TSH 2.269 08/07/2020     Vit D, 25-Hydroxy   Date Value Ref Range Status   02/29/2024 7 (L) 30 - 96 ng/mL Final     Comment:     Vitamin D deficiency.........<10 ng/mL                              Vitamin D insufficiency......10-29 ng/mL       Vitamin D sufficiency........> or equal to 30  ng/mL  Vitamin D toxicity............>100 ng/mL             Assessment:      1. Type 2 diabetes mellitus with diabetic polyneuropathy, with long-term current use of insulin        2. Primary hypertension        3. Morbid obesity with BMI of 50.0-59.9, adult        4. Fitting or adjustment of insulin pump        5. Alcohol use disorder, severe, dependence        6. Hypoglycemia associated with diabetes        7. Hyperlipidemia, unspecified hyperlipidemia type        8. Insulin pump status            Plan:     All reinforced:   Pt not taking statin--states pill too large to swallow. Reinforced its better than having chest sawed in 1/2.    Go home take BP meds. And crestor   Encourage, diet,exercise, ETOH cessation, minimization.     No true hypoglycemia--but with ETOH, don't really want her glucoses that tight, and she is requesting mounjaro increase  Decrease 12 mid from 1.65 to 1.4 u/hr   Decrease 8 am from 2 u/hr to 1.6 u/hr   Increase mounjaro from  7.5 to 10 mg.    .             ORDERS 03/22/2024     3 mo with me-likes later morning--no labs.     Time 25 min

## 2024-03-22 NOTE — PROGRESS NOTES
HPI     DM     DFE   AND  OCT      Additional comments: DM   LAST A1C  6.6  LAST  BS  109  AVER  BLURRY    VA OU            Comments    DLS 11/20/23    Pt states that vision has been stable since last visit. Pt denies eye   pain, and flashes. Pt has one floater in OS.    GTTS: none        OCT - OD low grade non central DME, with VMA  OS - central DME/CME - resolved    Prior FA - late macular leakage OS > OD  No NV OU      A/P    Mod NPDR OU  T2 controlled on insulin pump    2. DME OS>OD  Been present since at least 2018  S/p Avastin OS x 4  S/p Ozurdex OS x 1 9/25/23 7/23 - increased at 10 weeks  2/24 increased ME OS    Ozurdex OS today    Try q12 week     Get iluvien Auth      3. HTN Ret OU  BS/BP/chol control    4. PCIOL OU      12 weeks OCT no dilate     Risks, benefits, and alternatives to treatment discussed in detail with the patient.  The patient voiced understanding and wished to proceed with the procedure    Injection Procedure Note:  Diagnosis: DME OS    Patient Identified and Time Out complete  Pt marked  Topical Proparacaine and Betadine. Subconj lido  Inject Ozurdex OS at 6:00 @ 3.5-4mm posterior to limbus  Post Operative Dx: Same  Complications: None  Follow up as above.

## 2024-03-22 NOTE — PROGRESS NOTES
The patient location is:  [place], [place]  The chief complaint leading to consultation is:  Ongoing evaluation and management of chronic kidney disease.    Visit type: audiovisual    Face to Face time with patient: 12  31 minutes of total time spent on the encounter, which includes face to face time and non-face to face time preparing to see the patient (eg, review of tests), Obtaining and/or reviewing separately obtained history, Documenting clinical information in the electronic or other health record, Independently interpreting results (not separately reported) and communicating results to the patient/family/caregiver, or Care coordination (not separately reported).         Each patient to whom he or she provides medical services by telemedicine is:  (1) informed of the relationship between the physician and patient and the respective role of any other health care provider with respect to management of the patient; and (2) notified that he or she may decline to receive medical services by telemedicine and may withdraw from such care at any time.    Notes:     Subjective:        Patient ID: Mine Quiros is a 58 y.o. Black or  female who presents for return patient evaluation for chronic renal failure.  She has a pertinent past medical history of diabetes type 2 diagnosed in 2008 and hypertension diagnosed about 10 years ago.  She is followed in this clinic for ongoing evaluation and management of chronic renal failure and nephrotic range proteinuria.    Interval history:  03/22/2024 clinic visit-patient seen via telemedicine services today.  Reviewed her most recent lab results at chair side.  Hemoglobin A1c is 6.1%.  Urinalysis continues to show 3+ protein 1+ occult blood, U PCR quantification of 10 grams/gram.  She also has hypovitaminosis D. her serum albumin remains low 2.5.  Renal function based on serum creatinine trend is at baseline.      Review of Systems   Constitutional:  Negative  for chills, diaphoresis and fever.   Respiratory:  Negative for cough and shortness of breath.    Cardiovascular:  Negative for chest pain and leg swelling.   Gastrointestinal:  Negative for abdominal pain, diarrhea, nausea and vomiting.   Genitourinary:  Negative for difficulty urinating, dysuria and hematuria.   Musculoskeletal:  Negative for myalgias.   Neurological:  Negative for headaches.   Hematological:  Does not bruise/bleed easily.   All other systems reviewed and are negative.        The past medical, family and social histories were reviewed for this encounter.         Objective:   LMP 08/31/2007 (Approximate)   Telemedicine visit 3/22/2024     Physical Exam    Assessment:     Lab Results   Component Value Date    CREATININE 1.0 02/29/2024    BUN 11 02/29/2024     02/29/2024    K 3.5 02/29/2024     02/29/2024    CO2 27 02/29/2024     Lab Results   Component Value Date    .8 (H) 08/23/2023    CALCIUM 9.2 02/29/2024    PHOS 4.2 02/29/2024     Lab Results   Component Value Date    HCT 47.2 02/29/2024     Prot/Creat Ratio, Urine   Date Value Ref Range Status   02/29/2024 10.53 (H) 0.00 - 0.20 Final   08/23/2023 8.57 (H) 0.00 - 0.20 Final      Lab Results   Component Value Date    MICALBCREAT 6030.2 (H) 05/11/2023    MICALBCREAT 4302.9 (H) 01/05/2023    MICALBCREAT 3218.8 (H) 10/07/2021    MICALBCREAT 315.7 (H) 10/25/2019    MICALBCREAT 38.4 (H) 03/27/2018    MICALBCREAT 20.8 03/09/2016           1. Stage 3a chronic kidney disease    2. HTN (hypertension), benign    3. Morbid obesity with BMI of 50.0-59.9, adult    4. Type 2 diabetes mellitus with diabetic polyneuropathy, with long-term current use of insulin    5. Controlled type 2 diabetes mellitus with both eyes affected by moderate nonproliferative retinopathy and macular edema, with long-term current use of insulin    6. MARLON (obstructive sleep apnea)    7. Secondary hyperparathyroidism    8. Persistent proteinuria        Plan:   Return  to clinic in 3 months.  Labs for next visit include rfp x2, pth, upcr.  Baseline creatinine is 1.0mg/dL.    Chronic kidney disease stage 2, A3  Risk:  Longstanding diabetes mellitus type 2 previously uncontrolled, hypertension, morbid obesity.  -no history of renal biopsy, though likely diabetic nephropathy versus FSGS.  She has not a good candidate for immunosuppression.  -on RASI for CKD-MACE risk reduction, proteinuria reduction and guy-protection  -avoiding SGLT2 inhibitor due to body habitus and increased risk for yeast infection   -start Kerendia 10mg option today and sent to specialty pharmacy. Needs potassium checked 4 weeks after starting this medication.    Hypertension- reports control at home. -130s. Controlled on current medications.    Morbid obesity-the patient's biggest barrier to improving her overall health and management of other comorbidities. She is currently on GLP1a    Type 2 diabetes mellitus on long-term use of insulin with diabetic retinopathy-patient advised to continue good control of her diabetes to reduce risks of further complications    MARLON-continue CPAP     Secondary hyperparathyroidism-also with hypovitaminosis D, has a prescription for ergo 13255 units once a week but this appears to be old.  I believe patient will do better with daily dosing as shows more likely to remember.    Nephrotic range proteinuria-not to a degree at where she would need anticoagulation but getting close.  We will start Non-steroidal Mineralocorticoid Receptor Antagonist today for further proteinuria reduction.        Gino Solorio MD  [facility]    KFRE 2-Year: 0.6% at 2/29/2024  1:44 PM  Calculated from:  Serum Creatinine: 1.0 mg/dL at 2/29/2024  1:44 PM  Urine Albumin Creatinine Ratio: 6,030.2 ug/mg at 5/11/2023 11:15 AM  Age: 58 years  Sex: Female at 2/29/2024  1:44 PM  Has CKD-3 to CKD-5: Yes    KFRE 5-Year: 1.7% at 2/29/2024  1:44 PM  Calculated from:  Serum  Creatinine: 1.0 mg/dL at 2/29/2024  1:44 PM  Urine Albumin Creatinine Ratio: 6,030.2 ug/mg at 5/11/2023 11:15 AM  Age: 58 years  Sex: Female at 2/29/2024  1:44 PM  Has CKD-3 to CKD-5: Yes

## 2024-03-28 ENCOUNTER — TELEPHONE (OUTPATIENT)
Dept: ENDOCRINOLOGY | Facility: CLINIC | Age: 59
End: 2024-03-28
Payer: MEDICAID

## 2024-03-28 NOTE — TELEPHONE ENCOUNTER
Medtronic pump order faxed again to [facility] w/sections filled out that were missed.pt verb understanding

## 2024-03-28 NOTE — TELEPHONE ENCOUNTER
----- Message from Katia Fall sent at 3/28/2024  3:27 PM CDT -----  Contact: Patient  Type:  Needs Medical Advice    Who Called:   Patient    Would the patient rather a call back or a response via [facility]?   Call back  Best Call Back Number:   [phone]    Additional Information:   States she would like to speak with someone - states she still has not received her diabetic supplies - states she was told they still need authorization from the doctor - please call - thank you

## 2024-04-12 ENCOUNTER — TELEPHONE (OUTPATIENT)
Dept: ENDOCRINOLOGY | Facility: CLINIC | Age: 59
End: 2024-04-12
Payer: MEDICAID

## 2024-04-12 NOTE — TELEPHONE ENCOUNTER
----- Message from Zakia Martin, Patient Care Assistant sent at 4/12/2024  9:53 AM CDT -----  Contact: Pt  Type: Needs Medical Advice    Who Called: Pt  Best Call Back Number: [phone]  Inquiry/Question: Pt is calling to advise she has not received her sensors for her dexcom. Please advise Thank you~

## 2024-04-12 NOTE — TELEPHONE ENCOUNTER
S/w pt and notified her I spoke w/[facility] regarding pt's message that she has not received her DEXCOM sensors. She does not know which dexcom she is using. She is using a medtronic pump, don't they go w/ guardian sensors?. If it is so, they need to send to  [facility]. [facility] said pt's insurance is not under contract w/[facility] and they need to go to pharmacy    Please advise

## 2024-04-17 DIAGNOSIS — Z79.4 TYPE 2 DIABETES MELLITUS WITH DIABETIC POLYNEUROPATHY, WITH LONG-TERM CURRENT USE OF INSULIN: Primary | ICD-10-CM

## 2024-04-17 DIAGNOSIS — E11.42 TYPE 2 DIABETES MELLITUS WITH DIABETIC POLYNEUROPATHY, WITH LONG-TERM CURRENT USE OF INSULIN: Primary | ICD-10-CM

## 2024-04-25 ENCOUNTER — TELEPHONE (OUTPATIENT)
Dept: ENDOCRINOLOGY | Facility: CLINIC | Age: 59
End: 2024-04-25
Payer: MEDICAID

## 2024-04-25 NOTE — TELEPHONE ENCOUNTER
Spoke to pt and she stated she has not heard [facility]. Provided pt with number and adv her to call them and check status.

## 2024-04-25 NOTE — TELEPHONE ENCOUNTER
----- Message from Maryuri Sawyer sent at 4/25/2024  1:38 PM CDT -----  Regarding: diabetes supplies  Contact: pt  Type:  Needs Medical Advice    Who Called: pt    Would the patient rather a call back or a response via [facility]? Call back.    Best Call Back Number: [phone]    Additional Information: pt sts they have never heard back from the diabetes supply company.   Please advise.  Thank you.

## 2024-04-26 ENCOUNTER — TELEPHONE (OUTPATIENT)
Dept: ENDOCRINOLOGY | Facility: CLINIC | Age: 59
End: 2024-04-26
Payer: MEDICAID

## 2024-04-26 NOTE — TELEPHONE ENCOUNTER
[facility]I don't see any Rx for those

## 2024-04-26 NOTE — TELEPHONE ENCOUNTER
Called pharmacy,they said they have Rx just waiting for pt to call to give them insurance info so they can process the Rx

## 2024-04-26 NOTE — TELEPHONE ENCOUNTER
I talked to her an according to her pharmacy told her they did not have the Rx,but I let her know the do leana the Rx but she needed to call them back and give them her insurance info and anything else they needed to process the Rx

## 2024-04-26 NOTE — TELEPHONE ENCOUNTER
----- Message from Joi Abad sent at 4/25/2024  2:16 PM CDT -----  Type: Needs Medical Advice  Who Called:  pt  Symptoms (please be specific):  pt said she need to speak to the office--said she called the pharmacy in [place] and they do not have what she need for her diabetic pump--please call and advise  Best Call Back Number: [phone] (home)     Additional Information: thank you

## 2024-04-30 ENCOUNTER — PATIENT OUTREACH (OUTPATIENT)
Dept: ADMINISTRATIVE | Facility: HOSPITAL | Age: 59
End: 2024-04-30
Payer: MEDICAID

## 2024-04-30 ENCOUNTER — LAB VISIT (OUTPATIENT)
Dept: PRIMARY CARE CLINIC | Facility: CLINIC | Age: 59
End: 2024-04-30
Payer: MEDICAID

## 2024-04-30 DIAGNOSIS — R80.1 PERSISTENT PROTEINURIA: ICD-10-CM

## 2024-04-30 DIAGNOSIS — E11.42 TYPE 2 DIABETES MELLITUS WITH DIABETIC POLYNEUROPATHY, WITH LONG-TERM CURRENT USE OF INSULIN: ICD-10-CM

## 2024-04-30 DIAGNOSIS — N18.31 STAGE 3A CHRONIC KIDNEY DISEASE: ICD-10-CM

## 2024-04-30 DIAGNOSIS — Z79.4 TYPE 2 DIABETES MELLITUS WITH DIABETIC POLYNEUROPATHY, WITH LONG-TERM CURRENT USE OF INSULIN: ICD-10-CM

## 2024-04-30 DIAGNOSIS — Z12.31 ENCOUNTER FOR SCREENING MAMMOGRAM FOR MALIGNANT NEOPLASM OF BREAST: Primary | ICD-10-CM

## 2024-04-30 LAB
ALBUMIN SERPL BCP-MCNC: 2.7 G/DL (ref 3.5–5.2)
ANION GAP SERPL CALC-SCNC: 11 MMOL/L (ref 8–16)
BUN SERPL-MCNC: 14 MG/DL (ref 6–20)
CALCIUM SERPL-MCNC: 9.1 MG/DL (ref 8.7–10.5)
CHLORIDE SERPL-SCNC: 106 MMOL/L (ref 95–110)
CO2 SERPL-SCNC: 26 MMOL/L (ref 23–29)
CREAT SERPL-MCNC: 1 MG/DL (ref 0.5–1.4)
EST. GFR  (NO RACE VARIABLE): >60 ML/MIN/1.73 M^2
GLUCOSE SERPL-MCNC: 109 MG/DL (ref 70–110)
PHOSPHATE SERPL-MCNC: 3.7 MG/DL (ref 2.7–4.5)
POTASSIUM SERPL-SCNC: 3.5 MMOL/L (ref 3.5–5.1)
SODIUM SERPL-SCNC: 143 MMOL/L (ref 136–145)

## 2024-04-30 PROCEDURE — 80069 RENAL FUNCTION PANEL: CPT | Performed by: STUDENT IN AN ORGANIZED HEALTH CARE EDUCATION/TRAINING PROGRAM

## 2024-04-30 PROCEDURE — 36415 COLL VENOUS BLD VENIPUNCTURE: CPT | Mod: S$GLB,,, | Performed by: STUDENT IN AN ORGANIZED HEALTH CARE EDUCATION/TRAINING PROGRAM

## 2024-04-30 NOTE — PROGRESS NOTES
Population Health Chart Review & Patient Outreach Details      Additional [facility] Health Notes:               Updates Requested / Reviewed:      Updated Care Coordination Note, Care Everywhere, , and External Sources: DIS         Health Maintenance Topics Overdue:      VBHM Score: 1     Uncontrolled BP                       Health Maintenance Topic(s) Outreach Outcomes & Actions Taken:    Breast Cancer Screening - Outreach Outcomes & Actions Taken  : Mammogram Order Placed and Mammogram Screening Scheduled    Blood Pressure - Outreach Outcomes & Actions Taken  : Patient Will Call Back or Send Portal Message with Reading

## 2024-05-01 DIAGNOSIS — Z79.4 TYPE 2 DIABETES MELLITUS WITH DIABETIC POLYNEUROPATHY, WITH LONG-TERM CURRENT USE OF INSULIN: ICD-10-CM

## 2024-05-01 DIAGNOSIS — E11.42 TYPE 2 DIABETES MELLITUS WITH DIABETIC POLYNEUROPATHY, WITH LONG-TERM CURRENT USE OF INSULIN: ICD-10-CM

## 2024-05-01 NOTE — TELEPHONE ENCOUNTER
----- Message from Alex Leyva sent at 5/1/2024 10:20 AM CDT -----  Contact: Self  Type:  RX Refill Request    Who Called:  CT  Refill or New Rx:  Refill  RX Name and Strength:  tirzepatide 10 mg/0.5 mL PnIj and diabetic testing strips    [facility] [place] [id number] [address]  Phone: [phone] Fax: [phone]    Local or Mail Order:  local  Ordering Provider:    Best Call Back Number:  [phone]    Additional Information:

## 2024-05-07 ENCOUNTER — HOSPITAL ENCOUNTER (OUTPATIENT)
Dept: RADIOLOGY | Facility: HOSPITAL | Age: 59
Discharge: HOME | End: 2024-05-07
Attending: FAMILY MEDICINE
Payer: MEDICAID

## 2024-05-07 DIAGNOSIS — Z12.31 ENCOUNTER FOR SCREENING MAMMOGRAM FOR MALIGNANT NEOPLASM OF BREAST: ICD-10-CM

## 2024-05-07 PROCEDURE — 77063 BREAST TOMOSYNTHESIS BI: CPT | Mod: 26,,, | Performed by: RADIOLOGY

## 2024-05-07 PROCEDURE — 77067 SCR MAMMO BI INCL CAD: CPT | Mod: TC,PO

## 2024-05-07 PROCEDURE — 77067 SCR MAMMO BI INCL CAD: CPT | Mod: 26,,, | Performed by: RADIOLOGY

## 2024-05-08 ENCOUNTER — TELEPHONE (OUTPATIENT)
Dept: RADIOLOGY | Facility: HOSPITAL | Age: 59
End: 2024-05-08
Payer: MEDICAID

## 2024-05-08 NOTE — TELEPHONE ENCOUNTER
I left a message for patient to call back to schedule right breast diagnostic imaging.  She is scheduled on 5-16-24.

## 2024-05-14 DIAGNOSIS — Z79.4 TYPE 2 DIABETES MELLITUS WITH DIABETIC POLYNEUROPATHY, WITH LONG-TERM CURRENT USE OF INSULIN: ICD-10-CM

## 2024-05-14 DIAGNOSIS — E11.42 TYPE 2 DIABETES MELLITUS WITH DIABETIC POLYNEUROPATHY, WITH LONG-TERM CURRENT USE OF INSULIN: ICD-10-CM

## 2024-05-14 NOTE — TELEPHONE ENCOUNTER
----- Message from Mariluz Salomon sent at 5/14/2024 10:35 AM CDT -----  Type: Needs Medical Advice  Who Called:  pt     Best Call Back Number: [phone] (home)     Additional Information: pt requesting call back in regards to her sensors on her diabetic machine , says she never received any and is wondering if there is anything else she can get please advise

## 2024-05-16 ENCOUNTER — HOSPITAL ENCOUNTER (OUTPATIENT)
Dept: RADIOLOGY | Facility: HOSPITAL | Age: 59
Discharge: HOME | End: 2024-05-16
Attending: FAMILY MEDICINE
Payer: MEDICAID

## 2024-05-16 DIAGNOSIS — R92.8 ABNORMALITY OF RIGHT BREAST ON SCREENING MAMMOGRAM: ICD-10-CM

## 2024-05-16 PROCEDURE — 77065 DX MAMMO INCL CAD UNI: CPT | Mod: 26,RT,, | Performed by: RADIOLOGY

## 2024-05-16 PROCEDURE — 77065 DX MAMMO INCL CAD UNI: CPT | Mod: TC,PO,RT

## 2024-05-16 PROCEDURE — 77061 BREAST TOMOSYNTHESIS UNI: CPT | Mod: TC,PO,RT

## 2024-05-16 PROCEDURE — 77061 BREAST TOMOSYNTHESIS UNI: CPT | Mod: 26,RT,, | Performed by: RADIOLOGY

## 2024-05-23 ENCOUNTER — TELEPHONE (OUTPATIENT)
Dept: ENDOCRINOLOGY | Facility: CLINIC | Age: 59
End: 2024-05-23
Payer: MEDICAID

## 2024-05-23 NOTE — TELEPHONE ENCOUNTER
Spoke to pharmacy and they stated sensor needed a PA. Submitted  PA through covermymeds for guardian 3 sensor. KEY [id number]. It came back approved. Spoke to pt to adv her to call pharm to get approved for shipping out today.

## 2024-05-23 NOTE — TELEPHONE ENCOUNTER
----- Message from Lola Garcia sent at 5/22/2024  3:12 PM CDT -----  Type: Needs Medical Advice  Who Called:  pt   Symptoms (please be specific):  diabetic supplies   Best Call Back Number: [phone]  Additional Information: pt stated she never got pts sensor 7 70's please call back to advise asap thanks!

## 2024-05-31 ENCOUNTER — TELEPHONE (OUTPATIENT)
Dept: ENDOCRINOLOGY | Facility: CLINIC | Age: 59
End: 2024-05-31
Payer: MEDICAID

## 2024-05-31 NOTE — TELEPHONE ENCOUNTER
Spoke to the pharm and they stated they had to order the sensor and it should be coming in today and will be shipped to pt as soon as they get it. LM for pt to call clinic back to adv this.

## 2024-05-31 NOTE — TELEPHONE ENCOUNTER
----- Message from Rob Saunders LPN sent at 5/30/2024  4:18 PM CDT -----    ----- Message -----  From: Mine Marie  Sent: 5/30/2024   2:22 PM CDT  To: Osmin ESPOSITO Staff (Endo)    Pt still never received something for her diabetes pump.  Please call back to advise  [phone]

## 2024-06-03 ENCOUNTER — PROCEDURE VISIT (OUTPATIENT)
Dept: OPHTHALMOLOGY | Facility: CLINIC | Age: 59
End: 2024-06-03
Payer: MEDICAID

## 2024-06-03 DIAGNOSIS — E11.3313 CONTROLLED TYPE 2 DIABETES MELLITUS WITH BOTH EYES AFFECTED BY MODERATE NONPROLIFERATIVE RETINOPATHY AND MACULAR EDEMA, WITH LONG-TERM CURRENT USE OF INSULIN: Primary | ICD-10-CM

## 2024-06-03 DIAGNOSIS — H40.053 OCULAR HYPERTENSION, BILATERAL: ICD-10-CM

## 2024-06-03 DIAGNOSIS — H35.033 HYPERTENSIVE RETINOPATHY, BILATERAL: ICD-10-CM

## 2024-06-03 DIAGNOSIS — Z79.4 CONTROLLED TYPE 2 DIABETES MELLITUS WITH BOTH EYES AFFECTED BY MODERATE NONPROLIFERATIVE RETINOPATHY AND MACULAR EDEMA, WITH LONG-TERM CURRENT USE OF INSULIN: Primary | ICD-10-CM

## 2024-06-03 PROCEDURE — 92134 CPTRZ OPH DX IMG PST SGM RTA: CPT | Mod: PBBFAC,PO | Performed by: OPHTHALMOLOGY

## 2024-06-03 PROCEDURE — 92012 INTRM OPH EXAM EST PATIENT: CPT | Mod: S$PBB,,, | Performed by: OPHTHALMOLOGY

## 2024-06-03 PROCEDURE — 67028 INJECTION EYE DRUG: CPT | Mod: PBBFAC,PO,LT | Performed by: OPHTHALMOLOGY

## 2024-06-03 NOTE — PROGRESS NOTES
HPI    Patient here today with c/o pain OS and itching. VA stable ou, but   noticeable changes OS. Denies floaters or flashes ou.    No gtts  Last edited by Jeanette Prasad on 6/3/2024  8:46 AM.          OCT - OD low grade non central DME, with VMA  OS - central DME/CME - resolved    Prior FA - late macular leakage OS > OD  No NV OU      A/P    Mod NPDR OU  T2 controlled on insulin pump    2. DME OS>OD  Been present since at least 2018  S/p Avastin OS x 4  S/p Ozurdex OS x 2 2/19/24 7/23 - increased at 10 weeks  2/24 increased ME OS    Pt not approved for good days because of medicaid, and can't afford copay  Ozurdex no longer approved -  Pt will have to get Avastin monthly in future, and will likely lose vision.  Can consider STK - but not optimal.  This is unfortunate      3. HTN Ret OU  BS/BP/chol control    4. PCIOL OU      12 weeks OCT and dilate

## 2024-06-10 DIAGNOSIS — Z79.4 TYPE 2 DIABETES MELLITUS WITH DIABETIC POLYNEUROPATHY, WITH LONG-TERM CURRENT USE OF INSULIN: ICD-10-CM

## 2024-06-10 DIAGNOSIS — E11.42 TYPE 2 DIABETES MELLITUS WITH DIABETIC POLYNEUROPATHY, WITH LONG-TERM CURRENT USE OF INSULIN: ICD-10-CM

## 2024-06-10 NOTE — TELEPHONE ENCOUNTER
----- Message from Felicita Davis sent at 6/10/2024  2:15 PM CDT -----  Regarding: insulin  Can the clinic reply in [facility]:    No       Please refill the medication(s) listed below. Please call the patient when the prescription(s) is ready for  at this phone number     [phone] (home)   Pt is out of refils please advise she wooud also like a call back she has more questions           Medication #1  insulin aspart U-100 (NOVOLOG U-100 INSULIN ASPART) 100 unit/mL injection 50 mL 11 5/8/2023 - No  Sig: USE AS DIRECTED via insulin pump approx 150 UNITS EVERY DAY.  Sent to pharmacy as: insulin aspart U-100 (NOVOLOG U-100 INSULIN ASPART) 100 unit/m          Medication #2          Preferred Pharmacy:   [facility] - [address] [place] [address]  [place] [address]  Phone: [phone] Fax: [phone]

## 2024-06-10 NOTE — TELEPHONE ENCOUNTER
Spoke to pt and adv called in script for novolog. Spoke to [facility] family pahrm regarding guard 3 and adv her it is sitting at her po box to be picked up.

## 2024-06-11 RX ORDER — INSULIN ASPART 100 [IU]/ML
INJECTION, SOLUTION INTRAVENOUS; SUBCUTANEOUS
Qty: 50 ML | Refills: 11 | Status: SHIPPED | OUTPATIENT
Start: 2024-06-11

## 2024-06-19 DIAGNOSIS — E11.9 TYPE 2 DIABETES MELLITUS WITHOUT COMPLICATION: ICD-10-CM

## 2024-06-25 ENCOUNTER — LAB VISIT (OUTPATIENT)
Dept: PRIMARY CARE CLINIC | Facility: CLINIC | Age: 59
End: 2024-06-25
Payer: MEDICAID

## 2024-06-25 DIAGNOSIS — E11.42 TYPE 2 DIABETES MELLITUS WITH DIABETIC POLYNEUROPATHY, WITH LONG-TERM CURRENT USE OF INSULIN: ICD-10-CM

## 2024-06-25 DIAGNOSIS — Z79.4 TYPE 2 DIABETES MELLITUS WITH DIABETIC POLYNEUROPATHY, WITH LONG-TERM CURRENT USE OF INSULIN: ICD-10-CM

## 2024-06-25 DIAGNOSIS — R80.1 PERSISTENT PROTEINURIA: ICD-10-CM

## 2024-06-25 DIAGNOSIS — N18.31 STAGE 3A CHRONIC KIDNEY DISEASE: ICD-10-CM

## 2024-06-25 LAB
ALBUMIN SERPL BCP-MCNC: 2.6 G/DL (ref 3.5–5.2)
ANION GAP SERPL CALC-SCNC: 14 MMOL/L (ref 8–16)
BUN SERPL-MCNC: 15 MG/DL (ref 6–20)
CALCIUM SERPL-MCNC: 9 MG/DL (ref 8.7–10.5)
CHLORIDE SERPL-SCNC: 108 MMOL/L (ref 95–110)
CO2 SERPL-SCNC: 21 MMOL/L (ref 23–29)
CREAT SERPL-MCNC: 1.3 MG/DL (ref 0.5–1.4)
CREAT UR-MCNC: 137 MG/DL (ref 15–325)
EST. GFR  (NO RACE VARIABLE): 47.7 ML/MIN/1.73 M^2
GLUCOSE SERPL-MCNC: 105 MG/DL (ref 70–110)
PHOSPHATE SERPL-MCNC: 4.2 MG/DL (ref 2.7–4.5)
POTASSIUM SERPL-SCNC: 3.6 MMOL/L (ref 3.5–5.1)
PROT UR-MCNC: 1138 MG/DL (ref 0–15)
PROT/CREAT UR: 8.31 MG/G{CREAT} (ref 0–0.2)
PTH-INTACT SERPL-MCNC: 142.7 PG/ML (ref 9–77)
SODIUM SERPL-SCNC: 143 MMOL/L (ref 136–145)

## 2024-06-25 PROCEDURE — 36415 COLL VENOUS BLD VENIPUNCTURE: CPT | Mod: S$GLB,,, | Performed by: STUDENT IN AN ORGANIZED HEALTH CARE EDUCATION/TRAINING PROGRAM

## 2024-06-25 PROCEDURE — 80069 RENAL FUNCTION PANEL: CPT | Performed by: STUDENT IN AN ORGANIZED HEALTH CARE EDUCATION/TRAINING PROGRAM

## 2024-06-25 PROCEDURE — 83970 ASSAY OF PARATHORMONE: CPT | Performed by: STUDENT IN AN ORGANIZED HEALTH CARE EDUCATION/TRAINING PROGRAM

## 2024-06-25 PROCEDURE — 84156 ASSAY OF PROTEIN URINE: CPT | Performed by: STUDENT IN AN ORGANIZED HEALTH CARE EDUCATION/TRAINING PROGRAM

## 2024-07-05 ENCOUNTER — OFFICE VISIT (OUTPATIENT)
Dept: NEPHROLOGY | Facility: CLINIC | Age: 59
End: 2024-07-05
Payer: MEDICAID

## 2024-07-05 DIAGNOSIS — H35.033 HYPERTENSIVE RETINOPATHY, BILATERAL: ICD-10-CM

## 2024-07-05 DIAGNOSIS — N18.31 STAGE 3A CHRONIC KIDNEY DISEASE: Primary | ICD-10-CM

## 2024-07-05 DIAGNOSIS — N25.81 SECONDARY HYPERPARATHYROIDISM: ICD-10-CM

## 2024-07-05 DIAGNOSIS — E66.01 MORBID OBESITY WITH BMI OF 50.0-59.9, ADULT: ICD-10-CM

## 2024-07-05 DIAGNOSIS — I10 HTN (HYPERTENSION), BENIGN: ICD-10-CM

## 2024-07-05 DIAGNOSIS — Z79.4 TYPE 2 DIABETES MELLITUS WITH DIABETIC POLYNEUROPATHY, WITH LONG-TERM CURRENT USE OF INSULIN: ICD-10-CM

## 2024-07-05 DIAGNOSIS — E11.42 TYPE 2 DIABETES MELLITUS WITH DIABETIC POLYNEUROPATHY, WITH LONG-TERM CURRENT USE OF INSULIN: ICD-10-CM

## 2024-07-05 DIAGNOSIS — G47.33 OSA (OBSTRUCTIVE SLEEP APNEA): ICD-10-CM

## 2024-07-05 DIAGNOSIS — R80.1 PERSISTENT PROTEINURIA: ICD-10-CM

## 2024-07-05 NOTE — PROGRESS NOTES
The patient location is:  [place], [place]  The chief complaint leading to consultation is:  Ongoing evaluation and management of chronic kidney disease.    Visit type: audiovisual    Face to Face time with patient: 3 (audio difficulties) additional 12 min telephone  27 minutes of total time spent on the encounter, which includes face to face time and non-face to face time preparing to see the patient (eg, review of tests), Obtaining and/or reviewing separately obtained history, Documenting clinical information in the electronic or other health record, Independently interpreting results (not separately reported) and communicating results to the patient/family/caregiver, or Care coordination (not separately reported).     Each patient to whom he or she provides medical services by telemedicine is:  (1) informed of the relationship between the physician and patient and the respective role of any other health care provider with respect to management of the patient; and (2) notified that he or she may decline to receive medical services by telemedicine and may withdraw from such care at any time.    Notes:     Subjective:        Patient ID: Mine Quiros is a 58 y.o. female who presents for return patient evaluation for chronic renal failure.  She has a pertinent past medical history of diabetes type 2 diagnosed in 2008 and hypertension diagnosed about 10 years ago.  She is followed in this clinic for ongoing evaluation and management of chronic renal failure and nephrotic range proteinuria.    Interval history:  03/22/2024 clinic visit-patient seen via telemedicine services today.  Reviewed her most recent lab results at chair side.  Hemoglobin A1c is 6.1%.  Urinalysis continues to show 3+ protein 1+ occult blood, U PCR quantification of 10 grams/gram.  She also has hypovitaminosis D. her serum albumin remains low 2.5.  Renal function based on serum creatinine trend is at baseline.    7/5/24 clinic visit - seen  "today via telemedicine services for ongoing evaluation and management of CKD. She has no uremic or urinary symptoms and is in her usual state of health. She has not started taking her finerenone since last visit but she has obtained the medication. She admits to not taking any medications except for insulin since last visit d/t feeling "well". Counseled patient she needs to participate in the care plan if we are to preserve her kidney function.      Review of Systems   Constitutional:  Negative for chills, diaphoresis and fever.   Respiratory:  Negative for cough and shortness of breath.    Cardiovascular:  Negative for chest pain and leg swelling.   Gastrointestinal:  Negative for abdominal pain, diarrhea, nausea and vomiting.   Genitourinary:  Negative for difficulty urinating, dysuria and hematuria.   Musculoskeletal:  Negative for myalgias.   Neurological:  Negative for headaches.   Hematological:  Does not bruise/bleed easily.   All other systems reviewed and are negative.        The past medical, family and social histories were reviewed for this encounter.         Objective:   LMP 08/31/2007 (Approximate)   Telemedicine visit 7/5/2024     Physical Exam    Assessment:     Lab Results   Component Value Date    CREATININE 1.3 06/25/2024    BUN 15 06/25/2024     06/25/2024    K 3.6 06/25/2024     06/25/2024    CO2 21 (L) 06/25/2024     Lab Results   Component Value Date    .7 (H) 06/25/2024    CALCIUM 9.0 06/25/2024    PHOS 4.2 06/25/2024     Lab Results   Component Value Date    HCT 47.2 02/29/2024     Prot/Creat Ratio, Urine   Date Value Ref Range Status   06/25/2024 8.31 (H) 0.00 - 0.20 Final   02/29/2024 10.53 (H) 0.00 - 0.20 Final   08/23/2023 8.57 (H) 0.00 - 0.20 Final      Lab Results   Component Value Date    MICALBCREAT 6030.2 (H) 05/11/2023    MICALBCREAT 4302.9 (H) 01/05/2023    MICALBCREAT 3218.8 (H) 10/07/2021    MICALBCREAT 315.7 (H) 10/25/2019    MICALBCREAT 38.4 (H) 03/27/2018 "    MICALBCREAT 20.8 03/09/2016           1. Stage 3a chronic kidney disease    2. HTN (hypertension), benign    3. Morbid obesity with BMI of 50.0-59.9, adult    4. Type 2 diabetes mellitus with diabetic polyneuropathy, with long-term current use of insulin    5. MARLON (obstructive sleep apnea)    6. Secondary hyperparathyroidism    7. Persistent proteinuria    8. Hypertensive retinopathy, bilateral          Plan:   Return to clinic in 3 months.  Labs for next visit include rfp, pth, cbc, ua, uacr, upcr, pla2ra, thsd7a  Baseline creatinine is 1.0mg/dL.    Chronic kidney disease stage 3a, A3  Risk:  Longstanding diabetes mellitus type 2 previously uncontrolled, hypertension, morbid obesity.  -no history of renal biopsy, though likely diabetic nephropathy versus FSGS.  She has not a good candidate for immunosuppression.  -needs to be more adherent with medication regiment or I will not be able to prolong her need for dialysis. Stressed with patient today  -continue ARB  -continue Non-steroidal Mineralocorticoid Receptor Antagonist     Hypertension- needs to take her medications. Counseled patient.     Morbid obesity-the patient's biggest barrier to improving her overall health and management of other comorbidities.     Type 2 diabetes mellitus on long-term use of insulin with diabetic retinopathy-patient advised to continue good control of her diabetes to reduce risks of further complications    MARLON-continue CPAP     Secondary hyperparathyroidism- increased in the interim. Patient is not taking her vitamin d. Counseled patient.     Nephrotic range proteinuria-not to a degree at where she would need anticoagulation but getting close.  Needs to take her ARB and NRA        Gino Solorio MD  [facility] 2-Year: 0.6% at 4/30/2024  9:33 AM  Calculated from:  Serum Creatinine: 1.0 mg/dL at 4/30/2024  9:33 AM  Urine Albumin Creatinine Ratio: 6,030.2 ug/mg at 5/11/2023 11:15 AM  Age: 58  years  Sex: Female at 4/30/2024  9:33 AM  Has CKD-3 to CKD-5: Yes    KFRE 5-Year: 1.7% at 4/30/2024  9:33 AM  Calculated from:  Serum Creatinine: 1.0 mg/dL at 4/30/2024  9:33 AM  Urine Albumin Creatinine Ratio: 6,030.2 ug/mg at 5/11/2023 11:15 AM  Age: 58 years  Sex: Female at 4/30/2024  9:33 AM  Has CKD-3 to CKD-5: Yes

## 2024-07-16 ENCOUNTER — OFFICE VISIT (OUTPATIENT)
Dept: ENDOCRINOLOGY | Facility: CLINIC | Age: 59
End: 2024-07-16
Payer: MEDICAID

## 2024-07-16 VITALS
SYSTOLIC BLOOD PRESSURE: 180 MMHG | HEART RATE: 109 BPM | HEIGHT: 56 IN | BODY MASS INDEX: 45.6 KG/M2 | WEIGHT: 202.69 LBS | DIASTOLIC BLOOD PRESSURE: 118 MMHG

## 2024-07-16 DIAGNOSIS — I10 PRIMARY HYPERTENSION: ICD-10-CM

## 2024-07-16 DIAGNOSIS — Z96.41 INSULIN PUMP STATUS: ICD-10-CM

## 2024-07-16 DIAGNOSIS — E66.01 MORBID OBESITY WITH BMI OF 50.0-59.9, ADULT: ICD-10-CM

## 2024-07-16 DIAGNOSIS — Z79.4 TYPE 2 DIABETES MELLITUS WITH DIABETIC POLYNEUROPATHY, WITH LONG-TERM CURRENT USE OF INSULIN: Primary | ICD-10-CM

## 2024-07-16 DIAGNOSIS — E78.5 HYPERLIPIDEMIA, UNSPECIFIED HYPERLIPIDEMIA TYPE: ICD-10-CM

## 2024-07-16 DIAGNOSIS — F10.20 ALCOHOL USE DISORDER, SEVERE, DEPENDENCE: ICD-10-CM

## 2024-07-16 DIAGNOSIS — E11.42 TYPE 2 DIABETES MELLITUS WITH DIABETIC POLYNEUROPATHY, WITH LONG-TERM CURRENT USE OF INSULIN: Primary | ICD-10-CM

## 2024-07-16 DIAGNOSIS — Z46.81 FITTING OR ADJUSTMENT OF INSULIN PUMP: ICD-10-CM

## 2024-07-16 PROCEDURE — 3080F DIAST BP >= 90 MM HG: CPT | Mod: CPTII,,, | Performed by: NURSE PRACTITIONER

## 2024-07-16 PROCEDURE — 99214 OFFICE O/P EST MOD 30 MIN: CPT | Mod: PBBFAC,PO | Performed by: NURSE PRACTITIONER

## 2024-07-16 PROCEDURE — 3044F HG A1C LEVEL LT 7.0%: CPT | Mod: CPTII,,, | Performed by: NURSE PRACTITIONER

## 2024-07-16 PROCEDURE — 3008F BODY MASS INDEX DOCD: CPT | Mod: CPTII,,, | Performed by: NURSE PRACTITIONER

## 2024-07-16 PROCEDURE — 99999 PR PBB SHADOW E&M-EST. PATIENT-LVL IV: CPT | Mod: PBBFAC,,, | Performed by: NURSE PRACTITIONER

## 2024-07-16 PROCEDURE — 3077F SYST BP >= 140 MM HG: CPT | Mod: CPTII,,, | Performed by: NURSE PRACTITIONER

## 2024-07-16 PROCEDURE — 99214 OFFICE O/P EST MOD 30 MIN: CPT | Mod: S$PBB,,, | Performed by: NURSE PRACTITIONER

## 2024-07-16 PROCEDURE — 1159F MED LIST DOCD IN RCRD: CPT | Mod: CPTII,,, | Performed by: NURSE PRACTITIONER

## 2024-07-16 PROCEDURE — 3066F NEPHROPATHY DOC TX: CPT | Mod: CPTII,,, | Performed by: NURSE PRACTITIONER

## 2024-07-16 PROCEDURE — 95251 CONT GLUC MNTR ANALYSIS I&R: CPT | Mod: ,,, | Performed by: NURSE PRACTITIONER

## 2024-07-16 PROCEDURE — 4010F ACE/ARB THERAPY RXD/TAKEN: CPT | Mod: CPTII,,, | Performed by: NURSE PRACTITIONER

## 2024-07-16 NOTE — PROGRESS NOTES
ubjective:       Patient ID: Mine Quiros is a 58 y.o. female.    Chief Complaint: routine DM f/u   HPI   Pt is a 58 y.o. AAF with a long hx of very uncontrolled Type 2 DM-- as well as chronic conditions pending review including HTN, peripheral neuropathy, diastolic dysfunciton, morbid obesity- now on insulin pump.She has had multiple difficulties being able to navigate pump and having freq hypoglycemia. Medically disbabled she states due to neuropathy.     Interim Events: July 16, 2024:  Pt did not take BP meds this AM-- my check was 180/118, some headache. States she is frequently having nocturnal hypoglycemia, and will eat before bed or have candy and its still occurring. Inquired how much alcohol on nights of hypoglycemia.  Pt taking the 5th. Does complain of alarms for low glucoses--pt rarely less than 100--will lower alarm threshold.  No focal complaints.   Current DM meds:   Medtronic pump--~100 units day        Failed DM meds:  na        Back up plan for insulin pump hiatus: 60 units basal,  15-20 with meals.    Statin: rosuvastatin 20 mg        Not tolerated statin : na   ACE/ARB:losartan 100 mg or olmesartan 40 mg        Not tolerated ACE/ARB: na   Known Diabetic complications: neuropathy, retinopathy       CGMS Interpretation:       Sensor/Pump Interpretation or Prominent Theme: Excellent glycemic control. No hypoglycemia. No marked hyperglycemia.         March 22, 2024:  Had pt come in person so I could adjust her insulin pump (she nor I trust her to do it)   Has been complaining of hypogycemia--specifically at night--and says she feels so bad she can't event drink or eat anything so she just prays.  But I did download pump and sensor and no hypoglycemia actually noted.  I do see she landed near 70 once, but glucoses usually hovering around 100 overnight.  She is also on  7.5 Mounjaro and requesting increase dose.      Wt 92.4 kg  Did not take bp meds yet.     march 21, 2024:  Pt had bee seen  interim by PETE Reis NP. At that time pt had been complaining of some hypoglycemia and pump adjustments were made.  Pt is still complaining of hypoglycemia and specifically at night.  Does report a glucose in 50's.  Pt not real tech lilianay, and I did instruct her to run exercise mode at  night to keep numbers higher until she can get in and we adjust.  I dont' think she will be able to to well over the phone with instruction.    Also c/o constipation--advised SE of Mounjaro and dehydration.  C/o no wt loss--advised the ETOH is giving her calories and adding to dehydration.   -pt not taking statin because too big--  Reports current wt 207 lbs.      Aug 23, 2023:   No acute events. No focal complaints. Inquires about monjauro--denies any personal or family hx of pancreatic or thyroid cancer or pancreatitis.  PUmp and sensor downloaded.  DM is actually well controlled. ]  She did not take her BP meds today--which I strongly encouraged she better because her kidneys are starting to struggle based on proteinuria.  And she did not start the crestor I ordered, for her ridiculous LDL.  Her sister is present and reports she likes to cornell everything and we need to take her Frydaddy away.States she is better on ETOH intake--but 1/2 ai only last about 5 days.    .     May 17, 2023:  currently undergoing photocoagulation for retinopathy--  Diabetes control stable and quite reasonable especially considering past hx.  Questions regarding wt loss.  Advised ai has a lot of calories.  And makes liver fatter, Which makes her need more insulin and causes wt gain.  She is not thrilled at that sugguestion.  Otherwise no significant focal complaints.  Complaint of nighttime hypoglycemia.  Per sensor, once she dropped to 60's--but she had eaten right before so I suspect carb/insulin mismatch.  Also advised ETOH does contribute to nocturnal hypoglycemia. She did not take BP meds this AM.       Jan 12, 2022:  No focal complaints  except discolored nails.  A1C much improved. C/o hypo last night with glucose of 45---but she had eaten pizza, grapefuit and a couple of mr. Good bars. No marked insulin dosing.  Asymptomatic--Suspect sensor error r/t sleeping on it likely.  Approved for bariatric surgery--if she can get the pre weight off.      Sept 19, 2022:  NO acute events. Did not take BP meds this AM--was in [place].  Sensor downloaded and reviewed. No c/o hypoglycemia.  Still drinking.  States foot wounds.        Review of Systems   Constitutional:  Negative for activity change and fatigue.   HENT:  Negative for hearing loss and trouble swallowing.    Eyes:  Negative for photophobia and visual disturbance.        Last Eye Exam: June 2021   Respiratory:  Negative for cough and shortness of breath.    Cardiovascular:  Negative for chest pain and palpitations.   Gastrointestinal:  Positive for constipation and diarrhea.   Endocrine: Negative for polydipsia and polyuria.   Genitourinary:  Negative for frequency and urgency.   Musculoskeletal:  Negative for arthralgias, back pain and myalgias.   Skin:  Negative for rash and wound.   Allergic/Immunologic: Negative for food allergies.   Neurological:  Positive for numbness (hands and feet.). Negative for weakness.   Psychiatric/Behavioral:  Negative for sleep disturbance. The patient is not nervous/anxious.         Sleeps well with gabapentin.         Objective:      Physical Exam  Constitutional:       Appearance: Normal appearance. She is obese.   HENT:      Head: Normocephalic and atraumatic.      Nose: Nose normal.      Mouth/Throat:      Mouth: Mucous membranes are moist.      Pharynx: Oropharynx is clear.   Eyes:      Extraocular Movements: Extraocular movements intact.      Conjunctiva/sclera: Conjunctivae normal.      Pupils: Pupils are equal, round, and reactive to light.   Cardiovascular:      Rate and Rhythm: Tachycardia present.      Pulses: Normal pulses.      Heart sounds: Normal heart  "sounds.   Pulmonary:      Effort: Pulmonary effort is normal.      Breath sounds: Normal breath sounds.   Musculoskeletal:         General: Normal range of motion.      Cervical back: Normal range of motion and neck supple.      Right lower leg: No edema.      Left lower leg: No edema.   Skin:     General: Skin is warm and dry.   Neurological:      General: No focal deficit present.      Mental Status: She is alert and oriented to person, place, and time.   Psychiatric:         Mood and Affect: Mood normal.         Behavior: Behavior normal.         Thought Content: Thought content normal.         Judgment: Judgment normal.           Pulse 109   Ht 4' 8" (1.422 m)   Wt 92 kg (202 lb 11.4 oz)   LMP 08/31/2007 (Approximate)   BMI 45.45 kg/m²     Hemoglobin A1C   Date Value Ref Range Status   03/14/2024 6.1 (H) 4.0 - 5.6 % Final     Comment:     ADA Screening Guidelines:  5.7-6.4%  Consistent with prediabetes  >or=6.5%  Consistent with diabetes    High levels of fetal hemoglobin interfere with the HbA1C  assay. Heterozygous hemoglobin variants (HbS, HgC, etc)do  not significantly interfere with this assay.   However, presence of multiple variants may affect accuracy.     11/24/2023 6.5 (H) 4.0 - 5.6 % Final     Comment:     ADA Screening Guidelines:  5.7-6.4%  Consistent with prediabetes  >or=6.5%  Consistent with diabetes    High levels of fetal hemoglobin interfere with the HbA1C  assay. Heterozygous hemoglobin variants (HbS, HgC, etc)do  not significantly interfere with this assay.   However, presence of multiple variants may affect accuracy.     08/17/2023 7.1 (H) 4.0 - 5.6 % Final     Comment:     ADA Screening Guidelines:  5.7-6.4%  Consistent with prediabetes  >or=6.5%  Consistent with diabetes    High levels of fetal hemoglobin interfere with the HbA1C  assay. Heterozygous hemoglobin variants (HbS, HgC, etc)do  not significantly interfere with this assay.   However, presence of multiple variants may affect " accuracy.         Chemistry        Component Value Date/Time     06/25/2024 1148    K 3.6 06/25/2024 1148     06/25/2024 1148    CO2 21 (L) 06/25/2024 1148    BUN 15 06/25/2024 1148    CREATININE 1.3 06/25/2024 1148     06/25/2024 1148        Component Value Date/Time    CALCIUM 9.0 06/25/2024 1148    ALKPHOS 121 05/11/2023 1051    AST 49 (H) 05/11/2023 1051    ALT 28 05/11/2023 1051    BILITOT 0.3 05/11/2023 1051    ESTGFRAFRICA 58.4 (A) 04/28/2022 1011    EGFRNONAA 50.6 (A) 04/28/2022 1011          Lab Results   Component Value Date    CHOL 276 (H) 11/24/2023     Lab Results   Component Value Date    HDL 42 11/24/2023     Lab Results   Component Value Date    LDLCALC 194.8 (H) 11/24/2023     Lab Results   Component Value Date    TRIG 196 (H) 11/24/2023     Lab Results   Component Value Date    CHOLHDL 15.2 (L) 11/24/2023     Lab Results   Component Value Date    MICALBCREAT 6030.2 (H) 05/11/2023     Lab Results   Component Value Date    TSH 2.269 08/07/2020     Vit D, 25-Hydroxy   Date Value Ref Range Status   02/29/2024 7 (L) 30 - 96 ng/mL Final     Comment:     Vitamin D deficiency.........<10 ng/mL                              Vitamin D insufficiency......10-29 ng/mL       Vitamin D sufficiency........> or equal to 30 ng/mL  Vitamin D toxicity............>100 ng/mL             Assessment:      1. Type 2 diabetes mellitus with diabetic polyneuropathy, with long-term current use of insulin        2. Primary hypertension        3. Hyperlipidemia, unspecified hyperlipidemia type        4. Morbid obesity with BMI of 50.0-59.9, adult        5. Fitting or adjustment of insulin pump        6. Alcohol use disorder, severe, dependence        7. Insulin pump status                Plan:     All reinforced:   Pt not taking statin--states pill too large to swallow. Reinforced its better than having chest sawed in 1/2.    Go home take BP meds. And crestor !!!  Encourage, diet,exercise, ETOH cessation,  minimization.     No true hypoglycemia--but with ETOH, don't really want her glucoses that tight,   Cont 10 mg Mounjaro   .             ORDERS 07/16/2024     3 mo with me-likes later morning--A1c same day.      Time 25 min

## 2024-07-31 DIAGNOSIS — Z79.4 TYPE 2 DIABETES MELLITUS WITH COMPLICATION, WITH LONG-TERM CURRENT USE OF INSULIN: ICD-10-CM

## 2024-07-31 DIAGNOSIS — E11.8 TYPE 2 DIABETES MELLITUS WITH COMPLICATION, WITH LONG-TERM CURRENT USE OF INSULIN: ICD-10-CM

## 2024-07-31 RX ORDER — GABAPENTIN 300 MG/1
CAPSULE ORAL
Qty: 270 CAPSULE | Refills: 3 | Status: SHIPPED | OUTPATIENT
Start: 2024-07-31

## 2024-07-31 NOTE — TELEPHONE ENCOUNTER
Care Due:                  Date            Visit Type   Department     Provider  --------------------------------------------------------------------------------                                NP -                              PRIMARY      [facility]  Last Visit: 09-      CARE ([facility])   MEDICINE       Rene Alcocer  Next Visit: None Scheduled  None         None Found                                                            Last  Test          Frequency    Reason                     Performed    Due Date  --------------------------------------------------------------------------------    CMP.........  12 months..  rosuvastatin.............  05- 05-    [facility] Embedded Care Due Messages. Reference number: [id number].   7/31/2024 1:51:04 PM CDT

## 2024-08-19 ENCOUNTER — OFFICE VISIT (OUTPATIENT)
Dept: FAMILY MEDICINE | Facility: CLINIC | Age: 59
End: 2024-08-19
Payer: MEDICAID

## 2024-08-19 ENCOUNTER — TELEPHONE (OUTPATIENT)
Dept: FAMILY MEDICINE | Facility: CLINIC | Age: 59
End: 2024-08-19
Payer: MEDICAID

## 2024-08-19 VITALS
DIASTOLIC BLOOD PRESSURE: 106 MMHG | HEIGHT: 56 IN | HEART RATE: 95 BPM | BODY MASS INDEX: 44.44 KG/M2 | SYSTOLIC BLOOD PRESSURE: 152 MMHG | TEMPERATURE: 99 F | WEIGHT: 197.56 LBS | OXYGEN SATURATION: 99 %

## 2024-08-19 DIAGNOSIS — Z79.4 TYPE 2 DIABETES MELLITUS WITH COMPLICATION, WITH LONG-TERM CURRENT USE OF INSULIN: ICD-10-CM

## 2024-08-19 DIAGNOSIS — E11.8 TYPE 2 DIABETES MELLITUS WITH COMPLICATION, WITH LONG-TERM CURRENT USE OF INSULIN: ICD-10-CM

## 2024-08-19 DIAGNOSIS — U07.1 COVID: Primary | ICD-10-CM

## 2024-08-19 DIAGNOSIS — I10 HTN (HYPERTENSION), BENIGN: ICD-10-CM

## 2024-08-19 PROCEDURE — 99999 PR PBB SHADOW E&M-EST. PATIENT-LVL V: CPT | Mod: PBBFAC,,, | Performed by: NURSE PRACTITIONER

## 2024-08-19 PROCEDURE — 4010F ACE/ARB THERAPY RXD/TAKEN: CPT | Mod: CPTII,,, | Performed by: NURSE PRACTITIONER

## 2024-08-19 PROCEDURE — 3077F SYST BP >= 140 MM HG: CPT | Mod: CPTII,,, | Performed by: NURSE PRACTITIONER

## 2024-08-19 PROCEDURE — 3066F NEPHROPATHY DOC TX: CPT | Mod: CPTII,,, | Performed by: NURSE PRACTITIONER

## 2024-08-19 PROCEDURE — 99214 OFFICE O/P EST MOD 30 MIN: CPT | Mod: S$PBB,,, | Performed by: NURSE PRACTITIONER

## 2024-08-19 PROCEDURE — 99215 OFFICE O/P EST HI 40 MIN: CPT | Mod: PBBFAC,PO | Performed by: NURSE PRACTITIONER

## 2024-08-19 PROCEDURE — 3008F BODY MASS INDEX DOCD: CPT | Mod: CPTII,,, | Performed by: NURSE PRACTITIONER

## 2024-08-19 PROCEDURE — 1159F MED LIST DOCD IN RCRD: CPT | Mod: CPTII,,, | Performed by: NURSE PRACTITIONER

## 2024-08-19 PROCEDURE — 3080F DIAST BP >= 90 MM HG: CPT | Mod: CPTII,,, | Performed by: NURSE PRACTITIONER

## 2024-08-19 PROCEDURE — 3044F HG A1C LEVEL LT 7.0%: CPT | Mod: CPTII,,, | Performed by: NURSE PRACTITIONER

## 2024-08-19 RX ORDER — FLUTICASONE PROPIONATE 50 MCG
2 SPRAY, SUSPENSION (ML) NASAL NIGHTLY
Qty: 16 G | Refills: 0 | Status: SHIPPED | OUTPATIENT
Start: 2024-08-19

## 2024-08-19 RX ORDER — CETIRIZINE HYDROCHLORIDE 10 MG/1
10 TABLET ORAL DAILY
Qty: 7 TABLET | Refills: 0 | Status: SHIPPED | OUTPATIENT
Start: 2024-08-19 | End: 2024-08-26

## 2024-08-19 NOTE — PROGRESS NOTES
"Subjective     Patient ID: Mine Quiros is a 58 y.o. female.    Chief Complaint: Generalized Body Aches, Cough (Pt states she has has symptoms since Friday and progressively got worse.), Nasal Congestion, Fatigue, Dizziness, and Sore Throat      HPI      Patient is new to me. PCP is Dr. Alcocer.     57 y/o F with anxiety, Alcohol dependence, HTN, HLD, CKD3, DM2, morbid obesity, GERD, MARLON presents to clinic for c/o cough, body aches, congestion, fatigue and sore throat since Friday. Reports her sisters are both sick. She has not tried any OTC meds. BP is high today, has not been taking her BP meds.     Review of Systems   All other systems reviewed and are negative.         Objective   Vitals:    08/19/24 1422 08/19/24 1452   BP: (!) 146/104 (!) 152/106   Pulse: 95    Temp: 98.9 °F (37.2 °C)    TempSrc: Oral    SpO2: 99%    Weight: 89.6 kg (197 lb 8.5 oz)    Height: 4' 8" (1.422 m)       Physical Exam  Vitals and nursing note reviewed.   Constitutional:       General: She is not in acute distress.     Appearance: Normal appearance. She is obese. She is not ill-appearing, toxic-appearing or diaphoretic.   HENT:      Head: Normocephalic and atraumatic.      Right Ear: Tympanic membrane, ear canal and external ear normal. There is no impacted cerumen.      Left Ear: Tympanic membrane, ear canal and external ear normal. There is no impacted cerumen.      Nose: No congestion or rhinorrhea.      Mouth/Throat:      Pharynx: Posterior oropharyngeal erythema present. No oropharyngeal exudate.   Eyes:      General: No scleral icterus.        Right eye: No discharge.         Left eye: No discharge.      Conjunctiva/sclera: Conjunctivae normal.      Pupils: Pupils are equal, round, and reactive to light.   Cardiovascular:      Rate and Rhythm: Normal rate and regular rhythm.      Pulses: Normal pulses.      Heart sounds: Normal heart sounds. No murmur heard.     No friction rub. No gallop.   Pulmonary:      Effort: " Pulmonary effort is normal. No respiratory distress.      Breath sounds: Normal breath sounds. No stridor. No wheezing, rhonchi or rales.   Abdominal:      General: Abdomen is flat. Bowel sounds are normal.      Palpations: Abdomen is soft.   Musculoskeletal:         General: No deformity or signs of injury.      Cervical back: Normal range of motion and neck supple.      Right lower leg: No edema.      Left lower leg: No edema.   Lymphadenopathy:      Cervical: No cervical adenopathy.   Skin:     General: Skin is warm.      Coloration: Skin is not jaundiced or pale.      Findings: No lesion or rash.   Neurological:      General: No focal deficit present.      Mental Status: She is alert and oriented to person, place, and time. Mental status is at baseline.   Psychiatric:         Mood and Affect: Mood normal.         Behavior: Behavior normal.         Thought Content: Thought content normal.         Judgment: Judgment normal.         1. COVID  - molnupiravir 200 mg capsule (EUA); Take 4 capsules (800 mg total) by mouth every 12 (twelve) hours. for 5 days  Dispense: 40 capsule; Refill: 0  - cetirizine (ZYRTEC) 10 MG tablet; Take 1 tablet (10 mg total) by mouth once daily. for 7 days  Dispense: 7 tablet; Refill: 0  - fluticasone propionate (FLONASE) 50 mcg/actuation nasal spray; 2 sprays (100 mcg total) by Each Nostril route every evening.  Dispense: 16 g; Refill: 0    2. Type 2 diabetes mellitus with complication, with long-term current use of insulin  Controlled, last A1C 6.1%- due for repeat.    3. HTN (hypertension), benign   Not compliant with meds, discussed need to take meds as soon as she gets home.     RTC/ER precautions given. F/U if symptoms worsen or do not improve in the next 3-5 days.     Counseled on regular exercise, maintenance of a healthy weight, balanced diet rich in fruits/vegetables and lean protein, and avoidance of unhealthy habits like smoking and excessive alcohol intake.    Lida Jones,  FNP-C

## 2024-08-19 NOTE — PATIENT INSTRUCTIONS
A few reminders from today:    Take BP meds as soon as you get home  Rest  Lots of fluids  Tylenol as needed for pain/fever   Start Molnupiravir   Flonase and zyrtec daily as prescribed.  Wear a mask for 10 days  TO the ED for Chest pain, shortness of breath  Followup if symptoms worsen or do not improve in the next 3-5 days.     Do not hesitate to get in touch with me should you have any further questions or concerns. Notify provider if symptoms worsen or do not improve.    Thank you for trusting me with your care!  I wish you health and happiness.      -Lida Jones, SOLANGE-C

## 2024-08-19 NOTE — TELEPHONE ENCOUNTER
----- Message from Yenifer John sent at 8/19/2024 10:40 AM CDT -----  Contact: PT  Type:  Same Day Appointment Request    Caller is requesting a same day appointment.  Caller declined first available appointment listed below.    Name of Caller:PT   When is the first available appointment? N/A  Symptoms: COUGHING, CONGESTION , SORE THROAT AND HEADACHE  Best Call Back Number: [phone] (home)   Additional Information: PT IS OPEN TO SEEING A DIFFERENT PROVIDER   THANK YOU

## 2024-08-21 ENCOUNTER — TELEPHONE (OUTPATIENT)
Dept: ENDOCRINOLOGY | Facility: CLINIC | Age: 59
End: 2024-08-21
Payer: MEDICAID

## 2024-08-21 NOTE — TELEPHONE ENCOUNTER
----- Message from Sujatha Salomon sent at 8/21/2024 12:58 PM CDT -----  Type: Patient Call Back    Who called:self     What is the request in detail: patient requesting a call back concerning her blood sugar diagnostic (ACCU-CHEK GUIDE TEST STRIPS) Strp    Can the clinic reply by [facility]? no    Would the patient rather a call back or a response via [facility]?  call    Best call back number: .[phone] (home)      Additional Information:

## 2024-08-21 NOTE — TELEPHONE ENCOUNTER
Spoke to pt and adv she does need to check with test strips very often since she is on the sensor. Adv her the ins is only going to pay for so many strips, voiced understanding.

## 2024-09-09 ENCOUNTER — OFFICE VISIT (OUTPATIENT)
Dept: OPHTHALMOLOGY | Facility: CLINIC | Age: 59
End: 2024-09-09
Payer: MEDICAID

## 2024-09-09 ENCOUNTER — TELEPHONE (OUTPATIENT)
Dept: FAMILY MEDICINE | Facility: CLINIC | Age: 59
End: 2024-09-09
Payer: MEDICAID

## 2024-09-09 DIAGNOSIS — E11.3313 CONTROLLED TYPE 2 DIABETES MELLITUS WITH BOTH EYES AFFECTED BY MODERATE NONPROLIFERATIVE RETINOPATHY AND MACULAR EDEMA, WITH LONG-TERM CURRENT USE OF INSULIN: Primary | ICD-10-CM

## 2024-09-09 DIAGNOSIS — Z79.4 CONTROLLED TYPE 2 DIABETES MELLITUS WITH BOTH EYES AFFECTED BY MODERATE NONPROLIFERATIVE RETINOPATHY AND MACULAR EDEMA, WITH LONG-TERM CURRENT USE OF INSULIN: Primary | ICD-10-CM

## 2024-09-09 DIAGNOSIS — H35.033 HYPERTENSIVE RETINOPATHY, BILATERAL: ICD-10-CM

## 2024-09-09 PROCEDURE — 67028 INJECTION EYE DRUG: CPT | Mod: PBBFAC,PO,LT | Performed by: OPHTHALMOLOGY

## 2024-09-09 PROCEDURE — 92134 CPTRZ OPH DX IMG PST SGM RTA: CPT | Mod: PBBFAC,PO | Performed by: OPHTHALMOLOGY

## 2024-09-09 PROCEDURE — 99212 OFFICE O/P EST SF 10 MIN: CPT | Mod: PBBFAC,PO,25 | Performed by: OPHTHALMOLOGY

## 2024-09-09 PROCEDURE — 99999 PR PBB SHADOW E&M-EST. PATIENT-LVL II: CPT | Mod: PBBFAC,,, | Performed by: OPHTHALMOLOGY

## 2024-09-09 RX ORDER — LOSARTAN POTASSIUM 100 MG/1
100 TABLET ORAL
COMMUNITY
Start: 2024-08-31

## 2024-09-09 RX ORDER — DILTIAZEM HYDROCHLORIDE EXTENDED-RELEASE TABLETS 360 MG/1
360 TABLET, EXTENDED RELEASE ORAL
COMMUNITY
Start: 2024-05-09

## 2024-09-09 RX ORDER — CHLORTHALIDONE 25 MG/1
25 TABLET ORAL
COMMUNITY
Start: 2024-04-30

## 2024-09-09 NOTE — PROGRESS NOTES
HPI     Diabetic Eye Exam     Additional comments: 12 week f/u           Comments    VA stable ou, no pain ou and denies floaters or flashes ou.            Last edited by Jeanette Prasad on 9/9/2024 10:47 AM.          OCT - OD low grade non central DME, with VMA  OS - central DME/CME - increased    Prior FA - late macular leakage OS > OD  No NV OU      A/P    Mod NPDR OU  T2 controlled on insulin pump    2. DME OS>OD  Been present since at least 2018  S/p Avastin OS x 4  S/p Ozurdex OS x 2 2/19/24 7/23 - increased at 10 weeks  2/24 increased ME OS  9/24 - increased central ME OS      Avastin OS today    Pt not approved for good days because of medicaid, and can't afford copay  Ozurdex no longer approved -  Pt will have to get Avastin monthly in future, and will likely lose vision.  Can consider STK - but not optimal.  This is unfortunate      3. HTN Ret OU  BS/BP/chol control    4. PCIOL OU      8  weeks OCT no dilate       Risks, benefits, and alternatives to treatment discussed in detail with the patient.  The patient voiced understanding and wished to proceed with the procedure    Injection Procedure Note:  Diagnosis: DME OS    Patient Identified and Time Out complete  Pt marked  Topical Proparacaine and Betadine.  Inject Avastin OS at 6:00 @ 3.5-4mm posterior to limbus  Post Operative Dx: Same  Complications: None  Follow up as above.

## 2024-09-09 NOTE — TELEPHONE ENCOUNTER
----- Message from Tushar Valera sent at 9/9/2024  2:59 PM CDT -----  Type: Needs Medical Advice  Who Called:  pt  Pharmacy name and phone #:    [facility] - [facility]  [id number] [address]  [place] [address]  Phone: [phone] Fax: [phone]  PT WANTS TO GET A APPT  WITH

## 2024-09-10 ENCOUNTER — PATIENT MESSAGE (OUTPATIENT)
Dept: ADMINISTRATIVE | Facility: HOSPITAL | Age: 59
End: 2024-09-10
Payer: MEDICAID

## 2024-09-17 ENCOUNTER — TELEPHONE (OUTPATIENT)
Dept: FAMILY MEDICINE | Facility: CLINIC | Age: 59
End: 2024-09-17
Payer: MEDICAID

## 2024-09-17 NOTE — TELEPHONE ENCOUNTER
----- Message from Yadi Quiros sent at 9/17/2024  1:21 PM CDT -----  Type:  Sooner Appointment Request    Caller is requesting a sooner appointment.  Caller declined first available appointment listed below.  Caller will not accept being placed on the waitlist and is requesting a message be sent to doctor.    Name of Caller:  Mine  When is the first available appointment?  No solution found without overruling  Symptoms:  Establishing care  Would the patient rather a call back or a response via [facility]? Call back  Best Call Back Number:  [phone]   Additional Information:

## 2024-09-17 NOTE — TELEPHONE ENCOUNTER
Spoke to pt. She stated someone told her  was out of clinic until March 2025. I explained  is still in clinic. Pt needs annual brando. Appt brando for next week.

## 2024-09-24 ENCOUNTER — OFFICE VISIT (OUTPATIENT)
Dept: FAMILY MEDICINE | Facility: CLINIC | Age: 59
End: 2024-09-24
Payer: MEDICAID

## 2024-09-24 VITALS
BODY MASS INDEX: 44.88 KG/M2 | HEIGHT: 56 IN | SYSTOLIC BLOOD PRESSURE: 176 MMHG | HEART RATE: 99 BPM | WEIGHT: 199.5 LBS | DIASTOLIC BLOOD PRESSURE: 120 MMHG | OXYGEN SATURATION: 95 %

## 2024-09-24 DIAGNOSIS — Z79.4 TYPE 2 DIABETES MELLITUS WITH COMPLICATION, WITH LONG-TERM CURRENT USE OF INSULIN: Primary | ICD-10-CM

## 2024-09-24 DIAGNOSIS — I15.2 HYPERTENSION ASSOCIATED WITH DIABETES: ICD-10-CM

## 2024-09-24 DIAGNOSIS — E11.8 TYPE 2 DIABETES MELLITUS WITH COMPLICATION, WITH LONG-TERM CURRENT USE OF INSULIN: Primary | ICD-10-CM

## 2024-09-24 DIAGNOSIS — K21.9 GASTROESOPHAGEAL REFLUX DISEASE, UNSPECIFIED WHETHER ESOPHAGITIS PRESENT: ICD-10-CM

## 2024-09-24 DIAGNOSIS — F10.10 ETOH ABUSE: ICD-10-CM

## 2024-09-24 DIAGNOSIS — E11.59 HYPERTENSION ASSOCIATED WITH DIABETES: ICD-10-CM

## 2024-09-24 DIAGNOSIS — K63.5 COLORECTAL POLYPS: ICD-10-CM

## 2024-09-24 DIAGNOSIS — K62.1 COLORECTAL POLYPS: ICD-10-CM

## 2024-09-24 PROCEDURE — 4010F ACE/ARB THERAPY RXD/TAKEN: CPT | Mod: CPTII,,, | Performed by: FAMILY MEDICINE

## 2024-09-24 PROCEDURE — 3077F SYST BP >= 140 MM HG: CPT | Mod: CPTII,,, | Performed by: FAMILY MEDICINE

## 2024-09-24 PROCEDURE — 3080F DIAST BP >= 90 MM HG: CPT | Mod: CPTII,,, | Performed by: FAMILY MEDICINE

## 2024-09-24 PROCEDURE — 3066F NEPHROPATHY DOC TX: CPT | Mod: CPTII,,, | Performed by: FAMILY MEDICINE

## 2024-09-24 PROCEDURE — 1160F RVW MEDS BY RX/DR IN RCRD: CPT | Mod: CPTII,,, | Performed by: FAMILY MEDICINE

## 2024-09-24 PROCEDURE — 99214 OFFICE O/P EST MOD 30 MIN: CPT | Mod: S$PBB,,, | Performed by: FAMILY MEDICINE

## 2024-09-24 PROCEDURE — 1159F MED LIST DOCD IN RCRD: CPT | Mod: CPTII,,, | Performed by: FAMILY MEDICINE

## 2024-09-24 PROCEDURE — 3044F HG A1C LEVEL LT 7.0%: CPT | Mod: CPTII,,, | Performed by: FAMILY MEDICINE

## 2024-09-24 PROCEDURE — 99213 OFFICE O/P EST LOW 20 MIN: CPT | Mod: PBBFAC,PO | Performed by: FAMILY MEDICINE

## 2024-09-24 PROCEDURE — 3008F BODY MASS INDEX DOCD: CPT | Mod: CPTII,,, | Performed by: FAMILY MEDICINE

## 2024-09-24 PROCEDURE — 99999 PR PBB SHADOW E&M-EST. PATIENT-LVL III: CPT | Mod: PBBFAC,,, | Performed by: FAMILY MEDICINE

## 2024-09-24 NOTE — PATIENT INSTRUCTIONS
Ask your pharmacist about the shingles vaccine, the pneumococcal vaccine PCV 20, the flu shot, and COVID-19 vaccine series completion.

## 2024-09-24 NOTE — PROGRESS NOTES
Patient ID: Mine Quiros is a 59 y.o. female.    Chief Complaint: Annual Exam    History of Present Illness    CHIEF COMPLAINT:  Chief Complaint    TYPE 2 DIABETES:  She has type 2 diabetes, currently followed by endocrinology. She reports limited weight loss despite treatment. Her Mounjaro dose is potentially 15 mg weekly, to be confirmed by endocrinology.    HYPERTENSION:  She is followed by nephrology for hypertension management. Her current medication regimen is unclear. She has been prescribed two angiotensin receptor blockers (ARBs), a potassium-sparing diuretic, and chlorthalidone, but it's uncertain which she is actively taking.    GASTROESOPHAGEAL REFLUX DISEASE (GERD):  Her GERD is currently well-controlled with proton pump inhibitor therapy.    GASTROINTESTINAL HISTORY:  She has a history of colorectal polyps. Her last colonoscopy 3 years ago revealed hyperplastic polyp fragments. She is due for a follow-up colonoscopy.    SOCIAL HISTORY:  She reports enjoying [employer] wine and acknowledges that her alcohol consumption may be contributing to her caloric intake. She expresses willingness to moderate her alcohol consumption more effectively.    MEDICATION MANAGEMENT:  She was instructed to bring all medications to future appointments, including those prescribed by other providers, to ensure accurate medication reconciliation and management.      ROS:  General: -fever, -chills, -fatigue, -weight gain, +weight loss  Eyes: -vision changes, -redness, -discharge  ENT: -ear pain, -nasal congestion, -sore throat  Cardiovascular: -chest pain, -palpitations, -lower extremity edema  Respiratory: -cough, -shortness of breath  Gastrointestinal: -abdominal pain, -nausea, -vomiting, -diarrhea, -constipation, -blood in stool  Genitourinary: -dysuria, -hematuria, -frequency  Musculoskeletal: -joint pain, -muscle pain  Skin: -rash, -lesion  Neurological: -headache, -dizziness, -numbness,  -tingling  Psychiatric: -anxiety, -depression, -sleep difficulty         Physical Exam    General: No acute distress. Well-developed. Well-nourished.  Eyes: EOMI. Sclerae anicteric.  HENT: Normocephalic. Atraumatic. Nares patent. Moist oral mucosa.  Ears: Bilateral TMs clear. Bilateral EACs clear.  Cardiovascular: Regular rate. Regular rhythm. No murmurs. No rubs. No gallops. Normal S1, S2.  Respiratory: Normal respiratory effort. Clear to auscultation bilaterally. No rales. No rhonchi. No wheezing.  Abdomen: Soft. Non-tender. Non-distended. Normoactive bowel sounds.  Musculoskeletal: No  obvious deformity.  Extremities: No lower extremity edema.  Neurological: Alert & oriented x3. No slurred speech. Normal gait.  Psychiatric: Normal mood. Normal affect. Good insight. Good judgment.  Skin: Warm. Dry. No rash.         Assessment & Plan    Type 2 diabetes showing good control based on A1c; patient followed by endocrinology  Blood pressure not well controlled; patient followed by nephrology  Unclear medication regimen for blood pressure management; patient on 2 ARBs, potentially on potassium-sparing diuretic and chlorthalidone  Gastroesophageal reflux controlled on proton pump inhibitor  History of colorectal polyps; due for colonoscopy  Considering increase of Mounjaro to 15 mg per week, deferring decision to endocrinology    GASTROESOPHAGEAL REFLUX DISEASE (GERD):  - Monitored the patient's gastroesophageal reflux.  - Evaluated that the gastroesophageal reflux is currently controlled with proton pump inhibitor.  - Continued the proton pump inhibitor for GERD management.    COLORECTAL POLYPS:  - Assessed that the patient is due for colonoscopy.  - Noted that the last colonoscopy was performed 3 years ago and showed hyperplastic polyp fragments.  - Monitored the patient's history of colorectal polyps.  - Evaluated that the last colonoscopy performed 3 years ago showed hyperplastic polyp fragments.  - Assessed that the  patient is due for a follow-up colonoscopy.    HYPERTENSION:  - Monitored the patient's blood pressure, which is not well controlled.  - Noted that the patient is being followed by nephrology.  - Evaluated that blood pressure is not well controlled despite being on 2 ARBs.  - Assessed uncertainty about the patient's current medication regimen for hypertension.  - Prescribed a potassium-sparing diuretic and chlorthalidone.  - Instructed the patient to bring all medications to future appointments for review.    TYPE 2 DIABETES:  - Monitored the patient's type 2 diabetes, showing good control based on A1c.  - Evaluated that the patient has not lost significant weight.  - Assessed that the patient's wine consumption may be contributing to calorie intake.  - Noted that the patient is being followed by endocrinology.  - Suggested increasing Mounjaro dose to 15 mg per week, but deferred to endocrinology for final decision.  - Mine to moderate consumption of [employer] wine to reduce calorie intake.              Follow up in about 6 weeks (around 11/5/2024) for JAEL.    This note was generated with the assistance of ambient listening technology. Verbal consent was obtained by the patient and accompanying visitor(s) for the recording of patient appointment to facilitate this note. I attest to having reviewed and edited the generated note for accuracy, though some syntax or spelling errors may persist. Please contact the author of this note for any clarification.

## 2024-09-24 NOTE — Clinical Note
The patient's blood pressure is not controlled at this time.  I do not have all of her meds to review.  I have encouraged her to bring in all of her medications at every visit.  Her sister is with her and will help with that.

## 2024-09-27 ENCOUNTER — TELEPHONE (OUTPATIENT)
Dept: ENDOCRINOLOGY | Facility: CLINIC | Age: 59
End: 2024-09-27
Payer: MEDICAID

## 2024-09-27 NOTE — TELEPHONE ENCOUNTER
S/w pt states Dr Alcocer told her she might have to increase her Monjaro to 15 mg. Pt needing new Rx to be send. Can we up it to 15mg.Pt.states has not lost weight

## 2024-09-27 NOTE — TELEPHONE ENCOUNTER
----- Message from Maryuri Sawyer sent at 9/27/2024  9:41 AM CDT -----  Regarding: Refill request  Contact: pt  Type:  RX Refill Request    Who Called: pt    Refill or New Rx:refill    RX Name and Strength: Monbradro    How is the patient currently taking it? (ex. 1XDay):1/week    Is this a 30 day or 90 day RX:30    Preferred Pharmacy with phone number:  [facility] - [place] [address]  [id number] [address]  [place] [address]  Phone: [phone] Fax: [phone]    Local or Mail Order:local    Ordering Provider:wilbur    Would the patient rather a call back or a response via [facility]? Call back    Best Call Back Number:[phone]    Additional Information:  pt sts that pharm told her that she needs a new rx.

## 2024-10-04 ENCOUNTER — TELEPHONE (OUTPATIENT)
Dept: ENDOCRINOLOGY | Facility: CLINIC | Age: 59
End: 2024-10-04
Payer: MEDICAID

## 2024-10-04 DIAGNOSIS — Z79.4 TYPE 2 DIABETES MELLITUS WITH DIABETIC POLYNEUROPATHY, WITH LONG-TERM CURRENT USE OF INSULIN: Primary | ICD-10-CM

## 2024-10-04 DIAGNOSIS — E11.42 TYPE 2 DIABETES MELLITUS WITH DIABETIC POLYNEUROPATHY, WITH LONG-TERM CURRENT USE OF INSULIN: Primary | ICD-10-CM

## 2024-10-04 RX ORDER — TIRZEPATIDE 15 MG/.5ML
15 INJECTION, SOLUTION SUBCUTANEOUS
Qty: 4 PEN | Refills: 6 | Status: SHIPPED | OUTPATIENT
Start: 2024-10-04

## 2024-10-04 NOTE — TELEPHONE ENCOUNTER
----- Message from Joanie sent at 10/4/2024  1:18 PM CDT -----  Regarding: SELF  Type: Patient Call Back     Who called:SELF     What is the request in detail:pt called because she still is unable to receive Monjuaro medication from her pharmacy     Can the clinic reply by [facility]?-No     Would the patient rather a call back or a response via [facility]?-call back     Best call back number:[phone]     Additional Information:

## 2024-10-08 ENCOUNTER — TELEPHONE (OUTPATIENT)
Dept: ENDOCRINOLOGY | Facility: CLINIC | Age: 59
End: 2024-10-08
Payer: MEDICAID

## 2024-10-08 DIAGNOSIS — E78.5 HYPERLIPIDEMIA, UNSPECIFIED HYPERLIPIDEMIA TYPE: ICD-10-CM

## 2024-10-08 NOTE — TELEPHONE ENCOUNTER
----- Message from Yun sent at 10/8/2024  3:23 PM CDT -----  Type:  RX Refill Request    Who Called:  pt  Refill or New Rx:  refill  RX Name and Strength:  tirzepatide (MOUNJARO) 15 mg/0.5 mL PnIj  How is the patient currently taking it? (ex. 1XDay):  as directed  Is this a 30 day or 90 day RX:  N/A  Preferred Pharmacy with phone number:    [facility] - [place] [facility]  [address] [address]  [place] [address]  Phone: [phone] Fax: [phone]  Best Call Back Number:  [phone]  Additional Information:  pt is calling in regards to needing her Rx sent to be filled. Pt say she has not had it in about 2 weeks. Pt says she has called and left messages and has not heard anything back. Please call back and advise. Thanks!

## 2024-10-08 NOTE — TELEPHONE ENCOUNTER
Care Due:                  Date            Visit Type   Department     Provider  --------------------------------------------------------------------------------                                EP -                              [facility] FAMILY  Last Visit: 09-      CARE ([facility])   KEISHA Alcocer                              EP -                              PRIMARY      [facility] FAM[facility]  Next Visit: 11-      CARE ([facility])   MEDICINE       Rene Alcocer                                                            Last  Test          Frequency    Reason                     Performed    Due Date  --------------------------------------------------------------------------------    CMP.........  12 months..  rosuvastatin.............  05- 05-    Lipid Panel.  12 months..  rosuvastatin.............  11- 11-    [facility] Embedded Care Due Messages. Reference number: [id number].   10/08/2024 10:08:32 AM CDT

## 2024-10-08 NOTE — TELEPHONE ENCOUNTER
Pt advised mounjaro rx was sent to pharmacy on 10/4/24 and shows receipt confirmed by pharmacy. Pt will call pharmacy to see if it's ready for .

## 2024-10-09 RX ORDER — ROSUVASTATIN CALCIUM 10 MG/1
10 TABLET, COATED ORAL DAILY
Qty: 90 TABLET | Refills: 3 | Status: SHIPPED | OUTPATIENT
Start: 2024-10-09

## 2024-10-09 NOTE — TELEPHONE ENCOUNTER
Refill Routing Note   Medication(s) are not appropriate for processing by [facility] for the following reason(s):        Required labs outdated    [facility] action(s):  Defer     Requires labs : Yes      Medication Therapy Plan: Labs (CMP, Lipid Panel) due      Appointments  past 12m or future 3m with PCP    Date Provider   Last Visit   9/24/2024 Rene Alcocer MD   Next Visit   11/5/2024 Rene Alcocer MD   ED visits in past 90 days: 0        Note composed:7:06 PM 10/08/2024

## 2024-10-17 ENCOUNTER — TELEPHONE (OUTPATIENT)
Dept: ENDOCRINOLOGY | Facility: CLINIC | Age: 59
End: 2024-10-17
Payer: MEDICAID

## 2024-10-17 NOTE — TELEPHONE ENCOUNTER
S/w she wanted to change labs to a day before seeing Regina instead of the day of. Labs r/s for day before.pt verb understading

## 2024-10-17 NOTE — TELEPHONE ENCOUNTER
----- Message from Amina sent at 10/17/2024  3:33 PM CDT -----   Type:  Needs Medical Advice    Who Called: PT    Would the patient rather a call back or a response via [facility]? CALL  Best Call Back Number: [phone]        Additional Information:  PT states she wants to see if Dr want her to get labs on same day of appt. Please call back to advise. Thank you!

## 2024-10-21 ENCOUNTER — LAB VISIT (OUTPATIENT)
Dept: LAB | Facility: HOSPITAL | Age: 59
End: 2024-10-21
Attending: NURSE PRACTITIONER
Payer: MEDICAID

## 2024-10-21 ENCOUNTER — OFFICE VISIT (OUTPATIENT)
Dept: PODIATRY | Facility: CLINIC | Age: 59
End: 2024-10-21
Payer: MEDICAID

## 2024-10-21 DIAGNOSIS — Z79.4 TYPE 2 DIABETES MELLITUS WITH DIABETIC POLYNEUROPATHY, WITH LONG-TERM CURRENT USE OF INSULIN: ICD-10-CM

## 2024-10-21 DIAGNOSIS — N18.31 STAGE 3A CHRONIC KIDNEY DISEASE: ICD-10-CM

## 2024-10-21 DIAGNOSIS — Z79.4 TYPE 2 DIABETES MELLITUS WITH COMPLICATION, WITH LONG-TERM CURRENT USE OF INSULIN: ICD-10-CM

## 2024-10-21 DIAGNOSIS — E11.42 TYPE 2 DIABETES MELLITUS WITH DIABETIC POLYNEUROPATHY, WITH LONG-TERM CURRENT USE OF INSULIN: ICD-10-CM

## 2024-10-21 DIAGNOSIS — E11.8 TYPE 2 DIABETES MELLITUS WITH COMPLICATION, WITH LONG-TERM CURRENT USE OF INSULIN: ICD-10-CM

## 2024-10-21 DIAGNOSIS — E11.49 TYPE II DIABETES MELLITUS WITH NEUROLOGICAL MANIFESTATIONS: ICD-10-CM

## 2024-10-21 DIAGNOSIS — L30.9 ECZEMA OF LOWER EXTREMITY: Primary | ICD-10-CM

## 2024-10-21 DIAGNOSIS — R80.1 PERSISTENT PROTEINURIA: ICD-10-CM

## 2024-10-21 LAB
ALBUMIN SERPL BCP-MCNC: 2.8 G/DL (ref 3.5–5.2)
ANION GAP SERPL CALC-SCNC: 9 MMOL/L (ref 8–16)
BASOPHILS # BLD AUTO: 0.05 K/UL (ref 0–0.2)
BASOPHILS NFR BLD: 0.5 % (ref 0–1.9)
BUN SERPL-MCNC: 23 MG/DL (ref 6–20)
CALCIUM SERPL-MCNC: 9.5 MG/DL (ref 8.7–10.5)
CHLORIDE SERPL-SCNC: 110 MMOL/L (ref 95–110)
CO2 SERPL-SCNC: 23 MMOL/L (ref 23–29)
CREAT SERPL-MCNC: 1.2 MG/DL (ref 0.5–1.4)
DIFFERENTIAL METHOD BLD: ABNORMAL
EOSINOPHIL # BLD AUTO: 0.3 K/UL (ref 0–0.5)
EOSINOPHIL NFR BLD: 2.7 % (ref 0–8)
ERYTHROCYTE [DISTWIDTH] IN BLOOD BY AUTOMATED COUNT: 15.3 % (ref 11.5–14.5)
EST. GFR  (NO RACE VARIABLE): 52.1 ML/MIN/1.73 M^2
ESTIMATED AVG GLUCOSE: 134 MG/DL (ref 68–131)
GLUCOSE SERPL-MCNC: 107 MG/DL (ref 70–110)
HBA1C MFR BLD: 6.3 % (ref 4–5.6)
HCT VFR BLD AUTO: 47.2 % (ref 37–48.5)
HGB BLD-MCNC: 15.6 G/DL (ref 12–16)
IMM GRANULOCYTES # BLD AUTO: 0.04 K/UL (ref 0–0.04)
IMM GRANULOCYTES NFR BLD AUTO: 0.4 % (ref 0–0.5)
LYMPHOCYTES # BLD AUTO: 3.2 K/UL (ref 1–4.8)
LYMPHOCYTES NFR BLD: 31.2 % (ref 18–48)
MCH RBC QN AUTO: 30.6 PG (ref 27–31)
MCHC RBC AUTO-ENTMCNC: 33.1 G/DL (ref 32–36)
MCV RBC AUTO: 93 FL (ref 82–98)
MONOCYTES # BLD AUTO: 0.6 K/UL (ref 0.3–1)
MONOCYTES NFR BLD: 6.1 % (ref 4–15)
NEUTROPHILS # BLD AUTO: 6 K/UL (ref 1.8–7.7)
NEUTROPHILS NFR BLD: 59.1 % (ref 38–73)
NRBC BLD-RTO: 0 /100 WBC
PHOSPHATE SERPL-MCNC: 3.2 MG/DL (ref 2.7–4.5)
PLATELET # BLD AUTO: 346 K/UL (ref 150–450)
PMV BLD AUTO: 9.9 FL (ref 9.2–12.9)
POTASSIUM SERPL-SCNC: 3.2 MMOL/L (ref 3.5–5.1)
PTH-INTACT SERPL-MCNC: 158.7 PG/ML (ref 9–77)
RBC # BLD AUTO: 5.09 M/UL (ref 4–5.4)
SODIUM SERPL-SCNC: 142 MMOL/L (ref 136–145)
WBC # BLD AUTO: 10.18 K/UL (ref 3.9–12.7)

## 2024-10-21 PROCEDURE — 1159F MED LIST DOCD IN RCRD: CPT | Mod: CPTII,,, | Performed by: PODIATRIST

## 2024-10-21 PROCEDURE — 99214 OFFICE O/P EST MOD 30 MIN: CPT | Mod: S$PBB,,, | Performed by: PODIATRIST

## 2024-10-21 PROCEDURE — 3066F NEPHROPATHY DOC TX: CPT | Mod: CPTII,,, | Performed by: PODIATRIST

## 2024-10-21 PROCEDURE — 99999 PR PBB SHADOW E&M-EST. PATIENT-LVL II: CPT | Mod: PBBFAC,,, | Performed by: PODIATRIST

## 2024-10-21 PROCEDURE — 86255 FLUORESCENT ANTIBODY SCREEN: CPT | Performed by: STUDENT IN AN ORGANIZED HEALTH CARE EDUCATION/TRAINING PROGRAM

## 2024-10-21 PROCEDURE — 36415 COLL VENOUS BLD VENIPUNCTURE: CPT | Mod: PO | Performed by: STUDENT IN AN ORGANIZED HEALTH CARE EDUCATION/TRAINING PROGRAM

## 2024-10-21 PROCEDURE — 83970 ASSAY OF PARATHORMONE: CPT | Performed by: STUDENT IN AN ORGANIZED HEALTH CARE EDUCATION/TRAINING PROGRAM

## 2024-10-21 PROCEDURE — 1160F RVW MEDS BY RX/DR IN RCRD: CPT | Mod: CPTII,,, | Performed by: PODIATRIST

## 2024-10-21 PROCEDURE — 99212 OFFICE O/P EST SF 10 MIN: CPT | Mod: PBBFAC,PO | Performed by: PODIATRIST

## 2024-10-21 PROCEDURE — 83520 IMMUNOASSAY QUANT NOS NONAB: CPT | Performed by: STUDENT IN AN ORGANIZED HEALTH CARE EDUCATION/TRAINING PROGRAM

## 2024-10-21 PROCEDURE — 80069 RENAL FUNCTION PANEL: CPT | Performed by: STUDENT IN AN ORGANIZED HEALTH CARE EDUCATION/TRAINING PROGRAM

## 2024-10-21 PROCEDURE — 4010F ACE/ARB THERAPY RXD/TAKEN: CPT | Mod: CPTII,,, | Performed by: PODIATRIST

## 2024-10-21 PROCEDURE — 3044F HG A1C LEVEL LT 7.0%: CPT | Mod: CPTII,,, | Performed by: PODIATRIST

## 2024-10-21 PROCEDURE — 86255 FLUORESCENT ANTIBODY SCREEN: CPT | Mod: 59 | Performed by: STUDENT IN AN ORGANIZED HEALTH CARE EDUCATION/TRAINING PROGRAM

## 2024-10-21 PROCEDURE — 83036 HEMOGLOBIN GLYCOSYLATED A1C: CPT | Performed by: NURSE PRACTITIONER

## 2024-10-21 PROCEDURE — 85025 COMPLETE CBC W/AUTO DIFF WBC: CPT | Performed by: STUDENT IN AN ORGANIZED HEALTH CARE EDUCATION/TRAINING PROGRAM

## 2024-10-21 RX ORDER — BETAMETHASONE VALERATE 1 MG/G
CREAM TOPICAL 2 TIMES DAILY PRN
Qty: 45 G | Refills: 3 | Status: SHIPPED | OUTPATIENT
Start: 2024-10-21

## 2024-10-21 NOTE — PROGRESS NOTES
Subjective:      Patient ID: Mine Quiros is a 59 y.o. female.    Chief Complaint: Poly Blount is a 59 y.o. female who presents to the clinic for evaluation and treatment of high risk feet. 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 The patient's chief complaint is neuropathy pain burning and tingling taking gabapentin 600 mg at night before bed but cannot take it during the day as it makes her too tired. This patient has documented high risk feet requiring routine maintenance secondary to diabetes mellitis and those secondary complications of diabetes, as mentioned. Also has an area of eczema on her leg that itches a lot seeking treatment for this        PCP: Rene Alcocer MD        Current shoe gear:  Affected Foot: Casual shoes     Unaffected Foot: Casual shoes          Hemoglobin A1C   Date Value Ref Range Status   03/14/2024 6.1 (H) 4.0 - 5.6 % Final     Comment:     ADA Screening Guidelines:  5.7-6.4%  Consistent with prediabetes  >or=6.5%  Consistent with diabetes    High levels of fetal hemoglobin interfere with the  HbA1C  assay. Heterozygous hemoglobin variants (HbS, HgC, etc)do  not significantly interfere with this assay.   However, presence of multiple variants may affect accuracy.     11/24/2023 6.5 (H) 4.0 - 5.6 % Final     Comment:     ADA Screening Guidelines:  5.7-6.4%  Consistent with prediabetes  >or=6.5%  Consistent with diabetes    High levels of fetal hemoglobin interfere with the HbA1C  assay. Heterozygous hemoglobin variants (HbS, HgC, etc)do  not significantly interfere with this assay.   However, presence of multiple variants may affect accuracy.     08/17/2023 7.1 (H) 4.0 - 5.6 % Final     Comment:     ADA Screening Guidelines:  5.7-6.4%  Consistent with prediabetes  >or=6.5%  Consistent with diabetes    High levels of fetal hemoglobin interfere with the HbA1C  assay. Heterozygous hemoglobin variants (HbS, HgC, etc)do  not significantly interfere with this assay.   However, presence of multiple variants may affect accuracy.         Review of Systems   Constitutional: Negative for chills and fever.   Cardiovascular:  Negative for claudication and leg swelling.   Respiratory:  Negative for shortness of breath.    Skin:  Negative for itching, nail changes and rash.   Musculoskeletal:  Negative for muscle cramps, muscle weakness and myalgias.        Left heel pain   Gastrointestinal:  Negative for nausea and vomiting.   Neurological:  Negative for focal weakness, loss of balance, numbness and paresthesias.           Objective:      Physical Exam  Constitutional:       General: She is not in acute distress.     Appearance: She is well-developed. She is not diaphoretic.   Cardiovascular:      Pulses:           Dorsalis pedis pulses are 2+ on the right side and 2+ on the left side.        Posterior tibial pulses are 2+ on the right side and 2+ on the left side.      Comments: < 3 sec capillary refill time to toes 1-5 bilateral. Feet are warm to touch proximally with distal cooling b/l. There is some hair growth on  the feet and toes b/l. There is no edema b/l. No spider veins or varicosities present b/l.     Musculoskeletal:      Comments: Equinus noted b/l ankles with < 10 deg DF noted. MMT 5/5 in DF/PF/Inv/Ev resistance with no reproduction of pain in any direction. Passive range of motion of ankle and pedal joints is painless b/l.     Feet:      Right foot:      Protective Sensation: 10 sites tested.  8 sites sensed.      Left foot:      Protective Sensation: 10 sites tested.  7 sites sensed.   Skin:     General: Skin is warm and dry.      Coloration: Skin is not pale.      Findings: No abrasion, bruising, burn, ecchymosis, erythema, laceration, lesion, petechiae or rash.      Nails: There is no clubbing.      Comments: Skin temperature, texture and turgor within normal limits.    Left anterior shin area of discoloration and dryness, flaking skin   Neurological:      Mental Status: She is alert and oriented to person, place, and time.      Sensory: No sensory deficit.      Motor: No tremor, atrophy or abnormal muscle tone.      Comments: Negative tinel sign bilateral.   Psychiatric:         Behavior: Behavior normal.               Assessment:       Encounter Diagnoses   Name Primary?    Type 2 diabetes mellitus with complication, with long-term current use of insulin     Eczema of lower extremity Yes    Type II diabetes mellitus with neurological manifestations          Plan:       Mine was seen today for [facility].    Diagnoses and all orders for this visit:    Eczema of lower extremity  -     betamethasone valerate 0.1% (VALISONE) 0.1 % Crea; Apply topically 2 (two) times daily as needed (itching eczema).    Type 2 diabetes mellitus with complication, with long-term current use of insulin  -     Ambulatory referral/consult to Podiatry  -     betamethasone valerate 0.1% (VALISONE) 0.1 % Crea; Apply topically 2 (two) times daily as needed (itching eczema).    Type II diabetes mellitus with neurological manifestations      I  counseled the patient on her conditions, their implications and medical management.    Shoe inspection. Diabetic Foot Education. Patient reminded of the importance of good nutrition and blood sugar control to help prevent podiatric complications of diabetes. Patient instructed on proper foot hygeine. We discussed wearing proper shoe gear, daily foot inspections, never walking without protective shoe gear, never putting sharp instruments to feet    Use the valisone cream for the eczema as needed    Increase gabapentin to 900 mg nightly    Return 1 year diabetic foot exam    Adam Duong DPM

## 2024-10-22 ENCOUNTER — OFFICE VISIT (OUTPATIENT)
Dept: ENDOCRINOLOGY | Facility: CLINIC | Age: 59
End: 2024-10-22
Payer: MEDICAID

## 2024-10-22 VITALS
DIASTOLIC BLOOD PRESSURE: 84 MMHG | HEIGHT: 56 IN | BODY MASS INDEX: 45.17 KG/M2 | HEART RATE: 100 BPM | SYSTOLIC BLOOD PRESSURE: 162 MMHG | WEIGHT: 200.81 LBS

## 2024-10-22 DIAGNOSIS — E78.5 HYPERLIPIDEMIA, UNSPECIFIED HYPERLIPIDEMIA TYPE: ICD-10-CM

## 2024-10-22 DIAGNOSIS — Z79.4 TYPE 2 DIABETES MELLITUS WITH DIABETIC POLYNEUROPATHY, WITH LONG-TERM CURRENT USE OF INSULIN: Primary | ICD-10-CM

## 2024-10-22 DIAGNOSIS — E66.01 MORBID OBESITY WITH BMI OF 50.0-59.9, ADULT: ICD-10-CM

## 2024-10-22 DIAGNOSIS — F10.10 ETOH ABUSE: ICD-10-CM

## 2024-10-22 DIAGNOSIS — E11.42 TYPE 2 DIABETES MELLITUS WITH DIABETIC POLYNEUROPATHY, WITH LONG-TERM CURRENT USE OF INSULIN: Primary | ICD-10-CM

## 2024-10-22 DIAGNOSIS — I10 PRIMARY HYPERTENSION: ICD-10-CM

## 2024-10-22 PROCEDURE — 99213 OFFICE O/P EST LOW 20 MIN: CPT | Mod: PBBFAC,PO | Performed by: NURSE PRACTITIONER

## 2024-10-22 PROCEDURE — 99999 PR PBB SHADOW E&M-EST. PATIENT-LVL III: CPT | Mod: PBBFAC,,, | Performed by: NURSE PRACTITIONER

## 2024-10-22 NOTE — PROGRESS NOTES
ubjective:       Patient ID: Mine Quiros is a 59 y.o. female.    Chief Complaint: routine DM f/u   HPI   Pt is a 59 y.o. AAF with a long hx of very uncontrolled Type 2 DM-- as well as chronic conditions pending review including HTN, peripheral neuropathy, diastolic dysfunciton, morbid obesity- now on insulin pump.She has had multiple difficulties being able to navigate pump and having freq hypoglycemia. Medically disbabled she states due to neuropathy.     Interim Events: Oct 22, 2024:  From a diabetes standpoint pt doing great.  GLucoses excellent. No hypoglycemia.  I did receive a message that Dr. Alcocer wanted her to go up on Mounjaro--so it was increase to 15 mg.  However, she continues to drink a lot--I had previously calculated she was at one point getting about 6000 christina daily from ai.  She is complaining of frequent urination.  But pt also states she has been drinking a lot of Dr. Pepper, Sunkist, and RB--all with sugar.  Advised the ETOh and caffeine drinks all work as diuretics, and she needs to lay off all the aforementioned.     Current DM meds:   Medtronic pump--~100 units day        Failed DM meds:  na        Back up plan for insulin pump hiatus: 60 units basal,  15-20 with meals.    Statin: rosuvastatin 20 mg        Not tolerated statin : na   ACE/ARB:losartan 100 mg or olmesartan 40 mg        Not tolerated ACE/ARB: na   Known Diabetic complications: neuropathy, retinopathy       CGMS Interpretation:           Sensor/Pump Interpretation or Prominent Theme: Excellent glycemic control. No hypoglycemia. No marked hyperglycemia.             July 16, 2024:  Pt did not take BP meds this AM-- my check was 180/118, some headache. States she is frequently having nocturnal hypoglycemia, and will eat before bed or have candy and its still occurring. Inquired how much alcohol on nights of hypoglycemia.  Pt taking the 5th. Does complain of alarms for low glucoses--pt rarely less than 100--will lower  alarm threshold.  No focal complaints.   March 22, 2024:  Had pt come in person so I could adjust her insulin pump (she nor I trust her to do it)   Has been complaining of hypogycemia--specifically at night--and says she feels so bad she can't event drink or eat anything so she just prays.  But I did download pump and sensor and no hypoglycemia actually noted.  I do see she landed near 70 once, but glucoses usually hovering around 100 overnight.  She is also on  7.5 Mounjaro and requesting increase dose.      Wt 92.4 kg  Did not take bp meds yet.     march 21, 2024:  Pt had bee seen interim by PETE Reis NP. At that time pt had been complaining of some hypoglycemia and pump adjustments were made.  Pt is still complaining of hypoglycemia and specifically at night.  Does report a glucose in 50's.  Pt not real tech saavy, and I did instruct her to run exercise mode at  night to keep numbers higher until she can get in and we adjust.  I dont' think she will be able to to well over the phone with instruction.    Also c/o constipation--advised SE of Mounjaro and dehydration.  C/o no wt loss--advised the ETOH is giving her calories and adding to dehydration.   -pt not taking statin because too big--  Reports current wt 207 lbs.      Aug 23, 2023:   No acute events. No focal complaints. Inquires about monjauro--denies any personal or family hx of pancreatic or thyroid cancer or pancreatitis.  PUmp and sensor downloaded.  DM is actually well controlled. ]  She did not take her BP meds today--which I strongly encouraged she better because her kidneys are starting to struggle based on proteinuria.  And she did not start the crestor I ordered, for her ridiculous LDL.  Her sister is present and reports she likes to [place] everything and we need to take her Frydaddy away.States she is better on ETOH intake--but 1/2 ai only last about 5 days.    .   May 17, 2023:  currently undergoing photocoagulation for retinopathy--  Diabetes  control stable and quite reasonable especially considering past hx.  Questions regarding wt loss.  Advised ai has a lot of calories.  And makes liver fatter, Which makes her need more insulin and causes wt gain.  She is not thrilled at that sugguestion.  Otherwise no significant focal complaints.  Complaint of nighttime hypoglycemia.  Per sensor, once she dropped to 60's--but she had eaten right before so I suspect carb/insulin mismatch.  Also advised ETOH does contribute to nocturnal hypoglycemia. She did not take BP meds this AM.       Jan 12, 2022:  No focal complaints except discolored nails.  A1C much improved. C/o hypo last night with glucose of 45---but she had eaten pizza, grapefuit and a couple of mr. Good bars. No marked insulin dosing.  Asymptomatic--Suspect sensor error r/t sleeping on it likely.  Approved for bariatric surgery--if she can get the pre weight off.      Sept 19, 2022:  NO acute events. Did not take BP meds this AM--was in rush.  Sensor downloaded and reviewed. No c/o hypoglycemia.  Still drinking.  States foot wounds.        Review of Systems   Constitutional:  Negative for activity change and fatigue.   HENT:  Negative for hearing loss and trouble swallowing.    Eyes:  Negative for photophobia and visual disturbance.        Last Eye Exam: June 2021   Respiratory:  Negative for cough and shortness of breath.    Cardiovascular:  Negative for chest pain and palpitations.   Gastrointestinal:  Positive for constipation and diarrhea.   Endocrine: Negative for polydipsia and polyuria.   Genitourinary:  Negative for frequency and urgency.   Musculoskeletal:  Negative for arthralgias, back pain and myalgias.   Skin:  Negative for rash and wound.   Allergic/Immunologic: Negative for food allergies.   Neurological:  Positive for numbness (hands and feet.). Negative for weakness.   Psychiatric/Behavioral:  Negative for sleep disturbance. The patient is not nervous/anxious.         Sleeps well  with gabapentin.         Objective:      Physical Exam  Constitutional:       Appearance: Normal appearance. She is obese.   HENT:      Head: Normocephalic and atraumatic.      Nose: Nose normal.      Mouth/Throat:      Mouth: Mucous membranes are moist.      Pharynx: Oropharynx is clear.   Eyes:      Extraocular Movements: Extraocular movements intact.      Conjunctiva/sclera: Conjunctivae normal.      Pupils: Pupils are equal, round, and reactive to light.   Cardiovascular:      Rate and Rhythm: Tachycardia present.      Pulses: Normal pulses.      Heart sounds: Normal heart sounds.   Pulmonary:      Effort: Pulmonary effort is normal.      Breath sounds: Normal breath sounds.   Musculoskeletal:         General: Normal range of motion.      Cervical back: Normal range of motion and neck supple.      Right lower leg: No edema.      Left lower leg: No edema.   Skin:     General: Skin is warm and dry.   Neurological:      General: No focal deficit present.      Mental Status: She is alert and oriented to person, place, and time.   Psychiatric:         Mood and Affect: Mood normal.         Behavior: Behavior normal.         Thought Content: Thought content normal.         Judgment: Judgment normal.           LMP 08/31/2007 (Approximate)     Hemoglobin A1C   Date Value Ref Range Status   10/21/2024 6.3 (H) 4.0 - 5.6 % Final     Comment:     ADA Screening Guidelines:  5.7-6.4%  Consistent with prediabetes  >or=6.5%  Consistent with diabetes    High levels of fetal hemoglobin interfere with the HbA1C  assay. Heterozygous hemoglobin variants (HbS, HgC, etc)do  not significantly interfere with this assay.   However, presence of multiple variants may affect accuracy.     03/14/2024 6.1 (H) 4.0 - 5.6 % Final     Comment:     ADA Screening Guidelines:  5.7-6.4%  Consistent with prediabetes  >or=6.5%  Consistent with diabetes    High levels of fetal hemoglobin interfere with the HbA1C  assay. Heterozygous hemoglobin variants  (HbS, HgC, etc)do  not significantly interfere with this assay.   However, presence of multiple variants may affect accuracy.     11/24/2023 6.5 (H) 4.0 - 5.6 % Final     Comment:     ADA Screening Guidelines:  5.7-6.4%  Consistent with prediabetes  >or=6.5%  Consistent with diabetes    High levels of fetal hemoglobin interfere with the HbA1C  assay. Heterozygous hemoglobin variants (HbS, HgC, etc)do  not significantly interfere with this assay.   However, presence of multiple variants may affect accuracy.         Chemistry        Component Value Date/Time     10/21/2024 1459    K 3.2 (L) 10/21/2024 1459     10/21/2024 1459    CO2 23 10/21/2024 1459    BUN 23 (H) 10/21/2024 1459    CREATININE 1.2 10/21/2024 1459     10/21/2024 1459        Component Value Date/Time    CALCIUM 9.5 10/21/2024 1459    ALKPHOS 121 05/11/2023 1051    AST 49 (H) 05/11/2023 1051    ALT 28 05/11/2023 1051    BILITOT 0.3 05/11/2023 1051    ESTGFRAFRICA 58.4 (A) 04/28/2022 1011    EGFRNONAA 50.6 (A) 04/28/2022 1011          Lab Results   Component Value Date    CHOL 276 (H) 11/24/2023     Lab Results   Component Value Date    HDL 42 11/24/2023     Lab Results   Component Value Date    LDLCALC 194.8 (H) 11/24/2023     Lab Results   Component Value Date    TRIG 196 (H) 11/24/2023     Lab Results   Component Value Date    CHOLHDL 15.2 (L) 11/24/2023     Lab Results   Component Value Date    MICALBCREAT Unable to calculate 10/21/2024     Lab Results   Component Value Date    TSH 2.269 08/07/2020     Vit D, 25-Hydroxy   Date Value Ref Range Status   02/29/2024 7 (L) 30 - 96 ng/mL Final     Comment:     Vitamin D deficiency.........<10 ng/mL                              Vitamin D insufficiency......10-29 ng/mL       Vitamin D sufficiency........> or equal to 30 ng/mL  Vitamin D toxicity............>100 ng/mL             Assessment:      1. Type 2 diabetes mellitus with diabetic polyneuropathy, with long-term current use of insulin   Hemoglobin A1C      2. Primary hypertension        3. Hyperlipidemia, unspecified hyperlipidemia type  Lipid Panel      4. Morbid obesity with BMI of 50.0-59.9, adult        5. ETOH abuse              Plan:     All reinforced:   Pt not taking statin--states pill too large to swallow. Reinforced its better than having chest sawed in 1/2.    Go home take BP meds. And crestor !!!  Encourage, diet,exercise, ETOH cessation, minimization.     No true hypoglycemia--but with ETOH, don't really want her glucoses that tight,   Cont 15 mg Mounjaro     Will preemptively decrease rates from   1.4 to 1.2  1.6 to 1.4 u/hr   .             ORDERS 10/22/2024     3 mo with me-likes later morning- fasting A1c, lipids prior

## 2024-10-24 LAB
PHOSPHOLIPASE A2 RECEPTOR, ELISA: <2 RU/ML
PLA2R IGG SERPL QL IF: NEGATIVE
THSD7A IGG SERPL QL IF: NEGATIVE

## 2024-10-28 ENCOUNTER — OFFICE VISIT (OUTPATIENT)
Dept: NEPHROLOGY | Facility: CLINIC | Age: 59
End: 2024-10-28
Payer: MEDICAID

## 2024-10-28 VITALS — OXYGEN SATURATION: 99 % | SYSTOLIC BLOOD PRESSURE: 152 MMHG | HEART RATE: 64 BPM | DIASTOLIC BLOOD PRESSURE: 108 MMHG

## 2024-10-28 DIAGNOSIS — I10 HTN (HYPERTENSION), BENIGN: ICD-10-CM

## 2024-10-28 DIAGNOSIS — N18.31 STAGE 3A CHRONIC KIDNEY DISEASE: Primary | ICD-10-CM

## 2024-10-28 DIAGNOSIS — Z79.4 TYPE 2 DIABETES MELLITUS WITH DIABETIC POLYNEUROPATHY, WITH LONG-TERM CURRENT USE OF INSULIN: ICD-10-CM

## 2024-10-28 DIAGNOSIS — R80.1 PERSISTENT PROTEINURIA: ICD-10-CM

## 2024-10-28 DIAGNOSIS — E66.01 MORBID OBESITY WITH BMI OF 50.0-59.9, ADULT: ICD-10-CM

## 2024-10-28 DIAGNOSIS — E83.52 HYPERCALCEMIA: ICD-10-CM

## 2024-10-28 DIAGNOSIS — E11.42 TYPE 2 DIABETES MELLITUS WITH DIABETIC POLYNEUROPATHY, WITH LONG-TERM CURRENT USE OF INSULIN: ICD-10-CM

## 2024-10-28 DIAGNOSIS — G47.33 OSA (OBSTRUCTIVE SLEEP APNEA): ICD-10-CM

## 2024-10-28 DIAGNOSIS — N25.81 SECONDARY HYPERPARATHYROIDISM: ICD-10-CM

## 2024-10-28 PROCEDURE — 4010F ACE/ARB THERAPY RXD/TAKEN: CPT | Mod: CPTII,,, | Performed by: STUDENT IN AN ORGANIZED HEALTH CARE EDUCATION/TRAINING PROGRAM

## 2024-10-28 PROCEDURE — 3066F NEPHROPATHY DOC TX: CPT | Mod: CPTII,,, | Performed by: STUDENT IN AN ORGANIZED HEALTH CARE EDUCATION/TRAINING PROGRAM

## 2024-10-28 PROCEDURE — 3077F SYST BP >= 140 MM HG: CPT | Mod: CPTII,,, | Performed by: STUDENT IN AN ORGANIZED HEALTH CARE EDUCATION/TRAINING PROGRAM

## 2024-10-28 PROCEDURE — 1159F MED LIST DOCD IN RCRD: CPT | Mod: CPTII,,, | Performed by: STUDENT IN AN ORGANIZED HEALTH CARE EDUCATION/TRAINING PROGRAM

## 2024-10-28 PROCEDURE — 99214 OFFICE O/P EST MOD 30 MIN: CPT | Mod: S$PBB,,, | Performed by: STUDENT IN AN ORGANIZED HEALTH CARE EDUCATION/TRAINING PROGRAM

## 2024-10-28 PROCEDURE — 99999 PR PBB SHADOW E&M-EST. PATIENT-LVL II: CPT | Mod: PBBFAC,,, | Performed by: STUDENT IN AN ORGANIZED HEALTH CARE EDUCATION/TRAINING PROGRAM

## 2024-10-28 PROCEDURE — 3080F DIAST BP >= 90 MM HG: CPT | Mod: CPTII,,, | Performed by: STUDENT IN AN ORGANIZED HEALTH CARE EDUCATION/TRAINING PROGRAM

## 2024-10-28 PROCEDURE — 3044F HG A1C LEVEL LT 7.0%: CPT | Mod: CPTII,,, | Performed by: STUDENT IN AN ORGANIZED HEALTH CARE EDUCATION/TRAINING PROGRAM

## 2024-10-28 PROCEDURE — 3061F NEG MICROALBUMINURIA REV: CPT | Mod: CPTII,,, | Performed by: STUDENT IN AN ORGANIZED HEALTH CARE EDUCATION/TRAINING PROGRAM

## 2024-10-28 PROCEDURE — 99212 OFFICE O/P EST SF 10 MIN: CPT | Mod: PBBFAC,PN | Performed by: STUDENT IN AN ORGANIZED HEALTH CARE EDUCATION/TRAINING PROGRAM

## 2024-10-28 RX ORDER — FINERENONE 10 MG/1
10 TABLET, FILM COATED ORAL DAILY
Qty: 30 TABLET | Refills: 11 | Status: SHIPPED | OUTPATIENT
Start: 2024-10-28 | End: 2025-10-23

## 2024-11-04 ENCOUNTER — PROCEDURE VISIT (OUTPATIENT)
Dept: OPHTHALMOLOGY | Facility: CLINIC | Age: 59
End: 2024-11-04
Payer: MEDICAID

## 2024-11-04 DIAGNOSIS — E11.3313 CONTROLLED TYPE 2 DIABETES MELLITUS WITH BOTH EYES AFFECTED BY MODERATE NONPROLIFERATIVE RETINOPATHY AND MACULAR EDEMA, WITH LONG-TERM CURRENT USE OF INSULIN: Primary | ICD-10-CM

## 2024-11-04 DIAGNOSIS — H35.033 HYPERTENSIVE RETINOPATHY, BILATERAL: ICD-10-CM

## 2024-11-04 DIAGNOSIS — Z79.4 CONTROLLED TYPE 2 DIABETES MELLITUS WITH BOTH EYES AFFECTED BY MODERATE NONPROLIFERATIVE RETINOPATHY AND MACULAR EDEMA, WITH LONG-TERM CURRENT USE OF INSULIN: Primary | ICD-10-CM

## 2024-11-04 PROCEDURE — 92134 CPTRZ OPH DX IMG PST SGM RTA: CPT | Mod: PBBFAC,PO | Performed by: OPHTHALMOLOGY

## 2024-11-04 PROCEDURE — 67028 INJECTION EYE DRUG: CPT | Mod: S$PBB,LT,, | Performed by: OPHTHALMOLOGY

## 2024-11-04 PROCEDURE — 92012 INTRM OPH EXAM EST PATIENT: CPT | Mod: 25,S$PBB,, | Performed by: OPHTHALMOLOGY

## 2024-11-04 PROCEDURE — 99999PBSHW PR PBB SHADOW TECHNICAL ONLY FILED TO HB: Mod: JZ,PBBFAC,,

## 2024-11-04 PROCEDURE — 67028 INJECTION EYE DRUG: CPT | Mod: PBBFAC,PO,LT | Performed by: OPHTHALMOLOGY

## 2024-11-04 RX ADMIN — Medication 1.5 MG: at 10:11

## 2024-11-04 NOTE — PROGRESS NOTES
HPI     avastin injection      os      oct        Additional comments: AVASTIN  INJECTION     OS   Me  OCT   Fuzzy va     Last bs     unsure  Last A1c   unsure    Dls  09/09/24          Last edited by Trixie Contreras on 11/4/2024  9:52 AM.          OCT - OD low grade non central DME, with VMA  OS - central DME/CME - increased    Prior FA - late macular leakage OS > OD  No NV OU      A/P    Mod NPDR OU  T2 controlled on insulin pump    2. 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  Can consider STK - but not optimal.  This is unfortunate      3. HTN Ret OU  BS/BP/chol control    4. PCIOL OU      1 month OCT no dilate       Risks, benefits, and alternatives to treatment discussed in detail with the patient.  The patient voiced understanding and wished to proceed with the procedure    Injection Procedure Note:  Diagnosis: DME OS    Patient Identified and Time Out complete  Pt marked  Topical Proparacaine and Betadine.  Inject Avastin OS at 6:00 @ 3.5-4mm posterior to limbus  Post Operative Dx: Same  Complications: None  Follow up as above.

## 2024-11-26 ENCOUNTER — PATIENT OUTREACH (OUTPATIENT)
Dept: DIABETES | Facility: CLINIC | Age: 59
End: 2024-11-26
Payer: COMMERCIAL

## 2024-11-26 NOTE — PROGRESS NOTES
Patient calling today for assistance with Guardian sensor for Medtronic 770G insulin pump--she states she placed a new sensor and sensor is not starting.  Walked patient through pump menu to find CGM no longer paired with Medtronic pump.  Walked through pairing CGM with pump again and then restarting sensor (turn off then on)--sensor warm up initiated and patient in 2 hour warm up.  She is aware that a fingerstick glucose will be needed after sensor warm up and SmartGuard feature of Medtronic 770G pump will resume.

## 2024-12-06 DIAGNOSIS — E11.42 TYPE 2 DIABETES MELLITUS WITH DIABETIC POLYNEUROPATHY: ICD-10-CM

## 2024-12-06 RX ORDER — BLOOD-GLUCOSE SENSOR
EACH MISCELLANEOUS
Qty: 15 EACH | Refills: 3 | Status: SHIPPED | OUTPATIENT
Start: 2024-12-06

## 2024-12-11 ENCOUNTER — TELEPHONE (OUTPATIENT)
Dept: NEPHROLOGY | Facility: CLINIC | Age: 59
End: 2024-12-11
Payer: MEDICAID

## 2024-12-11 DIAGNOSIS — E11.42 TYPE 2 DIABETES MELLITUS WITH DIABETIC POLYNEUROPATHY, WITH LONG-TERM CURRENT USE OF INSULIN: ICD-10-CM

## 2024-12-11 DIAGNOSIS — Z79.4 TYPE 2 DIABETES MELLITUS WITH DIABETIC POLYNEUROPATHY, WITH LONG-TERM CURRENT USE OF INSULIN: ICD-10-CM

## 2024-12-11 DIAGNOSIS — N18.31 STAGE 3A CHRONIC KIDNEY DISEASE: Primary | ICD-10-CM

## 2024-12-11 RX ORDER — SPIRONOLACTONE 25 MG/1
25 TABLET ORAL DAILY
Qty: 30 TABLET | Refills: 11 | Status: SHIPPED | OUTPATIENT
Start: 2024-12-11 | End: 2025-12-11

## 2024-12-11 NOTE — TELEPHONE ENCOUNTER
Nephrology Plan of Care Note    Pt: Mine Quiros  : 1965  Date: 2024      Kerendia substitution would be spironolactone 25 mg q.d..  Prescription called into patient's pharmacy on file.      Gino Solorio MD  [facility]

## 2024-12-11 NOTE — TELEPHONE ENCOUNTER
----- Message from Chrissy sent at 12/11/2024  3:18 PM CST -----  Contact: pt  Type:  Needs Medical Advice    Who Called: pt    Pharmacy name and phone #:      [facility] - [place] - [address]  [address]  [place] [address]  Phone: [phone] Fax: [phone]    Would the patient rather a call back or a response via [facility]?  Call back    Best Call Back Number: [phone]    Additional Information: medicaid will not pay for the kidney medication kerendia 10mg.     Is there something else that will work?    Please call to advise    Thanks

## 2024-12-11 NOTE — TELEPHONE ENCOUNTER
"Called and spoke to patient. Patient states," [facility] but [facility]icaid won't pay for it. They want like $100-something for it." Patient requested an alternative medication be called in. Informed patient MD will be notified to advise further. Patient verbalized understanding.   "

## 2024-12-12 RX ORDER — LANCETS
EACH MISCELLANEOUS
Qty: 300 EACH | Refills: 3 | Status: SHIPPED | OUTPATIENT
Start: 2024-12-12

## 2024-12-16 ENCOUNTER — PROCEDURE VISIT (OUTPATIENT)
Dept: OPHTHALMOLOGY | Facility: CLINIC | Age: 59
End: 2024-12-16
Payer: MEDICAID

## 2024-12-16 DIAGNOSIS — Z79.4 CONTROLLED TYPE 2 DIABETES MELLITUS WITH BOTH EYES AFFECTED BY MODERATE NONPROLIFERATIVE RETINOPATHY AND MACULAR EDEMA, WITH LONG-TERM CURRENT USE OF INSULIN: Primary | ICD-10-CM

## 2024-12-16 DIAGNOSIS — E11.3313 CONTROLLED TYPE 2 DIABETES MELLITUS WITH BOTH EYES AFFECTED BY MODERATE NONPROLIFERATIVE RETINOPATHY AND MACULAR EDEMA, WITH LONG-TERM CURRENT USE OF INSULIN: Primary | ICD-10-CM

## 2024-12-16 PROCEDURE — 67028 INJECTION EYE DRUG: CPT | Mod: S$PBB,LT,, | Performed by: OPHTHALMOLOGY

## 2024-12-16 PROCEDURE — 92012 INTRM OPH EXAM EST PATIENT: CPT | Mod: 25,S$PBB,, | Performed by: OPHTHALMOLOGY

## 2024-12-16 PROCEDURE — 67028 INJECTION EYE DRUG: CPT | Mod: PBBFAC,PO,LT | Performed by: OPHTHALMOLOGY

## 2024-12-16 PROCEDURE — 99999PBSHW PR PBB SHADOW TECHNICAL ONLY FILED TO HB: Mod: JZ,PBBFAC,,

## 2024-12-16 PROCEDURE — 92134 CPTRZ OPH DX IMG PST SGM RTA: CPT | Mod: PBBFAC,PO | Performed by: OPHTHALMOLOGY

## 2024-12-16 RX ADMIN — Medication 1.5 MG: at 01:12

## 2024-12-16 NOTE — PROGRESS NOTES
HPI     avastin  os           oct   dm      Additional comments: Avastin os   Oct   Dme     Fuzzy va     Last bs  98   Last A1c   6.2      Dls 11/04/24          Last edited by Trixie Contreras on 12/16/2024  1:21 PM.          OCT - OD low grade non central DME, with VMA  OS - central DME/CME - decreased    Prior FA - late macular leakage OS > OD  No NV OU      A/P    Mod NPDR OU  T2 controlled on insulin pump    2. 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  Can consider STK - but not optimal.  This is unfortunate      3. HTN Ret OU  BS/BP/chol control    4. PCIOL OU      1 month OCT no dilate       Risks, benefits, and alternatives to treatment discussed in detail with the patient.  The patient voiced understanding and wished to proceed with the procedure    Injection Procedure Note:  Diagnosis: DME OS    Patient Identified and Time Out complete  Pt marked  Topical Proparacaine and Betadine.  Inject Avastin OS at 6:00 @ 3.5-4mm posterior to limbus  Post Operative Dx: Same  Complications: None  Follow up as above.

## 2024-12-26 ENCOUNTER — TELEPHONE (OUTPATIENT)
Dept: ENDOCRINOLOGY | Facility: CLINIC | Age: 59
End: 2024-12-26
Payer: MEDICAID

## 2024-12-26 NOTE — TELEPHONE ENCOUNTER
----- Message from Elle sent at 12/26/2024  3:08 PM CST -----  Contact: Self  Type:  RX Refill Request    Who Called:  Patient  Refill or New Rx:  New rx  RX Name and Strength:  blood-glucose sensor   How is the patient currently taking it? (ex. 1XDay):  AS Directed  Is this a 30 day or 90 day RX:  90  Preferred Pharmacy with phone number:    [facility] - [place] - [address] [place] [address]  [address] [place] [address]  [place] [address]  Phone: [phone] Fax: [phone]  Local or Mail Order:  Local  Ordering Provider:  Regina Solorio NP  Best Call Back Number:  [phone]    Additional Information:  Pt is asking if we can please call her back when we get this msg. Thank You  
Attempted to contact pt. No answer. LVM to call clinic back.     
T(C): 36.9 (05-31-18 @ 20:34), Max: 36.9 (05-31-18 @ 20:34)  HR: 72 (05-31-18 @ 20:34) (61 - 89)  BP: 101/78 (05-31-18 @ 20:34) (101/78 - 117/79)  RR: 16 (05-31-18 @ 20:34) (16 - 18)  SpO2: 95% (05-31-18 @ 20:34) (95% - 99%)    Gen: awake, alert  HENT: neck soft / supple; MMM  Lymph: no LAD noted in neck  Eye: PERRL, sclerae anicteric  CV: normal rate, regular rhythm  Pulm: CTAB, no w/r/r auscultated  Abd: +BS, soft, NT, ND  Skin: warm, dry  Ext: no LE edema  Neuro: A&Ox3, answering questions appropriately, following commands appropriately, recent and remote memory intact  Psych: normal mood / affect

## 2025-01-07 DIAGNOSIS — E11.42 TYPE 2 DIABETES MELLITUS WITH DIABETIC POLYNEUROPATHY: ICD-10-CM

## 2025-01-07 NOTE — TELEPHONE ENCOUNTER
----- Message from Efren sent at 1/7/2025 11:07 AM CST -----  Regarding: return call  Contact: patient  Type:  Patient Returning Call    Who Called:patient  Who Left Message for Patient:nurse  Does the patient know what this is regarding?:did not receive a sensor/ please call/ been trying to get sensor since [place]  Would the patient rather a call back or a response via [facility]?   Best Call Back Number:[phone]  Additional Information:

## 2025-01-07 NOTE — TELEPHONE ENCOUNTER
Insurance will most likely not cover a whole new 90 day supply this soon. I don't know if there's anything the pharmacy can do since they dispensed to someone who was not her. She can ask them if there's anything that can be done. Where would she want another rx sent? We can try.

## 2025-01-07 NOTE — TELEPHONE ENCOUNTER
They've stopped dispensing some specialty DME products so I'm not sure. Would probably [facility] for local [facility]. I doubt insurance will allow it since they just paid for a 90 day supply

## 2025-01-08 RX ORDER — BLOOD-GLUCOSE SENSOR
EACH MISCELLANEOUS
Qty: 15 EACH | Refills: 3 | Status: SHIPPED | OUTPATIENT
Start: 2025-01-08

## 2025-01-08 NOTE — TELEPHONE ENCOUNTER
Spoke with patient and notified her of Jessi's previous message and verbalized understanding     Pt would like to try rangel

## 2025-01-13 ENCOUNTER — TELEPHONE (OUTPATIENT)
Dept: FAMILY MEDICINE | Facility: CLINIC | Age: 60
End: 2025-01-13
Payer: MEDICAID

## 2025-01-13 ENCOUNTER — TELEPHONE (OUTPATIENT)
Dept: ENDOCRINOLOGY | Facility: CLINIC | Age: 60
End: 2025-01-13
Payer: MEDICAID

## 2025-01-13 NOTE — TELEPHONE ENCOUNTER
S/w pt and notified [facility] [place]. Gave pt the number to call and see what ws the delay.pt v/a

## 2025-01-13 NOTE — TELEPHONE ENCOUNTER
----- Message from Mariluz sent at 1/13/2025 11:28 AM CST -----  Type: Needs Medical Advice  Who Called:  pt     Best Call Back Number: [phone] ([place])     Additional Information: pt requesting call back in regards to lab please advise

## 2025-01-13 NOTE — TELEPHONE ENCOUNTER
----- Message from Amina sent at 1/13/2025  1:46 PM CST -----   Type:  Needs Medical Advice    Who Called: PT    Would the patient rather a call back or a response via [facility]? call  Best Call Back Number: [phone]        Additional Information: pt states she needs someone from the dr office to call her back..  Please call back to advise. Thank you!

## 2025-01-16 ENCOUNTER — PROCEDURE VISIT (OUTPATIENT)
Dept: OPHTHALMOLOGY | Facility: CLINIC | Age: 60
End: 2025-01-16
Payer: MEDICAID

## 2025-01-16 DIAGNOSIS — E11.3313 CONTROLLED TYPE 2 DIABETES MELLITUS WITH BOTH EYES AFFECTED BY MODERATE NONPROLIFERATIVE RETINOPATHY AND MACULAR EDEMA, WITH LONG-TERM CURRENT USE OF INSULIN: Primary | ICD-10-CM

## 2025-01-16 DIAGNOSIS — Z79.4 CONTROLLED TYPE 2 DIABETES MELLITUS WITH BOTH EYES AFFECTED BY MODERATE NONPROLIFERATIVE RETINOPATHY AND MACULAR EDEMA, WITH LONG-TERM CURRENT USE OF INSULIN: Primary | ICD-10-CM

## 2025-01-16 PROCEDURE — 92012 INTRM OPH EXAM EST PATIENT: CPT | Mod: 25,S$PBB,, | Performed by: OPHTHALMOLOGY

## 2025-01-16 PROCEDURE — 67028 INJECTION EYE DRUG: CPT | Mod: PBBFAC,PO,LT | Performed by: OPHTHALMOLOGY

## 2025-01-16 PROCEDURE — 92134 CPTRZ OPH DX IMG PST SGM RTA: CPT | Mod: PBBFAC,PO | Performed by: OPHTHALMOLOGY

## 2025-01-16 PROCEDURE — 67028 INJECTION EYE DRUG: CPT | Mod: S$PBB,LT,, | Performed by: OPHTHALMOLOGY

## 2025-01-16 PROCEDURE — 99999PBSHW PR PBB SHADOW TECHNICAL ONLY FILED TO HB: Mod: JZ,PBBFAC,,

## 2025-01-16 RX ADMIN — Medication 1.5 MG: at 01:01

## 2025-01-16 NOTE — PROGRESS NOTES
HPI     avsatin  injection  os        dm         Additional comments: Avastin  injection os   Dm   Last bs    132  Last A1c    6.2  Blurred va  ou     No gtts   Me   Dls 12/16/24          Last edited by Trixie Contreras on 1/16/2025  1:35 PM.          OCT - OD low grade non central DME, with VMA  OS - central DME/CME - decreased    Prior FA - late macular leakage OS > OD  No NV OU      A/P    Mod NPDR OU  T2 controlled on insulin pump    2. 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  Can consider STK - but not optimal.  This is unfortunate      3. HTN Ret OU  BS/BP/chol control    4. PCIOL OU      1 month OCT no dilate       Risks, benefits, and alternatives to treatment discussed in detail with the patient.  The patient voiced understanding and wished to proceed with the procedure    Injection Procedure Note:  Diagnosis: DME OS    Patient Identified and Time Out complete  Pt marked  Topical Proparacaine and Betadine.  Inject Avastin OS at 6:00 @ 3.5-4mm posterior to limbus  Post Operative Dx: Same  Complications: None  Follow up as above.

## 2025-01-18 ENCOUNTER — LAB VISIT (OUTPATIENT)
Dept: LAB | Facility: HOSPITAL | Age: 60
End: 2025-01-18
Attending: NURSE PRACTITIONER
Payer: MEDICAID

## 2025-01-18 DIAGNOSIS — N18.31 STAGE 3A CHRONIC KIDNEY DISEASE: ICD-10-CM

## 2025-01-18 DIAGNOSIS — Z79.4 TYPE 2 DIABETES MELLITUS WITH DIABETIC POLYNEUROPATHY, WITH LONG-TERM CURRENT USE OF INSULIN: ICD-10-CM

## 2025-01-18 DIAGNOSIS — E78.5 HYPERLIPIDEMIA, UNSPECIFIED HYPERLIPIDEMIA TYPE: ICD-10-CM

## 2025-01-18 DIAGNOSIS — E11.42 TYPE 2 DIABETES MELLITUS WITH DIABETIC POLYNEUROPATHY, WITH LONG-TERM CURRENT USE OF INSULIN: ICD-10-CM

## 2025-01-18 LAB
ALBUMIN SERPL BCP-MCNC: 2.7 G/DL (ref 3.5–5.2)
ALBUMIN/CREAT UR: 6702.8 UG/MG (ref 0–30)
ANION GAP SERPL CALC-SCNC: 12 MMOL/L (ref 8–16)
BACTERIA #/AREA URNS HPF: ABNORMAL /HPF
BASOPHILS # BLD AUTO: 0.04 K/UL (ref 0–0.2)
BASOPHILS NFR BLD: 0.4 % (ref 0–1.9)
BILIRUB UR QL STRIP: NEGATIVE
BUN SERPL-MCNC: 18 MG/DL (ref 6–20)
CALCIUM SERPL-MCNC: 9.1 MG/DL (ref 8.7–10.5)
CHLORIDE SERPL-SCNC: 106 MMOL/L (ref 95–110)
CHOLEST SERPL-MCNC: 232 MG/DL (ref 120–199)
CHOLEST/HDLC SERPL: 5.4 {RATIO} (ref 2–5)
CLARITY UR: CLEAR
CO2 SERPL-SCNC: 23 MMOL/L (ref 23–29)
COLOR UR: YELLOW
CREAT SERPL-MCNC: 1.4 MG/DL (ref 0.5–1.4)
CREAT UR-MCNC: 71 MG/DL (ref 15–325)
CREAT UR-MCNC: 71 MG/DL (ref 15–325)
DIFFERENTIAL METHOD BLD: NORMAL
EOSINOPHIL # BLD AUTO: 0.3 K/UL (ref 0–0.5)
EOSINOPHIL NFR BLD: 2.6 % (ref 0–8)
ERYTHROCYTE [DISTWIDTH] IN BLOOD BY AUTOMATED COUNT: 14 % (ref 11.5–14.5)
EST. GFR  (NO RACE VARIABLE): 43.3 ML/MIN/1.73 M^2
ESTIMATED AVG GLUCOSE: 123 MG/DL (ref 68–131)
GLUCOSE SERPL-MCNC: 87 MG/DL (ref 70–110)
GLUCOSE UR QL STRIP: NEGATIVE
HBA1C MFR BLD: 5.9 % (ref 4–5.6)
HCT VFR BLD AUTO: 45.5 % (ref 37–48.5)
HDLC SERPL-MCNC: 43 MG/DL (ref 40–75)
HDLC SERPL: 18.5 % (ref 20–50)
HGB BLD-MCNC: 14.6 G/DL (ref 12–16)
HGB UR QL STRIP: ABNORMAL
HYALINE CASTS #/AREA URNS LPF: 0 /LPF
IMM GRANULOCYTES # BLD AUTO: 0.03 K/UL (ref 0–0.04)
IMM GRANULOCYTES NFR BLD AUTO: 0.3 % (ref 0–0.5)
KETONES UR QL STRIP: NEGATIVE
LDLC SERPL CALC-MCNC: 162 MG/DL (ref 63–159)
LEUKOCYTE ESTERASE UR QL STRIP: NEGATIVE
LYMPHOCYTES # BLD AUTO: 3.2 K/UL (ref 1–4.8)
LYMPHOCYTES NFR BLD: 32.1 % (ref 18–48)
MCH RBC QN AUTO: 30.2 PG (ref 27–31)
MCHC RBC AUTO-ENTMCNC: 32.1 G/DL (ref 32–36)
MCV RBC AUTO: 94 FL (ref 82–98)
MICROALBUMIN UR DL<=1MG/L-MCNC: 4759 UG/ML
MICROSCOPIC COMMENT: ABNORMAL
MONOCYTES # BLD AUTO: 0.6 K/UL (ref 0.3–1)
MONOCYTES NFR BLD: 6.2 % (ref 4–15)
NEUTROPHILS # BLD AUTO: 5.9 K/UL (ref 1.8–7.7)
NEUTROPHILS NFR BLD: 58.4 % (ref 38–73)
NITRITE UR QL STRIP: NEGATIVE
NONHDLC SERPL-MCNC: 189 MG/DL
NRBC BLD-RTO: 0 /100 WBC
PH UR STRIP: 7 [PH] (ref 5–8)
PHOSPHATE SERPL-MCNC: 3.9 MG/DL (ref 2.7–4.5)
PLATELET # BLD AUTO: 407 K/UL (ref 150–450)
PMV BLD AUTO: 9.6 FL (ref 9.2–12.9)
POTASSIUM SERPL-SCNC: 3.1 MMOL/L (ref 3.5–5.1)
PROT UR QL STRIP: ABNORMAL
PROT UR-MCNC: 673 MG/DL (ref 0–15)
PROT/CREAT UR: 9.48 MG/G{CREAT} (ref 0–0.2)
PTH-INTACT SERPL-MCNC: 182.1 PG/ML (ref 9–77)
RBC # BLD AUTO: 4.84 M/UL (ref 4–5.4)
RBC #/AREA URNS HPF: 0 /HPF (ref 0–4)
SODIUM SERPL-SCNC: 141 MMOL/L (ref 136–145)
SP GR UR STRIP: 1.01 (ref 1–1.03)
SQUAMOUS #/AREA URNS HPF: 7 /HPF
TRIGL SERPL-MCNC: 135 MG/DL (ref 30–150)
URN SPEC COLLECT METH UR: ABNORMAL
WBC # BLD AUTO: 10.04 K/UL (ref 3.9–12.7)
WBC #/AREA URNS HPF: 6 /HPF (ref 0–5)

## 2025-01-18 PROCEDURE — 83036 HEMOGLOBIN GLYCOSYLATED A1C: CPT | Performed by: NURSE PRACTITIONER

## 2025-01-18 PROCEDURE — 85025 COMPLETE CBC W/AUTO DIFF WBC: CPT | Performed by: STUDENT IN AN ORGANIZED HEALTH CARE EDUCATION/TRAINING PROGRAM

## 2025-01-18 PROCEDURE — 82043 UR ALBUMIN QUANTITATIVE: CPT | Performed by: STUDENT IN AN ORGANIZED HEALTH CARE EDUCATION/TRAINING PROGRAM

## 2025-01-18 PROCEDURE — 82570 ASSAY OF URINE CREATININE: CPT | Performed by: STUDENT IN AN ORGANIZED HEALTH CARE EDUCATION/TRAINING PROGRAM

## 2025-01-18 PROCEDURE — 81000 URINALYSIS NONAUTO W/SCOPE: CPT | Mod: PO | Performed by: STUDENT IN AN ORGANIZED HEALTH CARE EDUCATION/TRAINING PROGRAM

## 2025-01-18 PROCEDURE — 80061 LIPID PANEL: CPT | Performed by: NURSE PRACTITIONER

## 2025-01-18 PROCEDURE — 83970 ASSAY OF PARATHORMONE: CPT | Performed by: STUDENT IN AN ORGANIZED HEALTH CARE EDUCATION/TRAINING PROGRAM

## 2025-01-18 PROCEDURE — 80069 RENAL FUNCTION PANEL: CPT | Performed by: STUDENT IN AN ORGANIZED HEALTH CARE EDUCATION/TRAINING PROGRAM

## 2025-01-18 PROCEDURE — 36415 COLL VENOUS BLD VENIPUNCTURE: CPT | Mod: PO | Performed by: NURSE PRACTITIONER

## 2025-01-23 ENCOUNTER — OFFICE VISIT (OUTPATIENT)
Dept: ENDOCRINOLOGY | Facility: CLINIC | Age: 60
End: 2025-01-23
Payer: MEDICAID

## 2025-01-23 ENCOUNTER — PATIENT MESSAGE (OUTPATIENT)
Dept: NEPHROLOGY | Facility: CLINIC | Age: 60
End: 2025-01-23
Payer: MEDICAID

## 2025-01-23 DIAGNOSIS — E11.42 TYPE 2 DIABETES MELLITUS WITH DIABETIC POLYNEUROPATHY, WITH LONG-TERM CURRENT USE OF INSULIN: Primary | ICD-10-CM

## 2025-01-23 DIAGNOSIS — E78.5 HYPERLIPIDEMIA, UNSPECIFIED HYPERLIPIDEMIA TYPE: ICD-10-CM

## 2025-01-23 DIAGNOSIS — E66.01 MORBID OBESITY WITH BMI OF 50.0-59.9, ADULT: ICD-10-CM

## 2025-01-23 DIAGNOSIS — Z79.4 TYPE 2 DIABETES MELLITUS WITH DIABETIC POLYNEUROPATHY, WITH LONG-TERM CURRENT USE OF INSULIN: Primary | ICD-10-CM

## 2025-01-23 DIAGNOSIS — I10 PRIMARY HYPERTENSION: ICD-10-CM

## 2025-01-23 DIAGNOSIS — Z46.81 FITTING OR ADJUSTMENT OF INSULIN PUMP: ICD-10-CM

## 2025-01-23 DIAGNOSIS — F10.10 ETOH ABUSE: ICD-10-CM
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
## 2025-01-23 NOTE — TELEPHONE ENCOUNTER
Nephrology Plan of Care Note    Pt: Mine Quiros  : 1965  Date: 2025      Reviewed labs with patient. Increased sedrick Rx.       Gino Solorio MD  [facility]

## 2025-01-29 ENCOUNTER — OFFICE VISIT (OUTPATIENT)
Dept: FAMILY MEDICINE | Facility: CLINIC | Age: 60
End: 2025-01-29
Payer: MEDICAID

## 2025-01-29 ENCOUNTER — CLINICAL SUPPORT (OUTPATIENT)
Dept: DIABETES | Facility: CLINIC | Age: 60
End: 2025-01-29
Payer: MEDICAID

## 2025-01-29 ENCOUNTER — OFFICE VISIT (OUTPATIENT)
Dept: NEPHROLOGY | Facility: CLINIC | Age: 60
End: 2025-01-29
Payer: MEDICAID

## 2025-01-29 VITALS
WEIGHT: 192.88 LBS | HEIGHT: 56 IN | HEIGHT: 56 IN | HEART RATE: 86 BPM | BODY MASS INDEX: 43.25 KG/M2 | OXYGEN SATURATION: 98 % | BODY MASS INDEX: 43.39 KG/M2 | DIASTOLIC BLOOD PRESSURE: 90 MMHG | SYSTOLIC BLOOD PRESSURE: 140 MMHG

## 2025-01-29 VITALS
BODY MASS INDEX: 43.39 KG/M2 | OXYGEN SATURATION: 98 % | HEART RATE: 94 BPM | DIASTOLIC BLOOD PRESSURE: 102 MMHG | WEIGHT: 192.88 LBS | SYSTOLIC BLOOD PRESSURE: 180 MMHG | HEIGHT: 56 IN

## 2025-01-29 DIAGNOSIS — Z79.4 TYPE 2 DIABETES MELLITUS WITH DIABETIC POLYNEUROPATHY, WITH LONG-TERM CURRENT USE OF INSULIN: Primary | ICD-10-CM

## 2025-01-29 DIAGNOSIS — N18.32 STAGE 3B CHRONIC KIDNEY DISEASE: ICD-10-CM

## 2025-01-29 DIAGNOSIS — Z79.4 TYPE 2 DIABETES MELLITUS WITH DIABETIC POLYNEUROPATHY, WITH LONG-TERM CURRENT USE OF INSULIN: ICD-10-CM

## 2025-01-29 DIAGNOSIS — N18.31 STAGE 3A CHRONIC KIDNEY DISEASE: ICD-10-CM

## 2025-01-29 DIAGNOSIS — I10 HTN (HYPERTENSION), BENIGN: ICD-10-CM

## 2025-01-29 DIAGNOSIS — E11.42 TYPE 2 DIABETES MELLITUS WITH DIABETIC POLYNEUROPATHY, WITH LONG-TERM CURRENT USE OF INSULIN: Primary | ICD-10-CM

## 2025-01-29 DIAGNOSIS — E78.5 HYPERLIPIDEMIA, UNSPECIFIED HYPERLIPIDEMIA TYPE: ICD-10-CM

## 2025-01-29 DIAGNOSIS — I10 HTN (HYPERTENSION), BENIGN: Primary | ICD-10-CM

## 2025-01-29 DIAGNOSIS — Z86.0100 HISTORY OF COLON POLYPS: ICD-10-CM

## 2025-01-29 DIAGNOSIS — E11.42 TYPE 2 DIABETES MELLITUS WITH DIABETIC POLYNEUROPATHY, WITH LONG-TERM CURRENT USE OF INSULIN: ICD-10-CM

## 2025-01-29 DIAGNOSIS — Z79.4 TYPE 2 DIABETES MELLITUS WITH COMPLICATION, WITH LONG-TERM CURRENT USE OF INSULIN: ICD-10-CM

## 2025-01-29 DIAGNOSIS — R80.9 ALBUMINURIA: ICD-10-CM

## 2025-01-29 DIAGNOSIS — G47.33 OSA (OBSTRUCTIVE SLEEP APNEA): ICD-10-CM

## 2025-01-29 DIAGNOSIS — E11.59 HYPERTENSION ASSOCIATED WITH DIABETES: ICD-10-CM

## 2025-01-29 DIAGNOSIS — N25.81 SECONDARY HYPERPARATHYROIDISM: ICD-10-CM

## 2025-01-29 DIAGNOSIS — I15.2 HYPERTENSION ASSOCIATED WITH DIABETES: ICD-10-CM

## 2025-01-29 DIAGNOSIS — E66.01 MORBID OBESITY WITH BMI OF 50.0-59.9, ADULT: ICD-10-CM

## 2025-01-29 DIAGNOSIS — R80.1 PERSISTENT PROTEINURIA: Primary | ICD-10-CM

## 2025-01-29 DIAGNOSIS — E11.8 TYPE 2 DIABETES MELLITUS WITH COMPLICATION, WITH LONG-TERM CURRENT USE OF INSULIN: ICD-10-CM

## 2025-01-29 PROCEDURE — 4010F ACE/ARB THERAPY RXD/TAKEN: CPT | Mod: CPTII,,, | Performed by: STUDENT IN AN ORGANIZED HEALTH CARE EDUCATION/TRAINING PROGRAM

## 2025-01-29 PROCEDURE — 3077F SYST BP >= 140 MM HG: CPT | Mod: CPTII,,, | Performed by: STUDENT IN AN ORGANIZED HEALTH CARE EDUCATION/TRAINING PROGRAM

## 2025-01-29 PROCEDURE — 99214 OFFICE O/P EST MOD 30 MIN: CPT | Mod: PBBFAC,27,PN | Performed by: STUDENT IN AN ORGANIZED HEALTH CARE EDUCATION/TRAINING PROGRAM

## 2025-01-29 PROCEDURE — 3066F NEPHROPATHY DOC TX: CPT | Mod: CPTII,,, | Performed by: STUDENT IN AN ORGANIZED HEALTH CARE EDUCATION/TRAINING PROGRAM

## 2025-01-29 PROCEDURE — 3062F POS MACROALBUMINURIA REV: CPT | Mod: CPTII,,, | Performed by: NURSE PRACTITIONER

## 2025-01-29 PROCEDURE — 3080F DIAST BP >= 90 MM HG: CPT | Mod: CPTII,,, | Performed by: NURSE PRACTITIONER

## 2025-01-29 PROCEDURE — 99999 PR PBB SHADOW E&M-EST. PATIENT-LVL II: CPT | Mod: PBBFAC,,, | Performed by: DIETITIAN, REGISTERED

## 2025-01-29 PROCEDURE — 99999 PR PBB SHADOW E&M-EST. PATIENT-LVL IV: CPT | Mod: PBBFAC,,, | Performed by: STUDENT IN AN ORGANIZED HEALTH CARE EDUCATION/TRAINING PROGRAM

## 2025-01-29 PROCEDURE — G0108 DIAB MANAGE TRN  PER INDIV: HCPCS | Mod: PBBFAC,PO | Performed by: DIETITIAN, REGISTERED

## 2025-01-29 PROCEDURE — 3062F POS MACROALBUMINURIA REV: CPT | Mod: CPTII,,, | Performed by: STUDENT IN AN ORGANIZED HEALTH CARE EDUCATION/TRAINING PROGRAM

## 2025-01-29 PROCEDURE — 1159F MED LIST DOCD IN RCRD: CPT | Mod: CPTII,,, | Performed by: NURSE PRACTITIONER

## 2025-01-29 PROCEDURE — 3044F HG A1C LEVEL LT 7.0%: CPT | Mod: CPTII,,, | Performed by: NURSE PRACTITIONER

## 2025-01-29 PROCEDURE — 99214 OFFICE O/P EST MOD 30 MIN: CPT | Mod: PBBFAC,PO | Performed by: NURSE PRACTITIONER

## 2025-01-29 PROCEDURE — 99999 PR PBB SHADOW E&M-EST. PATIENT-LVL IV: CPT | Mod: PBBFAC,,, | Performed by: NURSE PRACTITIONER

## 2025-01-29 PROCEDURE — 99212 OFFICE O/P EST SF 10 MIN: CPT | Mod: PBBFAC,27,PO | Performed by: DIETITIAN, REGISTERED

## 2025-01-29 PROCEDURE — 3077F SYST BP >= 140 MM HG: CPT | Mod: CPTII,,, | Performed by: NURSE PRACTITIONER

## 2025-01-29 PROCEDURE — 1160F RVW MEDS BY RX/DR IN RCRD: CPT | Mod: CPTII,,, | Performed by: STUDENT IN AN ORGANIZED HEALTH CARE EDUCATION/TRAINING PROGRAM

## 2025-01-29 PROCEDURE — 3008F BODY MASS INDEX DOCD: CPT | Mod: CPTII,,, | Performed by: NURSE PRACTITIONER

## 2025-01-29 PROCEDURE — 3044F HG A1C LEVEL LT 7.0%: CPT | Mod: CPTII,,, | Performed by: STUDENT IN AN ORGANIZED HEALTH CARE EDUCATION/TRAINING PROGRAM

## 2025-01-29 PROCEDURE — 4010F ACE/ARB THERAPY RXD/TAKEN: CPT | Mod: CPTII,,, | Performed by: NURSE PRACTITIONER

## 2025-01-29 PROCEDURE — 3080F DIAST BP >= 90 MM HG: CPT | Mod: CPTII,,, | Performed by: STUDENT IN AN ORGANIZED HEALTH CARE EDUCATION/TRAINING PROGRAM

## 2025-01-29 PROCEDURE — 3008F BODY MASS INDEX DOCD: CPT | Mod: CPTII,,, | Performed by: STUDENT IN AN ORGANIZED HEALTH CARE EDUCATION/TRAINING PROGRAM

## 2025-01-29 PROCEDURE — 3066F NEPHROPATHY DOC TX: CPT | Mod: CPTII,,, | Performed by: NURSE PRACTITIONER

## 2025-01-29 PROCEDURE — 1159F MED LIST DOCD IN RCRD: CPT | Mod: CPTII,,, | Performed by: STUDENT IN AN ORGANIZED HEALTH CARE EDUCATION/TRAINING PROGRAM

## 2025-01-29 PROCEDURE — 99214 OFFICE O/P EST MOD 30 MIN: CPT | Mod: S$PBB,,, | Performed by: NURSE PRACTITIONER

## 2025-01-29 PROCEDURE — 99999PBSHW PR PBB SHADOW TECHNICAL ONLY FILED TO HB: Mod: PBBFAC,,,

## 2025-01-29 PROCEDURE — 99214 OFFICE O/P EST MOD 30 MIN: CPT | Mod: S$PBB,,, | Performed by: STUDENT IN AN ORGANIZED HEALTH CARE EDUCATION/TRAINING PROGRAM

## 2025-01-29 RX ORDER — METOPROLOL SUCCINATE 100 MG/1
100 TABLET, EXTENDED RELEASE ORAL DAILY
Qty: 90 TABLET | Refills: 3 | Status: SHIPPED | OUTPATIENT
Start: 2025-01-29

## 2025-01-29 RX ORDER — GABAPENTIN 300 MG/1
600 CAPSULE ORAL NIGHTLY
Qty: 180 CAPSULE | Refills: 3 | Status: SHIPPED | OUTPATIENT
Start: 2025-01-29

## 2025-01-29 RX ORDER — DIPHENHYDRAMINE HCL 25 MG
50 CAPSULE ORAL NIGHTLY PRN
COMMUNITY

## 2025-01-29 RX ORDER — SPIRONOLACTONE 50 MG/1
50 TABLET, FILM COATED ORAL DAILY
Qty: 30 TABLET | Refills: 11 | Status: SHIPPED | OUTPATIENT
Start: 2025-01-29 | End: 2026-01-29

## 2025-01-29 NOTE — PROGRESS NOTES
"[facility]  Nephrology Clinic  Subjective:        HPI ID: Mine Quiros is a 59 y.o. female who presents for return patient evaluation for chronic renal failure.  She has a pertinent past medical history of diabetes type 2 diagnosed in 2008 and hypertension diagnosed >10 years ago.  She is followed in this clinic for ongoing evaluation and management of chronic renal failure and nephrotic range proteinuria.    Interval history:  03/22/2024 clinic visit-patient seen via telemedicine services today.  Reviewed her most recent lab results at chair side.  Hemoglobin A1c is 6.1%.  Urinalysis continues to show 3+ protein 1+ occult blood, U PCR quantification of 10 grams/gram.  She also has hypovitaminosis D. her serum albumin remains low 2.5.  Renal function based on serum creatinine trend is at baseline.    7/5/24 clinic visit - seen today via telemedicine services for ongoing evaluation and management of CKD. She has no uremic or urinary symptoms and is in her usual state of health. She has not started taking her finerenone since last visit but she has obtained the medication. She admits to not taking any medications except for insulin since last visit d/t feeling "well". Counseled patient she needs to participate in the care plan if we are to preserve her kidney function.    10/28/24 - here today for CKD f/u and ongoing management. Did not bring her medications with her to clinic with her today. She is here with her sister. She is still unsure of which medications she is taking. Counseled on need to bring her medications to all physician visits.  Denies acute complaints today.  We reviewed her labs at chairside.     01/29/25 - here today for f/u CKD management. She again did not bring her medications with her to clinic with her today. She continues to be a challenging, non-complaint patient. I stressed again with her today that I may be unable to help her if she cannot help herself with sticking to " "the therapy plans in place, and that dialysis will be a realistic future.  She reports she is going to be more compliant from this point forward.  We reviewed recent lab trends at chair side.  She remains hypokalemic but asymptomatic, we will increase spironolactone dose today to address this moving forward which should also help with her resistant hypertension.  Renal function based on serum creatinine trend remains close to her baseline.  Continues to demonstrate nephrotic range proteinuria.      Review of Systems   Constitutional:  Negative for chills, diaphoresis and fever.   Respiratory:  Negative for cough and shortness of breath.    Cardiovascular:  Negative for chest pain and leg swelling.   Gastrointestinal:  Negative for abdominal pain, diarrhea, nausea and vomiting.   Genitourinary:  Negative for difficulty urinating, dysuria and hematuria.   Musculoskeletal:  Negative for myalgias.   Neurological:  Negative for headaches.   Hematological:  Does not bruise/bleed easily.   All other systems reviewed and are negative.      The past medical, family and social histories were reviewed for this encounter.     Objective:   BP (!) 140/90 (BP Location: Right arm, Patient Position: Sitting)   Pulse 86   Ht 4' 8" (1.422 m)   LMP 08/31/2007 (Approximate)   SpO2 98%   BMI 43.25 kg/m²        Physical Exam  Vitals reviewed.   Constitutional:       General: She is not in acute distress.     Appearance: She is morbidly obese.   Cardiovascular:      Pulses: Normal pulses.      Heart sounds: Normal heart sounds.      No friction rub.   Pulmonary:      Effort: Pulmonary effort is normal. No respiratory distress.      Breath sounds: Normal breath sounds.   Abdominal:      Palpations: Abdomen is soft.      Tenderness: There is no abdominal tenderness.   Musculoskeletal:         General: No swelling.      Cervical back: Normal range of motion. No tenderness.      Right lower leg: Edema present.      Left lower leg: Edema " present.   Neurological:      General: No focal deficit present.      Mental Status: She is oriented to person, place, and time.      Motor: No weakness.   Psychiatric:         Mood and Affect: Mood normal.         Behavior: Behavior normal.         Assessment:     Lab Results   Component Value Date    CREATININE 1.4 01/18/2025    BUN 18 01/18/2025     01/18/2025    K 3.1 (L) 01/18/2025     01/18/2025    CO2 23 01/18/2025     Lab Results   Component Value Date    .1 (H) 01/18/2025    CALCIUM 9.1 01/18/2025    PHOS 3.9 01/18/2025     Lab Results   Component Value Date    HCT 45.5 01/18/2025     Prot/Creat Ratio, Urine   Date Value Ref Range Status   01/18/2025 9.48 (H) 0.00 - 0.20 Final   10/21/2024 11.73 (H) 0.00 - 0.20 Final   06/25/2024 8.31 (H) 0.00 - 0.20 Final      Lab Results   Component Value Date    MICALBCREAT 6702.8 (H) 01/18/2025    MICALBCREAT Unable to calculate 10/21/2024    MICALBCREAT 6030.2 (H) 05/11/2023    MICALBCREAT 4302.9 (H) 01/05/2023    MICALBCREAT 3218.8 (H) 10/07/2021    MICALBCREAT 315.7 (H) 10/25/2019           1. Persistent proteinuria    2. Stage 3a chronic kidney disease    3. Albuminuria    4. Type 2 diabetes mellitus with diabetic polyneuropathy, with long-term current use of insulin    5. Morbid obesity with BMI of 50.0-59.9, adult    6. HTN (hypertension), benign    7. Secondary hyperparathyroidism    8. MARLON (obstructive sleep apnea)        Plan:   Return to clinic in 3 months.  Labs for next visit include uacr, upcr, ua, rfp, pth, cbc  Baseline creatinine is 1.0-1.3mg/dL.    Chronic kidney disease stage 3a, A3  Risk:  Longstanding diabetes mellitus type 2 previously uncontrolled, hypertension, morbid obesity.  -no history of renal biopsy, though likely diabetic nephropathy versus FSGS.  She has not a good candidate for immunosuppression so biopsy is currently deferred.  -needs to be more adherent with medication regiment or I will not be able to prolong her  need for dialysis. Stressed with patient AGAIN today  -continue ARB  -continue/fill Mineralocorticoid Receptor Antagonist     Hypertension- needs to take her medications.  Increasing MRA dose today    Morbid obesity-the patient's biggest barrier to improving her overall health and management of other comorbidities.     Type 2 diabetes mellitus on long-term use of insulin with diabetic retinopathy-patient advised to continue good control of her diabetes to reduce risks of further complications    MARLON-continue CPAP     Secondary hyperparathyroidism- increased in the interim. Patient is not taking her vitamin d. Counseled patient.     Nephrotic range proteinuria-not to a degree at where she would need anticoagulation but getting close.  Needs to take her ARB and NRA      __________________________  Gino Solorio MD  [facility]    Part of this note has been created using Multigig dictation system. Errors in transcription may not be completely avoided.      KFRE 2-Year: 6.7% at 1/18/2025 10:42 AM  Calculated from:  Serum Creatinine: 1.4 mg/dL at 1/18/2025 10:42 AM  Urine Albumin Creatinine Ratio: 6,702.8 ug/mg at 1/18/2025 10:42 AM  Age: 59 years  Sex: Female at 1/18/2025 10:42 AM  Has CKD-3 to CKD-5: Yes    KFRE 5-Year: 19.3% at 1/18/2025 10:42 AM  Calculated from:  Serum Creatinine: 1.4 mg/dL at 1/18/2025 10:42 AM  Urine Albumin Creatinine Ratio: 6,702.8 ug/mg at 1/18/2025 10:42 AM  Age: 59 years  Sex: Female at 1/18/2025 10:42 AM  Has CKD-3 to CKD-5: Yes

## 2025-01-29 NOTE — PROGRESS NOTES
"Subjective     Patient ID: Mine Quiros is a 59 y.o. female.    Chief Complaint: Follow-up      HPI      Patient is new to me. PCP is Dr. Alcocer.     58 y/o F with Alcohol abuse, Dm2, CKD3, neuropathy, HTN, HLD, GERD, MARLON and obesity presents to clinic for followup. Here today with her sister.    DM2- A1C is 5.9%- on Mounjaro 15mg and insulin pump. Managed by Ms. Regina Solorio.    HTN- supposed to be on Metoprolol 100mg, Losartan 100mg, and spironolactone 25mg. She has not been taking any of her BP meds. BP is elevated. Has occasional headaches but ignores it. Denies cp or sob. We went through all of her med bottles, most of which were all full and last filled in 2022 and reconciled her medications and filled her pill box. She agrees to start taking her meds.     Vit D- pt agreed to start taking weekly vit d replacement    HLD- will start taking crestor 10mg as prescribed. Pill box filled.     She drinks 1/5th of ai ([employer]eh[employer]ah[employer]) per week. Denies any seizures but does get shakes when she doesn't drink    Review of Systems   Respiratory:  Negative for cough and shortness of breath.    Cardiovascular:  Negative for chest pain.          Objective   Vitals:    01/29/25 1302   BP: (!) 180/102   Pulse: 94   SpO2: 98%   Weight: 87.5 kg (192 lb 14.4 oz)   Height: 4' 8" (1.422 m)      Physical Exam  Constitutional:       General: She is not in acute distress.     Appearance: Normal appearance. She is obese. She is not ill-appearing, toxic-appearing or diaphoretic.   HENT:      Head: Normocephalic and atraumatic.   Cardiovascular:      Rate and Rhythm: Normal rate and regular rhythm.      Heart sounds: Normal heart sounds. No murmur heard.     No friction rub. No gallop.   Pulmonary:      Effort: No respiratory distress.      Breath sounds: Normal breath sounds. No stridor. No wheezing, rhonchi or rales.   Chest:      Chest wall: No tenderness.   Musculoskeletal:      Right lower leg: No edema.      Left lower " leg: No edema.   Neurological:      General: No focal deficit present.      Mental Status: She is alert and oriented to person, place, and time. Mental status is at baseline.   Psychiatric:         Mood and Affect: Mood normal.         Behavior: Behavior normal.         Thought Content: Thought content normal.         Judgment: Judgment normal.         1. Type 2 diabetes mellitus with complication, with long-term current use of insulin  - gabapentin (NEURONTIN) 300 MG capsule; Take 2 capsules (600 mg total) by mouth every evening. TAKE 1 CAPSULE BY MOUTH BY MOUTH THREE TIMES DAILY  Dispense: 180 capsule; Refill: 3    2. Hypertension associated with diabetes  - metoprolol succinate (TOPROL-XL) 100 MG 24 hr tablet; Take 1 tablet (100 mg total) by mouth once daily.  Dispense: 90 tablet; Refill: 3    3. HTN (hypertension), benign    4. Hyperlipidemia, unspecified hyperlipidemia type    5. History of colon polyps  - Case Request Endoscopy: COLONOSCOPY    6. Stage 3b chronic kidney disease   -Followed by nephrology- GFR 43    RTC/ER precautions given. F/U 2 weeks for BP recheck.    Counseled on regular exercise, maintenance of a healthy weight, balanced diet rich in fruits/vegetables and lean protein, and avoidance of unhealthy habits like smoking and excessive alcohol intake.    JO ANN Cardona

## 2025-01-30 NOTE — PROGRESS NOTES
Next appt 10-24-19  Last appt 5-6-19    Refill request for   Disp Refills Start End    levothyroxine (SYNTHROID, LEVOTHROID) 175 MCG tablet 90 tablet 1 1/8/2019     Sig: TAKE 1 TABLET DAILY      TSH 4-17-19 WNL    Refilled per standing med protocol.     "Diabetes Care Specialist Progress Note  Author: Kimberlee Shelton RD, CDE  Date: 1/30/2025    Intake    Program Intake  Reason for Diabetes Program Visit:: Intervention  Type of Intervention:: Individual  Individual: Education  Education: Pattern Management  Current diabetes risk level:: high  In the last month, have you used the ER or been admitted to the hospital: No  Permission to speak with others about care:: yes (patient's sister is with her at visit today and assists in diabetes care)    Current Diabetes Treatment: DM Injectables, Insulin  DM Injectables Type/Dose: Mounjaro 15 mg weekly  Method of insulin delivery?: Insulin Pump  Type of Pump: Novolog in Medtronic 770G insulin pump  Does patient have back-up plan?: Yes  Any problems obtaining supplies?: No    Medtronic 770G insulin pump download (1/15/2025 to 1/28/2025): See media file for full pump report. 100% of glucose values are in target range. Patient with inaccurate date in pump (she was 1 day behind) so pump setting made today for accurate time/date. Tight glycemic control noted--endocrine provider decreased basal rate settings to avoid hypoglycemia.             Continuous Glucose Monitoring  Patient has CGM: Yes  Personal CGM type:: Medtronic Guardian 4  GMI Date: 01/28/25  GMI Value: 6.1 %    Lab Results   Component Value Date    HGBA1C 5.9 (H) 01/18/2025     Weight: 87.5 kg (192 lb 14.4 oz) --patient with weight loss of 8 lbs over past 3 months, would like to continue to lose weight gradually.  Height: 4' 8" (142.2 cm)   Body mass index is 43.25 kg/m².    Lifestyle Coping Support & Clinical    Lifestyle/Coping/Support  Compared to other people your age, how would you rate your health?: Good  Does anyone in your family have diabetes or does anyone in your family support you in your diabetes care?: Strong family history of diabetes and patient gets diabetes support from sister who also uses Medtronic insulin pump to manage her diabetes  List " anything about Diabetes that causes you stress?: Occasionally gets flustered with use of insulin pump--aware to call when having issues with use of pump  How do you deal with stress/distress?: Family support since they have diabetes as well  Learning Barriers:: None, Other (Comment) (Patient with alcohol use disorder)  Culture or Roman Catholic beliefs that may impact ability to access healthcare: No  Psychosocial/Coping Skills Assessment Completed: : Yes  Assessment indicates:: Adequate understanding  Area of need?: No    Problem Review  Active Comorbidities: Hyperlipidemia/Dyslipidemia, Hypertension, Chronic Kidney Disease, Neuropathy    Diabetes Self-Management Skills Assessment    Medication Skills Assessment  Patient is able to identify current diabetes medications, dosages, and appropriate timing of medications.: yes  Patient reports problems or concerns with current medication regimen.: yes  Medication regimen problems/concerns:: concerned about side effects (having hypoglycemia)  Patient is  aware that some diabetes medications can cause low blood sugar?: Yes  Medication Skills Assessment Completed:: Yes  Assessment indicates:: Instruction Needed  Area of need?: Yes    Diabetes Disease Process/Treatment Options  Diabetes Type?: Type II  When were you diagnosed?: unknown has had diabetes for a very long time  If previous diabetes education, when/where:: Formal DM education July 2021 by me however patent with frequent calls for assistance with insulin pump use.  What are your goals for this education session?: Have Endorcrine provider review glucose data and see if pump setting changes needed and to troubleshoot sensor  Is patient aware of what causes diabetes?: Yes  Does patient understand the pathophysiology of diabetes?: Yes  Diabetes Disease Process/Treatment Options: Skills Assessment Completed: Yes  Assessment indicates:: Adequate understanding  Area of need?: No    Home Blood Glucose Monitoring  Patient  states that blood sugar is checked at home daily.: yes  Monitoring Method:: personal continuous glucose monitor  Personal CGM type:: Medtronic Guardian 4   What is your current Time in Range?: 100%  What is your A1c Target?: <6.5% without hypoglycemia  Home Blood Glucose Monitoring Skills Assessment Completed: : Yes  Assessment indicates:: Instruction Needed  Area of need?: Yes    Acute Complications  Have you ever had hypoglycemia (low BG 70 or less)?: yes  How often and what are your symptoms?: Rare as patient on CGM integrated insulin pump that assists with managing glucose control. Patient aware of signs/symptoms of hypoglycemia  How do you treat hypoglycemia?: Patient aware to treat hypoglycemia with simple sugar  Have you ever had hyperglycemia (high  or more)?: no   Do you know the symptoms of high blood sugar and how to treat: Not recently since on CGM integrated insulin pump--patient is aware that glucose levels > 200 mg/dL are to be adressed  Do you ever test for ketones?: no  Acute Complications Skills Assessment Completed: : Yes  Assessment indicates:: Adequate understanding  Area of need?: No    Assessment Summary and Plan    Based on today's diabetes care assessment, the following areas of need were identified:      Identified Areas of Need      Medication/Current Diabetes Treatment: Yes--see care plan   Lifestyle Coping/Support: No   Diabetes Disease Process/Treatment Options: No   Home Blood Glucose Monitoring: Yes --see care plan   Acute Complications: No      Today's interventions were provided through individual discussion, instruction, and written materials were provided.      Patient verbalized understanding of instruction and written materials.  Pt was able to return back demonstration of instructions today. Patient understood key points, needs reinforcement and further instruction.     Diabetes Self-Management Care Plan:    Today's Diabetes Self-Management Care Plan was developed with  Mine's input. Mine has agreed to work toward the following goal(s) to improve his/her overall diabetes control.      Care Plan: Diabetes Management   Updates made since 1/31/2024 12:00 AM        Problem: Medications         Long-Range Goal: Medtronic 770G insulin pump downloaded (as patient seen virtually by Endocrine last week and pump data not available)--setting changes made per AYO Solorio. Assisted patient with set up of MiniMed mobile iraida on her smart phone.    Start Date: 1/29/2025   Priority: High   Barriers: No Barriers Identified   Note:    Basal rate setting changes made per Regina Solorio NP after review of pump download today:    Basal:  12AM: 1 u/hr (decreased from 1.2 u/hr)  8AM: 1 u/hr (decreased from 1.4 u/hr)    No changes made to bolus or SmartGuard settings.       Task: Instructed patient on how to use MiniMed mobile iraida to see pump data on her smart phone and prevent her from having to pull out insulin pump.  Patient reminded she will need to bolus using insulin pump and not on iraida. Completed 1/30/2025   Note:    Set up [employer] personal account:    [employer] username: [email or web address]  [employer] password: [email or web address]    Patient able to download MiniMed mobile iraida on [employer] smart phone.  She logged into [employer] and assisted patient in pairing current Medtronic 770G insulin pump to iraida.  Patient now able to see pump data on mobile iraida of her phone.  Connected her [employer] account to [facility].  Patient aware she will need to stay logged into [employer] and have MiniMed mobile iraida running on phone for pump/CGM data to be shared with Endocrine provider.       Problem: Blood Glucose Self-Monitoring         Long-Range Goal: Patient asking for assistance with Guardian sensor use--changed sensor yesterday but sensor did not start on Medtronic 770G insulin pump.    Priority: Medium        Task: Troubleshooting provided to determine why Guardian sensor not connecting to Medtronic 770G insulin pump. Completed  1/29/2025   Note:    Patient states she changed Guardian sensor last night and has noticed that 2-hour sensor warm up did not initiate on Medtronic 770G insulin pump.  Determined that patient did not have transmitter snapped completely into sensor on abdomen and was able to snap in transmitter flush with sensor and 2-hour sensor warm up initiated on Medtronic pump. Patient aware of importance of continuing use of Guardian sensor on Medtronic pump to remain in Automated mode feature of pump to prevent large swings in glucose and especially decrease hypoglycemia.         Follow Up Plan     Follow up in about 6 months (around 7/29/2025). Pump settings adjusted today per AYO Solorio NP after pump upload and review.  Assisted with sensor change--patient reminded to snap in transmitter of sensor completely in order for insulin pump to recognize that new sensor was placed.  Made 3 month follow up with Endocrine--appt made for 4/30/25 with labs on 4/25/25.    Today's care plan and follow up schedule was discussed with patient.  Mine verbalized understanding of the care plan, goals, and agrees to follow up plan.        The patient was encouraged to communicate with his/her health care provider/physician and care team regarding his/her condition(s) and treatment.  I provided the patient with my contact information today and encouraged to contact me via phone or [facility] as needed.     Length of Visit   Total Time: 45 Minutes

## 2025-02-06 ENCOUNTER — TELEPHONE (OUTPATIENT)
Dept: ENDOCRINOLOGY | Facility: CLINIC | Age: 60
End: 2025-02-06
Payer: MEDICAID

## 2025-02-06 NOTE — TELEPHONE ENCOUNTER
----- Message from Marina sent at 2/5/2025 11:14 AM CST -----  Contact: self  Type:  Needs Medical Advice    Who Called: pt    Would the patient rather a call back or a response via [facility]? Call back     Best Call Back Number: [phone]      Additional Information: pt states she needs office to fill out a form for her to be able to get her meds , she not sure what the same of the form   Please call back to advise. Thanks!

## 2025-02-06 NOTE — TELEPHONE ENCOUNTER
Attempted to contact pt. No answer. LVM advising to call us back and let us know which medication she needs assistance with. Advised she may need to go through [facility] pharmacy assistance or she may need to fill out paperwork through the medications .

## 2025-02-06 NOTE — TELEPHONE ENCOUNTER
----- Message from Aaliyah sent at 2/6/2025 10:34 AM CST -----  Regarding: Call  Type:  Needs Medical Advice    Who Called: Pt    Would the patient rather a call back or a response via [facility]? Call    Best Call Back Number: [phone]    Additional Information: Pt is requesting assistant on meds ( tirzepatide (MOUNJARO) 15 mg/0.5 mL PnIj). Thanks

## 2025-02-10 ENCOUNTER — TELEPHONE (OUTPATIENT)
Dept: ENDOCRINOLOGY | Facility: CLINIC | Age: 60
End: 2025-02-10
Payer: MEDICAID

## 2025-02-10 NOTE — TELEPHONE ENCOUNTER
Pts Insurance Info as of 2/10/2025    [place] Medicaid    ID: [id number]    BIN: [id number]    PCN: [id number]    RX Group: [id number]

## 2025-02-12 ENCOUNTER — TELEPHONE (OUTPATIENT)
Dept: GASTROENTEROLOGY | Facility: CLINIC | Age: 60
End: 2025-02-12
Payer: MEDICAID

## 2025-02-14 ENCOUNTER — TELEPHONE (OUTPATIENT)
Dept: ENDOCRINOLOGY | Facility: CLINIC | Age: 60
End: 2025-02-14
Payer: MEDICAID

## 2025-02-14 NOTE — TELEPHONE ENCOUNTER
Additional questions answered for PA via CoverMyMeds for Mounjaro 15MG/0.5ML auto-injectors     Key: [id number]    PA Case ID #: [id number]    Awaiting determination

## 2025-02-14 NOTE — TELEPHONE ENCOUNTER
----- Message from Radha sent at 2/14/2025  2:30 PM CST -----  Contact: Patient  Type:  Needs Medical Advice    Who Called: Patient     Pharmacy name and phone #:    [facility] - [place] - [address]  [address]  [place] [address]  Phone: [phone] Fax: [phone]      Would the patient rather a call back or a response via [facility]? Call    Best Call Back Number: [phone]      Additional Information: Patient states that she never received her medication and is requesting a call to advise on next steps she should take. Please advise

## 2025-02-19 ENCOUNTER — TELEPHONE (OUTPATIENT)
Dept: ENDOCRINOLOGY | Facility: CLINIC | Age: 60
End: 2025-02-19
Payer: MEDICAID

## 2025-02-19 NOTE — TELEPHONE ENCOUNTER
----- Message from [employer] sent at 2/18/2025  2:20 PM CST -----  Type:  Needs Medical AdviceWho Called: the patientSymptoms (please be specific):How long has patient had these symptoms:  Pharmacy name and phone #:  [facility] - [place]  [address] [facility] [id number] [place] [address]Crcwk: [phone] Fax: [phone] the patient rather a call back or a response via [facility]? call back/Best Call Back Number: [phone] Additional Information: pt states she needs prior auth for tirzepatide (MOUNJARO) 15 mg/0.5 mL PnIjPlease adviseThanks

## 2025-02-20 ENCOUNTER — PROCEDURE VISIT (OUTPATIENT)
Dept: OPHTHALMOLOGY | Facility: CLINIC | Age: 60
End: 2025-02-20
Payer: MEDICAID

## 2025-02-20 ENCOUNTER — OFFICE VISIT (OUTPATIENT)
Dept: ENDOCRINOLOGY | Facility: CLINIC | Age: 60
End: 2025-02-20
Payer: MEDICAID

## 2025-02-20 DIAGNOSIS — Z79.899 ENCOUNTER FOR MEDICATION REVIEW: ICD-10-CM

## 2025-02-20 DIAGNOSIS — Z79.4 TYPE 2 DIABETES MELLITUS WITH DIABETIC POLYNEUROPATHY, WITH LONG-TERM CURRENT USE OF INSULIN: Primary | ICD-10-CM

## 2025-02-20 DIAGNOSIS — Z96.41 INSULIN PUMP STATUS: ICD-10-CM

## 2025-02-20 DIAGNOSIS — E66.01 MORBID OBESITY WITH BMI OF 50.0-59.9, ADULT: ICD-10-CM

## 2025-02-20 DIAGNOSIS — E11.3313 CONTROLLED TYPE 2 DIABETES MELLITUS WITH BOTH EYES AFFECTED BY MODERATE NONPROLIFERATIVE RETINOPATHY AND MACULAR EDEMA, WITH LONG-TERM CURRENT USE OF INSULIN: Primary | ICD-10-CM

## 2025-02-20 DIAGNOSIS — Z79.4 CONTROLLED TYPE 2 DIABETES MELLITUS WITH BOTH EYES AFFECTED BY MODERATE NONPROLIFERATIVE RETINOPATHY AND MACULAR EDEMA, WITH LONG-TERM CURRENT USE OF INSULIN: Primary | ICD-10-CM

## 2025-02-20 DIAGNOSIS — E11.42 TYPE 2 DIABETES MELLITUS WITH DIABETIC POLYNEUROPATHY, WITH LONG-TERM CURRENT USE OF INSULIN: Primary | ICD-10-CM

## 2025-02-20 PROCEDURE — 67028 INJECTION EYE DRUG: CPT | Mod: PBBFAC,PO,LT | Performed by: OPHTHALMOLOGY

## 2025-02-20 PROCEDURE — 92134 CPTRZ OPH DX IMG PST SGM RTA: CPT | Mod: PBBFAC,PO | Performed by: OPHTHALMOLOGY

## 2025-02-20 RX ADMIN — Medication 1.25 MG: at 03:02
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
## 2025-02-20 NOTE — PROGRESS NOTES
HPI     AVASTIN  OS     OCT      DM      Additional comments: DM   AVASTIN   OS   Last bs      150  range  Last A1c   5.7    Me   Dls  01/016/25          Last edited by Trixie Contreras on 2/20/2025  2:48 PM.          OCT - OD low grade non central DME, with VMA  OS - central DME/CME - decreased    Prior FA - late macular leakage OS > OD  No NV OU      A/P    Mod NPDR OU  T2 controlled on insulin pump    2. DME OS>OD  Been present since at least 2018  S/p Avastin OS x 8  S/p Ozurdex OS x 2 2/19/24 7/23 - increased at 10 weeks  2/24 increased ME OS  9/24 - increased central ME OS  11/24-Increase DME resume monthly avastin    Avastin OS today    Try Q 6 weeks    Pt not approved for good days because of medicaid, and can't afford copay  Ozurdex no longer approved -  Pt will have to get Avastin monthly in future, and will likely lose vision.  Can consider STK - but not optimal.  This is unfortunate      3. HTN Ret OU  BS/BP/chol control    4. PCIOL OU      6 weeks OCT no dilate LDFE 1/25      Risks, benefits, and alternatives to treatment discussed in detail with the patient.  The patient voiced understanding and wished to proceed with the procedure    Injection Procedure Note:  Diagnosis: DME OS    Patient Identified and Time Out complete  Pt marked  Topical Proparacaine and Betadine.  Inject Avastin OS at 6:00 @ 3.5-4mm posterior to limbus  Post Operative Dx: Same  Complications: None  Follow up as above.

## 2025-02-21 ENCOUNTER — TELEPHONE (OUTPATIENT)
Dept: ENDOCRINOLOGY | Facility: CLINIC | Age: 60
End: 2025-02-21
Payer: MEDICAID

## 2025-02-21 DIAGNOSIS — E11.42 TYPE 2 DIABETES MELLITUS WITH DIABETIC POLYNEUROPATHY, WITH LONG-TERM CURRENT USE OF INSULIN: ICD-10-CM

## 2025-02-21 DIAGNOSIS — Z79.4 TYPE 2 DIABETES MELLITUS WITH DIABETIC POLYNEUROPATHY, WITH LONG-TERM CURRENT USE OF INSULIN: ICD-10-CM

## 2025-02-21 RX ORDER — TIRZEPATIDE 15 MG/.5ML
15 INJECTION, SOLUTION SUBCUTANEOUS
Qty: 4 PEN | Refills: 6 | Status: SHIPPED | OUTPATIENT
Start: 2025-02-21

## 2025-02-21 NOTE — TELEPHONE ENCOUNTER
Spoke to Dorothy with [employer] regarding denial of Mounjaro 15mg.  She stated it was denied because of the quantity of 2 boxes.  They only approve one box. She stated each box has 4 pens totaling 2 ml of medication.  Pt is approved for 1 box.

## 2025-03-07 ENCOUNTER — PATIENT MESSAGE (OUTPATIENT)
Dept: ADMINISTRATIVE | Facility: HOSPITAL | Age: 60
End: 2025-03-07
Payer: MEDICAID

## 2025-03-21 ENCOUNTER — TELEPHONE (OUTPATIENT)
Dept: GASTROENTEROLOGY | Facility: CLINIC | Age: 60
End: 2025-03-21
Payer: MEDICAID

## 2025-04-07 ENCOUNTER — PROCEDURE VISIT (OUTPATIENT)
Dept: OPHTHALMOLOGY | Facility: CLINIC | Age: 60
End: 2025-04-07
Payer: MEDICAID

## 2025-04-07 DIAGNOSIS — Z79.4 CONTROLLED TYPE 2 DIABETES MELLITUS WITH BOTH EYES AFFECTED BY MODERATE NONPROLIFERATIVE RETINOPATHY AND MACULAR EDEMA, WITH LONG-TERM CURRENT USE OF INSULIN: Primary | ICD-10-CM

## 2025-04-07 DIAGNOSIS — E11.3313 CONTROLLED TYPE 2 DIABETES MELLITUS WITH BOTH EYES AFFECTED BY MODERATE NONPROLIFERATIVE RETINOPATHY AND MACULAR EDEMA, WITH LONG-TERM CURRENT USE OF INSULIN: Primary | ICD-10-CM

## 2025-04-07 PROCEDURE — 92134 CPTRZ OPH DX IMG PST SGM RTA: CPT | Mod: PBBFAC,PO | Performed by: OPHTHALMOLOGY

## 2025-04-07 PROCEDURE — 67028 INJECTION EYE DRUG: CPT | Mod: S$PBB,LT,, | Performed by: OPHTHALMOLOGY

## 2025-04-07 PROCEDURE — 92012 INTRM OPH EXAM EST PATIENT: CPT | Mod: 25,S$PBB,, | Performed by: OPHTHALMOLOGY

## 2025-04-07 PROCEDURE — 67028 INJECTION EYE DRUG: CPT | Mod: PBBFAC,PO,LT | Performed by: OPHTHALMOLOGY

## 2025-04-07 PROCEDURE — 99999PBSHW PR PBB SHADOW TECHNICAL ONLY FILED TO HB: Mod: JZ,PBBFAC,,

## 2025-04-07 RX ADMIN — Medication 1.25 MG: at 01:04

## 2025-04-07 NOTE — PROGRESS NOTES
HPI    Dm   Last bs 70 to 150  Last A1c   5.7    Cme  Dls  02/20/25  Dme    Last edited by Trixie Contreras on 4/7/2025  1:33 PM.            OCT - OD low grade non central DME, with VMA  OS - central DME/CME - decreased    Prior FA - late macular leakage OS > OD  No NV OU      A/P    Mod NPDR OU  T2 controlled on insulin pump    2. DME OS>OD  Been present since at least 2018  S/p Avastin OS x 8  S/p Ozurdex OS x 2 2/19/24 7/23 - increased at 10 weeks  2/24 increased ME OS  9/24 - increased central ME OS  11/24-Increase DME resume monthly avastin    Avastin OS today  -Try STK if NI next visit    Try Q 6 weeks    Pt not approved for good days because of medicaid, and can't afford copay  Ozurdex no longer approved -  Pt will have to get Avastin monthly in future, and will likely lose vision.  Can consider STK - but not optimal.  This is unfortunate      3. HTN Ret OU  BS/BP/chol control    4. PCIOL OU      6 weeks OCT and dilate LDFE 1/25      Risks, benefits, and alternatives to treatment discussed in detail with the patient.  The patient voiced understanding and wished to proceed with the procedure    Injection Procedure Note:  Diagnosis: DME OS    Patient Identified and Time Out complete  Pt marked  Topical Proparacaine and Betadine.  Inject Avastin OS at 6:00 @ 3.5-4mm posterior to limbus  Post Operative Dx: Same  Complications: None  Follow up as above.

## 2025-04-15 ENCOUNTER — TELEPHONE (OUTPATIENT)
Dept: GASTROENTEROLOGY | Facility: CLINIC | Age: 60
End: 2025-04-15
Payer: MEDICAID

## 2025-04-15 NOTE — TELEPHONE ENCOUNTER
Spoke with pt. Pt wanted to verify location of procedure. Location verified. Pt verbalized understanding

## 2025-04-15 NOTE — TELEPHONE ENCOUNTER
----- Message from [employer] sent at 4/15/2025  2:20 PM CDT -----  Type:  Needs Medical AdviceWho Called: the patientSymptoms (please be specific):colonoscopyHow long has patient had these symptoms:  Pharmacy name and phone #:  Would the patient rather a call back or a response via [facility]? call back/[facility] Call [facility] Number: [phone] Additional Information: Pt state she would like to speak to staff in reference to colonoscopyPlease adviseThanks   Occupational Therapy Discharge Note    Visit Count: 6/6   Plan of Care: 10/30/2018 Through: 12/11/2018  Insurance Information:   Medicare [employer]-OT  6 visits approved from 10/30-12/14  [id number]  Faxed 10/30  G codes needed  Referred by: Oscar Humphrey MD; Next provider visit (if known/scheduled): 9/18/19  Medical Diagnosis (from order):  Right elbow pain [M25.521]  - Primary   Treatment Diagnosis:  Right elbow  symptoms with increased pain/symptoms, impaired strength, impaired range of motion    Date of onset/injury: more then 6 months ago  Diagnosis Precautions: none  Chart reviewed at time of initial evaluation (relevant co-morbidities, allergies, tests and medications listed): back pain     SUBJECTIVE   Patient reports that she no longer has any elbow pain but still experiences occasional sharp pain in her shoulder when she reaches above her head or out to the side.  This pain lasts a couple of minutes and then it is gone.    Current Pain: 0/10 shoulder at rest, 7-8/10 shoulder when reaching (only occasional)  Functional Change: No pain laying on her shoulder at night anymore.    OBJECTIVE     Range of Motion (degrees)    Left Right Right   Date Initial Initial 12/13/18   Shoulder Flexion (170-180)  130 155   Shoulder Extension (50-60)      Shoulder Abduction (170-180)  150 155   Shoulder Adduction (50-75)      Shoulder Internal Rotation ()  45 55   Shoulder External Rotation (80-90)  58 85   Elbow Flexion (140-150)  WNL    Elbow Extension (0-10)  WNL    Forearm Supination (90)      Forearm Pronation (90)      Wrist Flexion (80)      Wrist Extension (70)      Wrist Radial Deviation (20)      Wrist Ulnar Deviation (45)      Reported in degrees, active range of motion recorded unless noted as AA=active assistive or P=passive; standard testing positions unless otherwise noted, norms included in ( ); *=pain   All motions within functional/normal limits except as noted.   Strength (out of 5)    Left  Right Right   Date Initial Initial 12/13/18   Shoulder Flexors 5/5 4/5 4+/5   Shoulder Extensors       Shoulder Abductors 5/5 4/5 4+/5   Shoulder Adductors      Shoulder Internal Rotators  3+/5 4+/5   Shoulder External Rotators   3+/5 4+/5   Elbow Flexors  4/5 5/5   Elbow Extensors   4/5 5/5   Forearm Supinators   4/5 5/5   Forearm Pronators  4/5 5/5   Wrist Flexors  4/5 5/5   Wrist Extensors   4/5 5/5   Wrist Radial Deviators   4/5 5/5   Wrist Ulnar Deviators  4/5 5/5   standard testing positions unless otherwise noted; *=pain   /Pinch (pounds of force)    Left Right  Right   Date Initial Initial  12/13/18    Ave: [address]: 43  54.3   Lateral Pinch Ave: 11 Ave: 12 12 12   3 Point Pinch [address]: 10 Ave: 12 11.5 11.5   Tip Pinch Ave: 9 Ave: 9.5 8 11   standard testing positions unless otherwise noted,* denotes pain, average reported, trial of 3 maybe reported \"1/2/3\",   Norms:   : Age: 60-69: male: left 56.5-96.6, right 69.3-111.7; female: left 35.6-49.2, right 45.0-59.3   Key/lateral pinch: Age: 65-69: male: left 22.0+/-3.6, right 23.4+/-3.9; female: left 14.3+/-2.8, right 15.0+/-2.6   Palmar/3 point pinch: Age: 65-69: male: left 21.2+/-4.1, right 21.4+/-3.0; female: left 13.7+/-3.4, right 14.2+/-3.1   Tip pinch: Age: 65-69: male: left 15.4+/-2.9, right 17.0+/-4.2; female: left 10.5+/-2.4, right 10.6+/-2.0   Comments: Only muscle strength that was assessed are noted.     Outcome Measures:   Disabilities of the Arm, Shoulder and Hand (DASH): DASH Score Calculated: 20.83 (scored 0-100; a higher score indicates greater disability)     Treatment     Therapeutic Exercise:   Exercise Repetitions Sets Position/Cues   Forearm extensor stretches  - - Hold 15 sec   Shoulder depression/retraction ex.s - - Hold 5 sec, verbal/manual cues.    Clock exercise: elevation, depression, protraction, retraction - - Needed no cueing   Neuro mobilization exercise - -    Wrist eccentric with 2# wt - -    Doorway biceps stretch 1 1  Hold for 20 seconds, VC for proper form   Doorway pec stretch 1 1 Hold for 20 seconds   Ashe Repp band for seated ER, standing shoulder ext and rows 10 1 Good form after instruction           Skilled input: manual assist with posture correction, instruction in Repp band exercises and completion of stretches.     Home Program:    Exercise: Date issued Date DC Comments   Forearm stretches 10/30/18     Shoulder retraction ex.s 10/30/18     Moist heat and massage 2x / day 10/30/18     Neuromobilization Exercises 11/6/2018  Handout provided   Doorway biceps stretch 11/27/2018  Handout provided. Demonstrates understanding   Doorway pec stretch 11/27/2018  Handout provided. Demonstrates understanding   Ashe Repp band for seated ER, standing shoulder ext and rows 12/13/18  Handouts provided, Demonstrates understanding     Writer verbally educated the patient and received verbal consent from the patient on hand placement, positioning of patient, and techniques to be performed today including assist with correct posture and positioning for exercises and how they are pertinent to the patient's plan of care.     ASSESSMENT   Patient exhibits increased painfree shoulder ROM and overall upper extremity strength, including  and pinch strength.  She was able to perform all exercises given for HEP with minimal cuing.  She has made good progress toward her goals.  Pain: (0/10) right anterior shoulder  PLAN   Goals: To be obtained by end of this plan of care:  1. Patient will be independent with progressed and modified home exercise program. Goal met  2. Decrease pain/symptoms to 0/10 Goal met for elbow, progressing for shoulder  3. Improve involved strength to 4+/5 Goal met  4. Improve involved range of motion to 85 ER & 80 IR  through improvements listed above patient will: Progressing  5. Be able to complete upper body, lower body dressing without pain/difficulty Progressing  6. Be able to reach overhead, at and above  shoulder height, out to side, behind back without pain/difficulty to improve completion of household tasks, completion of self-care tasks/dressing, cook/eat a meal, grocery shopping, house cleaning and care Progressing     Discharge from OT at this time.     Patient involved in and agreed to plan     THERAPY DAILY BILLING   Insurance: 1. [employer] MEDICARE 2. N/A    Evaluation Procedures:  No evaluation codes were used on this date of service    Timed Procedures:   Therapeutic Exercise, 40 minutes    Untimed Procedures:  No untimed codes were used on this date of service    Total Treatment Time: 40 minutes    G-Code:  G-Code Score ABN form  D/C: report discharge and goal status with C modifier   : Goal Self Cares Limitation,  CH - 0% impaired, limited or restricted  : D/C Self Cares  Limitation,  CJ - 20% to 39% impaired, limited or restricted  Modifier based on outcome measure(s)/functional testing/clinical judgement as listed above    The referring provider's electronic or written signature on the evaluation authorizes the therapy plan of care and certifies the need for these services, furnished under this plan of care while under their care.  Electronically sent for referring provider signature     Routine safety standards followed per [place] system and site policies      Discharge Measures:   Treatment Category: Elbow/Wrist/Palm, Other Non-Surgical  Outcome Measure: Disabilities of the Arm, Shoulder, and Hand   Initial Outcome Score:  27   Discharge Outcome Score: 21  Primary Clinician: Ashly King

## 2025-04-22 ENCOUNTER — LAB VISIT (OUTPATIENT)
Dept: PRIMARY CARE CLINIC | Facility: CLINIC | Age: 60
End: 2025-04-22
Payer: MEDICAID

## 2025-04-22 DIAGNOSIS — Z79.4 TYPE 2 DIABETES MELLITUS WITH DIABETIC POLYNEUROPATHY, WITH LONG-TERM CURRENT USE OF INSULIN: ICD-10-CM

## 2025-04-22 DIAGNOSIS — E11.42 TYPE 2 DIABETES MELLITUS WITH DIABETIC POLYNEUROPATHY, WITH LONG-TERM CURRENT USE OF INSULIN: ICD-10-CM

## 2025-04-22 LAB
CHOLEST SERPL-MCNC: 249 MG/DL (ref 120–199)
CHOLEST/HDLC SERPL: 6.4 {RATIO} (ref 2–5)
EAG (OHS): 131 MG/DL (ref 68–131)
HBA1C MFR BLD: 6.2 % (ref 4–5.6)
HDLC SERPL-MCNC: 39 MG/DL (ref 40–75)
HDLC SERPL: 15.7 % (ref 20–50)
LDLC SERPL CALC-MCNC: 176.2 MG/DL (ref 63–159)
NONHDLC SERPL-MCNC: 210 MG/DL
TRIGL SERPL-MCNC: 169 MG/DL (ref 30–150)

## 2025-04-22 PROCEDURE — 83036 HEMOGLOBIN GLYCOSYLATED A1C: CPT | Performed by: NURSE PRACTITIONER

## 2025-04-22 PROCEDURE — 82465 ASSAY BLD/SERUM CHOLESTEROL: CPT | Performed by: NURSE PRACTITIONER

## 2025-04-30 ENCOUNTER — OFFICE VISIT (OUTPATIENT)
Dept: ENDOCRINOLOGY | Facility: CLINIC | Age: 60
End: 2025-04-30
Payer: MEDICAID

## 2025-04-30 ENCOUNTER — LAB VISIT (OUTPATIENT)
Dept: LAB | Facility: HOSPITAL | Age: 60
End: 2025-04-30
Attending: NURSE PRACTITIONER
Payer: MEDICAID

## 2025-04-30 VITALS
HEART RATE: 109 BPM | SYSTOLIC BLOOD PRESSURE: 144 MMHG | DIASTOLIC BLOOD PRESSURE: 88 MMHG | WEIGHT: 192.38 LBS | BODY MASS INDEX: 43.28 KG/M2 | HEIGHT: 56 IN

## 2025-04-30 DIAGNOSIS — Z79.4 TYPE 2 DIABETES MELLITUS WITH DIABETIC POLYNEUROPATHY, WITH LONG-TERM CURRENT USE OF INSULIN: Primary | ICD-10-CM

## 2025-04-30 DIAGNOSIS — Z79.4 TYPE 2 DIABETES MELLITUS WITH DIABETIC POLYNEUROPATHY, WITH LONG-TERM CURRENT USE OF INSULIN: ICD-10-CM

## 2025-04-30 DIAGNOSIS — E78.5 HYPERLIPIDEMIA, UNSPECIFIED HYPERLIPIDEMIA TYPE: ICD-10-CM

## 2025-04-30 DIAGNOSIS — E11.42 TYPE 2 DIABETES MELLITUS WITH DIABETIC POLYNEUROPATHY, WITH LONG-TERM CURRENT USE OF INSULIN: ICD-10-CM

## 2025-04-30 DIAGNOSIS — E11.42 TYPE 2 DIABETES MELLITUS WITH DIABETIC POLYNEUROPATHY, WITH LONG-TERM CURRENT USE OF INSULIN: Primary | ICD-10-CM

## 2025-04-30 DIAGNOSIS — E66.01 MORBID OBESITY WITH BMI OF 50.0-59.9, ADULT: ICD-10-CM

## 2025-04-30 DIAGNOSIS — I10 PRIMARY HYPERTENSION: ICD-10-CM

## 2025-04-30 DIAGNOSIS — Z96.41 INSULIN PUMP STATUS: ICD-10-CM

## 2025-04-30 DIAGNOSIS — F10.20 ALCOHOL USE DISORDER, SEVERE, DEPENDENCE: ICD-10-CM

## 2025-04-30 DIAGNOSIS — N18.31 STAGE 3A CHRONIC KIDNEY DISEASE: ICD-10-CM

## 2025-04-30 LAB
BACTERIA #/AREA URNS HPF: ABNORMAL /HPF
BILIRUB UR QL STRIP.AUTO: NEGATIVE
CLARITY UR: CLEAR
COLOR UR AUTO: YELLOW
GLUCOSE UR QL STRIP: ABNORMAL
HGB UR QL STRIP: ABNORMAL
HYALINE CASTS #/AREA URNS LPF: 0 /LPF (ref 0–1)
KETONES UR QL STRIP: NEGATIVE
LEUKOCYTE ESTERASE UR QL STRIP: NEGATIVE
MICROSCOPIC COMMENT: ABNORMAL
NITRITE UR QL STRIP: NEGATIVE
PH UR STRIP: 7 [PH]
PROT UR QL STRIP: ABNORMAL
RBC #/AREA URNS HPF: 2 /HPF (ref 0–4)
SP GR UR STRIP: 1.02
SQUAMOUS #/AREA URNS HPF: 1 /HPF
WBC #/AREA URNS HPF: 1 /HPF (ref 0–5)

## 2025-04-30 PROCEDURE — 81003 URINALYSIS AUTO W/O SCOPE: CPT | Mod: PO

## 2025-04-30 PROCEDURE — 99999 PR PBB SHADOW E&M-EST. PATIENT-LVL IV: CPT | Mod: PBBFAC,,, | Performed by: NURSE PRACTITIONER

## 2025-04-30 PROCEDURE — 99214 OFFICE O/P EST MOD 30 MIN: CPT | Mod: PBBFAC,PO | Performed by: NURSE PRACTITIONER

## 2025-04-30 RX ORDER — BLOOD-GLUCOSE TRANSMITTER
1 EACH MISCELLANEOUS CONTINUOUS
Qty: 1 EACH | Refills: 0 | Status: SHIPPED | OUTPATIENT
Start: 2025-04-30 | End: 2026-04-30

## 2025-04-30 RX ORDER — BLOOD-GLUCOSE SENSOR
1 EACH MISCELLANEOUS
Qty: 12 EACH | Refills: 3 | Status: SHIPPED | OUTPATIENT
Start: 2025-04-30 | End: 2026-04-30

## 2025-04-30 NOTE — PATIENT INSTRUCTIONS
Stop Mounjaro a week before colonscopy      You may take pump off before bedtime before colonscopy. Resume once  you get home and are eating.
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
## 2025-05-06 ENCOUNTER — ANESTHESIA (OUTPATIENT)
Dept: ENDOSCOPY | Facility: HOSPITAL | Age: 60
End: 2025-05-06
Payer: MEDICAID

## 2025-05-06 ENCOUNTER — ANESTHESIA EVENT (OUTPATIENT)
Dept: ENDOSCOPY | Facility: HOSPITAL | Age: 60
End: 2025-05-06
Payer: MEDICAID

## 2025-05-06 ENCOUNTER — HOSPITAL ENCOUNTER (OUTPATIENT)
Facility: HOSPITAL | Age: 60
Discharge: HOME | End: 2025-05-06
Attending: INTERNAL MEDICINE | Admitting: INTERNAL MEDICINE
Payer: MEDICAID

## 2025-05-06 DIAGNOSIS — Z86.0100 HX OF COLONIC POLYPS: ICD-10-CM

## 2025-05-06 LAB — GLUCOSE SERPL-MCNC: 132 MG/DL (ref 70–110)

## 2025-05-06 PROCEDURE — 37000008 HC ANESTHESIA 1ST 15 MINUTES: Mod: PO | Performed by: INTERNAL MEDICINE

## 2025-05-06 PROCEDURE — 37000009 HC ANESTHESIA EA ADD 15 MINS: Mod: PO | Performed by: INTERNAL MEDICINE

## 2025-05-06 PROCEDURE — 45378 DIAGNOSTIC COLONOSCOPY: CPT | Performed by: INTERNAL MEDICINE

## 2025-05-06 PROCEDURE — 45378 DIAGNOSTIC COLONOSCOPY: CPT | Mod: ,,, | Performed by: INTERNAL MEDICINE

## 2025-05-06 PROCEDURE — 63600175 PHARM REV CODE 636 W HCPCS: Mod: PO | Performed by: ANESTHESIOLOGY

## 2025-05-06 PROCEDURE — 82962 GLUCOSE BLOOD TEST: CPT | Mod: PO | Performed by: INTERNAL MEDICINE

## 2025-05-06 PROCEDURE — 63600175 PHARM REV CODE 636 W HCPCS: Mod: PO | Performed by: NURSE ANESTHETIST, CERTIFIED REGISTERED

## 2025-05-06 PROCEDURE — 63600175 PHARM REV CODE 636 W HCPCS: Mod: PO | Performed by: INTERNAL MEDICINE

## 2025-05-06 PROCEDURE — G0105 COLORECTAL SCRN; HI RISK IND: HCPCS | Mod: PO | Performed by: INTERNAL MEDICINE

## 2025-05-06 RX ORDER — HYDRALAZINE HYDROCHLORIDE 20 MG/ML
10 INJECTION INTRAMUSCULAR; INTRAVENOUS ONCE
Status: COMPLETED | OUTPATIENT
Start: 2025-05-06 | End: 2025-05-06

## 2025-05-06 RX ORDER — LIDOCAINE HYDROCHLORIDE 20 MG/ML
INJECTION INTRAVENOUS
Status: DISCONTINUED | OUTPATIENT
Start: 2025-05-06 | End: 2025-05-06

## 2025-05-06 RX ORDER — PROPOFOL 10 MG/ML
VIAL (ML) INTRAVENOUS
Status: DISCONTINUED | OUTPATIENT
Start: 2025-05-06 | End: 2025-05-06

## 2025-05-06 RX ORDER — SODIUM CHLORIDE 0.9 % (FLUSH) 0.9 %
10 SYRINGE (ML) INJECTION
Status: DISCONTINUED | OUTPATIENT
Start: 2025-05-06 | End: 2025-05-06 | Stop reason: HOSPADM

## 2025-05-06 RX ORDER — LABETALOL HYDROCHLORIDE 5 MG/ML
10 INJECTION, SOLUTION INTRAVENOUS ONCE
Status: COMPLETED | OUTPATIENT
Start: 2025-05-06 | End: 2025-05-06

## 2025-05-06 RX ORDER — SODIUM CHLORIDE, SODIUM LACTATE, POTASSIUM CHLORIDE, CALCIUM CHLORIDE 600; 310; 30; 20 MG/100ML; MG/100ML; MG/100ML; MG/100ML
INJECTION, SOLUTION INTRAVENOUS CONTINUOUS
Status: DISCONTINUED | OUTPATIENT
Start: 2025-05-06 | End: 2025-05-06 | Stop reason: HOSPADM

## 2025-05-06 RX ADMIN — PROPOFOL 40 MG: 10 INJECTION, EMULSION INTRAVENOUS at 10:05

## 2025-05-06 RX ADMIN — HYDRALAZINE HYDROCHLORIDE 10 MG: 20 INJECTION, SOLUTION INTRAMUSCULAR; INTRAVENOUS at 09:05

## 2025-05-06 RX ADMIN — PROPOFOL 50 MG: 10 INJECTION, EMULSION INTRAVENOUS at 10:05

## 2025-05-06 RX ADMIN — SODIUM CHLORIDE, POTASSIUM CHLORIDE, SODIUM LACTATE AND CALCIUM CHLORIDE: 600; 310; 30; 20 INJECTION, SOLUTION INTRAVENOUS at 08:05

## 2025-05-06 RX ADMIN — LIDOCAINE HYDROCHLORIDE 75 MG: 20 INJECTION INTRAVENOUS at 10:05

## 2025-05-06 RX ADMIN — LABETALOL HYDROCHLORIDE 10 MG: 5 INJECTION, SOLUTION INTRAVENOUS at 09:05

## 2025-05-06 RX ADMIN — PROPOFOL 80 MG: 10 INJECTION, EMULSION INTRAVENOUS at 10:05

## 2025-05-06 NOTE — PLAN OF CARE
Pt meets criteria for discharge. Vital signs stable. Pt without falls. Discharge teaching complete. Questions answered.   Pt V/S stable Pt comfortable at this time. Pt taking PO fluids well. Pt and family given and understands DC instructions. Pt to be taken out in wheelchair    Plan: - unable to tolerate Kligmans \\n- start Azelaic acid apply bid \\n- continue OTC [place] Detail Level: Zone Render In Strict Bullet Format?: No Plan: - continue triamcinolone cream bid as needed for flares

## 2025-05-06 NOTE — TRANSFER OF CARE
"Anesthesia Transfer of Care Note    Patient: Mine Quiros    Procedure(s) Performed: Procedure(s) (LRB):  COLONOSCOPY, SCREENING, HIGH RISK PATIENT (N/A)    Patient location: PACU    Anesthesia Type: general    Transport from OR: Transported from OR on room air with adequate spontaneous ventilation    Post pain: adequate analgesia    Post assessment: no apparent anesthetic complications    Post vital signs: stable    Level of consciousness: awake and sedated    Nausea/Vomiting: no nausea/vomiting    Complications: none    Transfer of care protocol was followed      Last vitals: Visit Vitals  BP (!) 128/9 (BP Location: Left arm)   Pulse 91   Temp 36.3 °C (97.3 °F) (Skin)   Resp 15   Ht 4' 8" (1.422 m)   Wt 87.1 kg (192 lb)   LMP 08/31/2007 (Approximate)   SpO2 100%   Breastfeeding No   BMI 43.05 kg/m²     "

## 2025-05-06 NOTE — DISCHARGE SUMMARY
[facility]  Discharge Note  Short Stay  Discharge Note  Short Stay      SUMMARY     Admit Date: 5/6/2025    Attending Physician: Rory Jones MD     Discharge Physician: Rory Jones MD    Discharge Date: 5/6/2025 10:54 AM    Final Diagnosis: Screening [Z13.9]    Disposition: HOME OR SELF CARE    Patient Instructions:   Current Discharge Medication List        CONTINUE these medications which have NOT CHANGED    Details   diphenhydrAMINE (BENADRYL) 25 mg capsule Take 50 mg by mouth nightly as needed for Itching.      fluticasone propionate (FLONASE) 50 mcg/actuation nasal spray 2 sprays (100 mcg total) by Each Nostril route every evening.  Qty: 16 g, Refills: 0    Associated Diagnoses: COVID      gabapentin (NEURONTIN) 300 MG capsule Take 2 capsules (600 mg total) by mouth every evening. TAKE 1 CAPSULE BY MOUTH BY MOUTH THREE TIMES DAILY  Qty: 180 capsule, Refills: 3    Comments: This prescription was filled on 7/14/2023. Any refills authorized will be placed on file.  Associated Diagnoses: Type 2 diabetes mellitus with complication, with long-term current use of insulin      insulin aspart U-100 (NOVOLOG U-100 INSULIN ASPART) 100 unit/mL injection USE AS DIRECTED via insulin pump approx 150 UNITS EVERY DAY.  Qty: 50 mL, Refills: 11    Associated Diagnoses: Type 2 diabetes mellitus with diabetic polyneuropathy, with long-term current use of insulin      losartan (COZAAR) 100 MG tablet Take 100 mg by mouth.      metoprolol succinate (TOPROL-XL) 100 MG 24 hr tablet Take 1 tablet (100 mg total) by mouth once daily.  Qty: 90 tablet, Refills: 3    Comments: .  Associated Diagnoses: Hypertension associated with diabetes      rosuvastatin (CRESTOR) 10 MG tablet Take 1 tablet (10 mg total) by mouth once daily.  Qty: 90 tablet, Refills: 3    Associated Diagnoses: Hyperlipidemia, unspecified hyperlipidemia type      spironolactone (ALDACTONE) 50 MG tablet Take 1 tablet (50 mg total) by mouth once  daily.  Qty: 30 tablet, Refills: 11    Comments: .  Associated Diagnoses: Stage 3a chronic kidney disease      tirzepatide (MOUNJARO) 15 mg/0.5 mL PnIj Inject 15 mg into the skin every 7 days.  Qty: 4 Pen, Refills: 6    Associated Diagnoses: Type 2 diabetes mellitus with diabetic polyneuropathy, with long-term current use of insulin      ACCU-CHEK FASTCLIX LANCET DRUM Mis use to check glucose THREE TIMES DAILY  Qty: 300 each, Refills: 3    Associated Diagnoses: Type 2 diabetes mellitus with diabetic polyneuropathy, with long-term current use of insulin      albuterol (PROVENTIL/VENTOLIN HFA) 90 mcg/actuation inhaler INHALE 1 PUFF BY MOUTH EVERY 4 HOURS AS NEEDED for SHORTNESS OF BREATH Strength: 90 mcg/actuation  Qty: 18 g, Refills: 5      betamethasone valerate 0.1% (VALISONE) 0.1 % Crea Apply topically 2 (two) times daily as needed (itching eczema).  Qty: 45 g, Refills: 3    Associated Diagnoses: Type 2 diabetes mellitus with complication, with long-term current use of insulin; Eczema of lower extremity      blood sugar diagnostic (ACCU-CHEK GUIDE TEST STRIPS) Strp To check BG 3 times daily, to use with insurance preferred meter  Qty: 100 strip, Refills: 12    Associated Diagnoses: Type 2 diabetes mellitus with diabetic polyneuropathy, with long-term current use of insulin      blood-glucose meter kit To check BG 3 times daily, to use with insurance preferred meter  Qty: 1 each, Refills: 0    Associated Diagnoses: Type 2 diabetes mellitus with diabetic polyneuropathy, with long-term current use of insulin      !! blood-glucose sensor (GUARDIAN 4 GLUCOSE SENSOR) Jasmyne 1 Device by Misc.(Non-Drug; Combo Route) route every 7 days.  Qty: 12 each, Refills: 3    Associated Diagnoses: Type 2 diabetes mellitus with diabetic polyneuropathy, with long-term current use of insulin      !! blood-glucose sensor (GUARDIAN SENSOR 3 MISC)       !! blood-glucose sensor (GUARDIAN SENSOR 3) Jasmyne THIS RX IS FOR [employer] GUARDIAN 3--TO  "GO WITH MED[employer]IC 770. PT TO CHANGE SENSOR EVERY 7 DAYS.  Qty: 15 each, Refills: 3    Comments: PT ASKING TO BE DELIVERED IT TO HER  Associated Diagnoses: Type 2 diabetes mellitus with diabetic polyneuropathy      !! blood-glucose transmitter (DEXCOM G5 TRANSMITTER) Jasmyne This RX is for [employer] Guardian 3--to go with medtronic 770.  Pt to change sensor every 7 days.  Qty: 12 each, Refills: 3    Associated Diagnoses: Type 2 diabetes mellitus with diabetic polyneuropathy, with long-term current use of insulin      !! blood-glucose transmitter (GUARDIAN 4 TRANSMITTER) Jasmyne 1 Device by Misc.(Non-Drug; Combo Route) route continuous.  Qty: 1 each, Refills: 0    Associated Diagnoses: Type 2 diabetes mellitus with diabetic polyneuropathy, with long-term current use of insulin      ergocalciferol (ERGOCALCIFEROL) 50,000 unit Cap Take 1 capsule (50,000 Units total) by mouth every 7 days.  Qty: 12 capsule, Refills: 1    Comments: This prescription was filled on 9/3/2022. Any refills authorized will be placed on file.  Associated Diagnoses: Vitamin D deficiency disease      glucagon (BAQSIMI) 3 mg/actuation Spry Use in case of severe hypoglycemia  Qty: 1 each, Refills: 1    Associated Diagnoses: Type 2 diabetes mellitus with diabetic polyneuropathy, with long-term current use of insulin      insulin detemir U-100, Levemir, (LEVEMIR FLEXPEN) 100 unit/mL (3 mL) InPn pen Take 60 u qd, ONLY if pump malfunctioned  Qty: 15 mL, Refills: 0    Associated Diagnoses: Type 2 diabetes mellitus with diabetic polyneuropathy, with long-term current use of insulin      MICROLET 2 LANCING DEVICE Kit Use 4x/daily to check glucose.  Qty: 1 each, Refills: 2    Associated Diagnoses: Type 2 diabetes mellitus with diabetic polyneuropathy, with long-term current use of insulin      pen needle, diabetic 31 gauge x 5/16" Ndle To use with insulin pens if needed  Qty: 100 each, Refills: 12    Associated Diagnoses: Type 2 diabetes mellitus with diabetic " polyneuropathy, with long-term current use of insulin       !! - Potential duplicate medications found. Please discuss with provider.          Discharge Procedure Orders (must include Diet, Follow-up, Activity)    Follow Up:  Follow up with PCP as previously scheduled  Resume routine diet.  Activity as tolerated.    No driving day of procedure.

## 2025-05-06 NOTE — PROVATION PATIENT INSTRUCTIONS
Discharge Summary/Instructions after an Endoscopic Procedure  Patient Name: Mine Quiros  Patient MRN: [id number]  Patient YOB: 1965  Tuesday, May 6, 2025  Rory Jones MD  Dear patient,  As a result of recent federal legislation (The Federal Cures Act), you may   receive lab or pathology results from your procedure in your [facility]   account before your physician is able to contact you. Your physician or   their representative will relay the results to you with their   recommendations at their soonest availability.  Thank you,  RESTRICTIONS:  During your procedure today, you received medications for sedation.  These   medications may affect your judgment, balance and coordination.  Therefore,   for 24 hours, you have the following restrictions:   - DO NOT drive a car, operate machinery, make legal/financial decisions,   sign important papers or drink alcohol.    ACTIVITY:  Today: no heavy lifting, straining or running due to procedural   sedation/anesthesia.  The following day: return to full activity including work.  DIET:  Eat and drink normally unless instructed otherwise.     TREATMENT FOR COMMON SIDE EFFECTS:  - Mild abdominal pain, nausea, belching, bloating or excessive gas:  rest,   eat lightly and use a heating pad.  - Sore Throat: treat with throat lozenges and/or gargle with warm salt   water.  - Because air was used during the procedure, expelling large amounts of air   from your rectum or belching is normal.  - If a bowel prep was taken, you may not have a bowel movement for 1-3 days.    This is normal.  SYMPTOMS TO WATCH FOR AND REPORT TO YOUR PHYSICIAN:  1. Abdominal pain or bloating, other than gas cramps.  2. Chest pain.  3. Back pain.  4. Signs of infection such as: chills or fever occurring within 24 hours   after the procedure.  5. Rectal bleeding, which would show as bright red, maroon, or black stools.   (A tablespoon of blood from the rectum is not serious, especially if    hemorrhoids are present.)  6. Vomiting.  7. Weakness or dizziness.  GO DIRECTLY TO THE NEAREST EMERGENCY ROOM IF YOU HAVE ANY OF THE FOLLOWING:      Difficulty breathing              Chills and/or fever over 101 F   Persistent vomiting and/or vomiting blood   Severe abdominal pain   Severe chest pain   Black, tarry stools   Bleeding- more than one tablespoon   Any other symptom or condition that you feel may need urgent attention  Your doctor recommends these additional instructions:  If any biopsies were taken, your doctors clinic will contact you in 1 to 2   weeks with any results.  Your physician has recommended a repeat colonoscopy in five years for   surveillance.   You are being discharged to home.  For questions, problems or results please call your physician - Rory Jones MD at Work:  [phone].  EMERGENCY PHONE NUMBER: [phone], LAB RESULTS: [phone]  IF A COMPLICATION OR EMERGENCY SITUATION ARISES AND YOU ARE UNABLE TO REACH   YOUR PHYSICIAN - GO DIRECTLY TO THE EMERGENCY ROOM.  ___________________________________________  Nurse Signature  ___________________________________________  Patient/Designated Responsible Party Signature  Rory Jones MD  5/6/2025 10:53:54 AM  This report has been verified and signed electronically.  Dear patient,  As a result of recent federal legislation (The Federal Cures Act), you may   receive lab or pathology results from your procedure in your [facility]   account before your physician is able to contact you. Your physician or   their representative will relay the results to you with their   recommendations at their soonest availability.  Thank you.  PROVATION

## 2025-05-06 NOTE — H&P
History & Physical - Short Stay  Gastroenterology      SUBJECTIVE:     Procedure: Colonoscopy    Chief Complaint/Indication for Procedure: Previous Polyps    PTA Medications   Medication Sig    diphenhydrAMINE (BENADRYL) 25 mg capsule Take 50 mg by mouth nightly as needed for Itching.    fluticasone propionate (FLONASE) 50 mcg/actuation nasal spray 2 sprays (100 mcg total) by Each Nostril route every evening.    gabapentin (NEURONTIN) 300 MG capsule Take 2 capsules (600 mg total) by mouth every evening. TAKE 1 CAPSULE BY MOUTH BY MOUTH THREE TIMES DAILY    insulin aspart U-100 (NOVOLOG U-100 INSULIN ASPART) 100 unit/mL injection USE AS DIRECTED via insulin pump approx 150 UNITS EVERY DAY.    losartan (COZAAR) 100 MG tablet Take 100 mg by mouth.    metoprolol succinate (TOPROL-XL) 100 MG 24 hr tablet Take 1 tablet (100 mg total) by mouth once daily.    rosuvastatin (CRESTOR) 10 MG tablet Take 1 tablet (10 mg total) by mouth once daily.    spironolactone (ALDACTONE) 50 MG tablet Take 1 tablet (50 mg total) by mouth once daily.    tirzepatide (MOUNJARO) 15 mg/0.5 mL PnIj Inject 15 mg into the skin every 7 days.    ACCU-CHEK FASTCLIX LANCET DRUM [facility] use to check glucose THREE TIMES DAILY    albuterol (PROVENTIL/VENTOLIN HFA) 90 mcg/actuation inhaler INHALE 1 PUFF BY MOUTH EVERY 4 HOURS AS NEEDED for SHORTNESS OF BREATH Strength: 90 mcg/actuation    betamethasone valerate 0.1% (VALISONE) 0.1 % Crea Apply topically 2 (two) times daily as needed (itching eczema).    blood sugar diagnostic (ACCU-CHEK GUIDE TEST STRIPS) Strp To check BG 3 times daily, to use with insurance preferred meter    blood-glucose meter kit To check BG 3 times daily, to use with insurance preferred meter    blood-glucose sensor (GUARDIAN 4 GLUCOSE SENSOR) Jasmyne 1 Device by Misc.(Non-Drug; Combo Route) route every 7 days.    blood-glucose sensor (GUARDIAN SENSOR 3 MISC)     blood-glucose sensor (GUARDIAN SENSOR 3) Jasmyne THIS RX IS FOR [employer]  "3--TO GO WITH MEDTRONIC 770. PT TO CHANGE SENSOR EVERY 7 DAYS.    blood-glucose transmitter (DEXCOM G5 TRANSMITTER) Jasmyne This RX is for [employer] Guardian 3--to go with medtronic 770.  Pt to change sensor every 7 days.    blood-glucose transmitter (GUARDIAN 4 TRANSMITTER) Jasmyne 1 Device by Misc.(Non-Drug; Combo Route) route continuous.    ergocalciferol (ERGOCALCIFEROL) 50,000 unit Cap Take 1 capsule (50,000 Units total) by mouth every 7 days.    glucagon (BAQSIMI) 3 mg/actuation Spry Use in case of severe hypoglycemia    insulin detemir U-100, Levemir, (LEVEMIR FLEXPEN) 100 unit/mL (3 mL) InPn pen Take 60 u qd, ONLY if pump malfunctioned    MICROLET 2 LANCING DEVICE Kit Use 4x/daily to check glucose.    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, with long-term current use of insulin 3/13/2023    Diabetes mellitus     Type 2 IDDM - Uncontrolled    Diabetic neuropathy     Difficult intubation     needed glidescope for cholecystectomy 2009    Dry scalp     Fatty liver     GERD (gastroesophageal reflux disease)     on no meds    Glaucoma     Hyperlipidemia     Hypertension     Insomnia     Knee pain     Morbid obesity     Neuropathy due to type 2 diabetes mellitus     anaya feet    MARLON (obstructive sleep apnea)     CPAP, says she is compliant with use    Parotid mass 2014    had Biopsy    SOB (shortness of breath) 10/2016    " Tachycardia     frequently,chronic for years per chart    Type 2 diabetes mellitus with both eyes affected by mild nonproliferative retinopathy without macular edema, without long-term current use of insulin      Past Surgical History:   Procedure Laterality Date    BREAST BIOPSY      CARDIAC CATHETERIZATION  2016    WDL per patient    CATARACT EXTRACTION Right 2018    Dr Boothe    CATARACT EXTRACTION W/  INTRAOCULAR LENS IMPLANT Right 2018    Procedure: EXTRACTION, CATARACT, WITH IOL INSERTION;  Surgeon: Isaac Boothe Jr., MD;  Location: [facility] OR;  Service: Ophthalmology;  Laterality: Right;  Topical w/ MAC    CATARACT EXTRACTION W/  INTRAOCULAR LENS IMPLANT Left 2018    Dr Boothe     SECTION      x 1    CHOLECYSTECTOMY      COLONOSCOPY N/A 10/21/2021    Procedure: COLONOSCOPY;  Surgeon: Rory Jones MD;  Location: [facility];  Service: Endoscopy;  Laterality: N/A;    ESOPHAGOGASTRODUODENOSCOPY  2009    Dr GRACIELA Jones - [facility]    HEMORRHOID SURGERY  2012    PPH with External Hemorrhoidectomy - Dr JAMAL Rollins, had spinal anesthesia    HYSTEROSCOPIC POLYPECTOMY OF UTERUS N/A 2019    Procedure: POLYPECTOMY, UTERUS, HYSTEROSCOPIC;  Surgeon: Lauren Reid MD;  Location: [facility];  Service: OB/GYN;  Laterality: N/A;  Myosure    HYSTEROSCOPY WITH DILATION AND CURETTAGE OF UTERUS N/A 2019    Procedure: HYSTEROSCOPY, WITH DILATION AND CURETTAGE OF UTERUS;  Surgeon: Lauren Reid MD;  Location: [facility];  Service: OB/GYN;  Laterality: N/A;  Fractional D&C    ORIF FEMUR FRACTURE Left     IM dmitry.      PHACOEMULSIFICATION OF CATARACT Left 2018    Procedure: PHACOEMULSIFICATION, CATARACT;  Surgeon: Isaac Boothe Jr., MD;  Location: [facility] OR;  Service: Ophthalmology;  Laterality: Left;    TRIGGER FINGER RELEASE Right     TUBAL LIGATION      yag cap OS  Left 10/23/2018     Family History   Problem Relation Name Age of Onset    Diabetes Mother      Kidney  disease Mother      Hyperlipidemia Father      Heart disease Father      Hypertension Father      Diabetes Sister      Heart attacks under age 50 Brother      No Known Problems Maternal Grandmother      No Known Problems Maternal Grandfather      No Known Problems Paternal Grandmother      No Known Problems Paternal Grandfather      Diabetes Sister      Hepatitis Brother      Kidney failure Sister      Diabetes Sister      Amblyopia Neg Hx      Blindness Neg Hx      Cancer Neg Hx      Cataracts Neg Hx      Glaucoma Neg Hx      Macular degeneration Neg Hx      Retinal detachment Neg Hx      Strabismus Neg Hx      Stroke Neg Hx      Thyroid disease Neg Hx       Social History[1]      OBJECTIVE:     Vital Signs (Most Recent)  Temp: 97.3 °F (36.3 °C) (05/06/25 0850)  Pulse: 86 (05/06/25 0850)  Resp: 17 (05/06/25 0850)  BP: (!) 243/117 (05/06/25 0903)  SpO2: 100 % (05/06/25 0850)    Physical Exam:                                                       GENERAL:  Comfortable, in no acute distress.                                 HEENT EXAM:  Nonicteric.  No adenopathy.  Oropharynx is clear.               NECK:  Supple.                                                               LUNGS:  Clear.                                                               CARDIAC:  Regular rate and rhythm.  S1, S2.  No murmur.                      ABDOMEN:  Soft, positive bowel sounds, nontender.  No hepatosplenomegaly or masses.  No rebound or guarding.                                             EXTREMITIES:  No edema.     MENTAL STATUS:  Normal, alert and oriented.      ASSESSMENT/PLAN:     Assessment: Previous Polyps    Plan: Colonoscopy    Anesthesia Plan: General    ASA Grade: ASA 2 - Patient with mild systemic disease with no functional limitations    MALLAMPATI SCORE:  I (soft palate, uvula, fauces, and tonsillar pillars visible)           [1]   Social History  Tobacco Use    Smoking status: Never    Smokeless tobacco: Never    Substance Use Topics    Alcohol use: Not Currently     Alcohol/week: 42.0 standard drinks of alcohol     Types: 42 Shots of liquor per week     Comment: [place]elina    Drug use: No

## 2025-05-06 NOTE — ANESTHESIA POSTPROCEDURE EVALUATION
Anesthesia Post Evaluation    Patient: Mine Quiros    Procedure(s) Performed: Procedure(s) (LRB):  COLONOSCOPY, SCREENING, HIGH RISK PATIENT (N/A)    Final Anesthesia Type: general      Patient location during evaluation: PACU  Patient participation: Yes- Able to Participate  Level of consciousness: awake and alert  Post-procedure vital signs: reviewed and stable  Pain management: adequate  Airway patency: patent    PONV status at discharge: No PONV  Anesthetic complications: no      Cardiovascular status: hemodynamically stable  Respiratory status: unassisted and room air  Hydration status: euvolemic  Follow-up not needed.              Vitals Value Taken Time   /68 05/06/25 11:10   Temp 36.1 °C (97 °F) 05/06/25 11:10   Pulse 90 05/06/25 11:07   Resp 15 05/06/25 11:10   SpO2 98 % 05/06/25 11:10         No case tracking events are documented in the log.      Pain/Erin Score: Erin Score: 10 (5/6/2025 11:06 AM)

## 2025-05-06 NOTE — PROGRESS NOTES
Dr. Evans at bedside for elevated BP and difficulty placing PIV.     0938 MD at bedside. BP still elevated. VORB from Dr. Evans for Labetalol 10mg, IVP, ONCE, NOW.     0945  MD at bedside. BP still elevated. VORB from Dr. Evans for Hydralazine 10mg, IVP, ONCE, NOW. Orders carried out.     0958 MD notified BP still elevated at 225/111. VORB from Dr. Evans for Labetalol 10mg, IVP, ONCE, NOW. Orders carried out.     1008 MD made aware /96. MD okay to proceed with proceed at this time.     Discussed need to take all BP meds as soon as she gets home today and continue to take them as prescribed; likely should followup with primary care for elevated blood pressure. Also discussed stroke signs and symptoms and when she should seek medical attention. Dr. Evans also at bedside as well and discussed the same information. Patient's sister at bedside and in agreement to assist patient.

## 2025-05-06 NOTE — ANESTHESIA PREPROCEDURE EVALUATION
05/06/2025  Mine Quiros is a 59 y.o., female.      Pre-op Assessment    I have reviewed the Patient Summary Reports.     I have reviewed the Nursing Notes. I have reviewed the NPO Status.   I have reviewed the Medications.     Review of Systems  Anesthesia Hx:  No problems with previous Anesthesia                Social:  Non-Smoker       Cardiovascular:     Hypertension           hyperlipidemia            Shortness of Breath                    Hypertension         Pulmonary:      Shortness of breath  Sleep Apnea     Obstructive Sleep Apnea (MARLON).           Renal/:  Chronic Renal Disease, CKD        Kidney Function/Disease             Hepatic/GI:     GERD Liver Disease,        Gerd       Liver Disease        Neurological:    Neuromuscular Disease,                                 Neuromuscular Disease   Endocrine:  Diabetes    Diabetes                    Morbid Obesity / BMI > 40  Psych:  Psychiatric History                  Physical Exam  General: Well nourished, Oriented, Alert and Cooperative    Airway:  Mallampati: II   Mouth Opening: Normal  TM Distance: Normal  Tongue: Normal  Neck ROM: Normal ROM    Dental:  Intact    Chest/Lungs:  Normal Respiratory Rate    Heart:  Rate: Normal        Anesthesia Plan  Type of Anesthesia, risks & benefits discussed:    Anesthesia Type: Gen Natural Airway  Intra-op Monitoring Plan: Standard ASA Monitors  Induction:  IV  Informed Consent: Informed consent signed with the Patient and all parties understand the risks and agree with anesthesia plan.  All questions answered.   ASA Score: 4  Day of Surgery Review of History & Physical: H&P Update referred to the surgeon/provider.    Ready For Surgery From Anesthesia Perspective.     .

## 2025-05-06 NOTE — PLAN OF CARE
Dr. Salas with Anesthesia made aware that patient's /117 and she states she has been holding her BP meds (losartan, metoprolol, aldactone) for a couple weeks. She states she just isn't good with meds. Patient asymptomatic. MD orders to reassess after patient in bed and more relaxed.

## 2025-05-07 VITALS
HEIGHT: 56 IN | BODY MASS INDEX: 43.19 KG/M2 | WEIGHT: 192 LBS | OXYGEN SATURATION: 98 % | TEMPERATURE: 97 F | SYSTOLIC BLOOD PRESSURE: 127 MMHG | RESPIRATION RATE: 15 BRPM | HEART RATE: 90 BPM | DIASTOLIC BLOOD PRESSURE: 68 MMHG

## 2025-05-16 ENCOUNTER — LAB VISIT (OUTPATIENT)
Dept: PRIMARY CARE CLINIC | Facility: CLINIC | Age: 60
End: 2025-05-16
Payer: MEDICAID

## 2025-05-16 ENCOUNTER — OFFICE VISIT (OUTPATIENT)
Dept: NEPHROLOGY | Facility: CLINIC | Age: 60
End: 2025-05-16
Payer: MEDICAID

## 2025-05-16 VITALS
SYSTOLIC BLOOD PRESSURE: 168 MMHG | HEART RATE: 105 BPM | OXYGEN SATURATION: 98 % | HEIGHT: 56 IN | BODY MASS INDEX: 43.05 KG/M2 | DIASTOLIC BLOOD PRESSURE: 102 MMHG

## 2025-05-16 DIAGNOSIS — G47.33 OSA (OBSTRUCTIVE SLEEP APNEA): ICD-10-CM

## 2025-05-16 DIAGNOSIS — R80.9 ALBUMINURIA: ICD-10-CM

## 2025-05-16 DIAGNOSIS — N18.31 STAGE 3A CHRONIC KIDNEY DISEASE: Primary | ICD-10-CM

## 2025-05-16 DIAGNOSIS — I10 HTN (HYPERTENSION), BENIGN: ICD-10-CM

## 2025-05-16 DIAGNOSIS — Z79.4 TYPE 2 DIABETES MELLITUS WITH DIABETIC POLYNEUROPATHY, WITH LONG-TERM CURRENT USE OF INSULIN: ICD-10-CM

## 2025-05-16 DIAGNOSIS — R80.1 PERSISTENT PROTEINURIA: ICD-10-CM

## 2025-05-16 DIAGNOSIS — E55.9 VITAMIN D DEFICIENCY DISEASE: ICD-10-CM

## 2025-05-16 DIAGNOSIS — N25.81 SECONDARY HYPERPARATHYROIDISM: ICD-10-CM

## 2025-05-16 DIAGNOSIS — N18.31 STAGE 3A CHRONIC KIDNEY DISEASE: ICD-10-CM

## 2025-05-16 DIAGNOSIS — E11.42 TYPE 2 DIABETES MELLITUS WITH DIABETIC POLYNEUROPATHY, WITH LONG-TERM CURRENT USE OF INSULIN: ICD-10-CM

## 2025-05-16 LAB
ALBUMIN SERPL BCP-MCNC: 2.3 G/DL (ref 3.5–5.2)
ANION GAP (OHS): 9 MMOL/L (ref 8–16)
BUN SERPL-MCNC: 14 MG/DL (ref 6–20)
CALCIUM SERPL-MCNC: 8.5 MG/DL (ref 8.7–10.5)
CHLORIDE SERPL-SCNC: 105 MMOL/L (ref 95–110)
CO2 SERPL-SCNC: 25 MMOL/L (ref 23–29)
CREAT SERPL-MCNC: 1.3 MG/DL (ref 0.5–1.4)
GFR SERPLBLD CREATININE-BSD FMLA CKD-EPI: 47 ML/MIN/1.73/M2
GLUCOSE SERPL-MCNC: 117 MG/DL (ref 70–110)
PHOSPHATE SERPL-MCNC: 3.7 MG/DL (ref 2.7–4.5)
POTASSIUM SERPL-SCNC: 3.2 MMOL/L (ref 3.5–5.1)
PTH-INTACT SERPL-MCNC: 189.7 PG/ML (ref 9–77)
SODIUM SERPL-SCNC: 139 MMOL/L (ref 136–145)

## 2025-05-16 PROCEDURE — 99212 OFFICE O/P EST SF 10 MIN: CPT | Mod: PBBFAC,PN | Performed by: STUDENT IN AN ORGANIZED HEALTH CARE EDUCATION/TRAINING PROGRAM

## 2025-05-16 PROCEDURE — 3080F DIAST BP >= 90 MM HG: CPT | Mod: CPTII,,, | Performed by: STUDENT IN AN ORGANIZED HEALTH CARE EDUCATION/TRAINING PROGRAM

## 2025-05-16 PROCEDURE — 83970 ASSAY OF PARATHORMONE: CPT | Performed by: STUDENT IN AN ORGANIZED HEALTH CARE EDUCATION/TRAINING PROGRAM

## 2025-05-16 PROCEDURE — 3066F NEPHROPATHY DOC TX: CPT | Mod: CPTII,,, | Performed by: STUDENT IN AN ORGANIZED HEALTH CARE EDUCATION/TRAINING PROGRAM

## 2025-05-16 PROCEDURE — 3077F SYST BP >= 140 MM HG: CPT | Mod: CPTII,,, | Performed by: STUDENT IN AN ORGANIZED HEALTH CARE EDUCATION/TRAINING PROGRAM

## 2025-05-16 PROCEDURE — 82040 ASSAY OF SERUM ALBUMIN: CPT | Performed by: STUDENT IN AN ORGANIZED HEALTH CARE EDUCATION/TRAINING PROGRAM

## 2025-05-16 PROCEDURE — 3008F BODY MASS INDEX DOCD: CPT | Mod: CPTII,,, | Performed by: STUDENT IN AN ORGANIZED HEALTH CARE EDUCATION/TRAINING PROGRAM

## 2025-05-16 PROCEDURE — 99214 OFFICE O/P EST MOD 30 MIN: CPT | Mod: S$PBB,,, | Performed by: STUDENT IN AN ORGANIZED HEALTH CARE EDUCATION/TRAINING PROGRAM

## 2025-05-16 PROCEDURE — 3062F POS MACROALBUMINURIA REV: CPT | Mod: CPTII,,, | Performed by: STUDENT IN AN ORGANIZED HEALTH CARE EDUCATION/TRAINING PROGRAM

## 2025-05-16 PROCEDURE — 1159F MED LIST DOCD IN RCRD: CPT | Mod: CPTII,,, | Performed by: STUDENT IN AN ORGANIZED HEALTH CARE EDUCATION/TRAINING PROGRAM

## 2025-05-16 PROCEDURE — 3044F HG A1C LEVEL LT 7.0%: CPT | Mod: CPTII,,, | Performed by: STUDENT IN AN ORGANIZED HEALTH CARE EDUCATION/TRAINING PROGRAM

## 2025-05-16 PROCEDURE — 99999 PR PBB SHADOW E&M-EST. PATIENT-LVL II: CPT | Mod: PBBFAC,,, | Performed by: STUDENT IN AN ORGANIZED HEALTH CARE EDUCATION/TRAINING PROGRAM

## 2025-05-16 PROCEDURE — 4010F ACE/ARB THERAPY RXD/TAKEN: CPT | Mod: CPTII,,, | Performed by: STUDENT IN AN ORGANIZED HEALTH CARE EDUCATION/TRAINING PROGRAM

## 2025-05-16 PROCEDURE — 1160F RVW MEDS BY RX/DR IN RCRD: CPT | Mod: CPTII,,, | Performed by: STUDENT IN AN ORGANIZED HEALTH CARE EDUCATION/TRAINING PROGRAM

## 2025-05-16 RX ORDER — AMLODIPINE BESYLATE 10 MG/1
10 TABLET ORAL DAILY
COMMUNITY
End: 2025-05-16 | Stop reason: SDUPTHER

## 2025-05-16 RX ORDER — AMLODIPINE BESYLATE 5 MG/1
5 TABLET ORAL DAILY
Qty: 90 TABLET | Refills: 3 | Status: SHIPPED | OUTPATIENT
Start: 2025-05-16 | End: 2026-05-11

## 2025-05-16 NOTE — PROGRESS NOTES
"[facility]  Nephrology Clinic    Subjective:      HPI ID: Mine Quiros is a 59 y.o. female who presents for return patient evaluation for chronic renal failure.  She has a pertinent past medical history of diabetes type 2 diagnosed in 2008 and hypertension diagnosed >10 years ago.  She is followed in this clinic for ongoing evaluation and management of chronic renal failure and nephrotic range proteinuria.    Interval history:  03/22/2024 clinic visit-patient seen via telemedicine services today.  Reviewed her most recent lab results at chair side.  Hemoglobin A1c is 6.1%.  Urinalysis continues to show 3+ protein 1+ occult blood, U PCR quantification of 10 grams/gram.  She also has hypovitaminosis D. her serum albumin remains low 2.5.  Renal function based on serum creatinine trend is at baseline.    7/5/24 clinic visit - seen today via telemedicine services for ongoing evaluation and management of CKD. She has no uremic or urinary symptoms and is in her usual state of health. She has not started taking her finerenone since last visit but she has obtained the medication. She admits to not taking any medications except for insulin since last visit d/t feeling "well". Counseled patient she needs to participate in the care plan if we are to preserve her kidney function.    10/28/24 - here today for CKD f/u and ongoing management. Did not bring her medications with her to clinic with her today. She is here with her sister. She is still unsure of which medications she is taking. Counseled on need to bring her medications to all physician visits.  Denies acute complaints today.  We reviewed her labs at chairside.     01/29/25 - here today for f/u CKD management. She again did not bring her medications with her to clinic with her today. She continues to be a challenging, non-complaint patient. I stressed again with her today that I may be unable to help her if she cannot help herself with sticking to " "the therapy plans in place, and that dialysis will be a realistic future.  She reports she is going to be more compliant from this point forward.  We reviewed recent lab trends at chair side.  She remains hypokalemic but asymptomatic, we will increase spironolactone dose today to address this moving forward which should also help with her resistant hypertension.  Renal function based on serum creatinine trend remains close to her baseline.  Continues to demonstrate nephrotic range proteinuria.    5/16/25 - here today for CKD f/u. Feeling well. Denies acute complaints. She complains of urinary frequency, though denies dysuria symptoms. Appetite is good. Avoiding dehydration. Labs pending at the time of the visit today. Still did not bring medications into clinic today.     Review of Systems   Constitutional:  Negative for chills, diaphoresis and fever.   Respiratory:  Negative for cough and shortness of breath.    Cardiovascular:  Negative for chest pain and leg swelling.   Gastrointestinal:  Negative for abdominal pain, diarrhea, nausea and vomiting.   Genitourinary:  Negative for difficulty urinating, dysuria and hematuria.   Musculoskeletal:  Negative for myalgias.   Neurological:  Negative for headaches.   Hematological:  Does not bruise/bleed easily.   All other systems reviewed and are negative.      The past medical, family and social histories were reviewed for this encounter.     Objective:   BP (!) 168/102 (BP Location: Left arm, Patient Position: Sitting)   Pulse 105   Ht 4' 8" (1.422 m)   LMP 08/31/2007 (Approximate)   SpO2 98%   BMI 43.05 kg/m²        Physical Exam  Vitals reviewed.   Constitutional:       General: She is not in acute distress.     Appearance: She is morbidly obese.   Cardiovascular:      Pulses: Normal pulses.      Heart sounds: Normal heart sounds.      No friction rub.   Pulmonary:      Effort: Pulmonary effort is normal. No respiratory distress.      Breath sounds: Normal breath " sounds.   Abdominal:      Palpations: Abdomen is soft.      Tenderness: There is no abdominal tenderness.   Musculoskeletal:         General: No swelling.      Cervical back: Normal range of motion. No tenderness.      Right lower leg: Edema present.      Left lower leg: Edema present.   Neurological:      General: No focal deficit present.      Mental Status: She is oriented to person, place, and time.      Motor: No weakness.   Psychiatric:         Mood and Affect: Mood normal.         Behavior: Behavior normal.         Assessment:     Lab Results   Component Value Date    CREATININE 1.4 01/18/2025    BUN 18 01/18/2025     01/18/2025    K 3.1 (L) 01/18/2025     01/18/2025    CO2 23 01/18/2025     Lab Results   Component Value Date    .1 (H) 01/18/2025    CALCIUM 9.1 01/18/2025    PHOS 3.9 01/18/2025     Lab Results   Component Value Date    HCT 45.5 01/18/2025     Prot/Creat Ratio, Urine   Date Value Ref Range Status   01/18/2025 9.48 (H) 0.00 - 0.20 Final   10/21/2024 11.73 (H) 0.00 - 0.20 Final   06/25/2024 8.31 (H) 0.00 - 0.20 Final      Lab Results   Component Value Date    MICALBCREAT 6702.8 (H) 01/18/2025    MICALBCREAT Unable to calculate 10/21/2024    MICALBCREAT 6030.2 (H) 05/11/2023    MICALBCREAT 4302.9 (H) 01/05/2023    MICALBCREAT 3218.8 (H) 10/07/2021    MICALBCREAT 315.7 (H) 10/25/2019           1. Stage 3a chronic kidney disease    2. Persistent proteinuria    3. Albuminuria    4. Type 2 diabetes mellitus with diabetic polyneuropathy, with long-term current use of insulin    5. HTN (hypertension), benign    6. Secondary hyperparathyroidism    7. MARLON (obstructive sleep apnea)        Plan:   Return to clinic in 3 months.  Labs for next visit include uacr, upcr, ua, rfp, pth, cbc  Baseline creatinine is 1.0-1.3mg/dL.    CKD G3a / A3  Risk:  Longstanding diabetes mellitus type 2 previously uncontrolled, hypertension, morbid obesity.  -no history of renal biopsy, though likely  diabetic nephropathy versus FSGS.  She is not a good candidate for immunosuppression so biopsy is currently deferred.  -needs to be more adherent with medication regiment or I will not be able to prolong her need for dialysis. Stressed with patient AGAIN today  -continue ARB  -continue/fill Mineralocorticoid Receptor Antagonist     Hypertension- needs to take her medications. She continues to be non-adherent with bring medications in to reconcile. Can f/u with PCP for this as well. She is not sure what she is taking.     Morbid obesity-the patient's biggest barrier to improving her overall health and management of other comorbidities.     Type 2 diabetes mellitus on long-term use of insulin with diabetic retinopathy-patient advised to continue good control of her diabetes to reduce risks of further complications    MARLON-continue CPAP     Secondary hyperparathyroidism- increased in the interim. Ergo script refilled    Nephrotic range proteinuria-not to a degree at where she would need anticoagulation but getting close.  Needs to take her ARB and NRA      __________________________  Gino Solorio MD  [facility]    Part of this note has been created using [employer] dictation system. Errors in transcription may not be completely avoided.      KFRE 2-Year: 6.7% at 1/18/2025 10:42 AM  Calculated from:  Serum Creatinine: 1.4 mg/dL at 1/18/2025 10:42 AM  Urine Albumin Creatinine Ratio: 6,702.8 ug/mg at 1/18/2025 10:42 AM  Age: 59 years  Sex: Female at 1/18/2025 10:42 AM  Has CKD-3 to CKD-5: Yes    KFRE 5-Year: 19.3% at 1/18/2025 10:42 AM  Calculated from:  Serum Creatinine: 1.4 mg/dL at 1/18/2025 10:42 AM  Urine Albumin Creatinine Ratio: 6,702.8 ug/mg at 1/18/2025 10:42 AM  Age: 59 years  Sex: Female at 1/18/2025 10:42 AM  Has CKD-3 to CKD-5: Yes

## 2025-05-21 ENCOUNTER — CLINICAL SUPPORT (OUTPATIENT)
Dept: DIABETES | Facility: CLINIC | Age: 60
End: 2025-05-21
Payer: MEDICAID

## 2025-05-21 VITALS — BODY MASS INDEX: 42.66 KG/M2 | WEIGHT: 189.63 LBS | HEIGHT: 56 IN

## 2025-05-21 DIAGNOSIS — Z79.4 TYPE 2 DIABETES MELLITUS WITH DIABETIC POLYNEUROPATHY, WITH LONG-TERM CURRENT USE OF INSULIN: Primary | ICD-10-CM

## 2025-05-21 DIAGNOSIS — E11.42 TYPE 2 DIABETES MELLITUS WITH DIABETIC POLYNEUROPATHY, WITH LONG-TERM CURRENT USE OF INSULIN: Primary | ICD-10-CM

## 2025-05-21 PROCEDURE — 99999PBSHW PR PBB SHADOW TECHNICAL ONLY FILED TO HB: Mod: PBBFAC,,,

## 2025-05-21 PROCEDURE — G0108 DIAB MANAGE TRN  PER INDIV: HCPCS | Mod: PBBFAC,PO | Performed by: DIETITIAN, REGISTERED

## 2025-05-21 NOTE — PROGRESS NOTES
"Diabetes Care Specialist Follow-up Note  Author: Kimberlee Shelton RD, [facility]  Date: 5/21/2025    Intake    Program Intake  Reason for Diabetes Program Visit:: Intervention  Type of Intervention:: Individual  Individual: Device Training  Device Training: Insulin Pump Upgrade (Medtronic 770G to Medtronic 780G pump)  Current diabetes risk level:: high  In the last month, have you used the ER or been admitted to the hospital: No    Current Diabetes Treatment: Insulin, DM Injectables  DM Injectables Type/Dose: Mounjaro 15 mg weekly  Method of insulin delivery?: Insulin Pump--DISCONTINUED USE OF INSULIN PUMP APPROX 1 WEEK AGO (5/14/25) AND HAS NOT BEEN USING BACK UP INSULIN REGIMEN  Type of Pump: Novolog in Medtronic 770G insulin pump  Does patient have back-up plan?: Yes  Any problems obtaining supplies?: No    Patient did not bring Medtronic 770G discontinued pump to visit for download of device.      Continuous Glucose Monitoring  Patient has CGM: Yes  Personal CGM type:: Medtronic Guardian--currently not using due to issues with transmitter batter, awaiting replacement Guardian later this month    Lab Results   Component Value Date    HGBA1C 6.2 (H) 04/22/2025     A1c Pre Diabetes Care Specialist Intervention:  5.9%    Weight: 86 kg (189 lb 9.5 oz)   Height: 4' 8" (142.2 cm)   Body mass index is 42.51 kg/m².  Wt loss of 3 lbs since last visit on 1/29/2025    Glucose Monitoring: Currently not using Guardian 3 sensors/transmitter. Reports discontinuing use a couple of weeks ago and has not been fingerstick monitoring glucose at home.  States she feels fine and glucose is running fine. Fingerstick glucose checked at clinic visit to be 112 mg/dL (mid afternoon--fasting as patient has not yet eaten today).    Diabetes Self-Management Skills Assessment    Medication Skills Assessment  Patient is able to identify current diabetes medications, dosages, and appropriate timing of medications.: yes  Patient reports problems or " concerns with current medication regimen.: no  Patient is  aware that some diabetes medications can cause low blood sugar?: Yes  Medication Skills Assessment Completed:: Yes  Assessment indicates:: Instruction Needed  Area of need?: Yes    Home Blood Glucose Monitoring  Monitoring Method:: personal continuous glucose monitor  Personal CGM type:: Medtronic Guardian--currently not using due to issues with transmitter batter, awaiting replacement Guardian later this month   What is your current Time in Range?: Currently not using--awaiting new transmitter and sensors (pharmacy indicates these cannot be processed until after 5/31/25)  What is your A1c Target?: <7% without hypoglycemia  Home Blood Glucose Monitoring Skills Assessment Completed: : Yes  Assessment indicates:: Instruction Needed  Area of need?: Yes    During today's follow-up visit,  the following areas required further assessment and content was provided/reviewed.    Based on today's diabetes care assessment, the following areas of need were identified:      Identified Areas of Need      Medication/Current Diabetes Treatment: Yes --see care plan, pump upgrade done today in clinic visit   Home Blood Glucose Monitoring: Yes --see care plan, educated patient to fingerstick monitor glucose until restarting use of Guardian sensor.       Today's interventions were provided through individual discussion, instruction, and written materials were provided.    Patient verbalized understanding of instruction and written materials.  Pt was able to return back demonstration of instructions today. Patient understood key points, needs reinforcement and further instruction.     Diabetes Self-Management Care Plan Review and Evaluation of Progress:    During today's follow-up Mine's Diabetes Self-Management Care Plan progress was reviewed and progress was evaluated including his/her input. Mine has agreed to continue his/her journey to improve/maintain overall diabetes  control by continuing to set health goals. See care plan progress below.      Care Plan: Diabetes Management   Updates made since 5/21/2024 12:00 AM     Problem: Medications         Long-Range Goal: Patient upgrading from Medtronic 770G insulin pump to Medtronic 780G insulin pump.  Patient reports she discontinued use of 770G insulin pump last week (approx 5/14/25) due to alarming and has not been taking insulin.    Start Date: 1/29/2025   This Visit's Progress: On track   Priority: High   Barriers: No Barriers Identified   Note:    Medtronic MiniMed 780G --upgrading from Medtronic 770G    REFERRING PROVIDER: Regina Solorio NP    Patient here today for training on the MiniMed 780G insulin pump. Pump training was provided per Medtronic protocol.     Details of pump therapy were covered with the patient. Reviewed basal versus bolus insulin delivery.      Basic insulin pump features by menu were reviewed in detail. Menu icons reviewed with patient including:    History & Graph: History, Sensor Glucose Review, Graph, Time in Range  SmartGuard: SmartGuard Checklist, Temp Target, SmartGuard Settings, SmartGuard On & Off  Sound & Vibration: Silence Sensor Alerts, Volume, Sound, Vibration, Alert Settings Shortcut  [place] & Set: New [place] & Set, New Reservoir Only, New Set Only, Fill Cannula  Insulin: Bolus, Basal, Suspend/Resume Basal Delivery, Delivery Settings Shortcut  Blood Glucose: BG  Status: Suspend All Delivery, SmartGuard Checklist, Pump, Sensor  Paired Devices: Pair New Device, Pair Carelink, Mobile, Meter, Sensor  Settings: Alert Settings, Delivery Settings, Device Settings    Instructed and assisted in programming pump following Delivery Settings menu.  Settings were programmed as follows per provider:    Basal Pattern Setup  Basal rate: 0.5 u/hr (decreased from 1 u/hr today by AYO Solorio NP after patient found to be off insulin pump X 1 week and taking no insulin with glucose level measured at visit to  be 112 mg/dL)    Max Basal rate: 2 u/hr  Max Bolus: 25 units  Dual Wave: OFF  Square Wave: OFF  Bolus Increment: 0.1 u  Bolus Speed: Standard  Preset Bolus Setup: N/A  Preset Temp Setup:: N/A    Auto Suspend: off  Programmed Bolus Wizard settings per provider:    Bolus Wizard feature turned ON:  Carb ratio:5  Active insulin time: 4 hours  Insulin sensitivity: 30  BG Target: 120-140 mg/dL    Instructed and reviewed Clute & Tubing menu:  Patient will be using Pedro Advance infusion set with 6 mm cannula and tubing length of 23 inches.  Patient successfully demonstrated the ability to fill reservoir with insulin, rewind pump, and place reservoir into pump. Patient inserted the infusion set with aseptic technique and secured it to right lower abdomen. Reviewed site selection and rotation.     Accu-Chek Guide Link  Glucometer successfully paired to insulin pump for use with Bolus Wizard.  Reviewed frequency of monitoring glucose as well as bolus feature available from meter.     Discussed storage of insulin and back-up plan if pump is broken or fails;  Encouraged patient to keep extra insulin as needed. Reviewed treatment of hypoglycemia, hyperglycemia and troubleshooting of pump referring to Quick - Reference Safety rules.    Reminders:  Low Reservoir: 20 units  Set Change: OFF  Calibration:  OFF  Bolus BG Check: OFF  Missed Meal Bolus: OFF  Personal: OFF    Medtronic Carelink personal:  Username: [email or web address]  Password: [email or web address]    Patient has a copy of Medtronic Get to know your MiniMed 780G system booklet for home use.  Patient verbalized understanding of information reviewed today.     MiniMed mobile phone iraida:  Patient found to be logged out of mobile iraida. Assisted patient with logging back into [employer] and pairing new Medtronic 780G insulin pump to iraida.  Patient now able to see pump data on mobile iraida of her phone.  Connected her [employer] account to [facility].  Patient aware she will need to stay  logged into [employer] and have [employer] mobile iraida running on phone for pump/CGM data to be shared with Endocrine provider.       Problem: Blood Glucose Self-Monitoring         Long-Range Goal: Patient awaiting Guardian transmitter/sensor--[facility] indicating prescription cannot be filled until 5/31/25.    Start Date: 5/21/2025   This Visit's Progress: Not met   Priority: Medium   Barriers: Lack of Supplies   Note:    5/21/25: Contacted [facility] as patient states she has not received Guardian 4 transmitter and sensors. Pharmacy indicates Rx for Guardian 4 cannot be processed until 5/31/25 to be filled.  Patient will notify me once she receives transmitter and sensors so that device can be paired with pump and sensor use restarted to utilize SmartGuard feature of Medtronic 780G insulin pump.        Task: Troubleshooting provided to determine why Guardian sensor not connecting to Medtronic 770G insulin pump. Completed 1/29/2025   Note:    Patient states she changed Guardian sensor last night and has noticed that 2-hour sensor warm up did not initiate on Medtronic 770G insulin pump.  Determined that patient did not have transmitter snapped completely into sensor on abdomen and was able to snap in transmitter flush with sensor and 2-hour sensor warm up initiated on Medtronic pump. Patient aware of importance of continuing use of Guardian sensor on Medtronic pump to remain in Automated mode feature of pump to prevent large swings in glucose and especially decrease hypoglycemia.         Follow Up Plan     Follow up in about 2 weeks (around 6/4/2025) for Guardian 4 transmitter/sensor start and pair with upgraded Medtronic 780G insulin pump. Once sensor restarted, will be able to utilize SmartGuard feature of Medtronic pump for better control of glucose levels.  Endocrine provider decreased basal rate in upgraded insulin pump today as patient reports being off previous insulin pump for past 7 days with no other  outside delivered and glucose at visit today 112 mg/dL. Patient with minimal food intake--arrives at afternoon visit today and has not yet eaten.      Today's care plan and follow up schedule was discussed with patient.  Mine verbalized understanding of the care plan, goals, and agrees to follow up plan.        The patient was encouraged to communicate with his/her health care provider/physician and care team regarding his/her condition(s) and treatment.  I provided the patient with my contact information today and encouraged to contact me via phone or [facility] as needed.     Length of Visit   Total Time: 90 Minutes

## 2025-05-24 RX ORDER — ERGOCALCIFEROL 1.25 MG/1
CAPSULE ORAL
Qty: 21 CAPSULE | Refills: 0 | Status: SHIPPED | OUTPATIENT
Start: 2025-05-24 | End: 2026-05-19

## 2025-06-05 ENCOUNTER — PATIENT OUTREACH (OUTPATIENT)
Dept: DIABETES | Facility: CLINIC | Age: 60
End: 2025-06-05
Payer: MEDICAID

## 2025-06-10 ENCOUNTER — TELEPHONE (OUTPATIENT)
Dept: DIABETES | Facility: CLINIC | Age: 60
End: 2025-06-10
Payer: MEDICAID

## 2025-06-10 NOTE — TELEPHONE ENCOUNTER
Patient left a voicemail for Kori over the weekend stating that she was having trouble with her cgm communicating with her pump.  Attempted to reach patient back today to see if we could troubleshoot the issue.  Unable to reach patient but left her a message to call us back or to call Medtronic customer support if she was still having trouble.

## 2025-06-17 ENCOUNTER — TELEPHONE (OUTPATIENT)
Dept: DIABETES | Facility: CLINIC | Age: 60
End: 2025-06-17
Payer: MEDICAID

## 2025-06-17 NOTE — TELEPHONE ENCOUNTER
Patient returning call. Walked through re-pairing transmitter to Medtronic pump, however, patient unable to start new sensor.  She is agreeable to come in for troubleshooting visit in clinic tomorrow at 1:30pm with transmitter, , and new sensor so that diabetes care specialist can assist with restarting CGM and connecting to pump.

## 2025-06-17 NOTE — TELEPHONE ENCOUNTER
Returned patient's earlier call today regarding issues with use of Medtronic Guardian glucose sensor.  Patient currently using Medtronic 780G insulin pump integrated with Guardian 4 sensor.  Did receive a new Guardian transmitter in the past 2 weeks and paired CGM transmitter to pump on 6/5/25.      Patient states sensor issues with pump losing connection with sensor.  Office number left in voicemail today for patient to return call to obtain assistance.

## 2025-06-18 ENCOUNTER — CLINICAL SUPPORT (OUTPATIENT)
Dept: DIABETES | Facility: CLINIC | Age: 60
End: 2025-06-18
Payer: MEDICAID

## 2025-06-18 VITALS — HEIGHT: 56 IN | BODY MASS INDEX: 42.66 KG/M2 | WEIGHT: 189.63 LBS

## 2025-06-18 DIAGNOSIS — E11.42 TYPE 2 DIABETES MELLITUS WITH DIABETIC POLYNEUROPATHY, WITH LONG-TERM CURRENT USE OF INSULIN: Primary | ICD-10-CM

## 2025-06-18 DIAGNOSIS — Z79.4 TYPE 2 DIABETES MELLITUS WITH DIABETIC POLYNEUROPATHY, WITH LONG-TERM CURRENT USE OF INSULIN: Primary | ICD-10-CM

## 2025-06-18 PROCEDURE — G0108 DIAB MANAGE TRN  PER INDIV: HCPCS | Mod: PBBFAC,PO | Performed by: DIETITIAN, REGISTERED

## 2025-06-18 PROCEDURE — 99999PBSHW PR PBB SHADOW TECHNICAL ONLY FILED TO HB: Mod: PBBFAC,,,

## 2025-06-18 PROCEDURE — 99999 PR PBB SHADOW E&M-EST. PATIENT-LVL I: CPT | Mod: PBBFAC,,, | Performed by: DIETITIAN, REGISTERED

## 2025-06-18 PROCEDURE — 99211 OFF/OP EST MAY X REQ PHY/QHP: CPT | Mod: PBBFAC,PO | Performed by: DIETITIAN, REGISTERED

## 2025-06-18 NOTE — PROGRESS NOTES
Diabetes Care Specialist Follow-up Note  Author: Kimberlee Shelton RD, [facility]  Date: 6/18/2025    Intake    Program Intake  Reason for Diabetes Program Visit:: Intervention  Type of Intervention:: Individual  Individual: Device Training  Device Training: Other (Troubleshooting issues with Guardian 4 sensor connection to Medtronic 780G pump)  Current diabetes risk level:: moderate  In the last month, have you used the ER or been admitted to the hospital: No    Current Diabetes Treatment: Insulin, DM Injectables  DM Injectables Type/Dose: Mounjaro 15 mg weekly  Method of insulin delivery?: Insulin Pump  Type of Pump: Novolog in Hio840R insulin pump  Does patient have back-up plan?: Yes (Belives she has Levemir in home refrigerator--she will check tonight and confirm)  Any problems obtaining supplies?: No    Basal Pattern Setup  Basal rate: 0.5 u/hr (decreased from 1 u/hr today by AYO Solorio NP after patient found to be off insulin pump X 1 week and taking no insulin with glucose level measured at visit to be 112 mg/dL)    Max Basal rate: 2 u/hr  Max Bolus: 25 units    Carb ratio:5  Active insulin time: 4 hours  Insulin sensitivity: 30  BG Target: 120-140 mg/dL    Medtronic daysoft personal:  Username: [email or web address]  Password: [email or web address]    Medtronic 780G insulin pump download (6/5/2025 to 6/18/2025): See media file for full pump report. Patient with infrequent bolusing--reports was on vacation and did not remember to bolus often. Patient with sporadic use of insulin pump yet somehow maintaining good glucose control.  Glucose checked in clinic visit today to be 123 mg/dL after being off insulin pump approx 24 hours. Has had sensor issues and troubleshooting done at visit today resulted in determination that patient's current 780G pump is malfunctioning and she will be sent a replacement pump tomorrow.           Continuous Glucose Monitoring  Patient has CGM: Yes  Personal CGM type:: Medtronic Guardian 4    Lab Results  "  Component Value Date    HGBA1C 6.2 (H) 04/22/2025     Weight: 86 kg (189 lb 9.5 oz)   Height: 4' 8" (142.2 cm)   Body mass index is 42.51 kg/m².  Wt stable since last visit on 5/21/2025    Glucose Monitoring: Uses Medtronic Guardian 4 (new transmitter received late May 2025) but has been having issues with sensor staying connected to Medtronic pump and unable to utilize SmartGuard feature of Medtronic pump. Has glucometer at home but not using.     Diabetes Self-Management Skills Assessment    Medication Skills Assessment  Patient is able to identify current diabetes medications, dosages, and appropriate timing of medications.: yes  Patient reports problems or concerns with current medication regimen.: yes  Medication regimen problems/concerns:: does not feel like regimen is working  Patient is  aware that some diabetes medications can cause low blood sugar?: Yes  Medication Skills Assessment Completed:: Yes  Assessment indicates:: Instruction Needed  Area of need?: Yes    Home Blood Glucose Monitoring  Personal CGM type:: Medtronic Guardian 4    During today's follow-up visit,  the following areas required further assessment and content was provided/reviewed.    Based on today's diabetes care assessment, the following areas of need were identified:      Identified Areas of Need      Medication/Current Diabetes Treatment: Yes--see care plan       Today's interventions were provided through individual discussion, instruction, and written materials were provided.    Patient verbalized understanding of instruction and written materials.  Pt was able to return back demonstration of instructions today. Patient understood key points, needs reinforcement and further instruction.     Diabetes Self-Management Care Plan Review and Evaluation of Progress:    During today's follow-up Mine's Diabetes Self-Management Care Plan progress was reviewed and progress was evaluated including his/her input. Mine has agreed to continue " his/her journey to improve/maintain overall diabetes control by continuing to set health goals. See care plan progress below.      Care Plan: Diabetes Management   Updates made since 6/18/2024 12:00 AM        Problem: Medications         Long-Range Goal: Patient upgrading from Medtronic 770G insulin pump to Medtronic 780G insulin pump.  Patient reports she discontinued use of 770G insulin pump last week (approx 5/14/25) due to alarming and has not been taking insulin.    Start Date: 1/29/2025   Recent Progress: On track   Priority: High   Barriers: No Barriers Identified   Note:    6/18/25:  Patient arrives for assistance with connecting new Guardian 4 sensor to Medtronic 780G insulin pump. Unable to get connection to pump so contacted [employer] customer support at [phone] and after much troubleshooting, patient found to have a faulty 780G pump and will be mailed a replacement 780G insulin pump to her home.  She is scheduled for pump programming of replacement pump on Monday 6/23/25 @ 1pm.  She declines to pup back her pump on her body (she did take off yesterday afternoon) and will check back up insulin in refrigerator at home to use until pump restarted.  Fingerstick glucose checked in at clinic visit to be 123 mg/dL at 2:50PM.           Problem: Blood Glucose Self-Monitoring         Long-Range Goal: Patient received new Guardian 4 transmitter from [place] pharmacy at end of May 2025--having issues with connecting transmitter to Medtronic 780G pump.    Start Date: 5/21/2025   Recent Progress: Not met   Priority: Medium   Barriers: Lack of Supplies   Note:    6/18/25: Patient arrives for assistance in connecting Guardian transmitter to Medtronic 780G pump.  After multiple unsuccessful attempts--contacted [employer] technical support and patient found to have malfunctioning Medtronic 780G pump and will be shipped a replacement pump tomorrow.  Patient will return to clinic for assistance with restarting use of  Medtronic sensor and connecting to replacement pump.     5/21/25: Contacted [facility] as patient states she has not received Guardian 4 transmitter and sensors. Pharmacy indicates Rx for Guardian 4 cannot be processed until 5/31/25 to be filled.  Patient will notify me once she receives transmitter and sensors so that device can be paired with pump and sensor use restarted to utilize SmartGuard feature of Medtronic 780G insulin pump.        Follow Up Plan     Follow up in 5 days (on 6/23/2025 for @ 1pm) for replacement Medtronic 780G pump reprogramming and will connect and restart Guardian 4 sensor at this visit. Patient aware she needs to initiate insulin back up plan--she confirms she has insulin pens at home but is unaware if long acting insulin or rapid acting insulin and will check and notify.  Glucose checked in clinic today to be 123 mg/dL and patient took off insulin pump approx 24 hours ago.  Confirms still taking Mounjaro 15 mg weekly.    Today's care plan and follow up schedule was discussed with patient.  Mine verbalized understanding of the care plan, goals, and agrees to follow up plan.        The patient was encouraged to communicate with his/her health care provider/physician and care team regarding his/her condition(s) and treatment.  I provided the patient with my contact information today and encouraged to contact me via phone or [facility] Patient Portal as needed.     Length of Visit   Total Time: 45 Minutes

## 2025-06-19 DIAGNOSIS — Z79.4 TYPE 2 DIABETES MELLITUS WITH DIABETIC POLYNEUROPATHY, WITH LONG-TERM CURRENT USE OF INSULIN: ICD-10-CM

## 2025-06-19 DIAGNOSIS — E11.42 TYPE 2 DIABETES MELLITUS WITH DIABETIC POLYNEUROPATHY, WITH LONG-TERM CURRENT USE OF INSULIN: ICD-10-CM

## 2025-06-19 RX ORDER — INSULIN ASPART 100 [IU]/ML
INJECTION, SOLUTION INTRAVENOUS; SUBCUTANEOUS
Qty: 50 ML | Refills: 11 | Status: SHIPPED | OUTPATIENT
Start: 2025-06-19

## 2025-06-23 ENCOUNTER — CLINICAL SUPPORT (OUTPATIENT)
Dept: DIABETES | Facility: CLINIC | Age: 60
End: 2025-06-23
Payer: MEDICAID

## 2025-06-23 VITALS — WEIGHT: 189.63 LBS | HEIGHT: 56 IN | BODY MASS INDEX: 42.66 KG/M2

## 2025-06-23 DIAGNOSIS — E11.42 TYPE 2 DIABETES MELLITUS WITH DIABETIC POLYNEUROPATHY, WITH LONG-TERM CURRENT USE OF INSULIN: Primary | ICD-10-CM

## 2025-06-23 DIAGNOSIS — Z79.4 TYPE 2 DIABETES MELLITUS WITH DIABETIC POLYNEUROPATHY, WITH LONG-TERM CURRENT USE OF INSULIN: Primary | ICD-10-CM

## 2025-06-23 PROCEDURE — G0108 DIAB MANAGE TRN  PER INDIV: HCPCS | Mod: PBBFAC,PO | Performed by: DIETITIAN, REGISTERED

## 2025-06-23 PROCEDURE — 99999PBSHW PR PBB SHADOW TECHNICAL ONLY FILED TO HB: Mod: PBBFAC,,,

## 2025-06-23 NOTE — PROGRESS NOTES
"Diabetes Care Specialist Follow-up Note  Author: Kimberlee Shelton RD, [facility]  Date: 6/23/2025    Intake    Program Intake  Reason for Diabetes Program Visit:: Intervention  Type of Intervention:: Individual  Individual: Device Training  Device Training: Other (Program Medtronic 780G replacement pump)  Current diabetes risk level:: moderate  In the last month, have you used the ER or been admitted to the hospital: No  Permission to speak with others about care:: yes (sister is with patient today at visit)    Current Diabetes Treatment: Insulin, DM Injectables  DM Injectables Type/Dose: Mounjaro 15 mg weekly  Method of insulin delivery?: Insulin Pump  Type of Pump: Novolog in Medtronic 780G insulin pump--currently not wearing Medtronic pump for > 1 week  Does patient have back-up plan?: Yes (patient forgot to check home refrigerator for basal/long acting insulin)  Any problems obtaining supplies?: No    Continuous Glucose Monitoring  Patient has CGM: Yes  Personal CGM type:: Medtronic Guardian 4--currently not wearing due to pump malfunction    Lab Results   Component Value Date    HGBA1C 6.2 (H) 04/22/2025     Weight: 86 kg (189 lb 9.5 oz)   Height: 4' 8" (142.2 cm)   Body mass index is 42.51 kg/m².    Glucose monitoring: None currently.  Having issues with Medtronic Guardian and will plan to resume in 2 days once replacement transmitter received in mail from [employer].       Diabetes Self-Management Skills Assessment    Medication Skills Assessment  Patient is able to identify current diabetes medications, dosages, and appropriate timing of medications.: yes  Patient reports problems or concerns with current medication regimen.: yes  Medication regimen problems/concerns:: does not feel like regimen is working  Patient is  aware that some diabetes medications can cause low blood sugar?: Yes  Medication Skills Assessment Completed:: Yes  Assessment indicates:: Instruction Needed  Area of need?: Yes    Home Blood " Glucose Monitoring  Personal CGM type:: Medtronic Guardian 4--currently not wearing due to pump malfunction    During today's follow-up visit,  the following areas required further assessment and content was provided/reviewed.    Based on today's diabetes care assessment, the following areas of need were identified:      Identified Areas of Need      Medication/Current Diabetes Treatment: Yes--see care plan       Today's interventions were provided through individual discussion, instruction, and written materials were provided.    Patient verbalized understanding of instruction and written materials.  Pt was able to return back demonstration of instructions today. Patient understood key points, needs reinforcement and further instruction.     Diabetes Self-Management Care Plan Review and Evaluation of Progress:    During today's follow-up Mine's Diabetes Self-Management Care Plan progress was reviewed and progress was evaluated including his/her input. Mine has agreed to continue his/her journey to improve/maintain overall diabetes control by continuing to set health goals. See care plan progress below.      Care Plan: Diabetes Management   Updates made since 6/23/2024 12:00 AM        Problem: Medications         Long-Range Goal: Patient upgrading from Medtronic 770G insulin pump to Medtronic 780G insulin pump.  Patient reports she discontinued use of 770G insulin pump last week (approx 5/14/25) due to alarming and has not been taking insulin.    Start Date: 1/29/2025   This Visit's Progress: On track   Recent Progress: On track   Priority: High   Barriers: No Barriers Identified   Note:    6/23/25:  Patient arrives with replacement Medtronic 780G insulin pump. Patient has not been using pump for > 1 week due to ump malfunction and prior to that had issues with pump on vacation and used it minimally. It was programmed with settings from current Medtronic 780G pump as follows:    Medtronic MiniMed 780G pump serial #  [id number]    Basal Pattern Setup  Basal rate: 0.5 u/hr   Max Basal rate: 2 u/hr  Max Bolus: 25 units  Dual Wave: OFF  Square Wave: OFF  Bolus Increment: 0.1 u  Bolus Speed: Standard  Preset Bolus Setup: N/A  Preset Temp Setup:: N/A    Auto Suspend: off  Programmed Bolus Wizard settings per provider:    Bolus Wizard feature turned ON:  Carb ratio:5  Active insulin time: 4 hours  Insulin sensitivity: 30  BG Target: 120-140 mg/dL    Accu-Chek Guide Link  Glucometer successfully paired to insulin pump for use with Bolus Wizard. Un-paired old pump from glucometer and paired new 780G to glucometer.  Reviewed frequency of monitoring glucose as well as bolus feature available from meter.     Reminders:  Low Reservoir: 20 units  Set Change: OFF  Calibration:  OFF  Bolus BG Check: OFF  Missed Meal Bolus: OFF  Personal: OFF    Medtronic Carelink personal:  Username: [email or web address]  Password: [email or web address]    Paired Carelink Mobile to new 780G insulin pump successfully.    6/18/25:  Patient arrives for assistance with connecting new Guardian 4 sensor to Medtronic 780G insulin pump. Unable to get connection to pump so contacted [employer] customer support at [phone] and after much troubleshooting, patient found to have a faulty 780G pump and will be mailed a replacement 780G insulin pump to her home.  She is scheduled for pump programming of replacement pump on Monday 6/23/25 @ 1pm.  She declines to pup back her pump on her body (she did take off yesterday afternoon) and will check back up insulin in refrigerator at home to use until pump restarted.  Fingerstick glucose checked in at clinic visit to be 123 mg/dL at 2:50PM.         Problem: Blood Glucose Self-Monitoring         Long-Range Goal: Patient received new Guardian 4 transmitter from [facility] pharmacy at end of May 2025--having issues with connecting transmitter to Medtronic 780G pump.    Start Date: 5/21/2025   This Visit's Progress: On track   Recent Progress: Not met    Priority: Medium   Barriers: Lack of Supplies   Note:    6/23/25:  Patient with new replacement 780G insulin pump and continues to have issue with transmitter pairing to pump.  Contacted [employer] technical support for assistance again while patient at visit and after going through troubleshooting with tech, it was determined that patient had malfunctioning Guardian 4 transmitter. A replacement transmitter will be sent in 1-night express mail and patient will be assisted with pairing new transmitter over the phone on 6/25/25.  Fingerstick glucose checked in clinic to be 101 mg/dL at 2:20pm and patient has not yet eaten today.     6/18/25: Patient arrives for assistance in connecting Guardian transmitter to Medtronic 780G pump.  After multiple unsuccessful attempts--contacted [employer] technical support and patient found to have malfunctioning Medtronic 780G pump and will be shipped a replacement pump tomorrow.  Patient will return to clinic for assistance with restarting use of Medtronic sensor and connecting to replacement pump.     5/21/25: Contacted [facility] as patient states she has not received Guardian 4 transmitter and sensors. Pharmacy indicates Rx for Guardian 4 cannot be processed until 5/31/25 to be filled.  Patient will notify me once she receives transmitter and sensors so that device can be paired with pump and sensor use restarted to utilize SmartGuard feature of Medtronic 780G insulin pump.        Follow Up Plan     Follow up in 2 days (on 6/25/2025) for asssitance over the phone with pairing new Medtronic Guardian 4 transmitter (she is being sent replacement transmitter after troubleshooting done with [employer] technical support today). Patient opting not to resume Medtronic 780G insulin pump until able to starting nee sensor/transmitter.  Has not worn Medtronic insulin pump in > 1 week.  Fingerstick glucose checked in clinic to be 101 mg/dL (2:15pm and patient had not eaten yet today).   Confirms she is taking Mounjaro 15 mg weekly.  Next Hgb A1c scheduled for 7/29/25 and endocrine follow up 8/5/25.    Today's care plan and follow up schedule was discussed with patient.  Mine verbalized understanding of the care plan, goals, and agrees to follow up plan.        The patient was encouraged to communicate with his/her health care provider/physician and care team regarding his/her condition(s) and treatment.  I provided the patient with my contact information today and encouraged to contact me via phone or [facility] Patient Portal as needed.     Length of Visit   Total Time: 45 Minutes

## 2025-06-24 DIAGNOSIS — E11.42 TYPE 2 DIABETES MELLITUS WITH DIABETIC POLYNEUROPATHY, WITH LONG-TERM CURRENT USE OF INSULIN: ICD-10-CM

## 2025-06-24 DIAGNOSIS — Z79.4 TYPE 2 DIABETES MELLITUS WITH DIABETIC POLYNEUROPATHY, WITH LONG-TERM CURRENT USE OF INSULIN: ICD-10-CM

## 2025-06-24 RX ORDER — INSULIN LISPRO 100 [IU]/ML
INJECTION, SOLUTION INTRAVENOUS; SUBCUTANEOUS
OUTPATIENT
Start: 2025-06-24 | End: 2026-06-24

## 2025-06-24 RX ORDER — INSULIN ASPART 100 [IU]/ML
INJECTION, SOLUTION INTRAVENOUS; SUBCUTANEOUS
Qty: 50 ML | Refills: 11 | Status: SHIPPED | OUTPATIENT
Start: 2025-06-24 | End: 2025-06-26 | Stop reason: SDUPTHER

## 2025-06-24 NOTE — TELEPHONE ENCOUNTER
S/w pt wanted to know if we had sent her Novolog Rx to her pharmacy.Notified her we called it in on 6/19.Pt states pharmacy was sending something to clinic regarding the novolog.Please check and see if it needs a PA.they might have fax it to the clinic    Thanks

## 2025-06-24 NOTE — TELEPHONE ENCOUNTER
Copied from CRM #[id number]. Topic: Medications - Medication Refill  >> Jun 24, 2025  2:14 PM Pieter Cerda wrote:  Type:  RX Refill Request    Who Called:  lobo   Refill or New Rx:  new  RX Name and Strength:  insulin aspart U-100 (NOVOLOG U-100 INSULIN ASPART) 100 unit/mL injection  Preferred Pharmacy with phone number:    [facility] - [place] - [address] [address]  [place] [address]  Phone: [phone] Fax: [phone]          Local or Mail Order:  local  Ordering Provider:  same   Best Call Back Number:  [phone]  Additional Information:  lobo states she needs new RX for above patient , please call to further assist

## 2025-06-25 ENCOUNTER — PATIENT OUTREACH (OUTPATIENT)
Dept: DIABETES | Facility: CLINIC | Age: 60
End: 2025-06-25
Payer: MEDICAID

## 2025-06-25 NOTE — PROGRESS NOTES
Patient calling for assistance with setting up new Medtronic Guardian 4 transmitter and pairing to Medtronic 780G pump.  Patient in clinic Monday 6/23/25 and had issues with pairing Guardian 4 transmitter to pump and [employer] technical support called and patient was found to have defective Guardian transmitter so was sent replacement transmitter in mail.  Received new transmitter last night and calling today to help pair new sensor with pump.    Patient and sister walked through steps to pair new sensor with pump and sensor paired successfully.  Patient to resume insulin pump therapy and restart sensor.  Patient asked to call with any issues restarting insulin pump and sensor.

## 2025-06-26 RX ORDER — INSULIN ASPART 100 [IU]/ML
INJECTION, SOLUTION INTRAVENOUS; SUBCUTANEOUS
Qty: 50 ML | Refills: 11 | Status: SHIPPED | OUTPATIENT
Start: 2025-06-26

## 2025-06-27 ENCOUNTER — PATIENT MESSAGE (OUTPATIENT)
Dept: ADMINISTRATIVE | Facility: HOSPITAL | Age: 60
End: 2025-06-27
Payer: MEDICAID

## 2025-07-16 ENCOUNTER — TELEPHONE (OUTPATIENT)
Dept: ENDOCRINOLOGY | Facility: CLINIC | Age: 60
End: 2025-07-16
Payer: MEDICAID

## 2025-07-16 NOTE — TELEPHONE ENCOUNTER
Copied from CRM #[id number]. Topic: General Inquiry - Patient Advice  >> Jul 11, 2025  2:52 PM Shahbaz wrote:  Type:  Needs Medical Advice    Who Called: Pt   Pharmacy name and phone #:    [facility] - [place] - [address] [place] [address]  [address] [place] [address]  [place] [address]  Phone: [phone] Fax: [phone]  Would the patient rather a call back or a response via [facility]? Call  Best Call Back Number: [phone]   Additional Information: Pt was adv that her blood-glucose sensor (GUARDIAN 4 GLUCOSE SENSOR) Jasmyne can't be sent out w/o authorization. Pls call back annie dv. Thank you.

## 2025-07-29 ENCOUNTER — LAB VISIT (OUTPATIENT)
Dept: PRIMARY CARE CLINIC | Facility: CLINIC | Age: 60
End: 2025-07-29
Payer: MEDICAID

## 2025-07-29 DIAGNOSIS — E11.42 TYPE 2 DIABETES MELLITUS WITH DIABETIC POLYNEUROPATHY, WITH LONG-TERM CURRENT USE OF INSULIN: ICD-10-CM

## 2025-07-29 DIAGNOSIS — Z79.4 TYPE 2 DIABETES MELLITUS WITH DIABETIC POLYNEUROPATHY, WITH LONG-TERM CURRENT USE OF INSULIN: ICD-10-CM

## 2025-07-29 LAB
CHOLEST SERPL-MCNC: 267 MG/DL (ref 120–199)
CHOLEST/HDLC SERPL: 5.9 {RATIO} (ref 2–5)
EAG (OHS): 123 MG/DL (ref 68–131)
HBA1C MFR BLD: 5.9 % (ref 4–5.6)
HDLC SERPL-MCNC: 45 MG/DL (ref 40–75)
HDLC SERPL: 16.9 % (ref 20–50)
LDLC SERPL CALC-MCNC: 189.6 MG/DL (ref 63–159)
NONHDLC SERPL-MCNC: 222 MG/DL
TRIGL SERPL-MCNC: 162 MG/DL (ref 30–150)

## 2025-07-29 PROCEDURE — 83036 HEMOGLOBIN GLYCOSYLATED A1C: CPT | Performed by: NURSE PRACTITIONER

## 2025-07-29 PROCEDURE — 36415 COLL VENOUS BLD VENIPUNCTURE: CPT | Mod: S$GLB,,, | Performed by: NURSE PRACTITIONER

## 2025-07-29 PROCEDURE — 82465 ASSAY BLD/SERUM CHOLESTEROL: CPT | Performed by: NURSE PRACTITIONER

## 2025-08-04 ENCOUNTER — TELEPHONE (OUTPATIENT)
Dept: ENDOCRINOLOGY | Facility: CLINIC | Age: 60
End: 2025-08-04
Payer: MEDICAID

## 2025-08-05 ENCOUNTER — LAB VISIT (OUTPATIENT)
Dept: LAB | Facility: HOSPITAL | Age: 60
End: 2025-08-05
Attending: NURSE PRACTITIONER
Payer: MEDICAID

## 2025-08-05 ENCOUNTER — OFFICE VISIT (OUTPATIENT)
Dept: ENDOCRINOLOGY | Facility: CLINIC | Age: 60
End: 2025-08-05
Payer: MEDICAID

## 2025-08-05 VITALS
SYSTOLIC BLOOD PRESSURE: 162 MMHG | BODY MASS INDEX: 42.08 KG/M2 | HEART RATE: 103 BPM | WEIGHT: 187.06 LBS | HEIGHT: 56 IN | DIASTOLIC BLOOD PRESSURE: 80 MMHG

## 2025-08-05 DIAGNOSIS — E78.5 HYPERLIPIDEMIA, UNSPECIFIED HYPERLIPIDEMIA TYPE: ICD-10-CM

## 2025-08-05 DIAGNOSIS — I10 PRIMARY HYPERTENSION: ICD-10-CM

## 2025-08-05 DIAGNOSIS — E11.42 TYPE 2 DIABETES MELLITUS WITH DIABETIC POLYNEUROPATHY, WITH LONG-TERM CURRENT USE OF INSULIN: ICD-10-CM

## 2025-08-05 DIAGNOSIS — N18.31 STAGE 3A CHRONIC KIDNEY DISEASE: ICD-10-CM

## 2025-08-05 DIAGNOSIS — Z79.4 TYPE 2 DIABETES MELLITUS WITH DIABETIC POLYNEUROPATHY, WITH LONG-TERM CURRENT USE OF INSULIN: Primary | ICD-10-CM

## 2025-08-05 DIAGNOSIS — F10.20 ALCOHOL USE DISORDER, SEVERE, DEPENDENCE: ICD-10-CM

## 2025-08-05 DIAGNOSIS — R35.89 POLYURIA: ICD-10-CM

## 2025-08-05 DIAGNOSIS — E11.42 TYPE 2 DIABETES MELLITUS WITH DIABETIC POLYNEUROPATHY, WITH LONG-TERM CURRENT USE OF INSULIN: Primary | ICD-10-CM

## 2025-08-05 DIAGNOSIS — Z79.4 TYPE 2 DIABETES MELLITUS WITH DIABETIC POLYNEUROPATHY, WITH LONG-TERM CURRENT USE OF INSULIN: ICD-10-CM

## 2025-08-05 LAB
BACTERIA #/AREA URNS HPF: ABNORMAL /HPF
BILIRUB UR QL STRIP.AUTO: NEGATIVE
CLARITY UR: CLEAR
COLOR UR AUTO: YELLOW
GLUCOSE SERPL-MCNC: 122 MG/DL (ref 70–110)
GLUCOSE UR QL STRIP: ABNORMAL
HGB UR QL STRIP: ABNORMAL
HYALINE CASTS #/AREA URNS LPF: 0 /LPF (ref 0–1)
KETONES UR QL STRIP: NEGATIVE
LEUKOCYTE ESTERASE UR QL STRIP: NEGATIVE
MICROSCOPIC COMMENT: ABNORMAL
NITRITE UR QL STRIP: NEGATIVE
PH UR STRIP: 7 [PH]
PROT UR QL STRIP: ABNORMAL
RBC #/AREA URNS HPF: 3 /HPF (ref 0–4)
SP GR UR STRIP: 1.02
SQUAMOUS #/AREA URNS HPF: 5 /HPF
WBC #/AREA URNS HPF: 5 /HPF (ref 0–5)
YEAST URNS QL MICRO: ABNORMAL /HPF

## 2025-08-05 PROCEDURE — 99214 OFFICE O/P EST MOD 30 MIN: CPT | Mod: PBBFAC,PO | Performed by: NURSE PRACTITIONER

## 2025-08-05 PROCEDURE — 99214 OFFICE O/P EST MOD 30 MIN: CPT | Mod: S$PBB,,, | Performed by: NURSE PRACTITIONER

## 2025-08-05 PROCEDURE — 3066F NEPHROPATHY DOC TX: CPT | Mod: CPTII,,, | Performed by: NURSE PRACTITIONER

## 2025-08-05 PROCEDURE — 99999 PR PBB SHADOW E&M-EST. PATIENT-LVL IV: CPT | Mod: PBBFAC,,, | Performed by: NURSE PRACTITIONER

## 2025-08-05 PROCEDURE — 4010F ACE/ARB THERAPY RXD/TAKEN: CPT | Mod: CPTII,,, | Performed by: NURSE PRACTITIONER

## 2025-08-05 PROCEDURE — 3062F POS MACROALBUMINURIA REV: CPT | Mod: CPTII,,, | Performed by: NURSE PRACTITIONER

## 2025-08-05 PROCEDURE — 3079F DIAST BP 80-89 MM HG: CPT | Mod: CPTII,,, | Performed by: NURSE PRACTITIONER

## 2025-08-05 PROCEDURE — 1159F MED LIST DOCD IN RCRD: CPT | Mod: CPTII,,, | Performed by: NURSE PRACTITIONER

## 2025-08-05 PROCEDURE — 81003 URINALYSIS AUTO W/O SCOPE: CPT | Mod: PO

## 2025-08-05 PROCEDURE — 82962 GLUCOSE BLOOD TEST: CPT | Mod: PBBFAC,PO | Performed by: NURSE PRACTITIONER

## 2025-08-05 PROCEDURE — 3077F SYST BP >= 140 MM HG: CPT | Mod: CPTII,,, | Performed by: NURSE PRACTITIONER

## 2025-08-05 PROCEDURE — 99999PBSHW POCT GLUCOSE, HAND-HELD DEVICE: Mod: PBBFAC,,,

## 2025-08-05 PROCEDURE — 3008F BODY MASS INDEX DOCD: CPT | Mod: CPTII,,, | Performed by: NURSE PRACTITIONER

## 2025-08-05 PROCEDURE — 3044F HG A1C LEVEL LT 7.0%: CPT | Mod: CPTII,,, | Performed by: NURSE PRACTITIONER
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
## 2025-08-11 ENCOUNTER — TELEPHONE (OUTPATIENT)
Dept: NEPHROLOGY | Facility: CLINIC | Age: 60
End: 2025-08-11
Payer: MEDICAID

## 2025-08-18 ENCOUNTER — TELEPHONE (OUTPATIENT)
Dept: ENDOCRINOLOGY | Facility: CLINIC | Age: 60
End: 2025-08-18
Payer: MEDICAID

## 2025-08-19 ENCOUNTER — PATIENT MESSAGE (OUTPATIENT)
Dept: ADMINISTRATIVE | Facility: HOSPITAL | Age: 60
End: 2025-08-19
Payer: MEDICAID

## 2025-09-05 ENCOUNTER — LAB VISIT (OUTPATIENT)
Dept: PRIMARY CARE CLINIC | Facility: CLINIC | Age: 60
End: 2025-09-05
Payer: MEDICAID

## 2025-09-05 DIAGNOSIS — Z79.4 TYPE 2 DIABETES MELLITUS WITH DIABETIC POLYNEUROPATHY, WITH LONG-TERM CURRENT USE OF INSULIN: ICD-10-CM

## 2025-09-05 DIAGNOSIS — E11.42 TYPE 2 DIABETES MELLITUS WITH DIABETIC POLYNEUROPATHY, WITH LONG-TERM CURRENT USE OF INSULIN: ICD-10-CM

## (undated) DEVICE — TRAY NERVE BLOCK

## (undated) DEVICE — NDL SPINAL SPINOCAN 22GX3.5

## (undated) DEVICE — CANNULA CVD 100MM X 20G

## (undated) DEVICE — PACK OPHTHALMIC

## (undated) DEVICE — SEE MEDLINE ITEM 152622

## (undated) DEVICE — SOL BETADINE 5%

## (undated) DEVICE — PACK EYE CUSTOM COVINGTON.

## (undated) DEVICE — APPLICATOR CHLORAPREP CLR 10.5

## (undated) DEVICE — SEE MEDLINE ITEM 157131

## (undated) DEVICE — MARKER SKIN STND TIP BLUE BARR

## (undated) DEVICE — SHIELD COLLAGEN 12HR CORNEAL

## (undated) DEVICE — SYR 3CC LUER LOC

## (undated) DEVICE — TIP PHACO 45 DEGREE KELMAN

## (undated) DEVICE — GLOVE SURGICAL LATEX SZ 7

## (undated) DEVICE — TOWEL OR NONABSORB ADH 17X26

## (undated) DEVICE — SPONGE WEC CEL SPEARS

## (undated) DEVICE — TIP I/A CURVED SINGLE USE

## (undated) DEVICE — GLOVE PROTEXIS HYDROGEL SZ8

## (undated) DEVICE — COVER SURG LIGHT HANDLE

## (undated) DEVICE — NDL 18GA X1 1/2 REG BEVEL

## (undated) DEVICE — SOL IRR BSS OPHTH 500ML STRL

## (undated) DEVICE — PAD ELECTROSURGICAL PAT PLATE

## (undated) DEVICE — SYR DISP LL 5CC

## (undated) DEVICE — CYSTOTOME IRRIG 24G 13MM

## (undated) DEVICE — GOWN SURG 2XL DISP TIE BACK